# Patient Record
Sex: MALE | Race: WHITE | Employment: OTHER | URBAN - METROPOLITAN AREA
[De-identification: names, ages, dates, MRNs, and addresses within clinical notes are randomized per-mention and may not be internally consistent; named-entity substitution may affect disease eponyms.]

---

## 2018-03-26 ENCOUNTER — APPOINTMENT (OUTPATIENT)
Dept: GENERAL RADIOLOGY | Age: 78
DRG: 193 | End: 2018-03-26
Payer: COMMERCIAL

## 2018-03-26 ENCOUNTER — HOSPITAL ENCOUNTER (INPATIENT)
Age: 78
LOS: 3 days | Discharge: HOME | DRG: 193 | End: 2018-03-29
Attending: EMERGENCY MEDICINE | Admitting: INTERNAL MEDICINE
Payer: COMMERCIAL

## 2018-03-26 DIAGNOSIS — J44.1 COPD EXACERBATION (HCC): ICD-10-CM

## 2018-03-26 PROBLEM — R06.00 DYSPNEA: Status: ACTIVE | Noted: 2018-03-26

## 2018-03-26 LAB
ALBUMIN SERPL-MCNC: 3.7 G/DL (ref 3.5–5.2)
ALP BLD-CCNC: 76 U/L (ref 40–129)
ALT SERPL-CCNC: 18 U/L (ref 0–40)
ANION GAP SERPL CALCULATED.3IONS-SCNC: 13 MMOL/L (ref 7–16)
APTT: 26.6 SEC (ref 24.5–35.1)
AST SERPL-CCNC: 21 U/L (ref 0–39)
B.E.: 0.8 MMOL/L (ref -3–3)
BACTERIA: NORMAL /HPF
BASOPHILS ABSOLUTE: 0.03 E9/L (ref 0–0.2)
BASOPHILS RELATIVE PERCENT: 0.3 % (ref 0–2)
BILIRUB SERPL-MCNC: 0.8 MG/DL (ref 0–1.2)
BILIRUBIN URINE: NEGATIVE
BLOOD, URINE: ABNORMAL
BUN BLDV-MCNC: 9 MG/DL (ref 8–23)
CALCIUM SERPL-MCNC: 9.4 MG/DL (ref 8.6–10.2)
CHLORIDE BLD-SCNC: 98 MMOL/L (ref 98–107)
CLARITY: CLEAR
CO2: 26 MMOL/L (ref 22–29)
COHB: 1.6 % (ref 0–1.5)
COLOR: YELLOW
CREAT SERPL-MCNC: 1.1 MG/DL (ref 0.7–1.2)
CRITICAL: ABNORMAL
DATE ANALYZED: ABNORMAL
DATE OF COLLECTION: ABNORMAL
EOSINOPHILS ABSOLUTE: 0.16 E9/L (ref 0.05–0.5)
EOSINOPHILS RELATIVE PERCENT: 1.8 % (ref 0–6)
GFR AFRICAN AMERICAN: >60
GFR NON-AFRICAN AMERICAN: >60 ML/MIN/1.73
GLUCOSE BLD-MCNC: 112 MG/DL (ref 74–109)
GLUCOSE URINE: NEGATIVE MG/DL
HCO3: 24.4 MMOL/L (ref 22–26)
HCT VFR BLD CALC: 46.4 % (ref 37–54)
HEMOGLOBIN: 15.2 G/DL (ref 12.5–16.5)
HHB: 3.8 % (ref 0–5)
IMMATURE GRANULOCYTES #: 0.06 E9/L
IMMATURE GRANULOCYTES %: 0.7 % (ref 0–5)
INFLUENZA A BY PCR: DETECTED
INFLUENZA B BY PCR: NOT DETECTED
INR BLD: 1
KETONES, URINE: NEGATIVE MG/DL
LAB: ABNORMAL
LACTIC ACID: 1.5 MMOL/L (ref 0.5–2.2)
LEUKOCYTE ESTERASE, URINE: NEGATIVE
LIPASE: 26 U/L (ref 13–60)
LYMPHOCYTES ABSOLUTE: 0.7 E9/L (ref 1.5–4)
LYMPHOCYTES RELATIVE PERCENT: 7.9 % (ref 20–42)
Lab: 1045
MCH RBC QN AUTO: 30.5 PG (ref 26–35)
MCHC RBC AUTO-ENTMCNC: 32.8 % (ref 32–34.5)
MCV RBC AUTO: 93.2 FL (ref 80–99.9)
METHB: 0.1 % (ref 0–1.5)
MODE: ABNORMAL
MONOCYTES ABSOLUTE: 1.09 E9/L (ref 0.1–0.95)
MONOCYTES RELATIVE PERCENT: 12.3 % (ref 2–12)
NEUTROPHILS ABSOLUTE: 6.82 E9/L (ref 1.8–7.3)
NEUTROPHILS RELATIVE PERCENT: 77 % (ref 43–80)
NITRITE, URINE: NEGATIVE
O2 CONTENT: 18.8 ML/DL
O2 SATURATION: 96.1 % (ref 92–98.5)
O2HB: 94.5 % (ref 94–97)
OPERATOR ID: 3
PATIENT TEMP: 37 C
PCO2: 36 MMHG (ref 35–45)
PDW BLD-RTO: 14.8 FL (ref 11.5–15)
PH BLOOD GAS: 7.45 (ref 7.35–7.45)
PH UA: 5 (ref 5–9)
PLATELET # BLD: 204 E9/L (ref 130–450)
PMV BLD AUTO: 10.6 FL (ref 7–12)
PO2: 77.9 MMHG (ref 60–100)
POTASSIUM SERPL-SCNC: 3.6 MMOL/L (ref 3.5–5)
PRO-BNP: 163 PG/ML (ref 0–450)
PROTEIN UA: NEGATIVE MG/DL
PROTHROMBIN TIME: 11.7 SEC (ref 9.3–12.4)
RBC # BLD: 4.98 E12/L (ref 3.8–5.8)
RBC UA: NORMAL /HPF (ref 0–2)
SODIUM BLD-SCNC: 137 MMOL/L (ref 132–146)
SOURCE, BLOOD GAS: ABNORMAL
SPECIFIC GRAVITY UA: <=1.005 (ref 1–1.03)
THB: 14.1 G/DL (ref 11.5–16.5)
TIME ANALYZED: 1051
TOTAL PROTEIN: 6.5 G/DL (ref 6.4–8.3)
UROBILINOGEN, URINE: 0.2 E.U./DL
WBC # BLD: 8.9 E9/L (ref 4.5–11.5)
WBC UA: NORMAL /HPF (ref 0–5)

## 2018-03-26 PROCEDURE — 80053 COMPREHEN METABOLIC PANEL: CPT

## 2018-03-26 PROCEDURE — 87450 HC DIRECT STREP B ANTIGEN: CPT

## 2018-03-26 PROCEDURE — 82805 BLOOD GASES W/O2 SATURATION: CPT

## 2018-03-26 PROCEDURE — 85610 PROTHROMBIN TIME: CPT

## 2018-03-26 PROCEDURE — 81001 URINALYSIS AUTO W/SCOPE: CPT

## 2018-03-26 PROCEDURE — 96374 THER/PROPH/DIAG INJ IV PUSH: CPT

## 2018-03-26 PROCEDURE — 83605 ASSAY OF LACTIC ACID: CPT

## 2018-03-26 PROCEDURE — 71045 X-RAY EXAM CHEST 1 VIEW: CPT

## 2018-03-26 PROCEDURE — 2580000003 HC RX 258: Performed by: EMERGENCY MEDICINE

## 2018-03-26 PROCEDURE — 87502 INFLUENZA DNA AMP PROBE: CPT

## 2018-03-26 PROCEDURE — 85730 THROMBOPLASTIN TIME PARTIAL: CPT

## 2018-03-26 PROCEDURE — 94640 AIRWAY INHALATION TREATMENT: CPT

## 2018-03-26 PROCEDURE — 36415 COLL VENOUS BLD VENIPUNCTURE: CPT

## 2018-03-26 PROCEDURE — 99285 EMERGENCY DEPT VISIT HI MDM: CPT

## 2018-03-26 PROCEDURE — 87040 BLOOD CULTURE FOR BACTERIA: CPT

## 2018-03-26 PROCEDURE — 83690 ASSAY OF LIPASE: CPT

## 2018-03-26 PROCEDURE — 6370000000 HC RX 637 (ALT 250 FOR IP): Performed by: INTERNAL MEDICINE

## 2018-03-26 PROCEDURE — 1200000000 HC SEMI PRIVATE

## 2018-03-26 PROCEDURE — 6370000000 HC RX 637 (ALT 250 FOR IP): Performed by: EMERGENCY MEDICINE

## 2018-03-26 PROCEDURE — 85025 COMPLETE CBC W/AUTO DIFF WBC: CPT

## 2018-03-26 PROCEDURE — 96375 TX/PRO/DX INJ NEW DRUG ADDON: CPT

## 2018-03-26 PROCEDURE — 6360000002 HC RX W HCPCS: Performed by: EMERGENCY MEDICINE

## 2018-03-26 PROCEDURE — 83880 ASSAY OF NATRIURETIC PEPTIDE: CPT

## 2018-03-26 PROCEDURE — 87088 URINE BACTERIA CULTURE: CPT

## 2018-03-26 RX ORDER — 0.9 % SODIUM CHLORIDE 0.9 %
1000 INTRAVENOUS SOLUTION INTRAVENOUS ONCE
Status: COMPLETED | OUTPATIENT
Start: 2018-03-26 | End: 2018-03-26

## 2018-03-26 RX ORDER — SIMVASTATIN 20 MG
20 TABLET ORAL DAILY
Status: DISCONTINUED | OUTPATIENT
Start: 2018-03-27 | End: 2018-03-29 | Stop reason: HOSPADM

## 2018-03-26 RX ORDER — ONDANSETRON 2 MG/ML
4 INJECTION INTRAMUSCULAR; INTRAVENOUS ONCE
Status: COMPLETED | OUTPATIENT
Start: 2018-03-26 | End: 2018-03-26

## 2018-03-26 RX ORDER — ALBUTEROL SULFATE 2.5 MG/3ML
2.5 SOLUTION RESPIRATORY (INHALATION) EVERY 6 HOURS PRN
COMMUNITY

## 2018-03-26 RX ORDER — ACETAMINOPHEN 325 MG/1
650 TABLET ORAL ONCE
Status: COMPLETED | OUTPATIENT
Start: 2018-03-26 | End: 2018-03-26

## 2018-03-26 RX ORDER — OSELTAMIVIR PHOSPHATE 75 MG/1
75 CAPSULE ORAL 2 TIMES DAILY
Status: DISCONTINUED | OUTPATIENT
Start: 2018-03-26 | End: 2018-03-26 | Stop reason: SDUPTHER

## 2018-03-26 RX ORDER — IPRATROPIUM BROMIDE AND ALBUTEROL SULFATE 2.5; .5 MG/3ML; MG/3ML
1 SOLUTION RESPIRATORY (INHALATION)
Status: DISCONTINUED | OUTPATIENT
Start: 2018-03-26 | End: 2018-03-28

## 2018-03-26 RX ORDER — AZITHROMYCIN 250 MG/1
500 TABLET, FILM COATED ORAL ONCE
Status: COMPLETED | OUTPATIENT
Start: 2018-03-26 | End: 2018-03-26

## 2018-03-26 RX ORDER — OSELTAMIVIR PHOSPHATE 75 MG/1
75 CAPSULE ORAL 2 TIMES DAILY
Status: DISCONTINUED | OUTPATIENT
Start: 2018-03-26 | End: 2018-03-29 | Stop reason: HOSPADM

## 2018-03-26 RX ORDER — PREDNISONE 1 MG/1
15 TABLET ORAL DAILY
Status: ON HOLD | COMMUNITY
End: 2019-07-25 | Stop reason: HOSPADM

## 2018-03-26 RX ORDER — ASPIRIN 81 MG/1
81 TABLET, CHEWABLE ORAL DAILY
Status: DISCONTINUED | OUTPATIENT
Start: 2018-03-26 | End: 2018-03-29 | Stop reason: HOSPADM

## 2018-03-26 RX ORDER — IPRATROPIUM BROMIDE AND ALBUTEROL SULFATE 2.5; .5 MG/3ML; MG/3ML
1 SOLUTION RESPIRATORY (INHALATION)
Status: DISCONTINUED | OUTPATIENT
Start: 2018-03-26 | End: 2018-03-27

## 2018-03-26 RX ADMIN — SODIUM CHLORIDE 1000 ML: 9 INJECTION, SOLUTION INTRAVENOUS at 11:30

## 2018-03-26 RX ADMIN — AZITHROMYCIN 500 MG: 250 TABLET, FILM COATED ORAL at 09:56

## 2018-03-26 RX ADMIN — SODIUM CHLORIDE 1000 ML: 9 INJECTION, SOLUTION INTRAVENOUS at 09:06

## 2018-03-26 RX ADMIN — ACETAMINOPHEN 650 MG: 325 TABLET, FILM COATED ORAL at 09:06

## 2018-03-26 RX ADMIN — ONDANSETRON 4 MG: 2 INJECTION INTRAMUSCULAR; INTRAVENOUS at 10:05

## 2018-03-26 RX ADMIN — IPRATROPIUM BROMIDE AND ALBUTEROL SULFATE 1 AMPULE: .5; 3 SOLUTION RESPIRATORY (INHALATION) at 11:24

## 2018-03-26 RX ADMIN — IPRATROPIUM BROMIDE AND ALBUTEROL SULFATE 1 AMPULE: .5; 3 SOLUTION RESPIRATORY (INHALATION) at 14:43

## 2018-03-26 RX ADMIN — MOMETASONE FUROATE AND FORMOTEROL FUMARATE DIHYDRATE 2 PUFF: 200; 5 AEROSOL RESPIRATORY (INHALATION) at 18:54

## 2018-03-26 RX ADMIN — OSELTAMIVIR PHOSPHATE 75 MG: 75 CAPSULE ORAL at 11:30

## 2018-03-26 RX ADMIN — IPRATROPIUM BROMIDE AND ALBUTEROL SULFATE 1 AMPULE: .5; 3 SOLUTION RESPIRATORY (INHALATION) at 18:45

## 2018-03-26 RX ADMIN — WATER 2 G: 1 INJECTION INTRAMUSCULAR; INTRAVENOUS; SUBCUTANEOUS at 09:57

## 2018-03-26 RX ADMIN — ASPIRIN 81 MG 81 MG: 81 TABLET ORAL at 14:24

## 2018-03-26 ASSESSMENT — ENCOUNTER SYMPTOMS
SHORTNESS OF BREATH: 1
SPUTUM PRODUCTION: 1
CHEST TIGHTNESS: 1
NAUSEA: 0
WHEEZING: 0
EYES NEGATIVE: 1
SINUS PAIN: 0
SWOLLEN GLANDS: 0
DIARRHEA: 0
VOMITING: 0
ABDOMINAL PAIN: 0
COUGH: 1

## 2018-03-26 ASSESSMENT — PAIN SCALES - GENERAL
PAINLEVEL_OUTOF10: 0

## 2018-03-26 NOTE — CARE COORDINATION
Discharge Planning:    Met with pt & pt's wife Ashley Campos, in the ED regarding discharge planning. Pt refused to complete assessment with SW, SW asked if assessment could be completed by pt's wife Ashley Campos pt relayed that he can complete assessment. Pt lives with his wife, in a 1 story home, with 2 steps to get into the home. DME pt owns is O2 received through [address]. Pt has never received HHC & never been to rehabilitation facility. Pt is independent at home and does drive. Pt receives assistance from his wife. Pharmacy pt prefers is [facility] on [address]. Pt relays no concerns for returning home at this time. SW will follow.     Electronically signed by MELINA Brooks on 3/26/2018 at 10:52 AM

## 2018-03-26 NOTE — ED PROVIDER NOTES
Patient had be helped from his vehicle by nursing staff. He states that he became short of breath last night and is extremely weak. He has history of COPD and states he chronically wears 2 L of oxygen at home. He states that the worsening in his breathing began last evening and was not improved with taking his inhalers. He last took his inhalers this morning. He denies any chest pain but states he has tightness. He's had a productive cough. He denies fever or chills. He admits to being generally weak. The history is provided by the patient. Shortness of Breath   Severity:  Moderate  Onset quality:  Gradual  Duration:  12 hours  Timing:  Constant  Progression:  Worsening  Chronicity:  Recurrent  Context: URI    Relieved by:  Nothing  Worsened by: Activity  Ineffective treatments:  Inhaler  Associated symptoms: cough and sputum production    Associated symptoms: no abdominal pain, no chest pain, no diaphoresis, no ear pain, no fever, no headaches, no swollen glands, no vomiting and no wheezing    Risk factors: tobacco use (prior history)    Risk factors: no hx of PE/DVT        Review of Systems   Constitutional: Positive for activity change, appetite change and fatigue. Negative for diaphoresis and fever. HENT: Negative for congestion, ear pain and sinus pain. Eyes: Negative. Respiratory: Positive for cough, sputum production, chest tightness and shortness of breath. Negative for wheezing. Cardiovascular: Negative for chest pain, palpitations and leg swelling. Gastrointestinal: Negative for abdominal pain, diarrhea, nausea and vomiting. Genitourinary: Negative for decreased urine volume and flank pain. Musculoskeletal: Negative. Skin: Negative. Neurological: Positive for weakness (generalized). Negative for light-headedness and headaches. Physical Exam   Constitutional: He is oriented to person, place, and time. He appears well-developed and well-nourished. No distress. HENT:   Head: Normocephalic and atraumatic. Eyes: Conjunctivae and EOM are normal. Pupils are equal, round, and reactive to light. Neck: Normal range of motion. Neck supple. Cardiovascular: Regular rhythm, normal heart sounds and intact distal pulses. No murmur heard. Mild tachycardia   Pulmonary/Chest: Effort normal and breath sounds normal. No respiratory distress. He has no wheezes. He has no rales. He exhibits no tenderness. Abdominal: Soft. Bowel sounds are normal. There is no tenderness. There is no rebound and no guarding. Musculoskeletal: He exhibits no edema. Very weak, requiring two people to pull him to the sitting position. Lymphadenopathy:     He has no cervical adenopathy. Neurological: He is alert and oriented to person, place, and time. No cranial nerve deficit. Coordination normal.   Skin: Skin is warm and dry. No rash noted. He is not diaphoretic. No erythema. No pallor. Psychiatric: He has a normal mood and affect. His behavior is normal. Judgment and thought content normal.   Nursing note and vitals reviewed. Procedures    MDM    ED Course      EKG Interpretation    Interpreted by emergency department physician    ED Course        --------------------------------------------- PAST HISTORY ---------------------------------------------  Past Medical History:  has a past medical history of COPD (chronic obstructive pulmonary disease) ([facility] [address] and Hyperlipidemia. Past Surgical History:  has a past surgical history that includes Hemorrhoid surgery; eye surgery (Right); and Colonoscopy. Social History:  reports that he quit smoking about 8 years ago. He has quit using smokeless tobacco. He reports that he drinks about 0.6 oz of alcohol per week . He reports that he does not use drugs. Family History: family history is not on file. The patients home medications have been reviewed.     Allergies: Patient has no known allergies. -------------------------------------------------- RESULTS -------------------------------------------------    Lab  Results for orders placed or performed during the hospital encounter of 03/26/18   Culture blood #1   Result Value Ref Range    Blood Culture, Routine 5 Days- no growth    Culture blood #2   Result Value Ref Range    Culture, Blood 2 5 Days- no growth    Urine culture   Result Value Ref Range    Urine Culture, Routine <10,000 CFU/mL  Mixed gram positive organisms      Rapid influenza A/B antigens   Result Value Ref Range    Influenza A by PCR DETECTED (A) Not Detected    Influenza B by PCR Not Detected Not Detected   LEGIONELLA ANTIGEN, URINE   Result Value Ref Range    L. pneumophila Serogp 1 Ur Ag       Presumptive NEGATIVE suggesting no recent or current infections  with Legionella pneumophilia serogroup 1. Infection to  Legionella cannot be ruled out since other serogroups and  species may cause infection, antigen may not be present in  early infection, or level of antigen may be below the  detection limit. STREP PNEUMONIAE ANTIGEN   Result Value Ref Range    STREP PNEUMONIAE ANTIGEN, URINE       Presumptive NEGATIVE for Pneumococcal pneumonia, suggesting  no current or recent pneumococcal infection.  Infection due  to S. pneumoniae cannot be ruled out since the antigen present  in the sample may be below the detection limit of the test.     CBC auto differential   Result Value Ref Range    WBC 8.9 4.5 - 11.5 E9/L    RBC 4.98 3.80 - 5.80 E12/L    Hemoglobin 15.2 12.5 - 16.5 g/dL    Hematocrit 46.4 37.0 - 54.0 %    MCV 93.2 80.0 - 99.9 fL    MCH 30.5 26.0 - 35.0 pg    MCHC 32.8 32.0 - 34.5 %    RDW 14.8 11.5 - 15.0 fL    Platelets 274 856 - 031 E9/L    MPV 10.6 7.0 - 12.0 fL    Neutrophils % 77.0 43.0 - 80.0 %    Immature Granulocytes % 0.7 0.0 - 5.0 %    Lymphocytes % 7.9 (L) 20.0 - 42.0 %    Monocytes % 12.3 (H) 2.0 - 12.0 %    Eosinophils % 1.8 0.0 - 6.0 %    Basophils % 0.3 0.0 0.0 - 1.5 %    MetHb 0.1 0.0 - 1.5 %    O2 Content 18.8 mL/dL    HHb 3.8 0.0 - 5.0 %    tHb (est) 14.1 11.5 - 16.5 g/dL    Mode NC- 2 L     Date Of Collection [id number]     Time Collected 1045     Pt Temp 37.0 C     ID [id number]     Lab [id number]     Critical(s) Notified .  No Critical Values    Brain Natriuretic Peptide   Result Value Ref Range    Pro- 0 - 450 pg/mL   Microscopic Urinalysis   Result Value Ref Range    WBC, UA NONE 0 - 5 /HPF    RBC, UA NONE 0 - 2 /HPF    Bacteria, UA NONE /HPF   CBC WITH AUTO DIFFERENTIAL   Result Value Ref Range    WBC 7.3 4.5 - 11.5 E9/L    RBC 4.32 3.80 - 5.80 E12/L    Hemoglobin 13.2 12.5 - 16.5 g/dL    Hematocrit 41.2 37.0 - 54.0 %    MCV 95.4 80.0 - 99.9 fL    MCH 30.6 26.0 - 35.0 pg    MCHC 32.0 32.0 - 34.5 %    RDW 15.0 11.5 - 15.0 fL    Platelets 931 332 - 762 E9/L    MPV 10.5 7.0 - 12.0 fL    Neutrophils % 68.2 43.0 - 80.0 %    Immature Granulocytes % 0.6 0.0 - 5.0 %    Lymphocytes % 16.4 (L) 20.0 - 42.0 %    Monocytes % 11.7 2.0 - 12.0 %    Eosinophils % 2.8 0.0 - 6.0 %    Basophils % 0.3 0.0 - 2.0 %    Neutrophils # 4.95 1.80 - 7.30 E9/L    Immature Granulocytes # 0.04 E9/L    Lymphocytes # 1.19 (L) 1.50 - 4.00 E9/L    Monocytes # 0.85 0.10 - 0.95 E9/L    Eosinophils # 0.20 0.05 - 0.50 E9/L    Basophils # 0.02 0.00 - 0.20 E9/L   Comprehensive metabolic panel   Result Value Ref Range    Sodium 136 132 - 146 mmol/L    Potassium 3.9 3.5 - 5.0 mmol/L    Chloride 99 98 - 107 mmol/L    CO2 29 22 - 29 mmol/L    Anion Gap 8 7 - 16 mmol/L    Glucose 81 74 - 109 mg/dL    BUN 11 8 - 23 mg/dL    CREATININE 1.0 0.7 - 1.2 mg/dL    GFR Non-African American >60 >=60 mL/min/1.73    GFR African American >60     Calcium 8.8 8.6 - 10.2 mg/dL    Total Protein 5.8 (L) 6.4 - 8.3 g/dL    Alb 3.3 (L) 3.5 - 5.2 g/dL    Total Bilirubin 0.5 0.0 - 1.2 mg/dL    Alkaline Phosphatase 59 40 - 129 U/L    ALT 15 0 - 40 U/L    AST 20 0 - 39 U/L   CBC Auto Differential   Result Value Ref Range    WBC # 0.03 E9/L    Lymphocytes # 1.65 1.50 - 4.00 E9/L    Monocytes # 0.78 0.10 - 0.95 E9/L    Eosinophils # 0.30 0.05 - 0.50 E9/L    Basophils # 0.03 0.00 - 0.20 E9/L   Comprehensive Metabolic Panel   Result Value Ref Range    Sodium 142 132 - 146 mmol/L    Potassium 3.7 3.5 - 5.0 mmol/L    Chloride 99 98 - 107 mmol/L    CO2 32 (H) 22 - 29 mmol/L    Anion Gap 11 7 - 16 mmol/L    Glucose 93 74 - 109 mg/dL    BUN 9 8 - 23 mg/dL    CREATININE 0.9 0.7 - 1.2 mg/dL    GFR Non-African American >60 >=60 mL/min/1.73    GFR African American >60     Calcium 9.0 8.6 - 10.2 mg/dL    Total Protein 6.2 (L) 6.4 - 8.3 g/dL    Alb 3.6 3.5 - 5.2 g/dL    Total Bilirubin 0.5 0.0 - 1.2 mg/dL    Alkaline Phosphatase 58 40 - 129 U/L    ALT 17 0 - 40 U/L    AST 24 0 - 39 U/L   EKG 12 Lead   Result Value Ref Range    Ventricular Rate 94 BPM    Atrial Rate 94 BPM    P-R Interval 164 ms    QRS Duration 110 ms    Q-T Interval 354 ms    QTc Calculation (Bazett) 442 ms    P Axis 56 degrees    R Axis -15 degrees    T Axis 63 degrees       Radiology  XR CHEST PORTABLE   Final Result   Small right basilar focal area of airspace disease, atelectasis or   developing pneumonia. EKG: This EKG is signed and interpreted by me.        ------------------------- NURSING NOTES AND VITALS REVIEWED ---------------------------  Date / Time Roomed:  3/26/2018  8:30 AM  ED Bed Assignment:  0420/0420-01    The nursing notes within the ED encounter and vital signs as below have been reviewed. No data found. Oxygen Saturation Interpretation: Abnormal and Improved after treatment      ------------------------------------------ PROGRESS NOTES ------------------------------------------  Re-evaluation(s):  Patient is improving with breathing treatments however he continues to be very weak.     I have spoken with the patient and wife and discussed todays results, in addition to providing specific details for the plan of care and counseling regarding the

## 2018-03-27 LAB
ALBUMIN SERPL-MCNC: 3.3 G/DL (ref 3.5–5.2)
ALP BLD-CCNC: 59 U/L (ref 40–129)
ALT SERPL-CCNC: 15 U/L (ref 0–40)
ANION GAP SERPL CALCULATED.3IONS-SCNC: 8 MMOL/L (ref 7–16)
AST SERPL-CCNC: 20 U/L (ref 0–39)
BASOPHILS ABSOLUTE: 0.02 E9/L (ref 0–0.2)
BASOPHILS RELATIVE PERCENT: 0.3 % (ref 0–2)
BILIRUB SERPL-MCNC: 0.5 MG/DL (ref 0–1.2)
BUN BLDV-MCNC: 11 MG/DL (ref 8–23)
CALCIUM SERPL-MCNC: 8.8 MG/DL (ref 8.6–10.2)
CHLORIDE BLD-SCNC: 99 MMOL/L (ref 98–107)
CO2: 29 MMOL/L (ref 22–29)
CREAT SERPL-MCNC: 1 MG/DL (ref 0.7–1.2)
EOSINOPHILS ABSOLUTE: 0.2 E9/L (ref 0.05–0.5)
EOSINOPHILS RELATIVE PERCENT: 2.8 % (ref 0–6)
GFR AFRICAN AMERICAN: >60
GFR NON-AFRICAN AMERICAN: >60 ML/MIN/1.73
GLUCOSE BLD-MCNC: 81 MG/DL (ref 74–109)
HCT VFR BLD CALC: 41.2 % (ref 37–54)
HEMOGLOBIN: 13.2 G/DL (ref 12.5–16.5)
IMMATURE GRANULOCYTES #: 0.04 E9/L
IMMATURE GRANULOCYTES %: 0.6 % (ref 0–5)
L. PNEUMOPHILA SEROGP 1 UR AG: NORMAL
LV EF: 69 %
LVEF MODALITY: NORMAL
LYMPHOCYTES ABSOLUTE: 1.19 E9/L (ref 1.5–4)
LYMPHOCYTES RELATIVE PERCENT: 16.4 % (ref 20–42)
MCH RBC QN AUTO: 30.6 PG (ref 26–35)
MCHC RBC AUTO-ENTMCNC: 32 % (ref 32–34.5)
MCV RBC AUTO: 95.4 FL (ref 80–99.9)
MONOCYTES ABSOLUTE: 0.85 E9/L (ref 0.1–0.95)
MONOCYTES RELATIVE PERCENT: 11.7 % (ref 2–12)
NEUTROPHILS ABSOLUTE: 4.95 E9/L (ref 1.8–7.3)
NEUTROPHILS RELATIVE PERCENT: 68.2 % (ref 43–80)
PDW BLD-RTO: 15 FL (ref 11.5–15)
PLATELET # BLD: 184 E9/L (ref 130–450)
PMV BLD AUTO: 10.5 FL (ref 7–12)
POTASSIUM SERPL-SCNC: 3.9 MMOL/L (ref 3.5–5)
RBC # BLD: 4.32 E12/L (ref 3.8–5.8)
SODIUM BLD-SCNC: 136 MMOL/L (ref 132–146)
STREP PNEUMONIAE ANTIGEN, URINE: NORMAL
TOTAL PROTEIN: 5.8 G/DL (ref 6.4–8.3)
WBC # BLD: 7.3 E9/L (ref 4.5–11.5)

## 2018-03-27 PROCEDURE — 2580000003 HC RX 258: Performed by: INTERNAL MEDICINE

## 2018-03-27 PROCEDURE — 36415 COLL VENOUS BLD VENIPUNCTURE: CPT

## 2018-03-27 PROCEDURE — 85025 COMPLETE CBC W/AUTO DIFF WBC: CPT

## 2018-03-27 PROCEDURE — 93306 TTE W/DOPPLER COMPLETE: CPT

## 2018-03-27 PROCEDURE — 94640 AIRWAY INHALATION TREATMENT: CPT

## 2018-03-27 PROCEDURE — 93005 ELECTROCARDIOGRAM TRACING: CPT | Performed by: SPECIALIST

## 2018-03-27 PROCEDURE — 6370000000 HC RX 637 (ALT 250 FOR IP): Performed by: INTERNAL MEDICINE

## 2018-03-27 PROCEDURE — 2700000000 HC OXYGEN THERAPY PER DAY

## 2018-03-27 PROCEDURE — 6370000000 HC RX 637 (ALT 250 FOR IP): Performed by: EMERGENCY MEDICINE

## 2018-03-27 PROCEDURE — 6360000002 HC RX W HCPCS: Performed by: INTERNAL MEDICINE

## 2018-03-27 PROCEDURE — 80053 COMPREHEN METABOLIC PANEL: CPT

## 2018-03-27 PROCEDURE — 1200000000 HC SEMI PRIVATE

## 2018-03-27 RX ADMIN — IPRATROPIUM BROMIDE AND ALBUTEROL SULFATE 1 AMPULE: .5; 3 SOLUTION RESPIRATORY (INHALATION) at 09:40

## 2018-03-27 RX ADMIN — OSELTAMIVIR PHOSPHATE 75 MG: 75 CAPSULE ORAL at 07:58

## 2018-03-27 RX ADMIN — PREDNISONE 15 MG: 5 TABLET ORAL at 07:58

## 2018-03-27 RX ADMIN — AZITHROMYCIN MONOHYDRATE 500 MG: 500 INJECTION, POWDER, LYOPHILIZED, FOR SOLUTION INTRAVENOUS at 10:18

## 2018-03-27 RX ADMIN — MOMETASONE FUROATE AND FORMOTEROL FUMARATE DIHYDRATE 2 PUFF: 200; 5 AEROSOL RESPIRATORY (INHALATION) at 06:29

## 2018-03-27 RX ADMIN — IPRATROPIUM BROMIDE AND ALBUTEROL SULFATE 1 AMPULE: .5; 3 SOLUTION RESPIRATORY (INHALATION) at 18:29

## 2018-03-27 RX ADMIN — ASPIRIN 81 MG 81 MG: 81 TABLET ORAL at 07:58

## 2018-03-27 RX ADMIN — MOMETASONE FUROATE AND FORMOTEROL FUMARATE DIHYDRATE 2 PUFF: 200; 5 AEROSOL RESPIRATORY (INHALATION) at 18:37

## 2018-03-27 RX ADMIN — IPRATROPIUM BROMIDE AND ALBUTEROL SULFATE 1 AMPULE: .5; 3 SOLUTION RESPIRATORY (INHALATION) at 06:29

## 2018-03-27 RX ADMIN — OSELTAMIVIR PHOSPHATE 75 MG: 75 CAPSULE ORAL at 00:07

## 2018-03-27 RX ADMIN — SIMVASTATIN 20 MG: 20 TABLET, FILM COATED ORAL at 07:58

## 2018-03-27 RX ADMIN — IPRATROPIUM BROMIDE AND ALBUTEROL SULFATE 1 AMPULE: .5; 3 SOLUTION RESPIRATORY (INHALATION) at 14:29

## 2018-03-27 RX ADMIN — CEFTRIAXONE 1 G: 1 INJECTION, POWDER, FOR SOLUTION INTRAMUSCULAR; INTRAVENOUS at 09:47

## 2018-03-27 RX ADMIN — OSELTAMIVIR PHOSPHATE 75 MG: 75 CAPSULE ORAL at 20:51

## 2018-03-27 ASSESSMENT — PAIN SCALES - GENERAL
PAINLEVEL_OUTOF10: 0
PAINLEVEL_OUTOF10: 0

## 2018-03-28 LAB
ALBUMIN SERPL-MCNC: 3.3 G/DL (ref 3.5–5.2)
ALP BLD-CCNC: 57 U/L (ref 40–129)
ALT SERPL-CCNC: 15 U/L (ref 0–40)
ANION GAP SERPL CALCULATED.3IONS-SCNC: 11 MMOL/L (ref 7–16)
AST SERPL-CCNC: 23 U/L (ref 0–39)
BASOPHILS ABSOLUTE: 0.02 E9/L (ref 0–0.2)
BASOPHILS RELATIVE PERCENT: 0.3 % (ref 0–2)
BILIRUB SERPL-MCNC: 0.6 MG/DL (ref 0–1.2)
BUN BLDV-MCNC: 12 MG/DL (ref 8–23)
CALCIUM SERPL-MCNC: 8.7 MG/DL (ref 8.6–10.2)
CHLORIDE BLD-SCNC: 99 MMOL/L (ref 98–107)
CO2: 31 MMOL/L (ref 22–29)
CREAT SERPL-MCNC: 1 MG/DL (ref 0.7–1.2)
EKG ATRIAL RATE: 94 BPM
EKG P AXIS: 56 DEGREES
EKG P-R INTERVAL: 164 MS
EKG Q-T INTERVAL: 354 MS
EKG QRS DURATION: 110 MS
EKG QTC CALCULATION (BAZETT): 442 MS
EKG R AXIS: -15 DEGREES
EKG T AXIS: 63 DEGREES
EKG VENTRICULAR RATE: 94 BPM
EOSINOPHILS ABSOLUTE: 0.26 E9/L (ref 0.05–0.5)
EOSINOPHILS RELATIVE PERCENT: 3.9 % (ref 0–6)
GFR AFRICAN AMERICAN: >60
GFR NON-AFRICAN AMERICAN: >60 ML/MIN/1.73
GLUCOSE BLD-MCNC: 87 MG/DL (ref 74–109)
HCT VFR BLD CALC: 41.1 % (ref 37–54)
HEMOGLOBIN: 13.2 G/DL (ref 12.5–16.5)
IMMATURE GRANULOCYTES #: 0.05 E9/L
IMMATURE GRANULOCYTES %: 0.8 % (ref 0–5)
LYMPHOCYTES ABSOLUTE: 1.28 E9/L (ref 1.5–4)
LYMPHOCYTES RELATIVE PERCENT: 19.3 % (ref 20–42)
MCH RBC QN AUTO: 30.3 PG (ref 26–35)
MCHC RBC AUTO-ENTMCNC: 32.1 % (ref 32–34.5)
MCV RBC AUTO: 94.5 FL (ref 80–99.9)
MONOCYTES ABSOLUTE: 0.71 E9/L (ref 0.1–0.95)
MONOCYTES RELATIVE PERCENT: 10.7 % (ref 2–12)
NEUTROPHILS ABSOLUTE: 4.32 E9/L (ref 1.8–7.3)
NEUTROPHILS RELATIVE PERCENT: 65 % (ref 43–80)
PDW BLD-RTO: 14.9 FL (ref 11.5–15)
PLATELET # BLD: 188 E9/L (ref 130–450)
PMV BLD AUTO: 10.5 FL (ref 7–12)
POTASSIUM SERPL-SCNC: 3.8 MMOL/L (ref 3.5–5)
RBC # BLD: 4.35 E12/L (ref 3.8–5.8)
SODIUM BLD-SCNC: 141 MMOL/L (ref 132–146)
TOTAL PROTEIN: 6 G/DL (ref 6.4–8.3)
URINE CULTURE, ROUTINE: NORMAL
WBC # BLD: 6.6 E9/L (ref 4.5–11.5)

## 2018-03-28 PROCEDURE — 6370000000 HC RX 637 (ALT 250 FOR IP): Performed by: INTERNAL MEDICINE

## 2018-03-28 PROCEDURE — 36415 COLL VENOUS BLD VENIPUNCTURE: CPT

## 2018-03-28 PROCEDURE — 2700000000 HC OXYGEN THERAPY PER DAY

## 2018-03-28 PROCEDURE — 80053 COMPREHEN METABOLIC PANEL: CPT

## 2018-03-28 PROCEDURE — 94640 AIRWAY INHALATION TREATMENT: CPT

## 2018-03-28 PROCEDURE — 6360000002 HC RX W HCPCS: Performed by: INTERNAL MEDICINE

## 2018-03-28 PROCEDURE — 85025 COMPLETE CBC W/AUTO DIFF WBC: CPT

## 2018-03-28 PROCEDURE — 6370000000 HC RX 637 (ALT 250 FOR IP): Performed by: EMERGENCY MEDICINE

## 2018-03-28 PROCEDURE — 2580000003 HC RX 258

## 2018-03-28 PROCEDURE — 1200000000 HC SEMI PRIVATE

## 2018-03-28 PROCEDURE — 2580000003 HC RX 258: Performed by: INTERNAL MEDICINE

## 2018-03-28 RX ORDER — FAMOTIDINE 20 MG/1
20 TABLET, FILM COATED ORAL 2 TIMES DAILY
Status: DISCONTINUED | OUTPATIENT
Start: 2018-03-28 | End: 2018-03-29 | Stop reason: HOSPADM

## 2018-03-28 RX ORDER — SODIUM CHLORIDE 0.9 % (FLUSH) 0.9 %
SYRINGE (ML) INJECTION
Status: COMPLETED
Start: 2018-03-28 | End: 2018-03-28

## 2018-03-28 RX ORDER — ALBUTEROL SULFATE 2.5 MG/3ML
2.5 SOLUTION RESPIRATORY (INHALATION)
Status: DISCONTINUED | OUTPATIENT
Start: 2018-03-28 | End: 2018-03-29 | Stop reason: HOSPADM

## 2018-03-28 RX ADMIN — CEFTRIAXONE 1 G: 1 INJECTION, POWDER, FOR SOLUTION INTRAMUSCULAR; INTRAVENOUS at 10:34

## 2018-03-28 RX ADMIN — AZITHROMYCIN MONOHYDRATE 500 MG: 500 INJECTION, POWDER, LYOPHILIZED, FOR SOLUTION INTRAVENOUS at 11:10

## 2018-03-28 RX ADMIN — ASPIRIN 81 MG 81 MG: 81 TABLET ORAL at 08:14

## 2018-03-28 RX ADMIN — OSELTAMIVIR PHOSPHATE 75 MG: 75 CAPSULE ORAL at 08:14

## 2018-03-28 RX ADMIN — SIMVASTATIN 20 MG: 20 TABLET, FILM COATED ORAL at 08:13

## 2018-03-28 RX ADMIN — ALBUTEROL SULFATE 2.5 MG: 2.5 SOLUTION RESPIRATORY (INHALATION) at 14:11

## 2018-03-28 RX ADMIN — IPRATROPIUM BROMIDE AND ALBUTEROL SULFATE 1 AMPULE: .5; 3 SOLUTION RESPIRATORY (INHALATION) at 09:34

## 2018-03-28 RX ADMIN — Medication 10 ML: at 10:33

## 2018-03-28 RX ADMIN — IPRATROPIUM BROMIDE AND ALBUTEROL SULFATE 1 AMPULE: .5; 3 SOLUTION RESPIRATORY (INHALATION) at 06:17

## 2018-03-28 RX ADMIN — OSELTAMIVIR PHOSPHATE 75 MG: 75 CAPSULE ORAL at 21:04

## 2018-03-28 RX ADMIN — TIOTROPIUM BROMIDE 18 MCG: 18 CAPSULE ORAL; RESPIRATORY (INHALATION) at 14:23

## 2018-03-28 RX ADMIN — PREDNISONE 15 MG: 5 TABLET ORAL at 08:13

## 2018-03-28 RX ADMIN — MOMETASONE FUROATE AND FORMOTEROL FUMARATE DIHYDRATE 2 PUFF: 200; 5 AEROSOL RESPIRATORY (INHALATION) at 18:22

## 2018-03-28 RX ADMIN — MOMETASONE FUROATE AND FORMOTEROL FUMARATE DIHYDRATE 2 PUFF: 200; 5 AEROSOL RESPIRATORY (INHALATION) at 06:17

## 2018-03-28 RX ADMIN — ALBUTEROL SULFATE 2.5 MG: 2.5 SOLUTION RESPIRATORY (INHALATION) at 18:22

## 2018-03-28 ASSESSMENT — PAIN SCALES - GENERAL
PAINLEVEL_OUTOF10: 0

## 2018-03-28 NOTE — PROGRESS NOTES
Department of Internal Medicine  General Internal Medicine  Attending Progress Note      SUBJECTIVE:    Feels better today. Shortness of breath is better. Occasional cough with yellowish to greenish sputum. No chest pains. Eating and drinking well. Medications    Current Facility-Administered Medications: albuterol (PROVENTIL) nebulizer solution 2.5 mg, 2.5 mg, Nebulization, Q4H WA  tiotropium (SPIRIVA) inhalation capsule 18 mcg, 18 mcg, Inhalation, Daily  famotidine (PEPCID) tablet 20 mg, 20 mg, Oral, BID  oseltamivir (TAMIFLU) capsule 75 mg, 75 mg, Oral, BID  aspirin chewable tablet 81 mg, 81 mg, Oral, Daily  mometasone-formoterol (DULERA) 200-5 MCG/ACT inhaler 2 puff, 2 puff, Inhalation, BID  predniSONE (DELTASONE) tablet 15 mg, 15 mg, Oral, Daily  simvastatin (ZOCOR) tablet 20 mg, 20 mg, Oral, Daily  azithromycin (ZITHROMAX) 500 mg in D5W 250ml Vial Mate, 500 mg, Intravenous, Q24H  enoxaparin (LOVENOX) injection 40 mg, 40 mg, Subcutaneous, Daily  cefTRIAXone (ROCEPHIN) 1 g in dextrose 5 % 50 mL IVPB, 1 g, Intravenous, Q24H    Physical    VITALS:  BP (!) 124/59   Pulse 83   Temp 98.4 °F (36.9 °C) (Oral)   Resp 16   Ht 5' 11\" (1.803 m)   Wt 224 lb 8 oz (101.8 kg)   SpO2 95%   BMI 31.31 kg/m²   TEMPERATURE:  Current - Temp: 98.4 °F (36.9 °C);  Max - Temp  Av.7 °F (37.1 °C)  Min: 98.4 °F (36.9 °C)  Max: 99 °F (37.2 °C)  RESPIRATIONS RANGE: Resp  Av  Min: 16  Max: 16  PULSE RANGE: Pulse  Av.5  Min: 83  Max: 90  BLOOD PRESSURE RANGE:  Systolic (49QXO), ZXR:557 , Min:100 , DPY:064   ; Diastolic (19ULP), GZA:36, Min:59, Max:60    PULSE OXIMETRY RANGE: SpO2  Av.7 %  Min: 93 %  Max: 95 %  24HR INTAKE/OUTPUT:      Intake/Output Summary (Last 24 hours) at 18 1858  Last data filed at 18 1829   Gross per 24 hour   Intake             1240 ml   Output                0 ml   Net             1240 ml       CONSTITUTIONAL:  awake, alert, cooperative, no apparent distress, and appears

## 2018-03-28 NOTE — H&P
[address]                               HISTORY AND PHYSICAL    PATIENT NAME: Cecilia Rodriguez                       :        1940  MED REC NO:   [id number]                            ROOM:       6356  ACCOUNT NO:   [de-identified]                           ADMIT DATE: 2018  PROVIDER:     Blossom Hamman, MD    CHIEF COMPLAINT:  Shortness of breath. HISTORY OF PRESENT ILLNESS:  The patient is a 52-year-old gentleman. He  presented to the emergency room with a week history of having coughing. Yesterday, he started having soreness of his throat and shortness of  breath. He denied having any fevers. He had some chills. No nausea,  vomiting, diarrhea or constipation. No urinary complaints. Denied any  chest pain. He presented to the ER because of persistence of his symptoms. The patient was seen in the emergency room and he had a chest x-ray done,  which revealed evidence of a small right basal airspace disease. His  influenza A was positive. He was given Rocephin and azithromycin  intravenously and Tamiflu 75 mg p.o. x1. He was admitted for further  evaluation. PAST MEDICAL HISTORY:  1. Longstanding COPD. 2.  History of lung nodules. 3.  Hyperlipidemia. 4.  History of abdominal aortic aneurysm 3 cm, a few years ago. Repeat  ultrasound done in  showed no significant aneurysmal dilatation of the  aorta. PAST SURGICAL HISTORY:  1. Right cataract surgery. 2.  Hemorrhoid surgery many years ago. FAMILY HISTORY:  The mother  from coronary artery disease in her  76s. Father  of dementia. Two brother  of cancer of the  lung. Three sisters alive and healthy. He has one son who has diabetes  mellitus type 2. One daughter who is healthy. ALLERGIES:  No known drug allergies.     MEDICATIONS:  Aspirin 81 mg daily, Symbicort 2 puffs twice daily,  prednisone 50 mg daily, simvastatin 20 mg nightly. SOCIAL HISTORY:  The patient has a 50 pack-year history of smoking. He  drinks about 12-pack per week. He is . He is retired and used to  be an . REVIEW OF SYSTEMS:  Unremarkable other than stated in the history of  present illness and past medical history. PHYSICAL EXAMINATION:  GENERAL APPEARANCE:  A 67-year-old gentleman in no acute distress. His  vital signs in the ER, temperature 100.4, pulse 108 per minute, respiratory  rate 20 per minute, blood pressure 149/67, saturation of oxygen 96% on  nasal cannula. HEENT:  Normocephalic, atraumatic. His pupils are equal and reactive  bilaterally. Extraocular movements are intact. No conjunctival pallor or  icterus appreciated. NECK:  Supple. No JVD. No lymphadenopathy, no bruits. CHEST:  Extremely diminished over the bases bilaterally. He has vesicular  breath sounds. ABDOMEN:  Soft, nontender. Bowel sounds are positive. No organomegaly  appreciated. EXTREMITIES:  There is no edema, clubbing or cyanosis. NEUROLOGIC:  Awake, alert, oriented x3 with no focal deficits noted. LABORATORY DATA:  Sodium 137, potassium 3.6, chloride 98, bicarb 26, BUN of  9, creatinine of 1.1. Liver function tests are normal.  White cell count  8.9, hemoglobin 15.2, hematocrit 46.4, platelet count 625,184. ASSESSMENT/PLAN:  1. Influenza A. The patient placed on Tamiflu 75 mg p.o. b.i.d. We will  continue same for now. 2.  COPD with exacerbation. We will keep him on Rocephin and azithromycin  intravenously and aerosol treatments and consult his pulmonologist, Dr. Dontrell Ortez. We will monitor his respiratory status closely. 3.  Right lower lobe airspace disease, rule out pneumonia. We will check  her blood cultures. We will send urine for Legionella and strep pneumoniae  antigens. Keep him on Rocephin and azithromycin intravenously for now. Consult Dr. Dontrell Ortez as stated above.   4.  Chronic respiratory failure, on home oxygen. 5.  Dyspnea secondary to above. Rule out any cardiac etiology. We will  consult Dr. Cody Lantigua. 6.  Hyperlipidemia. 7.  History of lung nodules in the past.  8.  History of tobacco abuse.         Dell Acosta MD    D: 03/27/2018 20:12:45       T: 03/27/2018 20:15:43     [email or web address]  Job#: [id number]     Doc#: [id number]    CC:

## 2018-03-28 NOTE — PROGRESS NOTES
P Quality Flow/Interdisciplinary Rounds Progress Note        Quality Flow Rounds held on March 28, 2018    Disciplines Attending:  Bedside Nurse, ,  and Nursing Unit Leadership    Julian Garcia was admitted on 3/26/2018  8:30 AM    Anticipated Discharge Date:  Expected Discharge Date: 03/29/18    Disposition:    Philipp Score:  Philipp Scale Score: 20    Readmission Risk              Readmission Risk:        14.75       Age 72 or Greater:  1    Admitted from SNF or Requires Paid or Family Care:  0    Currently has CHF,COPD,ARF,CRI,or is on dialysis:  4    Takes more than 5 Prescription Medications:  4    Takes Digoxin,Insulin,Anticoagulants,Narcotics or ASA/Plavix:  [address] in Past 12 Months:  0    On Disability:  0    Patient Considers own Health:  3.75          Discussed patient goal for the day, patient clinical progression, and barriers to discharge.   The following Goal(s) of the Day/Commitment(s) have been identified:  Activity progression, Iso for Flu, has Home O2, IV ATX,        Gabrielle Chappell  March 28, 2018

## 2018-03-28 NOTE — CONSULTS
[address] [place] [address]                                   CONSULTATION    PATIENT NAME: Dada Farris                       :        1940  MED REC NO:   [id number]                            ROOM:       2894  ACCOUNT NO:   [de-identified]                           ADMIT DATE: 2018  PROVIDER:     Sakina Castañeda MD    CONSULT DATE:  2018    HISTORY OF PRESENT ILLNESS:  The patient is a 58-year-old white male whom I  am seeing in consultation at the request of Dr. Jillian Fragoso. The patient had  presented to the hospital because of increasing shortness of breath. Overall, doing poorly. A chest x-ray was done, which has suggested the  possibility of right lower lobe pneumonia. He has been coughing, chest  tightness, chest congestion, and just overall very weak. PAST MEDICAL HISTORY:  He is known to me from previous encounter. He has  had a bronchoscopy on 2018, because of intractable cough. At that  time, I found severe tracheobronchitis, most likely secondary to GERD and  in spite of recommendation, the patient had refused to see a GI specialist  for an upper endoscopy. He has been having problem with GERD that he has  denied intermittently, but I also has spoken with his wife, which she had  told me that he was having problems with that. His other history; known to  have severe COPD, chronic respiratory failure, has been corticosteroid  dependent. Has had deconditioning. SMOKING HABIT:  He did smoke for about 45 years about 1-1/2 pack of  cigarettes per day, quit in . WORK HISTORY:   39 years, retired about 15 years ago. He also  had worked at [employer] _____ at one point for about 3 years. FAMILY HISTORY:  Mother, brother, sister had asthma; emphysema in the  mother and brother; 2 brother had lung cancer. He is not sure, mother had  CHF. Both parents  in their [de-identified].   The

## 2018-03-29 VITALS
WEIGHT: 224.5 LBS | RESPIRATION RATE: 20 BRPM | HEART RATE: 78 BPM | TEMPERATURE: 98.2 F | OXYGEN SATURATION: 94 % | SYSTOLIC BLOOD PRESSURE: 118 MMHG | DIASTOLIC BLOOD PRESSURE: 48 MMHG | HEIGHT: 71 IN | BODY MASS INDEX: 31.43 KG/M2

## 2018-03-29 LAB
ALBUMIN SERPL-MCNC: 3.6 G/DL (ref 3.5–5.2)
ALP BLD-CCNC: 58 U/L (ref 40–129)
ALT SERPL-CCNC: 17 U/L (ref 0–40)
ANION GAP SERPL CALCULATED.3IONS-SCNC: 11 MMOL/L (ref 7–16)
AST SERPL-CCNC: 24 U/L (ref 0–39)
BASOPHILS ABSOLUTE: 0.03 E9/L (ref 0–0.2)
BASOPHILS RELATIVE PERCENT: 0.4 % (ref 0–2)
BILIRUB SERPL-MCNC: 0.5 MG/DL (ref 0–1.2)
BUN BLDV-MCNC: 9 MG/DL (ref 8–23)
CALCIUM SERPL-MCNC: 9 MG/DL (ref 8.6–10.2)
CHLORIDE BLD-SCNC: 99 MMOL/L (ref 98–107)
CO2: 32 MMOL/L (ref 22–29)
CREAT SERPL-MCNC: 0.9 MG/DL (ref 0.7–1.2)
EOSINOPHILS ABSOLUTE: 0.3 E9/L (ref 0.05–0.5)
EOSINOPHILS RELATIVE PERCENT: 4 % (ref 0–6)
GFR AFRICAN AMERICAN: >60
GFR NON-AFRICAN AMERICAN: >60 ML/MIN/1.73
GLUCOSE BLD-MCNC: 93 MG/DL (ref 74–109)
HCT VFR BLD CALC: 41 % (ref 37–54)
HEMOGLOBIN: 13.3 G/DL (ref 12.5–16.5)
IMMATURE GRANULOCYTES #: 0.03 E9/L
IMMATURE GRANULOCYTES %: 0.4 % (ref 0–5)
LYMPHOCYTES ABSOLUTE: 1.65 E9/L (ref 1.5–4)
LYMPHOCYTES RELATIVE PERCENT: 22 % (ref 20–42)
MCH RBC QN AUTO: 30.6 PG (ref 26–35)
MCHC RBC AUTO-ENTMCNC: 32.4 % (ref 32–34.5)
MCV RBC AUTO: 94.3 FL (ref 80–99.9)
MONOCYTES ABSOLUTE: 0.78 E9/L (ref 0.1–0.95)
MONOCYTES RELATIVE PERCENT: 10.4 % (ref 2–12)
NEUTROPHILS ABSOLUTE: 4.72 E9/L (ref 1.8–7.3)
NEUTROPHILS RELATIVE PERCENT: 62.8 % (ref 43–80)
PDW BLD-RTO: 14.6 FL (ref 11.5–15)
PLATELET # BLD: 212 E9/L (ref 130–450)
PMV BLD AUTO: 10 FL (ref 7–12)
POTASSIUM SERPL-SCNC: 3.7 MMOL/L (ref 3.5–5)
RBC # BLD: 4.35 E12/L (ref 3.8–5.8)
SODIUM BLD-SCNC: 142 MMOL/L (ref 132–146)
TOTAL PROTEIN: 6.2 G/DL (ref 6.4–8.3)
WBC # BLD: 7.5 E9/L (ref 4.5–11.5)

## 2018-03-29 PROCEDURE — 94640 AIRWAY INHALATION TREATMENT: CPT

## 2018-03-29 PROCEDURE — 2580000003 HC RX 258: Performed by: INTERNAL MEDICINE

## 2018-03-29 PROCEDURE — 36415 COLL VENOUS BLD VENIPUNCTURE: CPT

## 2018-03-29 PROCEDURE — 2700000000 HC OXYGEN THERAPY PER DAY

## 2018-03-29 PROCEDURE — 80053 COMPREHEN METABOLIC PANEL: CPT

## 2018-03-29 PROCEDURE — 85025 COMPLETE CBC W/AUTO DIFF WBC: CPT

## 2018-03-29 PROCEDURE — 6370000000 HC RX 637 (ALT 250 FOR IP): Performed by: INTERNAL MEDICINE

## 2018-03-29 PROCEDURE — 6360000002 HC RX W HCPCS: Performed by: INTERNAL MEDICINE

## 2018-03-29 PROCEDURE — 2580000003 HC RX 258

## 2018-03-29 PROCEDURE — 6370000000 HC RX 637 (ALT 250 FOR IP): Performed by: EMERGENCY MEDICINE

## 2018-03-29 RX ORDER — FAMOTIDINE 20 MG/1
20 TABLET, FILM COATED ORAL 2 TIMES DAILY
Qty: 60 TABLET | Refills: 3 | Status: SHIPPED | OUTPATIENT
Start: 2018-03-29

## 2018-03-29 RX ORDER — OSELTAMIVIR PHOSPHATE 75 MG/1
75 CAPSULE ORAL 2 TIMES DAILY
Qty: 5 CAPSULE | Refills: 0 | Status: SHIPPED | OUTPATIENT
Start: 2018-03-29 | End: 2018-03-31

## 2018-03-29 RX ORDER — OSELTAMIVIR PHOSPHATE 75 MG/1
75 CAPSULE ORAL 2 TIMES DAILY
Qty: 20 CAPSULE | Refills: 0 | Status: SHIPPED | OUTPATIENT
Start: 2018-03-29 | End: 2018-03-29

## 2018-03-29 RX ORDER — AMOXICILLIN AND CLAVULANATE POTASSIUM 875; 125 MG/1; MG/1
1 TABLET, FILM COATED ORAL 2 TIMES DAILY
Qty: 14 TABLET | Refills: 0 | Status: SHIPPED | OUTPATIENT
Start: 2018-03-29 | End: 2018-04-05

## 2018-03-29 RX ORDER — SODIUM CHLORIDE 0.9 % (FLUSH) 0.9 %
SYRINGE (ML) INJECTION
Status: COMPLETED
Start: 2018-03-29 | End: 2018-03-29

## 2018-03-29 RX ADMIN — AZITHROMYCIN MONOHYDRATE 500 MG: 500 INJECTION, POWDER, LYOPHILIZED, FOR SOLUTION INTRAVENOUS at 09:56

## 2018-03-29 RX ADMIN — ALBUTEROL SULFATE 2.5 MG: 2.5 SOLUTION RESPIRATORY (INHALATION) at 09:39

## 2018-03-29 RX ADMIN — SIMVASTATIN 20 MG: 20 TABLET, FILM COATED ORAL at 08:55

## 2018-03-29 RX ADMIN — PREDNISONE 15 MG: 5 TABLET ORAL at 08:55

## 2018-03-29 RX ADMIN — ALBUTEROL SULFATE 2.5 MG: 2.5 SOLUTION RESPIRATORY (INHALATION) at 06:05

## 2018-03-29 RX ADMIN — OSELTAMIVIR PHOSPHATE 75 MG: 75 CAPSULE ORAL at 08:55

## 2018-03-29 RX ADMIN — CEFTRIAXONE 1 G: 1 INJECTION, POWDER, FOR SOLUTION INTRAMUSCULAR; INTRAVENOUS at 08:53

## 2018-03-29 RX ADMIN — MOMETASONE FUROATE AND FORMOTEROL FUMARATE DIHYDRATE 2 PUFF: 200; 5 AEROSOL RESPIRATORY (INHALATION) at 06:05

## 2018-03-29 RX ADMIN — TIOTROPIUM BROMIDE 18 MCG: 18 CAPSULE ORAL; RESPIRATORY (INHALATION) at 06:05

## 2018-03-29 RX ADMIN — ASPIRIN 81 MG 81 MG: 81 TABLET ORAL at 08:55

## 2018-03-29 RX ADMIN — Medication 10 ML: at 09:07

## 2018-03-29 ASSESSMENT — PAIN SCALES - GENERAL: PAINLEVEL_OUTOF10: 0

## 2018-03-29 NOTE — PROGRESS NOTES
Cardiology  Progress Note      SUBJECTIVE: patient was seen this morning. No chest pain. Less dyspnea. Current Inpatient Medications  No current facility-administered medications for this encounter. Review Of System   A comprehensive review of systems was negative except for: Respiratory: positive for cough    Physical  VITALS:  BP (!) 118/48   Pulse 78   Temp 98.2 °F (36.8 °C) (Oral)   Resp 20   Ht 5' 11\" (1.803 m)   Wt 224 lb 8 oz (101.8 kg)   SpO2 94%   BMI 31.31 kg/m²   CURRENT TEMPERATURE:  Temp: 98.2 °F (36.8 °C)  NECK:  no jugular venous distension  BACK:  symmetric  LUNGS:  diminished breath sounds right base and left base  CARDIOVASCULAR:  normal S1 and S2, no S3 and no S4  ABDOMEN:  non-tender  EXTREMITIES: no pedal edema noted    DATA:      ECG:  I have reviewed EKG with the following interpretation:  normal sinus rhythm    Cardiology Labs:    BMP:    Lab Results   Component Value Date     03/29/2018    K 3.7 03/29/2018    CL 99 03/29/2018    CO2 32 03/29/2018    BUN 9 03/29/2018     CBC:    Lab Results   Component Value Date    WBC 7.5 03/29/2018    RBC 4.35 03/29/2018    HGB 13.3 03/29/2018    HCT 41.0 03/29/2018    MCV 94.3 03/29/2018    RDW 14.6 03/29/2018     03/29/2018       ASSESSMENT    Dyspnea  Mostly pulmonary related. Please refer to my consult  Echo : Normal EF  Clinically much better. PLAN  As per orders.   Sign off  Thank you

## 2018-03-29 NOTE — PROGRESS NOTES
P Quality Flow/Interdisciplinary Rounds Progress Note        Quality Flow Rounds held on March 29, 2018    Disciplines Attending:  Bedside Nurse, ,  and Nursing Unit Leadership    Julian Garcia was admitted on 3/26/2018  8:30 AM    Anticipated Discharge Date:  Expected Discharge Date: 03/29/18    Disposition:    Philipp Score:  Philipp Scale Score: 22    Readmission Risk              Readmission Risk:        14.75       Age 72 or Greater:  1    Admitted from SNF or Requires Paid or Family Care:  0    Currently has CHF,COPD,ARF,CRI,or is on dialysis:  4    Takes more than 5 Prescription Medications:  4    Takes Digoxin,Insulin,Anticoagulants,Narcotics or ASA/Plavix:  [address] in Past 12 Months:  0    On Disability:  0    Patient Considers own Health:  3.75          Discussed patient goal for the day, patient clinical progression, and barriers to discharge.   The following Goal(s) of the Day/Commitment(s) have been identified:  Prompt for DC      Gabrielle Chappell  March 29, 2018

## 2018-03-30 NOTE — DISCHARGE SUMMARY
Augmentin 875 mg b.i.d. for 7  more days, Tamiflu 75 mg b.i.d. for two more days, prednisone 50 mg daily,  Proventil nebulizer 2.5 mg every 6 hours as needed, aspirin 81 mg daily,  Symbicort 2 puffs b.i.d., simvastatin 20 mg daily, Spiriva 18 mcg daily.         Urvashi Chen MD    D: 03/29/2018 21:00:07       T: 03/29/2018 21:01:21     [email or web address]  Job#: [id number]     Doc#: [id number]    CC:

## 2018-03-31 LAB
BLOOD CULTURE, ROUTINE: NORMAL
CULTURE, BLOOD 2: NORMAL

## 2018-04-04 ASSESSMENT — ENCOUNTER SYMPTOMS
DIARRHEA: 0
SINUS PAIN: 0
VOMITING: 0
CHEST TIGHTNESS: 1
NAUSEA: 0
WHEEZING: 0
SPUTUM PRODUCTION: 1
SHORTNESS OF BREATH: 1
EYES NEGATIVE: 1
ABDOMINAL PAIN: 0
SWOLLEN GLANDS: 0
COUGH: 1

## 2018-09-17 ENCOUNTER — HOSPITAL ENCOUNTER (OUTPATIENT)
Dept: ULTRASOUND IMAGING | Age: 78
Discharge: HOME | End: 2018-09-17
Payer: COMMERCIAL

## 2018-09-17 DIAGNOSIS — M79.604 PAIN IN BOTH LOWER EXTREMITIES: ICD-10-CM

## 2018-09-17 DIAGNOSIS — M79.605 PAIN IN BOTH LOWER EXTREMITIES: ICD-10-CM

## 2018-09-17 PROCEDURE — 93925 LOWER EXTREMITY STUDY: CPT

## 2019-02-07 ENCOUNTER — HOSPITAL ENCOUNTER (OUTPATIENT)
Age: 79
Setting detail: OBSERVATION
Discharge: HOME | End: 2019-02-08
Attending: EMERGENCY MEDICINE | Admitting: SURGERY
Payer: MEDICARE

## 2019-02-07 ENCOUNTER — ANESTHESIA (OUTPATIENT)
Dept: OPERATING ROOM | Age: 79
End: 2019-02-07
Payer: MEDICARE

## 2019-02-07 ENCOUNTER — APPOINTMENT (OUTPATIENT)
Dept: CT IMAGING | Age: 79
End: 2019-02-07
Payer: MEDICARE

## 2019-02-07 ENCOUNTER — ANESTHESIA EVENT (OUTPATIENT)
Dept: OPERATING ROOM | Age: 79
End: 2019-02-07
Payer: MEDICARE

## 2019-02-07 VITALS
TEMPERATURE: 98.4 F | RESPIRATION RATE: 3 BRPM | OXYGEN SATURATION: 95 % | DIASTOLIC BLOOD PRESSURE: 144 MMHG | SYSTOLIC BLOOD PRESSURE: 152 MMHG

## 2019-02-07 DIAGNOSIS — K35.20 ACUTE APPENDICITIS WITH PERFORATION AND GENERALIZED PERITONITIS, WITHOUT ABSCESS, UNSPECIFIED WHETHER GANGRENE PRESENT: Primary | ICD-10-CM

## 2019-02-07 DIAGNOSIS — K35.20 ACUTE APPENDICITIS WITH GENERALIZED PERITONITIS, WITHOUT ABSCESS, UNSPECIFIED WHETHER GANGRENE PRESENT, UNSPECIFIED WHETHER PERFORATION PRESENT: ICD-10-CM

## 2019-02-07 PROBLEM — K35.33 APPENDICITIS WITH PERITONITIS: Status: ACTIVE | Noted: 2019-02-07

## 2019-02-07 PROBLEM — K35.80 APPENDICITIS, ACUTE: Status: ACTIVE | Noted: 2019-02-07

## 2019-02-07 LAB
ALBUMIN SERPL-MCNC: 4.1 G/DL (ref 3.5–5.2)
ALP BLD-CCNC: 76 U/L (ref 40–129)
ALT SERPL-CCNC: 23 U/L (ref 0–40)
ANION GAP SERPL CALCULATED.3IONS-SCNC: 11 MMOL/L (ref 7–16)
AST SERPL-CCNC: 19 U/L (ref 0–39)
BACTERIA: NORMAL /HPF
BASOPHILS ABSOLUTE: 0.05 E9/L (ref 0–0.2)
BASOPHILS RELATIVE PERCENT: 0.4 % (ref 0–2)
BILIRUB SERPL-MCNC: 0.7 MG/DL (ref 0–1.2)
BILIRUBIN URINE: NEGATIVE
BLOOD, URINE: ABNORMAL
BUN BLDV-MCNC: 16 MG/DL (ref 8–23)
CALCIUM SERPL-MCNC: 9.4 MG/DL (ref 8.6–10.2)
CHLORIDE BLD-SCNC: 103 MMOL/L (ref 98–107)
CLARITY: CLEAR
CO2: 29 MMOL/L (ref 22–29)
COLOR: YELLOW
CREAT SERPL-MCNC: 1.1 MG/DL (ref 0.7–1.2)
EOSINOPHILS ABSOLUTE: 0.36 E9/L (ref 0.05–0.5)
EOSINOPHILS RELATIVE PERCENT: 2.6 % (ref 0–6)
GFR AFRICAN AMERICAN: >60
GFR NON-AFRICAN AMERICAN: >60 ML/MIN/1.73
GLUCOSE BLD-MCNC: 106 MG/DL (ref 74–99)
GLUCOSE URINE: NEGATIVE MG/DL
HCT VFR BLD CALC: 49.3 % (ref 37–54)
HEMOGLOBIN: 15.7 G/DL (ref 12.5–16.5)
IMMATURE GRANULOCYTES #: 0.06 E9/L
IMMATURE GRANULOCYTES %: 0.4 % (ref 0–5)
KETONES, URINE: NEGATIVE MG/DL
LACTIC ACID: 2.2 MMOL/L (ref 0.5–2.2)
LEUKOCYTE ESTERASE, URINE: NEGATIVE
LYMPHOCYTES ABSOLUTE: 2.13 E9/L (ref 1.5–4)
LYMPHOCYTES RELATIVE PERCENT: 15.4 % (ref 20–42)
MCH RBC QN AUTO: 30.3 PG (ref 26–35)
MCHC RBC AUTO-ENTMCNC: 31.8 % (ref 32–34.5)
MCV RBC AUTO: 95.2 FL (ref 80–99.9)
MONOCYTES ABSOLUTE: 1.33 E9/L (ref 0.1–0.95)
MONOCYTES RELATIVE PERCENT: 9.6 % (ref 2–12)
NEUTROPHILS ABSOLUTE: 9.91 E9/L (ref 1.8–7.3)
NEUTROPHILS RELATIVE PERCENT: 71.6 % (ref 43–80)
NITRITE, URINE: NEGATIVE
PDW BLD-RTO: 13.6 FL (ref 11.5–15)
PH UA: 5.5 (ref 5–9)
PLATELET # BLD: 189 E9/L (ref 130–450)
PMV BLD AUTO: 10.3 FL (ref 7–12)
POTASSIUM SERPL-SCNC: 3.9 MMOL/L (ref 3.5–5)
PROTEIN UA: NEGATIVE MG/DL
RBC # BLD: 5.18 E12/L (ref 3.8–5.8)
RBC UA: NORMAL /HPF (ref 0–2)
SODIUM BLD-SCNC: 143 MMOL/L (ref 132–146)
SPECIFIC GRAVITY UA: 1.02 (ref 1–1.03)
TOTAL PROTEIN: 6.9 G/DL (ref 6.4–8.3)
UROBILINOGEN, URINE: 0.2 E.U./DL
WBC # BLD: 13.8 E9/L (ref 4.5–11.5)
WBC UA: NORMAL /HPF (ref 0–5)

## 2019-02-07 PROCEDURE — G0378 HOSPITAL OBSERVATION PER HR: HCPCS

## 2019-02-07 PROCEDURE — 44970 LAPAROSCOPY APPENDECTOMY: CPT | Performed by: SURGERY

## 2019-02-07 PROCEDURE — 81001 URINALYSIS AUTO W/SCOPE: CPT

## 2019-02-07 PROCEDURE — 2500000003 HC RX 250 WO HCPCS: Performed by: SURGERY

## 2019-02-07 PROCEDURE — 3600000004 HC SURGERY LEVEL 4 BASE: Performed by: SURGERY

## 2019-02-07 PROCEDURE — 6360000002 HC RX W HCPCS: Performed by: INTERNAL MEDICINE

## 2019-02-07 PROCEDURE — 2700000000 HC OXYGEN THERAPY PER DAY

## 2019-02-07 PROCEDURE — 80053 COMPREHEN METABOLIC PANEL: CPT

## 2019-02-07 PROCEDURE — 6370000000 HC RX 637 (ALT 250 FOR IP): Performed by: INTERNAL MEDICINE

## 2019-02-07 PROCEDURE — 6360000004 HC RX CONTRAST MEDICATION: Performed by: RADIOLOGY

## 2019-02-07 PROCEDURE — 6360000002 HC RX W HCPCS: Performed by: NURSE ANESTHETIST, CERTIFIED REGISTERED

## 2019-02-07 PROCEDURE — 6360000002 HC RX W HCPCS: Performed by: EMERGENCY MEDICINE

## 2019-02-07 PROCEDURE — 96376 TX/PRO/DX INJ SAME DRUG ADON: CPT

## 2019-02-07 PROCEDURE — 49585 REPAIR UMBILICAL HERN,5+Y/O,REDUC: CPT | Performed by: SURGERY

## 2019-02-07 PROCEDURE — 2580000003 HC RX 258: Performed by: NURSE ANESTHETIST, CERTIFIED REGISTERED

## 2019-02-07 PROCEDURE — 99285 EMERGENCY DEPT VISIT HI MDM: CPT

## 2019-02-07 PROCEDURE — 2580000003 HC RX 258: Performed by: SURGERY

## 2019-02-07 PROCEDURE — 2709999900 HC NON-CHARGEABLE SUPPLY: Performed by: SURGERY

## 2019-02-07 PROCEDURE — 6370000000 HC RX 637 (ALT 250 FOR IP): Performed by: ANESTHESIOLOGY

## 2019-02-07 PROCEDURE — 2720000010 HC SURG SUPPLY STERILE: Performed by: SURGERY

## 2019-02-07 PROCEDURE — 85025 COMPLETE CBC W/AUTO DIFF WBC: CPT

## 2019-02-07 PROCEDURE — 3700000000 HC ANESTHESIA ATTENDED CARE: Performed by: SURGERY

## 2019-02-07 PROCEDURE — 96375 TX/PRO/DX INJ NEW DRUG ADDON: CPT

## 2019-02-07 PROCEDURE — 7100000000 HC PACU RECOVERY - FIRST 15 MIN: Performed by: SURGERY

## 2019-02-07 PROCEDURE — 83605 ASSAY OF LACTIC ACID: CPT

## 2019-02-07 PROCEDURE — 7100000001 HC PACU RECOVERY - ADDTL 15 MIN: Performed by: SURGERY

## 2019-02-07 PROCEDURE — 6360000002 HC RX W HCPCS: Performed by: SURGERY

## 2019-02-07 PROCEDURE — 74177 CT ABD & PELVIS W/CONTRAST: CPT

## 2019-02-07 PROCEDURE — 3600000014 HC SURGERY LEVEL 4 ADDTL 15MIN: Performed by: SURGERY

## 2019-02-07 PROCEDURE — 96374 THER/PROPH/DIAG INJ IV PUSH: CPT

## 2019-02-07 PROCEDURE — 36415 COLL VENOUS BLD VENIPUNCTURE: CPT

## 2019-02-07 PROCEDURE — 94640 AIRWAY INHALATION TREATMENT: CPT

## 2019-02-07 PROCEDURE — 3700000001 HC ADD 15 MINUTES (ANESTHESIA): Performed by: SURGERY

## 2019-02-07 PROCEDURE — 2580000003 HC RX 258: Performed by: EMERGENCY MEDICINE

## 2019-02-07 PROCEDURE — 2500000003 HC RX 250 WO HCPCS: Performed by: NURSE ANESTHETIST, CERTIFIED REGISTERED

## 2019-02-07 PROCEDURE — 88304 TISSUE EXAM BY PATHOLOGIST: CPT

## 2019-02-07 PROCEDURE — 99223 1ST HOSP IP/OBS HIGH 75: CPT | Performed by: SURGERY

## 2019-02-07 RX ORDER — ROCURONIUM BROMIDE 10 MG/ML
INJECTION, SOLUTION INTRAVENOUS PRN
Status: DISCONTINUED | OUTPATIENT
Start: 2019-02-07 | End: 2019-02-07 | Stop reason: SDUPTHER

## 2019-02-07 RX ORDER — SODIUM CHLORIDE, SODIUM LACTATE, POTASSIUM CHLORIDE, CALCIUM CHLORIDE 600; 310; 30; 20 MG/100ML; MG/100ML; MG/100ML; MG/100ML
INJECTION, SOLUTION INTRAVENOUS CONTINUOUS PRN
Status: DISCONTINUED | OUTPATIENT
Start: 2019-02-07 | End: 2019-02-07 | Stop reason: SDUPTHER

## 2019-02-07 RX ORDER — ONDANSETRON 2 MG/ML
4 INJECTION INTRAMUSCULAR; INTRAVENOUS
Status: DISCONTINUED | OUTPATIENT
Start: 2019-02-07 | End: 2019-02-07

## 2019-02-07 RX ORDER — BUPIVACAINE HYDROCHLORIDE AND EPINEPHRINE 2.5; 5 MG/ML; UG/ML
INJECTION, SOLUTION EPIDURAL; INFILTRATION; INTRACAUDAL; PERINEURAL PRN
Status: DISCONTINUED | OUTPATIENT
Start: 2019-02-07 | End: 2019-02-07 | Stop reason: ALTCHOICE

## 2019-02-07 RX ORDER — LABETALOL HYDROCHLORIDE 5 MG/ML
INJECTION, SOLUTION INTRAVENOUS PRN
Status: DISCONTINUED | OUTPATIENT
Start: 2019-02-07 | End: 2019-02-07 | Stop reason: SDUPTHER

## 2019-02-07 RX ORDER — HYDROMORPHONE HYDROCHLORIDE 1 MG/ML
0.25 INJECTION, SOLUTION INTRAMUSCULAR; INTRAVENOUS; SUBCUTANEOUS EVERY 5 MIN PRN
Status: DISCONTINUED | OUTPATIENT
Start: 2019-02-07 | End: 2019-02-07

## 2019-02-07 RX ORDER — SODIUM CHLORIDE 0.9 % (FLUSH) 0.9 %
10 SYRINGE (ML) INJECTION EVERY 12 HOURS SCHEDULED
Status: DISCONTINUED | OUTPATIENT
Start: 2019-02-07 | End: 2019-02-08 | Stop reason: HOSPADM

## 2019-02-07 RX ORDER — MORPHINE SULFATE 10 MG/ML
5 INJECTION, SOLUTION INTRAMUSCULAR; INTRAVENOUS ONCE
Status: COMPLETED | OUTPATIENT
Start: 2019-02-07 | End: 2019-02-07

## 2019-02-07 RX ORDER — SODIUM CHLORIDE 0.9 % (FLUSH) 0.9 %
10 SYRINGE (ML) INJECTION PRN
Status: DISCONTINUED | OUTPATIENT
Start: 2019-02-07 | End: 2019-02-08 | Stop reason: HOSPADM

## 2019-02-07 RX ORDER — SODIUM CHLORIDE, SODIUM LACTATE, POTASSIUM CHLORIDE, CALCIUM CHLORIDE 600; 310; 30; 20 MG/100ML; MG/100ML; MG/100ML; MG/100ML
INJECTION, SOLUTION INTRAVENOUS CONTINUOUS
Status: DISCONTINUED | OUTPATIENT
Start: 2019-02-07 | End: 2019-02-08 | Stop reason: HOSPADM

## 2019-02-07 RX ORDER — 0.9 % SODIUM CHLORIDE 0.9 %
1000 INTRAVENOUS SOLUTION INTRAVENOUS ONCE
Status: COMPLETED | OUTPATIENT
Start: 2019-02-07 | End: 2019-02-07

## 2019-02-07 RX ORDER — NEOSTIGMINE METHYLSULFATE 0.5 MG/ML
INJECTION, SOLUTION INTRAVENOUS PRN
Status: DISCONTINUED | OUTPATIENT
Start: 2019-02-07 | End: 2019-02-07 | Stop reason: SDUPTHER

## 2019-02-07 RX ORDER — FENTANYL CITRATE 50 UG/ML
50 INJECTION, SOLUTION INTRAMUSCULAR; INTRAVENOUS EVERY 5 MIN PRN
Status: DISCONTINUED | OUTPATIENT
Start: 2019-02-07 | End: 2019-02-07

## 2019-02-07 RX ORDER — ONDANSETRON 2 MG/ML
4 INJECTION INTRAMUSCULAR; INTRAVENOUS ONCE
Status: COMPLETED | OUTPATIENT
Start: 2019-02-07 | End: 2019-02-07

## 2019-02-07 RX ORDER — IPRATROPIUM BROMIDE AND ALBUTEROL SULFATE 2.5; .5 MG/3ML; MG/3ML
1 SOLUTION RESPIRATORY (INHALATION) ONCE
Status: COMPLETED | OUTPATIENT
Start: 2019-02-07 | End: 2019-02-07

## 2019-02-07 RX ORDER — HYDROCODONE BITARTRATE AND ACETAMINOPHEN 5; 325 MG/1; MG/1
2 TABLET ORAL EVERY 4 HOURS PRN
Status: DISCONTINUED | OUTPATIENT
Start: 2019-02-07 | End: 2019-02-08 | Stop reason: HOSPADM

## 2019-02-07 RX ORDER — ALBUTEROL SULFATE 90 UG/1
2 AEROSOL, METERED RESPIRATORY (INHALATION) EVERY 6 HOURS PRN
Status: DISCONTINUED | OUTPATIENT
Start: 2019-02-07 | End: 2019-02-08 | Stop reason: HOSPADM

## 2019-02-07 RX ORDER — DEXAMETHASONE SODIUM PHOSPHATE 10 MG/ML
INJECTION INTRAMUSCULAR; INTRAVENOUS PRN
Status: DISCONTINUED | OUTPATIENT
Start: 2019-02-07 | End: 2019-02-07 | Stop reason: SDUPTHER

## 2019-02-07 RX ORDER — HYDROCODONE BITARTRATE AND ACETAMINOPHEN 5; 325 MG/1; MG/1
1 TABLET ORAL EVERY 4 HOURS PRN
Status: DISCONTINUED | OUTPATIENT
Start: 2019-02-07 | End: 2019-02-08 | Stop reason: HOSPADM

## 2019-02-07 RX ORDER — MEPERIDINE HYDROCHLORIDE 25 MG/ML
12.5 INJECTION INTRAMUSCULAR; INTRAVENOUS; SUBCUTANEOUS EVERY 5 MIN PRN
Status: DISCONTINUED | OUTPATIENT
Start: 2019-02-07 | End: 2019-02-07

## 2019-02-07 RX ORDER — ACETAMINOPHEN 325 MG/1
650 TABLET ORAL EVERY 4 HOURS PRN
Status: DISCONTINUED | OUTPATIENT
Start: 2019-02-07 | End: 2019-02-08 | Stop reason: HOSPADM

## 2019-02-07 RX ORDER — MIDAZOLAM HYDROCHLORIDE 1 MG/ML
INJECTION INTRAMUSCULAR; INTRAVENOUS PRN
Status: DISCONTINUED | OUTPATIENT
Start: 2019-02-07 | End: 2019-02-07 | Stop reason: SDUPTHER

## 2019-02-07 RX ORDER — FAMOTIDINE 20 MG/1
20 TABLET, FILM COATED ORAL 2 TIMES DAILY
Status: DISCONTINUED | OUTPATIENT
Start: 2019-02-07 | End: 2019-02-08 | Stop reason: HOSPADM

## 2019-02-07 RX ORDER — GLYCOPYRROLATE 1 MG/5 ML
SYRINGE (ML) INTRAVENOUS PRN
Status: DISCONTINUED | OUTPATIENT
Start: 2019-02-07 | End: 2019-02-07 | Stop reason: SDUPTHER

## 2019-02-07 RX ORDER — PROPOFOL 10 MG/ML
INJECTION, EMULSION INTRAVENOUS PRN
Status: DISCONTINUED | OUTPATIENT
Start: 2019-02-07 | End: 2019-02-07 | Stop reason: SDUPTHER

## 2019-02-07 RX ORDER — HYDROCODONE BITARTRATE AND ACETAMINOPHEN 5; 325 MG/1; MG/1
1 TABLET ORAL EVERY 6 HOURS PRN
Qty: 10 TABLET | Refills: 0 | Status: SHIPPED | OUTPATIENT
Start: 2019-02-07 | End: 2019-02-10

## 2019-02-07 RX ORDER — KETOROLAC TROMETHAMINE 30 MG/ML
INJECTION, SOLUTION INTRAMUSCULAR; INTRAVENOUS PRN
Status: DISCONTINUED | OUTPATIENT
Start: 2019-02-07 | End: 2019-02-07 | Stop reason: SDUPTHER

## 2019-02-07 RX ORDER — FENTANYL CITRATE 50 UG/ML
INJECTION, SOLUTION INTRAMUSCULAR; INTRAVENOUS PRN
Status: DISCONTINUED | OUTPATIENT
Start: 2019-02-07 | End: 2019-02-07 | Stop reason: SDUPTHER

## 2019-02-07 RX ORDER — ASPIRIN 81 MG/1
81 TABLET, CHEWABLE ORAL DAILY
Status: DISCONTINUED | OUTPATIENT
Start: 2019-02-07 | End: 2019-02-08 | Stop reason: HOSPADM

## 2019-02-07 RX ORDER — ONDANSETRON 2 MG/ML
4 INJECTION INTRAMUSCULAR; INTRAVENOUS EVERY 6 HOURS PRN
Status: DISCONTINUED | OUTPATIENT
Start: 2019-02-07 | End: 2019-02-08 | Stop reason: HOSPADM

## 2019-02-07 RX ORDER — ALBUTEROL SULFATE 2.5 MG/3ML
2.5 SOLUTION RESPIRATORY (INHALATION) EVERY 6 HOURS PRN
Status: DISCONTINUED | OUTPATIENT
Start: 2019-02-07 | End: 2019-02-08 | Stop reason: HOSPADM

## 2019-02-07 RX ORDER — SIMVASTATIN 20 MG
20 TABLET ORAL DAILY
Status: DISCONTINUED | OUTPATIENT
Start: 2019-02-07 | End: 2019-02-08 | Stop reason: HOSPADM

## 2019-02-07 RX ORDER — ONDANSETRON 2 MG/ML
INJECTION INTRAMUSCULAR; INTRAVENOUS PRN
Status: DISCONTINUED | OUTPATIENT
Start: 2019-02-07 | End: 2019-02-07 | Stop reason: SDUPTHER

## 2019-02-07 RX ADMIN — ROCURONIUM BROMIDE 30 MG: 10 INJECTION, SOLUTION INTRAVENOUS at 10:29

## 2019-02-07 RX ADMIN — ASPIRIN 81 MG CHEWABLE TABLET 81 MG: 81 TABLET CHEWABLE at 17:22

## 2019-02-07 RX ADMIN — FAMOTIDINE 20 MG: 20 TABLET, FILM COATED ORAL at 20:28

## 2019-02-07 RX ADMIN — MORPHINE SULFATE 5 MG: 10 INJECTION INTRAVENOUS at 09:29

## 2019-02-07 RX ADMIN — SODIUM CHLORIDE, POTASSIUM CHLORIDE, SODIUM LACTATE AND CALCIUM CHLORIDE: 600; 310; 30; 20 INJECTION, SOLUTION INTRAVENOUS at 10:53

## 2019-02-07 RX ADMIN — WATER 1 G: 1 INJECTION INTRAMUSCULAR; INTRAVENOUS; SUBCUTANEOUS at 09:29

## 2019-02-07 RX ADMIN — SODIUM CHLORIDE, POTASSIUM CHLORIDE, SODIUM LACTATE AND CALCIUM CHLORIDE: 600; 310; 30; 20 INJECTION, SOLUTION INTRAVENOUS at 10:27

## 2019-02-07 RX ADMIN — METRONIDAZOLE 500 MG: 500 INJECTION, SOLUTION INTRAVENOUS at 09:30

## 2019-02-07 RX ADMIN — IOPAMIDOL 80 ML: 755 INJECTION, SOLUTION INTRAVENOUS at 08:26

## 2019-02-07 RX ADMIN — LIDOCAINE HYDROCHLORIDE 60 MG: 20 INJECTION, SOLUTION INTRAVENOUS at 10:29

## 2019-02-07 RX ADMIN — FENTANYL CITRATE 50 MCG: 50 INJECTION, SOLUTION INTRAMUSCULAR; INTRAVENOUS at 10:37

## 2019-02-07 RX ADMIN — PROPOFOL 40 MG: 10 INJECTION, EMULSION INTRAVENOUS at 10:29

## 2019-02-07 RX ADMIN — Medication 10 ML: at 20:29

## 2019-02-07 RX ADMIN — DEXAMETHASONE SODIUM PHOSPHATE 10 MG: 10 INJECTION INTRAMUSCULAR; INTRAVENOUS at 10:37

## 2019-02-07 RX ADMIN — IOHEXOL 50 ML: 240 INJECTION, SOLUTION INTRATHECAL; INTRAVASCULAR; INTRAVENOUS; ORAL at 08:26

## 2019-02-07 RX ADMIN — Medication 0.6 MG: at 10:51

## 2019-02-07 RX ADMIN — MORPHINE SULFATE 5 MG: 10 INJECTION INTRAVENOUS at 06:00

## 2019-02-07 RX ADMIN — ONDANSETRON 4 MG: 2 INJECTION INTRAMUSCULAR; INTRAVENOUS at 10:50

## 2019-02-07 RX ADMIN — KETOROLAC TROMETHAMINE 30 MG: 30 INJECTION, SOLUTION INTRAMUSCULAR; INTRAVENOUS at 10:48

## 2019-02-07 RX ADMIN — LABETALOL 20 MG/4 ML (5 MG/ML) INTRAVENOUS SYRINGE 5 MG: at 10:44

## 2019-02-07 RX ADMIN — FENTANYL CITRATE 100 MCG: 50 INJECTION, SOLUTION INTRAMUSCULAR; INTRAVENOUS at 10:29

## 2019-02-07 RX ADMIN — FENTANYL CITRATE 50 MCG: 50 INJECTION, SOLUTION INTRAMUSCULAR; INTRAVENOUS at 10:43

## 2019-02-07 RX ADMIN — PROPOFOL 50 MG: 10 INJECTION, EMULSION INTRAVENOUS at 10:56

## 2019-02-07 RX ADMIN — SIMVASTATIN 20 MG: 20 TABLET, FILM COATED ORAL at 17:22

## 2019-02-07 RX ADMIN — ONDANSETRON 4 MG: 2 INJECTION INTRAMUSCULAR; INTRAVENOUS at 09:45

## 2019-02-07 RX ADMIN — MIDAZOLAM 2 MG: 1 INJECTION INTRAMUSCULAR; INTRAVENOUS at 10:27

## 2019-02-07 RX ADMIN — METRONIDAZOLE 500 MG: 500 INJECTION, SOLUTION INTRAVENOUS at 17:22

## 2019-02-07 RX ADMIN — IPRATROPIUM BROMIDE AND ALBUTEROL SULFATE 1 AMPULE: .5; 3 SOLUTION RESPIRATORY (INHALATION) at 11:47

## 2019-02-07 RX ADMIN — PREDNISONE 15 MG: 5 TABLET ORAL at 17:22

## 2019-02-07 RX ADMIN — NEOSTIGMINE METHYLSULFATE 3 MG: 0.5 INJECTION INTRAVENOUS at 10:51

## 2019-02-07 RX ADMIN — SODIUM CHLORIDE 1000 ML: 9 INJECTION, SOLUTION INTRAVENOUS at 06:00

## 2019-02-07 RX ADMIN — ALBUTEROL SULFATE 2.5 MG: 2.5 SOLUTION RESPIRATORY (INHALATION) at 18:32

## 2019-02-07 RX ADMIN — SODIUM CHLORIDE, POTASSIUM CHLORIDE, SODIUM LACTATE AND CALCIUM CHLORIDE: 600; 310; 30; 20 INJECTION, SOLUTION INTRAVENOUS at 16:28

## 2019-02-07 RX ADMIN — FENTANYL CITRATE 50 MCG: 50 INJECTION, SOLUTION INTRAMUSCULAR; INTRAVENOUS at 10:55

## 2019-02-07 ASSESSMENT — PULMONARY FUNCTION TESTS
PIF_VALUE: 22
PIF_VALUE: 24
PIF_VALUE: 1
PIF_VALUE: 30
PIF_VALUE: 30
PIF_VALUE: 24
PIF_VALUE: 30
PIF_VALUE: 7
PIF_VALUE: 27
PIF_VALUE: 42
PIF_VALUE: 4
PIF_VALUE: 5
PIF_VALUE: 26
PIF_VALUE: 21
PIF_VALUE: 8
PIF_VALUE: 26
PIF_VALUE: 4
PIF_VALUE: 26
PIF_VALUE: 31
PIF_VALUE: 31
PIF_VALUE: 25
PIF_VALUE: 1
PIF_VALUE: 21
PIF_VALUE: 23
PIF_VALUE: 24
PIF_VALUE: 28
PIF_VALUE: 21
PIF_VALUE: 29
PIF_VALUE: 24
PIF_VALUE: 0
PIF_VALUE: 21
PIF_VALUE: 22
PIF_VALUE: 26
PIF_VALUE: 31
PIF_VALUE: 30

## 2019-02-07 ASSESSMENT — ENCOUNTER SYMPTOMS
EYE REDNESS: 0
WHEEZING: 0
SORE THROAT: 0
COUGH: 0
EYE DISCHARGE: 0
EYE PAIN: 0
BACK PAIN: 0
DIARRHEA: 0
SINUS PRESSURE: 0
NAUSEA: 0
SHORTNESS OF BREATH: 0
VOMITING: 0
SHORTNESS OF BREATH: 1
ABDOMINAL PAIN: 1

## 2019-02-07 ASSESSMENT — PAIN DESCRIPTION - PAIN TYPE
TYPE: ACUTE PAIN
TYPE: ACUTE PAIN

## 2019-02-07 ASSESSMENT — PAIN DESCRIPTION - FREQUENCY
FREQUENCY: CONTINUOUS
FREQUENCY: CONTINUOUS

## 2019-02-07 ASSESSMENT — PAIN SCALES - GENERAL
PAINLEVEL_OUTOF10: 10
PAINLEVEL_OUTOF10: 10
PAINLEVEL_OUTOF10: 0
PAINLEVEL_OUTOF10: 0
PAINLEVEL_OUTOF10: 5
PAINLEVEL_OUTOF10: 0
PAINLEVEL_OUTOF10: 4
PAINLEVEL_OUTOF10: 6
PAINLEVEL_OUTOF10: 0

## 2019-02-07 ASSESSMENT — COPD QUESTIONNAIRES: CAT_SEVERITY: SEVERE

## 2019-02-07 ASSESSMENT — PAIN DESCRIPTION - ORIENTATION
ORIENTATION: RIGHT;LOWER

## 2019-02-07 ASSESSMENT — PAIN DESCRIPTION - ONSET: ONSET: AWAKENED FROM SLEEP

## 2019-02-07 ASSESSMENT — PAIN DESCRIPTION - DESCRIPTORS
DESCRIPTORS: ACHING
DESCRIPTORS: ACHING

## 2019-02-07 ASSESSMENT — PAIN DESCRIPTION - LOCATION
LOCATION: ABDOMEN

## 2019-02-07 ASSESSMENT — PAIN DESCRIPTION - PROGRESSION: CLINICAL_PROGRESSION: GRADUALLY IMPROVING

## 2019-02-08 VITALS
TEMPERATURE: 98 F | WEIGHT: 230 LBS | HEIGHT: 71 IN | OXYGEN SATURATION: 95 % | SYSTOLIC BLOOD PRESSURE: 131 MMHG | HEART RATE: 66 BPM | RESPIRATION RATE: 16 BRPM | BODY MASS INDEX: 32.2 KG/M2 | DIASTOLIC BLOOD PRESSURE: 66 MMHG

## 2019-02-08 LAB
ALBUMIN SERPL-MCNC: 3.6 G/DL (ref 3.5–5.2)
ALP BLD-CCNC: 61 U/L (ref 40–129)
ALT SERPL-CCNC: 17 U/L (ref 0–40)
ANION GAP SERPL CALCULATED.3IONS-SCNC: 15 MMOL/L (ref 7–16)
AST SERPL-CCNC: 15 U/L (ref 0–39)
BILIRUB SERPL-MCNC: 0.6 MG/DL (ref 0–1.2)
BUN BLDV-MCNC: 17 MG/DL (ref 8–23)
CALCIUM SERPL-MCNC: 9.1 MG/DL (ref 8.6–10.2)
CHLORIDE BLD-SCNC: 100 MMOL/L (ref 98–107)
CO2: 26 MMOL/L (ref 22–29)
CREAT SERPL-MCNC: 1.1 MG/DL (ref 0.7–1.2)
GFR AFRICAN AMERICAN: >60
GFR NON-AFRICAN AMERICAN: >60 ML/MIN/1.73
GLUCOSE BLD-MCNC: 107 MG/DL (ref 74–99)
HCT VFR BLD CALC: 44.2 % (ref 37–54)
HEMOGLOBIN: 14.1 G/DL (ref 12.5–16.5)
MCH RBC QN AUTO: 30.7 PG (ref 26–35)
MCHC RBC AUTO-ENTMCNC: 31.9 % (ref 32–34.5)
MCV RBC AUTO: 96.1 FL (ref 80–99.9)
PDW BLD-RTO: 13.7 FL (ref 11.5–15)
PLATELET # BLD: 187 E9/L (ref 130–450)
PMV BLD AUTO: 10.6 FL (ref 7–12)
POTASSIUM SERPL-SCNC: 4.4 MMOL/L (ref 3.5–5)
RBC # BLD: 4.6 E12/L (ref 3.8–5.8)
SODIUM BLD-SCNC: 141 MMOL/L (ref 132–146)
TOTAL PROTEIN: 6.5 G/DL (ref 6.4–8.3)
WBC # BLD: 14.6 E9/L (ref 4.5–11.5)

## 2019-02-08 PROCEDURE — 80053 COMPREHEN METABOLIC PANEL: CPT

## 2019-02-08 PROCEDURE — 2500000003 HC RX 250 WO HCPCS: Performed by: SURGERY

## 2019-02-08 PROCEDURE — 85027 COMPLETE CBC AUTOMATED: CPT

## 2019-02-08 PROCEDURE — 36415 COLL VENOUS BLD VENIPUNCTURE: CPT

## 2019-02-08 PROCEDURE — G0378 HOSPITAL OBSERVATION PER HR: HCPCS

## 2019-02-08 PROCEDURE — 99024 POSTOP FOLLOW-UP VISIT: CPT | Performed by: SURGERY

## 2019-02-08 PROCEDURE — 6370000000 HC RX 637 (ALT 250 FOR IP): Performed by: INTERNAL MEDICINE

## 2019-02-08 PROCEDURE — 2580000003 HC RX 258: Performed by: SURGERY

## 2019-02-08 PROCEDURE — 6360000002 HC RX W HCPCS: Performed by: SURGERY

## 2019-02-08 PROCEDURE — 94150 VITAL CAPACITY TEST: CPT

## 2019-02-08 PROCEDURE — 2700000000 HC OXYGEN THERAPY PER DAY

## 2019-02-08 PROCEDURE — 94640 AIRWAY INHALATION TREATMENT: CPT

## 2019-02-08 RX ADMIN — MOMETASONE FUROATE AND FORMOTEROL FUMARATE DIHYDRATE 2 PUFF: 200; 5 AEROSOL RESPIRATORY (INHALATION) at 06:55

## 2019-02-08 RX ADMIN — PREDNISONE 15 MG: 5 TABLET ORAL at 08:41

## 2019-02-08 RX ADMIN — ASPIRIN 81 MG CHEWABLE TABLET 81 MG: 81 TABLET CHEWABLE at 08:42

## 2019-02-08 RX ADMIN — WATER 1 G: 1 INJECTION INTRAMUSCULAR; INTRAVENOUS; SUBCUTANEOUS at 08:41

## 2019-02-08 RX ADMIN — FAMOTIDINE 20 MG: 20 TABLET, FILM COATED ORAL at 08:41

## 2019-02-08 RX ADMIN — METRONIDAZOLE 500 MG: 500 INJECTION, SOLUTION INTRAVENOUS at 08:55

## 2019-02-08 RX ADMIN — METRONIDAZOLE 500 MG: 500 INJECTION, SOLUTION INTRAVENOUS at 01:33

## 2019-02-08 RX ADMIN — TIOTROPIUM BROMIDE 18 MCG: 18 CAPSULE ORAL; RESPIRATORY (INHALATION) at 06:54

## 2019-02-08 RX ADMIN — SODIUM CHLORIDE, POTASSIUM CHLORIDE, SODIUM LACTATE AND CALCIUM CHLORIDE: 600; 310; 30; 20 INJECTION, SOLUTION INTRAVENOUS at 08:39

## 2019-02-08 RX ADMIN — SIMVASTATIN 20 MG: 20 TABLET, FILM COATED ORAL at 08:42

## 2019-02-08 ASSESSMENT — PAIN - FUNCTIONAL ASSESSMENT: PAIN_FUNCTIONAL_ASSESSMENT: ACTIVITIES ARE NOT PREVENTED

## 2019-02-08 ASSESSMENT — PAIN SCALES - GENERAL
PAINLEVEL_OUTOF10: 0
PAINLEVEL_OUTOF10: 0

## 2019-02-28 ENCOUNTER — OFFICE VISIT (OUTPATIENT)
Dept: SURGERY | Age: 79
End: 2019-02-28

## 2019-02-28 VITALS
SYSTOLIC BLOOD PRESSURE: 134 MMHG | WEIGHT: 223 LBS | HEIGHT: 71 IN | DIASTOLIC BLOOD PRESSURE: 78 MMHG | TEMPERATURE: 98.4 F | RESPIRATION RATE: 18 BRPM | HEART RATE: 82 BPM | OXYGEN SATURATION: 96 % | BODY MASS INDEX: 31.22 KG/M2

## 2019-02-28 DIAGNOSIS — K36 OTHER APPENDICITIS: Primary | ICD-10-CM

## 2019-02-28 PROCEDURE — 99024 POSTOP FOLLOW-UP VISIT: CPT | Performed by: SURGERY

## 2019-02-28 RX ORDER — TERBINAFINE HYDROCHLORIDE 250 MG/1
TABLET ORAL
COMMUNITY
Start: 2019-02-13 | End: 2020-12-18 | Stop reason: ALTCHOICE

## 2019-07-21 ENCOUNTER — APPOINTMENT (OUTPATIENT)
Dept: GENERAL RADIOLOGY | Age: 79
DRG: 191 | End: 2019-07-21
Payer: MEDICARE

## 2019-07-21 ENCOUNTER — HOSPITAL ENCOUNTER (EMERGENCY)
Age: 79
Discharge: HOME | DRG: 191 | End: 2019-07-21
Attending: EMERGENCY MEDICINE
Payer: MEDICARE

## 2019-07-21 VITALS
HEIGHT: 71 IN | OXYGEN SATURATION: 95 % | BODY MASS INDEX: 29.82 KG/M2 | RESPIRATION RATE: 32 BRPM | DIASTOLIC BLOOD PRESSURE: 63 MMHG | HEART RATE: 112 BPM | TEMPERATURE: 98.8 F | SYSTOLIC BLOOD PRESSURE: 130 MMHG | WEIGHT: 213 LBS

## 2019-07-21 DIAGNOSIS — J44.1 COPD EXACERBATION (HCC): Primary | ICD-10-CM

## 2019-07-21 LAB
ANION GAP SERPL CALCULATED.3IONS-SCNC: 14 MMOL/L (ref 7–16)
B.E.: -1.7 MMOL/L (ref -3–3)
BASOPHILS ABSOLUTE: 0.04 E9/L (ref 0–0.2)
BASOPHILS RELATIVE PERCENT: 0.4 % (ref 0–2)
BUN BLDV-MCNC: 11 MG/DL (ref 8–23)
CALCIUM SERPL-MCNC: 9.8 MG/DL (ref 8.6–10.2)
CHLORIDE BLD-SCNC: 98 MMOL/L (ref 98–107)
CO2: 27 MMOL/L (ref 22–29)
CREAT SERPL-MCNC: 1 MG/DL (ref 0.7–1.2)
DELIVERY SYSTEMS: ABNORMAL
DEVICE: ABNORMAL
EOSINOPHILS ABSOLUTE: 0.14 E9/L (ref 0.05–0.5)
EOSINOPHILS RELATIVE PERCENT: 1.4 % (ref 0–6)
FIO2 ARTERIAL: 2
GFR AFRICAN AMERICAN: >60
GFR NON-AFRICAN AMERICAN: >60 ML/MIN/1.73
GLUCOSE BLD-MCNC: 114 MG/DL (ref 74–99)
HCO3 ARTERIAL: 22.1 MMOL/L (ref 22–26)
HCT VFR BLD CALC: 46.5 % (ref 37–54)
HEMOGLOBIN: 14.6 G/DL (ref 12.5–16.5)
IMMATURE GRANULOCYTES #: 0.06 E9/L
IMMATURE GRANULOCYTES %: 0.6 % (ref 0–5)
LACTIC ACID: 5.5 MMOL/L (ref 0.5–2.2)
LYMPHOCYTES ABSOLUTE: 1.19 E9/L (ref 1.5–4)
LYMPHOCYTES RELATIVE PERCENT: 11.7 % (ref 20–42)
MAGNESIUM: 1.8 MG/DL (ref 1.6–2.6)
MCH RBC QN AUTO: 31.1 PG (ref 26–35)
MCHC RBC AUTO-ENTMCNC: 31.4 % (ref 32–34.5)
MCV RBC AUTO: 98.9 FL (ref 80–99.9)
MONOCYTES ABSOLUTE: 0.94 E9/L (ref 0.1–0.95)
MONOCYTES RELATIVE PERCENT: 9.3 % (ref 2–12)
NEUTROPHILS ABSOLUTE: 7.77 E9/L (ref 1.8–7.3)
NEUTROPHILS RELATIVE PERCENT: 76.6 % (ref 43–80)
O2 SATURATION: 94.2 % (ref 92–98.5)
OPERATOR ID: 9
PCO2 ARTERIAL: 33.8 MMHG (ref 35–45)
PDW BLD-RTO: 13.9 FL (ref 11.5–15)
PH BLOOD GAS: 7.42 (ref 7.35–7.45)
PLATELET # BLD: 196 E9/L (ref 130–450)
PMV BLD AUTO: 10.5 FL (ref 7–12)
PO2 ARTERIAL: 69.4 MMHG (ref 60–80)
POTASSIUM REFLEX MAGNESIUM: 3.5 MMOL/L (ref 3.5–5)
PRO-BNP: 102 PG/ML (ref 0–450)
RBC # BLD: 4.7 E12/L (ref 3.8–5.8)
SODIUM BLD-SCNC: 139 MMOL/L (ref 132–146)
SOURCE, BLOOD GAS: ABNORMAL
TROPONIN: 0.02 NG/ML (ref 0–0.03)
WBC # BLD: 10.1 E9/L (ref 4.5–11.5)

## 2019-07-21 PROCEDURE — 83880 ASSAY OF NATRIURETIC PEPTIDE: CPT

## 2019-07-21 PROCEDURE — 6370000000 HC RX 637 (ALT 250 FOR IP): Performed by: EMERGENCY MEDICINE

## 2019-07-21 PROCEDURE — 94640 AIRWAY INHALATION TREATMENT: CPT

## 2019-07-21 PROCEDURE — 80048 BASIC METABOLIC PNL TOTAL CA: CPT

## 2019-07-21 PROCEDURE — 87040 BLOOD CULTURE FOR BACTERIA: CPT

## 2019-07-21 PROCEDURE — 85025 COMPLETE CBC W/AUTO DIFF WBC: CPT

## 2019-07-21 PROCEDURE — 83605 ASSAY OF LACTIC ACID: CPT

## 2019-07-21 PROCEDURE — 84484 ASSAY OF TROPONIN QUANT: CPT

## 2019-07-21 PROCEDURE — 96374 THER/PROPH/DIAG INJ IV PUSH: CPT

## 2019-07-21 PROCEDURE — 99285 EMERGENCY DEPT VISIT HI MDM: CPT

## 2019-07-21 PROCEDURE — 6360000002 HC RX W HCPCS: Performed by: EMERGENCY MEDICINE

## 2019-07-21 PROCEDURE — 2580000003 HC RX 258: Performed by: EMERGENCY MEDICINE

## 2019-07-21 PROCEDURE — 83735 ASSAY OF MAGNESIUM: CPT

## 2019-07-21 PROCEDURE — 82803 BLOOD GASES ANY COMBINATION: CPT

## 2019-07-21 PROCEDURE — 71045 X-RAY EXAM CHEST 1 VIEW: CPT

## 2019-07-21 PROCEDURE — 36415 COLL VENOUS BLD VENIPUNCTURE: CPT

## 2019-07-21 PROCEDURE — 93005 ELECTROCARDIOGRAM TRACING: CPT

## 2019-07-21 PROCEDURE — 94664 DEMO&/EVAL PT USE INHALER: CPT

## 2019-07-21 RX ORDER — AZITHROMYCIN 250 MG/1
TABLET, FILM COATED ORAL
Qty: 4 TABLET | Refills: 0 | Status: ON HOLD | OUTPATIENT
Start: 2019-07-21 | End: 2019-07-25 | Stop reason: HOSPADM

## 2019-07-21 RX ORDER — 0.9 % SODIUM CHLORIDE 0.9 %
1000 INTRAVENOUS SOLUTION INTRAVENOUS ONCE
Status: COMPLETED | OUTPATIENT
Start: 2019-07-21 | End: 2019-07-21

## 2019-07-21 RX ORDER — METHYLPREDNISOLONE SODIUM SUCCINATE 125 MG/2ML
60 INJECTION, POWDER, LYOPHILIZED, FOR SOLUTION INTRAMUSCULAR; INTRAVENOUS ONCE
Status: COMPLETED | OUTPATIENT
Start: 2019-07-21 | End: 2019-07-21

## 2019-07-21 RX ORDER — IPRATROPIUM BROMIDE AND ALBUTEROL SULFATE 2.5; .5 MG/3ML; MG/3ML
3 SOLUTION RESPIRATORY (INHALATION) ONCE
Status: COMPLETED | OUTPATIENT
Start: 2019-07-21 | End: 2019-07-21

## 2019-07-21 RX ORDER — AZITHROMYCIN 250 MG/1
500 TABLET, FILM COATED ORAL ONCE
Status: COMPLETED | OUTPATIENT
Start: 2019-07-21 | End: 2019-07-21

## 2019-07-21 RX ADMIN — SODIUM CHLORIDE 1000 ML: 9 INJECTION, SOLUTION INTRAVENOUS at 06:55

## 2019-07-21 RX ADMIN — METHYLPREDNISOLONE SODIUM SUCCINATE 60 MG: 125 INJECTION, POWDER, FOR SOLUTION INTRAMUSCULAR; INTRAVENOUS at 05:55

## 2019-07-21 RX ADMIN — IPRATROPIUM BROMIDE AND ALBUTEROL SULFATE 3 AMPULE: .5; 3 SOLUTION RESPIRATORY (INHALATION) at 04:57

## 2019-07-21 RX ADMIN — AZITHROMYCIN MONOHYDRATE 500 MG: 250 TABLET ORAL at 08:02

## 2019-07-21 ASSESSMENT — ENCOUNTER SYMPTOMS
DIARRHEA: 0
CHEST TIGHTNESS: 1
SHORTNESS OF BREATH: 1
NAUSEA: 0
ABDOMINAL PAIN: 0
COUGH: 1
VOMITING: 0
BACK PAIN: 0

## 2019-07-21 NOTE — ED NOTES
Blood Cultures obtained from the right wrist, per policy. 1st set drawn at this time and sent to lab. Blood Cultures obtained from the right anticubital. 2nd set drawn and sent to lab.      Janes Campbell RN  07/21/19 [id number]

## 2019-07-21 NOTE — ED PROVIDER NOTES
Patient presents to the ED with a complaint of shortness of breath which got worse this morning. Describes as tightness in his chest.  Denies actual chest pain. Has had increase productivity of his cough with yellow sputum. No fever chills. Was doing breathing treatments at home which provided only minimal relief. Was also given a DuoNeb treatment by EMS which provided only mild relief. No associated fever or chills. No swelling in his legs. States this is similar to previous episodes of COPD exacerbation. Takes 15 mg of prednisone daily. Has not been on any recent antibiotics. Of breath is worse with any type of exertion. He is on continuous home oxygen therapy, 2 L by nasal cannula. He did have to wear more oxygen this morning due to his difficulty breathing. Review of Systems   Constitutional: Negative for chills, diaphoresis, fatigue and fever. HENT: Negative for congestion and postnasal drip. Eyes: Negative for visual disturbance. Respiratory: Positive for cough, chest tightness and shortness of breath. Cardiovascular: Negative for chest pain, palpitations and leg swelling. Gastrointestinal: Negative for abdominal pain, diarrhea, nausea and vomiting. Musculoskeletal: Negative for back pain. Skin: Negative for pallor and rash. Neurological: Negative for dizziness, syncope and light-headedness. Hematological: Does not bruise/bleed easily. Psychiatric/Behavioral: Negative for confusion. All other systems reviewed and are negative. Physical Exam   Constitutional: He is oriented to person, place, and time. He appears well-developed and well-nourished. He appears distressed. HENT:   Head: Normocephalic and atraumatic. Eyes: Conjunctivae are normal.   Neck: Normal range of motion. Neck supple. Cardiovascular: Regular rhythm and normal heart sounds. No murmur heard.   Patient is tachycardic   Pulmonary/Chest: He is in respiratory distress ( Accessory muscle use with mild conversational dyspnea). He has wheezes ( Faint expiratory wheezes noted). He has no rales. Diminished breath sounds throughout   Abdominal: Soft. Bowel sounds are normal. There is no tenderness. There is no rebound and no guarding. Musculoskeletal: He exhibits no edema, tenderness or deformity. There is no pretibial edema nor calf tenderness bilaterally      Neurological: He is alert and oriented to person, place, and time. Skin: Skin is warm. He is diaphoretic. Nursing note and vitals reviewed. Procedures    MDM    EKG Interpretation    Interpreted by emergency department physician    Rhythm: sinus tachycardia  Rate: 108  Axis: normal  Ectopy: none  Conduction: normal  ST Segments: normal  T Waves: normal  Q Waves: none    Clinical Impression: sinus tachycardia    Keith Sleeper          --------------------------------------------- PAST HISTORY ---------------------------------------------  Past Medical History:  has a past medical history of COPD (chronic obstructive pulmonary disease) (HCC) and Hyperlipidemia. Past Surgical History:  has a past surgical history that includes Hemorrhoid surgery; eye surgery (Right); Colonoscopy; and laparoscopic appendectomy (N/A, 2/7/2019). Social History:  reports that he quit smoking about 9 years ago. He has quit using smokeless tobacco. He reports that he drinks about 1.0 standard drinks of alcohol per week. He reports that he does not use drugs. Family History: family history is not on file. The patients home medications have been reviewed. Allergies: Patient has no known allergies.     -------------------------------------------------- RESULTS -------------------------------------------------  Labs:  Results for orders placed or performed during the hospital encounter of 07/21/19   CBC Auto Differential   Result Value Ref Range    WBC 10.1 4.5 - 11.5 E9/L    RBC 4.70 3.80 - 5.80 E12/L    Hemoglobin 14.6 12.5 - 16.5 g/dL

## 2019-07-23 ENCOUNTER — APPOINTMENT (OUTPATIENT)
Dept: GENERAL RADIOLOGY | Age: 79
DRG: 191 | End: 2019-07-23
Payer: MEDICARE

## 2019-07-23 ENCOUNTER — HOSPITAL ENCOUNTER (INPATIENT)
Age: 79
LOS: 2 days | Discharge: ANOTHER HOSPITAL | DRG: 191 | End: 2019-07-25
Attending: EMERGENCY MEDICINE | Admitting: INTERNAL MEDICINE
Payer: MEDICARE

## 2019-07-23 DIAGNOSIS — J44.1 COPD EXACERBATION (HCC): Primary | ICD-10-CM

## 2019-07-23 PROBLEM — J44.9 COPD (CHRONIC OBSTRUCTIVE PULMONARY DISEASE) (HCC): Status: ACTIVE | Noted: 2019-07-23

## 2019-07-23 LAB
ANION GAP SERPL CALCULATED.3IONS-SCNC: 12 MMOL/L (ref 7–16)
ANISOCYTOSIS: ABNORMAL
B.E.: 3.6 MMOL/L
BASOPHILS ABSOLUTE: 0 E9/L (ref 0–0.2)
BASOPHILS RELATIVE PERCENT: 0.3 % (ref 0–2)
BUN BLDV-MCNC: 16 MG/DL (ref 8–23)
CALCIUM SERPL-MCNC: 9.6 MG/DL (ref 8.6–10.2)
CHLORIDE BLD-SCNC: 98 MMOL/L (ref 98–107)
CO2: 29 MMOL/L (ref 22–29)
COHB: 1.5 % (ref 0–1.5)
COMMENT: ABNORMAL
CREAT SERPL-MCNC: 1 MG/DL (ref 0.7–1.2)
CRITICAL: ABNORMAL
DATE ANALYZED: ABNORMAL
DATE OF COLLECTION: ABNORMAL
EKG ATRIAL RATE: 91 BPM
EKG P AXIS: 74 DEGREES
EKG P-R INTERVAL: 170 MS
EKG Q-T INTERVAL: 354 MS
EKG QRS DURATION: 98 MS
EKG QTC CALCULATION (BAZETT): 435 MS
EKG R AXIS: 12 DEGREES
EKG T AXIS: 78 DEGREES
EKG VENTRICULAR RATE: 91 BPM
EOSINOPHILS ABSOLUTE: 0.06 E9/L (ref 0.05–0.5)
EOSINOPHILS RELATIVE PERCENT: 0.9 % (ref 0–6)
GFR AFRICAN AMERICAN: >60
GFR NON-AFRICAN AMERICAN: >60 ML/MIN/1.73
GLUCOSE BLD-MCNC: 102 MG/DL (ref 74–99)
HCO3: 29.7 MMOL/L
HCT VFR BLD CALC: 45.4 % (ref 37–54)
HEMOGLOBIN: 14.4 G/DL (ref 12.5–16.5)
HHB: 62.1 %
LAB: ABNORMAL
LYMPHOCYTES ABSOLUTE: 1.28 E9/L (ref 1.5–4)
LYMPHOCYTES RELATIVE PERCENT: 17.5 % (ref 20–42)
Lab: ABNORMAL
MCH RBC QN AUTO: 30.7 PG (ref 26–35)
MCHC RBC AUTO-ENTMCNC: 31.7 % (ref 32–34.5)
MCV RBC AUTO: 96.8 FL (ref 80–99.9)
METHB: 0.3 % (ref 0–1.5)
MODE: ABNORMAL
MONOCYTES ABSOLUTE: 1.14 E9/L (ref 0.1–0.95)
MONOCYTES RELATIVE PERCENT: 15.8 % (ref 2–12)
NEUTROPHILS ABSOLUTE: 4.69 E9/L (ref 1.8–7.3)
NEUTROPHILS RELATIVE PERCENT: 65.8 % (ref 43–80)
O2 SATURATION: 36.8 %
O2HB: 36.1 %
OPERATOR ID: 4001
PATIENT TEMP: 37 C
PCO2: 50.5 MMHG (ref 40–52)
PDW BLD-RTO: 14.3 FL (ref 11.5–15)
PH BLOOD GAS: 7.39 (ref 7.3–7.42)
PLATELET # BLD: 203 E9/L (ref 130–450)
PMV BLD AUTO: 10.1 FL (ref 7–12)
PO2: 22.1 MMHG (ref 30–50)
POLYCHROMASIA: ABNORMAL
POTASSIUM REFLEX MAGNESIUM: 4.6 MMOL/L (ref 3.5–5)
PRO-BNP: 68 PG/ML (ref 0–450)
RBC # BLD: 4.69 E12/L (ref 3.8–5.8)
SODIUM BLD-SCNC: 139 MMOL/L (ref 132–146)
SOURCE, BLOOD GAS: ABNORMAL
THB: 15.8 G/DL (ref 11.5–16.5)
TIME ANALYZED: 1640
TROPONIN: 0.01 NG/ML (ref 0–0.03)
WBC # BLD: 7.1 E9/L (ref 4.5–11.5)

## 2019-07-23 PROCEDURE — 6370000000 HC RX 637 (ALT 250 FOR IP): Performed by: EMERGENCY MEDICINE

## 2019-07-23 PROCEDURE — 83880 ASSAY OF NATRIURETIC PEPTIDE: CPT

## 2019-07-23 PROCEDURE — 93005 ELECTROCARDIOGRAM TRACING: CPT | Performed by: EMERGENCY MEDICINE

## 2019-07-23 PROCEDURE — 94644 CONT INHLJ TX 1ST HOUR: CPT

## 2019-07-23 PROCEDURE — 6360000002 HC RX W HCPCS: Performed by: INTERNAL MEDICINE

## 2019-07-23 PROCEDURE — 99285 EMERGENCY DEPT VISIT HI MDM: CPT

## 2019-07-23 PROCEDURE — 1200000000 HC SEMI PRIVATE

## 2019-07-23 PROCEDURE — 36415 COLL VENOUS BLD VENIPUNCTURE: CPT

## 2019-07-23 PROCEDURE — 6370000000 HC RX 637 (ALT 250 FOR IP): Performed by: INTERNAL MEDICINE

## 2019-07-23 PROCEDURE — 84484 ASSAY OF TROPONIN QUANT: CPT

## 2019-07-23 PROCEDURE — 82805 BLOOD GASES W/O2 SATURATION: CPT

## 2019-07-23 PROCEDURE — 80048 BASIC METABOLIC PNL TOTAL CA: CPT

## 2019-07-23 PROCEDURE — 93010 ELECTROCARDIOGRAM REPORT: CPT | Performed by: INTERNAL MEDICINE

## 2019-07-23 PROCEDURE — 71045 X-RAY EXAM CHEST 1 VIEW: CPT

## 2019-07-23 PROCEDURE — 85025 COMPLETE CBC W/AUTO DIFF WBC: CPT

## 2019-07-23 RX ORDER — IPRATROPIUM BROMIDE AND ALBUTEROL SULFATE 2.5; .5 MG/3ML; MG/3ML
3 SOLUTION RESPIRATORY (INHALATION) ONCE
Status: COMPLETED | OUTPATIENT
Start: 2019-07-23 | End: 2019-07-23

## 2019-07-23 RX ORDER — FAMOTIDINE 20 MG/1
20 TABLET, FILM COATED ORAL 2 TIMES DAILY
Status: DISCONTINUED | OUTPATIENT
Start: 2019-07-23 | End: 2019-07-25 | Stop reason: HOSPADM

## 2019-07-23 RX ORDER — ASPIRIN 81 MG/1
81 TABLET, CHEWABLE ORAL DAILY
Status: DISCONTINUED | OUTPATIENT
Start: 2019-07-24 | End: 2019-07-25 | Stop reason: HOSPADM

## 2019-07-23 RX ORDER — DOXYCYCLINE HYCLATE 100 MG/1
100 CAPSULE ORAL 2 TIMES DAILY
Status: DISCONTINUED | OUTPATIENT
Start: 2019-07-23 | End: 2019-07-25 | Stop reason: HOSPADM

## 2019-07-23 RX ORDER — SIMVASTATIN 20 MG
20 TABLET ORAL DAILY
Status: DISCONTINUED | OUTPATIENT
Start: 2019-07-24 | End: 2019-07-25 | Stop reason: HOSPADM

## 2019-07-23 RX ORDER — PREDNISONE 20 MG/1
60 TABLET ORAL ONCE
Status: COMPLETED | OUTPATIENT
Start: 2019-07-23 | End: 2019-07-23

## 2019-07-23 RX ORDER — METHYLPREDNISOLONE SODIUM SUCCINATE 125 MG/2ML
60 INJECTION, POWDER, LYOPHILIZED, FOR SOLUTION INTRAMUSCULAR; INTRAVENOUS EVERY 8 HOURS
Status: DISCONTINUED | OUTPATIENT
Start: 2019-07-23 | End: 2019-07-24

## 2019-07-23 RX ORDER — ALBUTEROL SULFATE 90 UG/1
2 AEROSOL, METERED RESPIRATORY (INHALATION) EVERY 4 HOURS PRN
Status: DISCONTINUED | OUTPATIENT
Start: 2019-07-23 | End: 2019-07-24

## 2019-07-23 RX ORDER — TERBINAFINE HYDROCHLORIDE 250 MG/1
250 TABLET ORAL DAILY
Status: DISCONTINUED | OUTPATIENT
Start: 2019-07-24 | End: 2019-07-25 | Stop reason: HOSPADM

## 2019-07-23 RX ADMIN — PREDNISONE 60 MG: 20 TABLET ORAL at 16:19

## 2019-07-23 RX ADMIN — METHYLPREDNISOLONE SODIUM SUCCINATE 60 MG: 125 INJECTION, POWDER, FOR SOLUTION INTRAMUSCULAR; INTRAVENOUS at 21:57

## 2019-07-23 RX ADMIN — IPRATROPIUM BROMIDE AND ALBUTEROL SULFATE 3 AMPULE: .5; 3 SOLUTION RESPIRATORY (INHALATION) at 16:16

## 2019-07-23 RX ADMIN — DOXYCYCLINE HYCLATE 100 MG: 100 CAPSULE ORAL at 21:57

## 2019-07-24 ENCOUNTER — APPOINTMENT (OUTPATIENT)
Dept: CT IMAGING | Age: 79
DRG: 191 | End: 2019-07-24
Payer: MEDICARE

## 2019-07-24 LAB
ALBUMIN SERPL-MCNC: 3.8 G/DL (ref 3.5–5.2)
ALP BLD-CCNC: 59 U/L (ref 40–129)
ALT SERPL-CCNC: 29 U/L (ref 0–40)
ANION GAP SERPL CALCULATED.3IONS-SCNC: 11 MMOL/L (ref 7–16)
ANISOCYTOSIS: ABNORMAL
AST SERPL-CCNC: 33 U/L (ref 0–39)
BASOPHILS ABSOLUTE: 0 E9/L (ref 0–0.2)
BASOPHILS RELATIVE PERCENT: 0.2 % (ref 0–2)
BILIRUB SERPL-MCNC: 0.4 MG/DL (ref 0–1.2)
BUN BLDV-MCNC: 15 MG/DL (ref 8–23)
BURR CELLS: ABNORMAL
CALCIUM SERPL-MCNC: 9.1 MG/DL (ref 8.6–10.2)
CHLORIDE BLD-SCNC: 100 MMOL/L (ref 98–107)
CO2: 29 MMOL/L (ref 22–29)
CREAT SERPL-MCNC: 0.9 MG/DL (ref 0.7–1.2)
EOSINOPHILS ABSOLUTE: 0 E9/L (ref 0.05–0.5)
EOSINOPHILS RELATIVE PERCENT: 0 % (ref 0–6)
GFR AFRICAN AMERICAN: >60
GFR NON-AFRICAN AMERICAN: >60 ML/MIN/1.73
GLUCOSE BLD-MCNC: 149 MG/DL (ref 74–99)
HCT VFR BLD CALC: 43.9 % (ref 37–54)
HEMOGLOBIN: 14.2 G/DL (ref 12.5–16.5)
LYMPHOCYTES ABSOLUTE: 0.49 E9/L (ref 1.5–4)
LYMPHOCYTES RELATIVE PERCENT: 4.3 % (ref 20–42)
MCH RBC QN AUTO: 31.1 PG (ref 26–35)
MCHC RBC AUTO-ENTMCNC: 32.3 % (ref 32–34.5)
MCV RBC AUTO: 96.3 FL (ref 80–99.9)
MONOCYTES ABSOLUTE: 0.25 E9/L (ref 0.1–0.95)
MONOCYTES RELATIVE PERCENT: 1.7 % (ref 2–12)
NEUTROPHILS ABSOLUTE: 11.56 E9/L (ref 1.8–7.3)
NEUTROPHILS RELATIVE PERCENT: 93.9 % (ref 43–80)
OVALOCYTES: ABNORMAL
PDW BLD-RTO: 14 FL (ref 11.5–15)
PLATELET # BLD: 203 E9/L (ref 130–450)
PMV BLD AUTO: 10.3 FL (ref 7–12)
POIKILOCYTES: ABNORMAL
POLYCHROMASIA: ABNORMAL
POTASSIUM SERPL-SCNC: 4.6 MMOL/L (ref 3.5–5)
RBC # BLD: 4.56 E12/L (ref 3.8–5.8)
SODIUM BLD-SCNC: 140 MMOL/L (ref 132–146)
TARGET CELLS: ABNORMAL
TOTAL PROTEIN: 6.4 G/DL (ref 6.4–8.3)
WBC # BLD: 12.3 E9/L (ref 4.5–11.5)

## 2019-07-24 PROCEDURE — 1200000000 HC SEMI PRIVATE

## 2019-07-24 PROCEDURE — 71260 CT THORAX DX C+: CPT

## 2019-07-24 PROCEDURE — 6370000000 HC RX 637 (ALT 250 FOR IP): Performed by: INTERNAL MEDICINE

## 2019-07-24 PROCEDURE — 6360000002 HC RX W HCPCS: Performed by: INTERNAL MEDICINE

## 2019-07-24 PROCEDURE — 94664 DEMO&/EVAL PT USE INHALER: CPT

## 2019-07-24 PROCEDURE — 6360000004 HC RX CONTRAST MEDICATION: Performed by: RADIOLOGY

## 2019-07-24 PROCEDURE — 36415 COLL VENOUS BLD VENIPUNCTURE: CPT

## 2019-07-24 PROCEDURE — 80053 COMPREHEN METABOLIC PANEL: CPT

## 2019-07-24 PROCEDURE — 85025 COMPLETE CBC W/AUTO DIFF WBC: CPT

## 2019-07-24 PROCEDURE — 94640 AIRWAY INHALATION TREATMENT: CPT

## 2019-07-24 RX ORDER — METHYLPREDNISOLONE SODIUM SUCCINATE 125 MG/2ML
60 INJECTION, POWDER, LYOPHILIZED, FOR SOLUTION INTRAMUSCULAR; INTRAVENOUS EVERY 6 HOURS
Status: DISCONTINUED | OUTPATIENT
Start: 2019-07-24 | End: 2019-07-25 | Stop reason: HOSPADM

## 2019-07-24 RX ORDER — ALBUTEROL SULFATE 2.5 MG/3ML
2.5 SOLUTION RESPIRATORY (INHALATION)
Status: DISCONTINUED | OUTPATIENT
Start: 2019-07-24 | End: 2019-07-25 | Stop reason: HOSPADM

## 2019-07-24 RX ADMIN — TIOTROPIUM BROMIDE 18 MCG: 18 CAPSULE ORAL; RESPIRATORY (INHALATION) at 06:42

## 2019-07-24 RX ADMIN — METHYLPREDNISOLONE SODIUM SUCCINATE 60 MG: 125 INJECTION, POWDER, FOR SOLUTION INTRAMUSCULAR; INTRAVENOUS at 12:43

## 2019-07-24 RX ADMIN — ALBUTEROL SULFATE 2.5 MG: 2.5 SOLUTION RESPIRATORY (INHALATION) at 10:07

## 2019-07-24 RX ADMIN — FAMOTIDINE 20 MG: 20 TABLET ORAL at 20:59

## 2019-07-24 RX ADMIN — IOPAMIDOL 80 ML: 755 INJECTION, SOLUTION INTRAVENOUS at 12:28

## 2019-07-24 RX ADMIN — MOMETASONE FUROATE AND FORMOTEROL FUMARATE DIHYDRATE 2 PUFF: 200; 5 AEROSOL RESPIRATORY (INHALATION) at 00:09

## 2019-07-24 RX ADMIN — DOXYCYCLINE HYCLATE 100 MG: 100 CAPSULE ORAL at 20:59

## 2019-07-24 RX ADMIN — METHYLPREDNISOLONE SODIUM SUCCINATE 60 MG: 125 INJECTION, POWDER, FOR SOLUTION INTRAMUSCULAR; INTRAVENOUS at 17:16

## 2019-07-24 RX ADMIN — SIMVASTATIN 20 MG: 20 TABLET, FILM COATED ORAL at 09:14

## 2019-07-24 RX ADMIN — ASPIRIN 81 MG 81 MG: 81 TABLET ORAL at 09:15

## 2019-07-24 RX ADMIN — MOMETASONE FUROATE AND FORMOTEROL FUMARATE DIHYDRATE 2 PUFF: 200; 5 AEROSOL RESPIRATORY (INHALATION) at 06:42

## 2019-07-24 RX ADMIN — TERBINAFINE 250 MG: 250 TABLET ORAL at 09:18

## 2019-07-24 RX ADMIN — MOMETASONE FUROATE AND FORMOTEROL FUMARATE DIHYDRATE 2 PUFF: 200; 5 AEROSOL RESPIRATORY (INHALATION) at 17:52

## 2019-07-24 RX ADMIN — ALBUTEROL SULFATE 2.5 MG: 2.5 SOLUTION RESPIRATORY (INHALATION) at 13:35

## 2019-07-24 RX ADMIN — ALBUTEROL SULFATE 2 PUFF: 90 AEROSOL, METERED RESPIRATORY (INHALATION) at 00:09

## 2019-07-24 RX ADMIN — ALBUTEROL SULFATE 2.5 MG: 2.5 SOLUTION RESPIRATORY (INHALATION) at 17:50

## 2019-07-24 RX ADMIN — ALBUTEROL SULFATE 2 PUFF: 90 AEROSOL, METERED RESPIRATORY (INHALATION) at 07:18

## 2019-07-24 RX ADMIN — METHYLPREDNISOLONE SODIUM SUCCINATE 60 MG: 125 INJECTION, POWDER, FOR SOLUTION INTRAMUSCULAR; INTRAVENOUS at 05:39

## 2019-07-24 RX ADMIN — DOXYCYCLINE HYCLATE 100 MG: 100 CAPSULE ORAL at 09:15

## 2019-07-24 ASSESSMENT — PAIN DESCRIPTION - PROGRESSION: CLINICAL_PROGRESSION: OTHER (COMMENT)

## 2019-07-24 ASSESSMENT — PAIN DESCRIPTION - ONSET: ONSET: OTHER (COMMENT)

## 2019-07-24 ASSESSMENT — PAIN SCALES - GENERAL
PAINLEVEL_OUTOF10: 0
PAINLEVEL_OUTOF10: 0

## 2019-07-24 ASSESSMENT — PAIN - FUNCTIONAL ASSESSMENT: PAIN_FUNCTIONAL_ASSESSMENT: PREVENTS OR INTERFERES SOME ACTIVE ACTIVITIES AND ADLS

## 2019-07-24 NOTE — PROGRESS NOTES
Was called to the bedside due to patient being uncomfortable and short of breath. All ordered medications for this patient have been administered at this time and it is too early to administer another dose of Albuterol per Q4 PRN order. Spoke to RN and advised we speak to the physician about starting nebulizer treatments on this patient along with keeping his Albuterol inhaler. Will return to administer treatment as soon as I am able.

## 2019-07-24 NOTE — PROGRESS NOTES
[facility] Quality Flow/Interdisciplinary Rounds Progress Note        Quality Flow Rounds held on July 24, 2019    Disciplines Attending:  Bedside Nurse, ,  and Nursing Unit Leadership    Gris Jauregui was admitted on 7/23/2019  3:46 PM    Anticipated Discharge Date:  Expected Discharge Date: 07/24/19    Disposition:    Philipp Score:  Philipp Scale Score: 19    Readmission Risk              Risk of Unplanned Readmission:        12           Discussed patient goal for the day, patient clinical progression, and barriers to discharge.   The following Goal(s) of the Day/Commitment(s) have been identified:  Continue to monitor      Oseas Vásquez  July 24, 2019

## 2019-07-25 VITALS
HEIGHT: 71 IN | RESPIRATION RATE: 22 BRPM | SYSTOLIC BLOOD PRESSURE: 123 MMHG | DIASTOLIC BLOOD PRESSURE: 61 MMHG | OXYGEN SATURATION: 90 % | BODY MASS INDEX: 32.34 KG/M2 | TEMPERATURE: 97.9 F | HEART RATE: 92 BPM | WEIGHT: 231 LBS

## 2019-07-25 LAB
ALBUMIN SERPL-MCNC: 4.1 G/DL (ref 3.5–5.2)
ALP BLD-CCNC: 69 U/L (ref 40–129)
ALT SERPL-CCNC: 42 U/L (ref 0–40)
ANION GAP SERPL CALCULATED.3IONS-SCNC: 14 MMOL/L (ref 7–16)
AST SERPL-CCNC: 37 U/L (ref 0–39)
BASOPHILS ABSOLUTE: 0.04 E9/L (ref 0–0.2)
BASOPHILS RELATIVE PERCENT: 0.2 % (ref 0–2)
BILIRUB SERPL-MCNC: 0.4 MG/DL (ref 0–1.2)
BUN BLDV-MCNC: 22 MG/DL (ref 8–23)
CALCIUM SERPL-MCNC: 9.1 MG/DL (ref 8.6–10.2)
CHLORIDE BLD-SCNC: 102 MMOL/L (ref 98–107)
CO2: 26 MMOL/L (ref 22–29)
CREAT SERPL-MCNC: 1 MG/DL (ref 0.7–1.2)
EOSINOPHILS ABSOLUTE: 0 E9/L (ref 0.05–0.5)
EOSINOPHILS RELATIVE PERCENT: 0 % (ref 0–6)
GFR AFRICAN AMERICAN: >60
GFR NON-AFRICAN AMERICAN: >60 ML/MIN/1.73
GLUCOSE BLD-MCNC: 135 MG/DL (ref 74–99)
HCT VFR BLD CALC: 47.4 % (ref 37–54)
HEMOGLOBIN: 15.2 G/DL (ref 12.5–16.5)
IMMATURE GRANULOCYTES #: 0.14 E9/L
IMMATURE GRANULOCYTES %: 0.8 % (ref 0–5)
LYMPHOCYTES ABSOLUTE: 1.02 E9/L (ref 1.5–4)
LYMPHOCYTES RELATIVE PERCENT: 5.6 % (ref 20–42)
MCH RBC QN AUTO: 31 PG (ref 26–35)
MCHC RBC AUTO-ENTMCNC: 32.1 % (ref 32–34.5)
MCV RBC AUTO: 96.7 FL (ref 80–99.9)
MONOCYTES ABSOLUTE: 0.83 E9/L (ref 0.1–0.95)
MONOCYTES RELATIVE PERCENT: 4.5 % (ref 2–12)
NEUTROPHILS ABSOLUTE: 16.23 E9/L (ref 1.8–7.3)
NEUTROPHILS RELATIVE PERCENT: 88.9 % (ref 43–80)
PDW BLD-RTO: 13.8 FL (ref 11.5–15)
PLATELET # BLD: 243 E9/L (ref 130–450)
PMV BLD AUTO: 9.9 FL (ref 7–12)
POTASSIUM SERPL-SCNC: 4.2 MMOL/L (ref 3.5–5)
RBC # BLD: 4.9 E12/L (ref 3.8–5.8)
SODIUM BLD-SCNC: 142 MMOL/L (ref 132–146)
TOTAL PROTEIN: 6.8 G/DL (ref 6.4–8.3)
WBC # BLD: 18.3 E9/L (ref 4.5–11.5)

## 2019-07-25 PROCEDURE — 80053 COMPREHEN METABOLIC PANEL: CPT

## 2019-07-25 PROCEDURE — 36415 COLL VENOUS BLD VENIPUNCTURE: CPT

## 2019-07-25 PROCEDURE — 94640 AIRWAY INHALATION TREATMENT: CPT

## 2019-07-25 PROCEDURE — 6370000000 HC RX 637 (ALT 250 FOR IP): Performed by: INTERNAL MEDICINE

## 2019-07-25 PROCEDURE — 6360000002 HC RX W HCPCS: Performed by: INTERNAL MEDICINE

## 2019-07-25 PROCEDURE — 85025 COMPLETE CBC W/AUTO DIFF WBC: CPT

## 2019-07-25 RX ORDER — METHYLPREDNISOLONE SODIUM SUCCINATE 125 MG/2ML
60 INJECTION, POWDER, LYOPHILIZED, FOR SOLUTION INTRAMUSCULAR; INTRAVENOUS EVERY 6 HOURS
Qty: 1 EACH | Refills: 0
Start: 2019-07-25 | End: 2020-12-18 | Stop reason: ALTCHOICE

## 2019-07-25 RX ORDER — DOXYCYCLINE HYCLATE 100 MG/1
100 CAPSULE ORAL 2 TIMES DAILY
Qty: 20 CAPSULE | Refills: 0 | Status: SHIPPED | OUTPATIENT
Start: 2019-07-25 | End: 2019-08-04

## 2019-07-25 RX ADMIN — TIOTROPIUM BROMIDE 18 MCG: 18 CAPSULE ORAL; RESPIRATORY (INHALATION) at 06:04

## 2019-07-25 RX ADMIN — TERBINAFINE 250 MG: 250 TABLET ORAL at 08:41

## 2019-07-25 RX ADMIN — SIMVASTATIN 20 MG: 20 TABLET, FILM COATED ORAL at 08:42

## 2019-07-25 RX ADMIN — ASPIRIN 81 MG 81 MG: 81 TABLET ORAL at 08:42

## 2019-07-25 RX ADMIN — METHYLPREDNISOLONE SODIUM SUCCINATE 60 MG: 125 INJECTION, POWDER, FOR SOLUTION INTRAMUSCULAR; INTRAVENOUS at 00:05

## 2019-07-25 RX ADMIN — FAMOTIDINE 20 MG: 20 TABLET ORAL at 08:42

## 2019-07-25 RX ADMIN — METHYLPREDNISOLONE SODIUM SUCCINATE 60 MG: 125 INJECTION, POWDER, FOR SOLUTION INTRAMUSCULAR; INTRAVENOUS at 11:33

## 2019-07-25 RX ADMIN — METHYLPREDNISOLONE SODIUM SUCCINATE 60 MG: 125 INJECTION, POWDER, FOR SOLUTION INTRAMUSCULAR; INTRAVENOUS at 05:35

## 2019-07-25 RX ADMIN — ALBUTEROL SULFATE 2.5 MG: 2.5 SOLUTION RESPIRATORY (INHALATION) at 13:08

## 2019-07-25 RX ADMIN — MOMETASONE FUROATE AND FORMOTEROL FUMARATE DIHYDRATE 2 PUFF: 200; 5 AEROSOL RESPIRATORY (INHALATION) at 06:03

## 2019-07-25 RX ADMIN — ALBUTEROL SULFATE 2.5 MG: 2.5 SOLUTION RESPIRATORY (INHALATION) at 04:40

## 2019-07-25 RX ADMIN — DOXYCYCLINE HYCLATE 100 MG: 100 CAPSULE ORAL at 08:41

## 2019-07-25 RX ADMIN — ALBUTEROL SULFATE 2.5 MG: 2.5 SOLUTION RESPIRATORY (INHALATION) at 08:57

## 2019-07-25 NOTE — DISCHARGE INSTR - COC
Belongings:[id number]}    RN SIGNATURE:  {Esignature:595452700}    CASE MANAGEMENT/SOCIAL WORK SECTION    Inpatient Status Date: ***    Readmission Risk Assessment Score:  Readmission Risk              Risk of Unplanned Readmission:        12           Discharging to Facility/ Agency   · Name:   · Address:  · Phone:  · Fax:    Dialysis Facility (if applicable)   · Name:  · Address:  · Dialysis Schedule:  · Phone:  · Fax:    / signature: {Esignature:[id number]}    PHYSICIAN SECTION    Prognosis: {Prognosis:3298346791}    Condition at Discharge: [address] [place] [address] Patient Condition:604518644}    Rehab Potential (if transferring to Rehab): {Prognosis:8711463393}    Recommended Labs or Other Treatments After Discharge: ***    Physician Certification: I certify the above information and transfer of Elda Coyne  is necessary for the continuing treatment of the diagnosis listed and that he requires {Admit to Appropriate Level of Care:29654} for {GREATER/LESS:770024248} 30 days.      Update Admission H&P: {CHP DME Changes in FXQKK:273168216}    PHYSICIAN SIGNATURE:  {Esignature:586953394}

## 2019-07-25 NOTE — PLAN OF CARE
Problem: Falls - Risk of:  Goal: Will remain free from falls  7/25/2019 0339 by Guanako Jeffers RN  Outcome: Met This Shift  Goal: Absence of physical injury  7/25/2019 0339 by Guanako Jeffers RN  Outcome: Met This Shift     Problem: Airway Clearance - Ineffective:  Goal: Ability to maintain a clear airway will improve  7/25/2019 1600 by Ngozi Bradley RN  Outcome: Met This Shift  7/25/2019 0339 by Guanako Jeffers RN  Outcome: Met This Shift

## 2019-07-26 LAB
BLOOD CULTURE, ROUTINE: NORMAL
CULTURE, BLOOD 2: NORMAL

## 2020-06-16 ENCOUNTER — APPOINTMENT (OUTPATIENT)
Dept: CT IMAGING | Age: 80
End: 2020-06-16
Payer: MEDICARE

## 2020-06-16 ENCOUNTER — HOSPITAL ENCOUNTER (EMERGENCY)
Age: 80
Discharge: HOME | End: 2020-06-16
Attending: EMERGENCY MEDICINE
Payer: MEDICARE

## 2020-06-16 VITALS
TEMPERATURE: 98.1 F | SYSTOLIC BLOOD PRESSURE: 156 MMHG | OXYGEN SATURATION: 97 % | RESPIRATION RATE: 24 BRPM | WEIGHT: 258 LBS | BODY MASS INDEX: 36.12 KG/M2 | DIASTOLIC BLOOD PRESSURE: 77 MMHG | HEART RATE: 114 BPM | HEIGHT: 71 IN

## 2020-06-16 LAB
ANION GAP SERPL CALCULATED.3IONS-SCNC: 14 MMOL/L (ref 7–16)
B.E.: 1.2 MMOL/L (ref -3–3)
BASOPHILS ABSOLUTE: 0.04 E9/L (ref 0–0.2)
BASOPHILS RELATIVE PERCENT: 0.3 % (ref 0–2)
BUN BLDV-MCNC: 14 MG/DL (ref 8–23)
CALCIUM SERPL-MCNC: 9.6 MG/DL (ref 8.6–10.2)
CHLORIDE BLD-SCNC: 101 MMOL/L (ref 98–107)
CO2: 27 MMOL/L (ref 22–29)
COHB: 0 % (ref 0–1.5)
CREAT SERPL-MCNC: 1.2 MG/DL (ref 0.7–1.2)
CRITICAL: ABNORMAL
DATE ANALYZED: ABNORMAL
DATE OF COLLECTION: ABNORMAL
EOSINOPHILS ABSOLUTE: 0.01 E9/L (ref 0.05–0.5)
EOSINOPHILS RELATIVE PERCENT: 0.1 % (ref 0–6)
GFR AFRICAN AMERICAN: >60
GFR NON-AFRICAN AMERICAN: 58 ML/MIN/1.73
GLUCOSE BLD-MCNC: 124 MG/DL (ref 74–99)
HCO3: 24.1 MMOL/L (ref 22–26)
HCT VFR BLD CALC: 48.5 % (ref 37–54)
HEMOGLOBIN: 15.7 G/DL (ref 12.5–16.5)
HHB: 3.1 % (ref 0–5)
IMMATURE GRANULOCYTES #: 0.17 E9/L
IMMATURE GRANULOCYTES %: 1.3 % (ref 0–5)
LAB: ABNORMAL
LACTIC ACID: 3.2 MMOL/L (ref 0.5–2.2)
LYMPHOCYTES ABSOLUTE: 0.68 E9/L (ref 1.5–4)
LYMPHOCYTES RELATIVE PERCENT: 5.2 % (ref 20–42)
Lab: ABNORMAL
MCH RBC QN AUTO: 31.2 PG (ref 26–35)
MCHC RBC AUTO-ENTMCNC: 32.4 % (ref 32–34.5)
MCV RBC AUTO: 96.2 FL (ref 80–99.9)
METHB: 0.3 % (ref 0–1.5)
MODE: ABNORMAL
MONOCYTES ABSOLUTE: 0.64 E9/L (ref 0.1–0.95)
MONOCYTES RELATIVE PERCENT: 4.9 % (ref 2–12)
NEUTROPHILS ABSOLUTE: 11.43 E9/L (ref 1.8–7.3)
NEUTROPHILS RELATIVE PERCENT: 88.2 % (ref 43–80)
O2 CONTENT: 21.6 ML/DL
O2 SATURATION: 96.9 % (ref 92–98.5)
O2HB: 96.6 % (ref 94–97)
OPERATOR ID: 30
PATIENT TEMP: 37 C
PCO2: 33.5 MMHG (ref 35–45)
PDW BLD-RTO: 15.1 FL (ref 11.5–15)
PH BLOOD GAS: 7.47 (ref 7.35–7.45)
PLATELET # BLD: 216 E9/L (ref 130–450)
PMV BLD AUTO: 10.6 FL (ref 7–12)
PO2: 82.1 MMHG (ref 75–100)
POTASSIUM SERPL-SCNC: 4.6 MMOL/L (ref 3.5–5)
PRO-BNP: 160 PG/ML (ref 0–450)
RBC # BLD: 5.04 E12/L (ref 3.8–5.8)
SODIUM BLD-SCNC: 142 MMOL/L (ref 132–146)
SOURCE, BLOOD GAS: ABNORMAL
THB: 15.9 G/DL (ref 11.5–16.5)
TIME ANALYZED: 1635
TROPONIN: 0.03 NG/ML (ref 0–0.03)
WBC # BLD: 13 E9/L (ref 4.5–11.5)

## 2020-06-16 PROCEDURE — 94644 CONT INHLJ TX 1ST HOUR: CPT

## 2020-06-16 PROCEDURE — 6370000000 HC RX 637 (ALT 250 FOR IP): Performed by: STUDENT IN AN ORGANIZED HEALTH CARE EDUCATION/TRAINING PROGRAM

## 2020-06-16 PROCEDURE — 87040 BLOOD CULTURE FOR BACTERIA: CPT

## 2020-06-16 PROCEDURE — 85025 COMPLETE CBC W/AUTO DIFF WBC: CPT

## 2020-06-16 PROCEDURE — 83880 ASSAY OF NATRIURETIC PEPTIDE: CPT

## 2020-06-16 PROCEDURE — 36415 COLL VENOUS BLD VENIPUNCTURE: CPT

## 2020-06-16 PROCEDURE — 82805 BLOOD GASES W/O2 SATURATION: CPT

## 2020-06-16 PROCEDURE — 71275 CT ANGIOGRAPHY CHEST: CPT

## 2020-06-16 PROCEDURE — 84484 ASSAY OF TROPONIN QUANT: CPT

## 2020-06-16 PROCEDURE — 6360000004 HC RX CONTRAST MEDICATION: Performed by: RADIOLOGY

## 2020-06-16 PROCEDURE — 99285 EMERGENCY DEPT VISIT HI MDM: CPT

## 2020-06-16 PROCEDURE — 83605 ASSAY OF LACTIC ACID: CPT

## 2020-06-16 PROCEDURE — 80048 BASIC METABOLIC PNL TOTAL CA: CPT

## 2020-06-16 PROCEDURE — 93005 ELECTROCARDIOGRAM TRACING: CPT | Performed by: STUDENT IN AN ORGANIZED HEALTH CARE EDUCATION/TRAINING PROGRAM

## 2020-06-16 RX ORDER — IPRATROPIUM BROMIDE AND ALBUTEROL SULFATE 2.5; .5 MG/3ML; MG/3ML
3 SOLUTION RESPIRATORY (INHALATION) ONCE
Status: COMPLETED | OUTPATIENT
Start: 2020-06-16 | End: 2020-06-16

## 2020-06-16 RX ADMIN — IOPAMIDOL 75 ML: 755 INJECTION, SOLUTION INTRAVENOUS at 17:54

## 2020-06-16 RX ADMIN — IPRATROPIUM BROMIDE AND ALBUTEROL SULFATE 3 AMPULE: .5; 3 SOLUTION RESPIRATORY (INHALATION) at 15:38

## 2020-06-16 ASSESSMENT — ENCOUNTER SYMPTOMS
EYE DISCHARGE: 0
ABDOMINAL PAIN: 0
NAUSEA: 0
VOMITING: 0
EYE REDNESS: 0
EYE PAIN: 0
WHEEZING: 0
DIARRHEA: 0
SORE THROAT: 0
BACK PAIN: 0
COUGH: 0
SINUS PRESSURE: 0
SHORTNESS OF BREATH: 1

## 2020-06-16 ASSESSMENT — PAIN DESCRIPTION - ONSET: ONSET: ON-GOING

## 2020-06-16 ASSESSMENT — PAIN - FUNCTIONAL ASSESSMENT: PAIN_FUNCTIONAL_ASSESSMENT: PREVENTS OR INTERFERES SOME ACTIVE ACTIVITIES AND ADLS

## 2020-06-16 ASSESSMENT — PAIN DESCRIPTION - PROGRESSION: CLINICAL_PROGRESSION: GRADUALLY WORSENING

## 2020-06-16 ASSESSMENT — PAIN SCALES - GENERAL: PAINLEVEL_OUTOF10: 5

## 2020-06-16 ASSESSMENT — PAIN DESCRIPTION - LOCATION: LOCATION: FOOT

## 2020-06-16 ASSESSMENT — PAIN DESCRIPTION - FREQUENCY: FREQUENCY: CONTINUOUS

## 2020-06-16 ASSESSMENT — PAIN DESCRIPTION - DESCRIPTORS: DESCRIPTORS: SORE;BURNING;CONSTANT

## 2020-06-16 ASSESSMENT — PAIN DESCRIPTION - ORIENTATION: ORIENTATION: RIGHT;LEFT

## 2020-06-16 ASSESSMENT — PAIN DESCRIPTION - PAIN TYPE: TYPE: CHRONIC PAIN

## 2020-06-16 NOTE — ED PROVIDER NOTES
Pulmonary:      Effort: Tachypnea, accessory muscle usage and respiratory distress present. Breath sounds: Wheezing present. Abdominal:      General: There is distension. Palpations: Abdomen is soft. Tenderness: There is no abdominal tenderness. There is no guarding or rebound. Musculoskeletal:      Right lower leg: Edema present. Left lower leg: Edema present. Skin:     General: Skin is warm and dry. Findings: No erythema or rash. Neurological:      General: No focal deficit present. Mental Status: He is alert and oriented to person, place, and time. Sensory: No sensory deficit. Motor: No weakness. Coordination: Coordination normal.          Procedures     MDM  Number of Diagnoses or Management Options  Diagnosis management comments: Frontal diagnosis includes acute pulmonary embolism, COPD exacerbation, or acute decompensated congestive heart failure. Labs and imaging will be obtained. Supportive care will be provided. ABG to assess respiratory status. EKG Interpretation    Interpreted by emergency department physician    Clinical Impression: Sinus tachycardia. PAC noted. No ST segment elevations or depressions. Left axis. No T wave abnormalities inversions. No acute changes except for heart rate when compared to EKG from 7/23/2019. Danielle Bunch               --------------------------------------------- PAST HISTORY ---------------------------------------------  Past Medical History:  has a past medical history of COPD (chronic obstructive pulmonary disease) ([address] and Hyperlipidemia. Past Surgical History:  has a past surgical history that includes Hemorrhoid surgery; eye surgery (Right); Colonoscopy; laparoscopic appendectomy (N/A, 2/7/2019); and Appendectomy (2019). Social History:  reports that he quit smoking about 10 years ago. He has quit using smokeless tobacco. He reports previous alcohol use.  He reports that he does not use Source: Blood Arterial     pH, Blood Gas 7.474 (H) 7.350 - 7.450    PCO2 33.5 (L) 35.0 - 45.0 mmHg    PO2 82.1 75.0 - 100.0 mmHg    HCO3 24.1 22.0 - 26.0 mmol/L    B.E. 1.2 -3.0 - 3.0 mmol/L    O2 Sat 96.9 92.0 - 98.5 %    O2Hb 96.6 94.0 - 97.0 %    COHb 0.0 0.0 - 1.5 %    MetHb 0.3 0.0 - 1.5 %    O2 Content 21.6 mL/dL    HHb 3.1 0.0 - 5.0 %    tHb (est) 15.9 11.5 - 16.5 g/dL    Mode NC-4  L     Date Of Collection      Time Collected      Pt Temp 37.0 C     ID 30     Lab [id number]     Critical(s) Notified . No Critical Values    EKG 12 Lead   Result Value Ref Range    Ventricular Rate 110 BPM    Atrial Rate 110 BPM    P-R Interval 162 ms    QRS Duration 92 ms    Q-T Interval 320 ms    QTc Calculation (Bazett) 433 ms    P Axis 58 degrees    R Axis -23 degrees    T Axis 62 degrees       Radiology:  CTA CHEST W CONTRAST   Final Result   1. No pulmonary embolism or other acute process. 2. Moderate emphysematous changes. 3. Mildly ectatic ascending thoracic aorta measuring approximately 4.3 cm. Coronary artery atherosclerotic vascular calcifications. 4. Resolution of the previously identified small 4 mm nodule in the left   lower lobe.             ------------------------- NURSING NOTES AND VITALS REVIEWED ---------------------------  Date / Time Roomed:  6/16/2020  3:07 PM  ED Bed Assignment:  [id number]    The nursing notes within the ED encounter and vital signs as below have been reviewed. BP (!) 156/77   Pulse 114   Temp 98.1 °F (36.7 °C) (Oral)   Resp 24   Ht 5' 11\" (1.803 m)   Wt 258 lb (117 kg)   SpO2 97%   BMI 35.98 kg/m²   Oxygen Saturation Interpretation: Normal      ------------------------------------------ PROGRESS NOTES ------------------------------------------  1857 patient was reevaluated and results of today's testing were shared with him.   I explained that given his elevated heart rate and accessory muscle use I was concerned that he is in the midst of a significant COPD

## 2020-06-17 ENCOUNTER — CARE COORDINATION (OUTPATIENT)
Dept: CARE COORDINATION | Age: 80
End: 2020-06-17

## 2020-06-18 ENCOUNTER — CARE COORDINATION (OUTPATIENT)
Dept: CARE COORDINATION | Age: 80
End: 2020-06-18

## 2020-06-18 LAB
EKG ATRIAL RATE: 110 BPM
EKG P AXIS: 58 DEGREES
EKG P-R INTERVAL: 162 MS
EKG Q-T INTERVAL: 320 MS
EKG QRS DURATION: 92 MS
EKG QTC CALCULATION (BAZETT): 433 MS
EKG R AXIS: -23 DEGREES
EKG T AXIS: 62 DEGREES
EKG VENTRICULAR RATE: 110 BPM

## 2020-06-18 NOTE — CARE COORDINATION
COVID-19 ED/Flu Clinic Initial Outreach Note    This Florian Henderson made second attempt to  contact the patient by telephone to perform post discharge call. Left message. Will no longer make an outreach. Patient was seen and treated for leg pain and SOB .
Breast cancer

## 2020-06-21 LAB
BLOOD CULTURE, ROUTINE: NORMAL
CULTURE, BLOOD 2: NORMAL

## 2020-10-16 ENCOUNTER — HOSPITAL ENCOUNTER (OUTPATIENT)
Dept: ULTRASOUND IMAGING | Age: 80
End: 2020-10-16
Payer: MEDICARE

## 2020-10-16 ENCOUNTER — HOSPITAL ENCOUNTER (OUTPATIENT)
Dept: ULTRASOUND IMAGING | Age: 80
Discharge: HOME | End: 2020-10-16
Payer: MEDICARE

## 2020-10-16 PROCEDURE — 93925 LOWER EXTREMITY STUDY: CPT

## 2020-10-16 PROCEDURE — 93925 LOWER EXTREMITY STUDY: CPT | Performed by: RADIOLOGY

## 2020-12-18 ENCOUNTER — HOSPITAL ENCOUNTER (INPATIENT)
Age: 80
LOS: 6 days | Discharge: HOME HEALTH | DRG: 871 | End: 2020-12-24
Attending: EMERGENCY MEDICINE | Admitting: INTERNAL MEDICINE
Payer: MEDICARE

## 2020-12-18 ENCOUNTER — APPOINTMENT (OUTPATIENT)
Dept: GENERAL RADIOLOGY | Age: 80
DRG: 871 | End: 2020-12-18
Payer: MEDICARE

## 2020-12-18 DIAGNOSIS — D72.829 LEUKOCYTOSIS, UNSPECIFIED TYPE: ICD-10-CM

## 2020-12-18 DIAGNOSIS — R06.03 ACUTE RESPIRATORY DISTRESS: ICD-10-CM

## 2020-12-18 DIAGNOSIS — A41.9 SEPTIC SHOCK (HCC): Primary | ICD-10-CM

## 2020-12-18 DIAGNOSIS — R09.02 HYPOXIA: ICD-10-CM

## 2020-12-18 DIAGNOSIS — R65.21 SEPTIC SHOCK (HCC): Primary | ICD-10-CM

## 2020-12-18 DIAGNOSIS — E87.20 LACTIC ACIDOSIS: ICD-10-CM

## 2020-12-18 DIAGNOSIS — J18.9 PNEUMONIA DUE TO ORGANISM: ICD-10-CM

## 2020-12-18 LAB
ALBUMIN SERPL-MCNC: 3.8 G/DL (ref 3.5–5.2)
ALP BLD-CCNC: 100 U/L (ref 40–129)
ALT SERPL-CCNC: 19 U/L (ref 0–40)
ANION GAP SERPL CALCULATED.3IONS-SCNC: 17 MMOL/L (ref 7–16)
APTT: 27.4 SEC (ref 24.5–35.1)
AST SERPL-CCNC: 23 U/L (ref 0–39)
B.E.: -3.9 MMOL/L (ref -3–3)
BACTERIA: ABNORMAL /HPF
BASOPHILS ABSOLUTE: 0 E9/L (ref 0–0.2)
BASOPHILS RELATIVE PERCENT: 0.3 % (ref 0–2)
BILIRUB SERPL-MCNC: 0.7 MG/DL (ref 0–1.2)
BILIRUBIN URINE: NEGATIVE
BLOOD, URINE: ABNORMAL
BUN BLDV-MCNC: 12 MG/DL (ref 8–23)
CALCIUM SERPL-MCNC: 9.9 MG/DL (ref 8.6–10.2)
CHLORIDE BLD-SCNC: 100 MMOL/L (ref 98–107)
CLARITY: CLEAR
CO2: 25 MMOL/L (ref 22–29)
COHB: 0.4 % (ref 0–1.5)
COLOR: YELLOW
CREAT SERPL-MCNC: 1.8 MG/DL (ref 0.7–1.2)
CRITICAL: ABNORMAL
DATE ANALYZED: ABNORMAL
DATE OF COLLECTION: ABNORMAL
EKG ATRIAL RATE: 192 BPM
EKG P AXIS: -79 DEGREES
EKG P-R INTERVAL: 134 MS
EKG Q-T INTERVAL: 242 MS
EKG QRS DURATION: 76 MS
EKG QTC CALCULATION (BAZETT): 406 MS
EKG R AXIS: -15 DEGREES
EKG T AXIS: 79 DEGREES
EKG VENTRICULAR RATE: 170 BPM
EOSINOPHILS ABSOLUTE: 0.24 E9/L (ref 0.05–0.5)
EOSINOPHILS RELATIVE PERCENT: 0.9 % (ref 0–6)
EPITHELIAL CELLS, UA: ABNORMAL /HPF
GFR AFRICAN AMERICAN: 44
GFR NON-AFRICAN AMERICAN: 37 ML/MIN/1.73
GLUCOSE BLD-MCNC: 160 MG/DL (ref 74–99)
GLUCOSE URINE: NEGATIVE MG/DL
HCO3: 19.1 MMOL/L (ref 22–26)
HCT VFR BLD CALC: 51.2 % (ref 37–54)
HEMOGLOBIN: 15.9 G/DL (ref 12.5–16.5)
HHB: 6.3 % (ref 0–5)
INR BLD: 1.2
KETONES, URINE: NEGATIVE MG/DL
LAB: ABNORMAL
LACTIC ACID, SEPSIS: 6.6 MMOL/L (ref 0.5–1.9)
LACTIC ACID, SEPSIS: 8.6 MMOL/L (ref 0.5–1.9)
LACTIC ACID: 4.7 MMOL/L (ref 0.5–2.2)
LACTIC ACID: 6.3 MMOL/L (ref 0.5–2.2)
LEUKOCYTE ESTERASE, URINE: NEGATIVE
LYMPHOCYTES ABSOLUTE: 2.64 E9/L (ref 1.5–4)
LYMPHOCYTES RELATIVE PERCENT: 10.4 % (ref 20–42)
Lab: ABNORMAL
MCH RBC QN AUTO: 30 PG (ref 26–35)
MCHC RBC AUTO-ENTMCNC: 31.1 % (ref 32–34.5)
MCV RBC AUTO: 96.6 FL (ref 80–99.9)
METHB: 0.5 % (ref 0–1.5)
MODE: ABNORMAL
MONOCYTES ABSOLUTE: 1.58 E9/L (ref 0.1–0.95)
MONOCYTES RELATIVE PERCENT: 6.1 % (ref 2–12)
NEUTROPHILS ABSOLUTE: 21.91 E9/L (ref 1.8–7.3)
NEUTROPHILS RELATIVE PERCENT: 82.6 % (ref 43–80)
NITRITE, URINE: NEGATIVE
O2 CONTENT: 21.2 ML/DL
O2 SATURATION: 93.6 % (ref 92–98.5)
O2HB: 92.8 % (ref 94–97)
OPERATOR ID: 274
PATIENT TEMP: 37
PCO2: 30.1 MMHG (ref 35–45)
PDW BLD-RTO: 14.7 FL (ref 11.5–15)
PH BLOOD GAS: 7.42 (ref 7.35–7.45)
PH UA: 5 (ref 5–9)
PLATELET # BLD: 276 E9/L (ref 130–450)
PMV BLD AUTO: 11.1 FL (ref 7–12)
PO2: 66.9 MMHG (ref 75–100)
POTASSIUM REFLEX MAGNESIUM: 3.7 MMOL/L (ref 3.5–5)
PRO-BNP: 1011 PG/ML (ref 0–450)
PROTEIN UA: NEGATIVE MG/DL
PROTHROMBIN TIME: 13.2 SEC (ref 9.3–12.4)
RBC # BLD: 5.3 E12/L (ref 3.8–5.8)
RBC UA: ABNORMAL /HPF (ref 0–2)
SARS-COV-2, NAAT: NOT DETECTED
SODIUM BLD-SCNC: 142 MMOL/L (ref 132–146)
SOURCE, BLOOD GAS: ABNORMAL
SPECIFIC GRAVITY UA: 1.02 (ref 1–1.03)
THB: 16.3 G/DL (ref 11.5–16.5)
TIME ANALYZED: 1018
TOTAL PROTEIN: 6.8 G/DL (ref 6.4–8.3)
TROPONIN: 0.17 NG/ML (ref 0–0.03)
UROBILINOGEN, URINE: 0.2 E.U./DL
WBC # BLD: 26.4 E9/L (ref 4.5–11.5)
WBC UA: ABNORMAL /HPF (ref 0–5)

## 2020-12-18 PROCEDURE — 93005 ELECTROCARDIOGRAM TRACING: CPT | Performed by: EMERGENCY MEDICINE

## 2020-12-18 PROCEDURE — 83880 ASSAY OF NATRIURETIC PEPTIDE: CPT

## 2020-12-18 PROCEDURE — 99285 EMERGENCY DEPT VISIT HI MDM: CPT

## 2020-12-18 PROCEDURE — 6360000002 HC RX W HCPCS: Performed by: INTERNAL MEDICINE

## 2020-12-18 PROCEDURE — 6370000000 HC RX 637 (ALT 250 FOR IP): Performed by: EMERGENCY MEDICINE

## 2020-12-18 PROCEDURE — 2500000003 HC RX 250 WO HCPCS: Performed by: STUDENT IN AN ORGANIZED HEALTH CARE EDUCATION/TRAINING PROGRAM

## 2020-12-18 PROCEDURE — 94644 CONT INHLJ TX 1ST HOUR: CPT

## 2020-12-18 PROCEDURE — 71045 X-RAY EXAM CHEST 1 VIEW: CPT

## 2020-12-18 PROCEDURE — 2580000003 HC RX 258: Performed by: STUDENT IN AN ORGANIZED HEALTH CARE EDUCATION/TRAINING PROGRAM

## 2020-12-18 PROCEDURE — 85730 THROMBOPLASTIN TIME PARTIAL: CPT

## 2020-12-18 PROCEDURE — 81001 URINALYSIS AUTO W/SCOPE: CPT

## 2020-12-18 PROCEDURE — 82805 BLOOD GASES W/O2 SATURATION: CPT

## 2020-12-18 PROCEDURE — 96365 THER/PROPH/DIAG IV INF INIT: CPT

## 2020-12-18 PROCEDURE — 2000000000 HC ICU R&B

## 2020-12-18 PROCEDURE — 36415 COLL VENOUS BLD VENIPUNCTURE: CPT

## 2020-12-18 PROCEDURE — 93010 ELECTROCARDIOGRAM REPORT: CPT | Performed by: INTERNAL MEDICINE

## 2020-12-18 PROCEDURE — U0002 COVID-19 LAB TEST NON-CDC: HCPCS

## 2020-12-18 PROCEDURE — 85610 PROTHROMBIN TIME: CPT

## 2020-12-18 PROCEDURE — 87040 BLOOD CULTURE FOR BACTERIA: CPT

## 2020-12-18 PROCEDURE — 80053 COMPREHEN METABOLIC PANEL: CPT

## 2020-12-18 PROCEDURE — 6360000002 HC RX W HCPCS: Performed by: STUDENT IN AN ORGANIZED HEALTH CARE EDUCATION/TRAINING PROGRAM

## 2020-12-18 PROCEDURE — 85025 COMPLETE CBC W/AUTO DIFF WBC: CPT

## 2020-12-18 PROCEDURE — 2580000003 HC RX 258: Performed by: INTERNAL MEDICINE

## 2020-12-18 PROCEDURE — 84484 ASSAY OF TROPONIN QUANT: CPT

## 2020-12-18 PROCEDURE — 94660 CPAP INITIATION&MGMT: CPT

## 2020-12-18 PROCEDURE — 6370000000 HC RX 637 (ALT 250 FOR IP): Performed by: INTERNAL MEDICINE

## 2020-12-18 PROCEDURE — 96375 TX/PRO/DX INJ NEW DRUG ADDON: CPT

## 2020-12-18 PROCEDURE — 87088 URINE BACTERIA CULTURE: CPT

## 2020-12-18 PROCEDURE — 83605 ASSAY OF LACTIC ACID: CPT

## 2020-12-18 RX ORDER — LORAZEPAM 1 MG/1
1 TABLET ORAL EVERY 6 HOURS PRN
Status: DISCONTINUED | OUTPATIENT
Start: 2020-12-18 | End: 2020-12-24 | Stop reason: HOSPADM

## 2020-12-18 RX ORDER — ATORVASTATIN CALCIUM 20 MG/1
20 TABLET, FILM COATED ORAL DAILY
Status: DISCONTINUED | OUTPATIENT
Start: 2020-12-19 | End: 2020-12-24 | Stop reason: HOSPADM

## 2020-12-18 RX ORDER — PREDNISONE 1 MG/1
5 TABLET ORAL DAILY
Status: ON HOLD | COMMUNITY
End: 2020-12-24 | Stop reason: HOSPADM

## 2020-12-18 RX ORDER — ACETAMINOPHEN 325 MG/1
650 TABLET ORAL EVERY 6 HOURS PRN
Status: DISCONTINUED | OUTPATIENT
Start: 2020-12-18 | End: 2020-12-24 | Stop reason: HOSPADM

## 2020-12-18 RX ORDER — IPRATROPIUM BROMIDE AND ALBUTEROL SULFATE 2.5; .5 MG/3ML; MG/3ML
3 SOLUTION RESPIRATORY (INHALATION) ONCE
Status: COMPLETED | OUTPATIENT
Start: 2020-12-18 | End: 2020-12-18

## 2020-12-18 RX ORDER — LORAZEPAM 0.5 MG/1
0.5 TABLET ORAL ONCE
Status: COMPLETED | OUTPATIENT
Start: 2020-12-18 | End: 2020-12-18

## 2020-12-18 RX ORDER — ASPIRIN 81 MG/1
81 TABLET, CHEWABLE ORAL DAILY
Status: DISCONTINUED | OUTPATIENT
Start: 2020-12-19 | End: 2020-12-24 | Stop reason: HOSPADM

## 2020-12-18 RX ORDER — FAMOTIDINE 20 MG/1
20 TABLET, FILM COATED ORAL 2 TIMES DAILY
Status: DISCONTINUED | OUTPATIENT
Start: 2020-12-18 | End: 2020-12-24 | Stop reason: HOSPADM

## 2020-12-18 RX ORDER — 0.9 % SODIUM CHLORIDE 0.9 %
1000 INTRAVENOUS SOLUTION INTRAVENOUS ONCE
Status: COMPLETED | OUTPATIENT
Start: 2020-12-18 | End: 2020-12-18

## 2020-12-18 RX ORDER — SODIUM CHLORIDE 9 MG/ML
INJECTION, SOLUTION INTRAVENOUS CONTINUOUS
Status: DISCONTINUED | OUTPATIENT
Start: 2020-12-18 | End: 2020-12-22

## 2020-12-18 RX ORDER — 0.9 % SODIUM CHLORIDE 0.9 %
30 INTRAVENOUS SOLUTION INTRAVENOUS ONCE
Status: COMPLETED | OUTPATIENT
Start: 2020-12-18 | End: 2020-12-18

## 2020-12-18 RX ORDER — ALBUTEROL SULFATE 2.5 MG/3ML
2.5 SOLUTION RESPIRATORY (INHALATION) EVERY 4 HOURS PRN
Status: DISCONTINUED | OUTPATIENT
Start: 2020-12-18 | End: 2020-12-24 | Stop reason: HOSPADM

## 2020-12-18 RX ORDER — METHYLPREDNISOLONE SODIUM SUCCINATE 125 MG/2ML
60 INJECTION, POWDER, LYOPHILIZED, FOR SOLUTION INTRAMUSCULAR; INTRAVENOUS EVERY 6 HOURS
Status: DISCONTINUED | OUTPATIENT
Start: 2020-12-18 | End: 2020-12-20

## 2020-12-18 RX ORDER — FUROSEMIDE 20 MG/1
20 TABLET ORAL DAILY
Status: ON HOLD | COMMUNITY
End: 2020-12-24 | Stop reason: HOSPADM

## 2020-12-18 RX ORDER — ACETAMINOPHEN 650 MG/1
650 SUPPOSITORY RECTAL ONCE
Status: COMPLETED | OUTPATIENT
Start: 2020-12-18 | End: 2020-12-18

## 2020-12-18 RX ADMIN — LORAZEPAM 0.5 MG: 0.5 TABLET ORAL at 15:57

## 2020-12-18 RX ADMIN — SODIUM CHLORIDE: 9 INJECTION, SOLUTION INTRAVENOUS at 23:15

## 2020-12-18 RX ADMIN — METHYLPREDNISOLONE SODIUM SUCCINATE 60 MG: 125 INJECTION, POWDER, FOR SOLUTION INTRAMUSCULAR; INTRAVENOUS at 23:16

## 2020-12-18 RX ADMIN — WATER 2 G: 1 INJECTION INTRAMUSCULAR; INTRAVENOUS; SUBCUTANEOUS at 11:55

## 2020-12-18 RX ADMIN — SODIUM CHLORIDE 1000 ML: 9 INJECTION, SOLUTION INTRAVENOUS at 17:14

## 2020-12-18 RX ADMIN — FAMOTIDINE 20 MG: 20 TABLET ORAL at 23:16

## 2020-12-18 RX ADMIN — VANCOMYCIN HYDROCHLORIDE 1750 MG: 10 INJECTION, POWDER, LYOPHILIZED, FOR SOLUTION INTRAVENOUS at 17:51

## 2020-12-18 RX ADMIN — ACETAMINOPHEN 650 MG: 650 SUPPOSITORY RECTAL at 10:59

## 2020-12-18 RX ADMIN — IPRATROPIUM BROMIDE AND ALBUTEROL SULFATE 3 AMPULE: .5; 3 SOLUTION RESPIRATORY (INHALATION) at 10:15

## 2020-12-18 RX ADMIN — DOXYCYCLINE 100 MG: 100 INJECTION, POWDER, LYOPHILIZED, FOR SOLUTION INTRAVENOUS at 13:21

## 2020-12-18 RX ADMIN — SODIUM CHLORIDE 3510 ML: 9 INJECTION, SOLUTION INTRAVENOUS at 11:54

## 2020-12-18 RX ADMIN — PIPERACILLIN AND TAZOBACTAM 3.38 G: 3; .375 INJECTION, POWDER, FOR SOLUTION INTRAVENOUS at 17:15

## 2020-12-18 ASSESSMENT — ENCOUNTER SYMPTOMS
EYE REDNESS: 0
EYE PAIN: 0
ABDOMINAL PAIN: 0
BACK PAIN: 0
SORE THROAT: 0
CONSTIPATION: 0
EYE DISCHARGE: 0
VOMITING: 1
WHEEZING: 0
COUGH: 0
NAUSEA: 1
BLOOD IN STOOL: 0
SINUS PRESSURE: 0
SHORTNESS OF BREATH: 1
DIARRHEA: 0
ABDOMINAL DISTENTION: 0

## 2020-12-18 NOTE — ED NOTES
Spoke with RT Will, stated they did a trial on NC and patient SPO2 dropped and he was labored breathing.  Stated the patient absolutely needs the [facility] Darryl Lazcano RN  12/18/20 5827

## 2020-12-18 NOTE — Clinical Note
Patient Class: Inpatient [101]   REQUIRED: Diagnosis: Septic shock ([facility] [address] [[id number]]   Estimated Length of Stay: Estimated stay of more than 2 midnights   Future Attending Provider: Alexis Barrientos [[id number]]   Admitting Provider: Alexis Barrientos [[id number]]

## 2020-12-18 NOTE — ED PROVIDER NOTES
71-year male presents ED with complaint of shortness of breath since 1 AM.  Patient does have a history of COPD, denied having any troubles prior to 1 AM last night. States nothing makes his shortness of better or worse, has not tried anything for shortness with. Patient is on home oxygen at home states he is on 2 L at rest, 4 L when moving. Does have some associated nausea and vomiting as well. Denies any chest pain, abdominal pain, change to bowel or bladder habits, fevers chills. Denies any cough congestion runny nose. Review of Systems   Constitutional: Negative for chills and fever. HENT: Negative for ear pain, sinus pressure and sore throat. Eyes: Negative for pain, discharge and redness. Respiratory: Positive for shortness of breath. Negative for cough and wheezing. Cardiovascular: Negative for chest pain. Gastrointestinal: Positive for nausea and vomiting. Negative for abdominal distention, abdominal pain, blood in stool, constipation and diarrhea. Genitourinary: Negative for dysuria and frequency. Musculoskeletal: Negative for arthralgias and back pain. Skin: Negative for rash and wound. Neurological: Negative for weakness and headaches. Hematological: Negative for adenopathy. All other systems reviewed and are negative. Physical Exam  Vitals signs and nursing note reviewed. Constitutional:       Appearance: He is well-developed. HENT:      Head: Normocephalic and atraumatic. Eyes:      Conjunctiva/sclera: Conjunctivae normal.   Neck:      Musculoskeletal: Normal range of motion and neck supple. Cardiovascular:      Rate and Rhythm: Normal rate and regular rhythm. Heart sounds: Normal heart sounds. No murmur. Pulmonary:      Effort: Pulmonary effort is normal. Tachypnea present. No respiratory distress. Breath sounds: Examination of the right-upper field reveals wheezing. Examination of the left-upper field reveals wheezing. Examination of the right-middle field reveals wheezing. Examination of the left-middle field reveals wheezing. Examination of the right-lower field reveals wheezing. Examination of the left-lower field reveals wheezing. Wheezing present. No rales. Comments: On nasal cannula saturating 85%. Abdominal:      General: Abdomen is protuberant. Bowel sounds are normal. There is no distension. Palpations: Abdomen is soft. Tenderness: There is no abdominal tenderness. There is no guarding or rebound. Negative signs include Erazo's sign, Rovsing's sign and McBurney's sign. Musculoskeletal:         General: No tenderness or deformity. Skin:     General: Skin is warm and dry. Comments: Pitting edema in the bilateral lower extremities   Neurological:      General: No focal deficit present. Mental Status: He is alert and oriented to person, place, and time. Psychiatric:         Mood and Affect: Mood normal.         Thought Content:  Thought content normal.          Procedures     MDM  Number of Diagnoses or Management Options  Acute respiratory distress  Hypoxia  Lactic acidosis  Leukocytosis, unspecified type  Pneumonia due to organism  Septic shock ([facility] [address] I have discussed this patient in detail with the resident and provided the instruction and education regarding the evidence-based evaluation and treatment of shortness of breath  History: Patient with history of COPD, follows with Dr. Jacky Dos Santos. He states he was feeling fairly well last night on his usual home oxygen. He woke this morning severely short of breath and severe distress. No recent illness or injury. No fever. My findings: Antony Patiño is a 79 y.o. male whom is in respiratory distress. Physical exam reveals he is tachypneic and tachycardic. Abdominal breathing is noted. Use of accessory muscles noted. Conversational dyspnea noted. Lung sounds are coarse with expiratory wheezing bilaterally. No obvious rhonchi or rales. Abdomen is soft, dull to percussion, nontender to palpation. Extremities with 1+ lower edema in a symmetrical fashion. He is alert and oriented follows commands. Skin is warm and dry. My plan: Symptomatic and supportive care. Aggressive respiratory support with BiPAP. Oxygen supplementation. Labs, x-ray. Admission. Electronically signed by Jesus Moore DO on 12/18/20 at 10:08 AM EST          [TG]   1102 Creatinine elevated at 1.8 elevated from baseline, patient does also have elevated troponin 0.17 however most likely due to demand ischemia from his shortness of breath. [CB]   1121 Covid negative      [CB]   1126 Lactic acid 6.6      [CB]   1129 Pts white count showing elevation at 26.4    [CB]   36 Cxr showing right lower lobe pneumonia      [CB]   1130 Patient does state he is feeling better, tachycardic at 120, did also develop a fever of 101.3 did start the patient on Tylenol for that.    [CB]   1135 Patient does have erythema to his buttocks and scrotum no signs of Bryson's gangrene or sacral wounds.     [CB]   1220 Bp was 113/60 pulse 105    [CB] 1224 Patient was updated on his lab work and chest x-ray showing pneumonia will start antibiotics for this and again talked with him about getting him into the hospital which she was agreeable with. Was also started on 30 mL/kg of fluids due to his septic shock. [CB]   1243 Inform nurse about placing second IV    [CB]   0724 Patient still doing well, has approximately 700 mL left of his 30 MG/KG fluid bolus pressure most recently was 101/56 with a pulse of 103. Patient had no complaints at this time. [CB]   0 Talked with Dr. Heavenly Pace who was agreeable with the patient being admitted to the hospital.    [CB]   1659 Lactic acid elevated at 8.6.    [CB]   1706 Reports Dr. Ciara Alvarez would like the intensivist to have the patient sent to the intensive care unit, due to the patient's elevated lactic acid following his fluids    [CB]   1735 Talked with Dr. Varinder Ashraf of the ICU and he was agreeable with having patient come to the intensive care unit. [CB]   1929 Repeat lactic acid came down to 6.3.    [CB]      ED Course User Index  [CB] Robbin Reaves MD  [TG] Miguel Steinberg,       EKG: This EKG is signed by emergency department physician. Rate: 1700  Rhythm: Sinus tachycardia  Interpretation: Sinus tachycardia, patient does have slight ST depressions in leads V4 through V6 and slight elevation in lead aVR, most likely due to demand ischemia  Comparison: Changes compared to previous EKG.     Sepsis Re-assessment of Volume Status and Tissue Perfusion  Required for septic shock (persistent hypotension after fluids OR lactic acid  > or = 4.0)    12/18/20   2:20 PM EST          Vital Signs:   Vitals:    12/21/20 1430 12/21/20 1630 12/21/20 1730 12/21/20 1757   BP: 137/77 131/81 (!) 144/72 114/72   Pulse: 153  113 113   Resp:       Temp:       TempSrc:       SpO2:       Weight:       Height: Card/Pulm:  Rhythm: normal rate. Heart Sounds: Normal S1, S2 and no murmurs, gallops, or rubs. clear to auscultation, no wheezes or rales, unlabored breathing and pt on bipap. Capillary Refill: normal.  Radial Pulse:  present 2+. Skin:  Warm. OR    Any two of the following:  - Measure CVP  - Measure ScvO2  - Bedside cardiovascular ultrasound  - Dynamic assessment of fluid responsiveness with passive leg raise or fluid   challenge        ED Course as of Dec 21 1956   Fri Dec 18, 2020   1008 ATTENDING PROVIDER ATTESTATION:     I have personally performed and/or participated in the history, exam, medical decision making, and procedures and agree with all pertinent clinical information unless otherwise noted. I have also reviewed and agree with the past medical, family and social history unless otherwise noted. I have discussed this patient in detail with the resident and provided the instruction and education regarding the evidence-based evaluation and treatment of shortness of breath  History: Patient with history of COPD, follows with Dr. Jan Fragoso. He states he was feeling fairly well last night on his usual home oxygen. He woke this morning severely short of breath and severe distress. No recent illness or injury. No fever. My findings: Usha Calitxo is a 79 y.o. male whom is in respiratory distress. Physical exam reveals he is tachypneic and tachycardic. Abdominal breathing is noted. Use of accessory muscles noted. Conversational dyspnea noted. Lung sounds are coarse with expiratory wheezing bilaterally. No obvious rhonchi or rales. Abdomen is soft, dull to percussion, nontender to palpation. Extremities with 1+ lower edema in a symmetrical fashion. He is alert and oriented follows commands. Skin is warm and dry. My plan: Symptomatic and supportive care. Aggressive respiratory support with BiPAP. Oxygen supplementation. Labs, x-ray. Admission. Electronically signed by Mike Fonseca DO on 12/18/20 at 10:08 AM EST          [TG]   1102 Creatinine elevated at 1.8 elevated from baseline, patient does also have elevated troponin 0.17 however most likely due to demand ischemia from his shortness of breath. [CB]   1121 Covid negative      [CB]   1126 Lactic acid 6.6      [CB]   1129 Pts white count showing elevation at 26.4    [CB]   36 Cxr showing right lower lobe pneumonia      [CB]   1130 Patient does state he is feeling better, tachycardic at 120, did also develop a fever of 101.3 did start the patient on Tylenol for that.    [CB]   1135 Patient does have erythema to his buttocks and scrotum no signs of Bryson's gangrene or sacral wounds. [CB]   1220 Bp was 113/60 pulse 105    [CB]   1224 Patient was updated on his lab work and chest x-ray showing pneumonia will start antibiotics for this and again talked with him about getting him into the hospital which she was agreeable with. Was also started on 30 mL/kg of fluids due to his septic shock. [CB]   1243 Inform nurse about placing second IV    [CB]   4863 Patient still doing well, has approximately 700 mL left of his 30 MG/KG fluid bolus pressure most recently was 101/56 with a pulse of 103. Patient had no complaints at this time. [CB]   0 Talked with Dr. Katina Perry who was agreeable with the patient being admitted to the hospital.    [CB]   1659 Lactic acid elevated at 8.6.    [CB]   1706 Reports Dr. Rainey Friend would like the intensivist to have the patient sent to the intensive care unit, due to the patient's elevated lactic acid following his fluids    [CB]   1735 Talked with Dr. Sonny Sales of the ICU and he was agreeable with having patient come to the intensive care unit.     [CB]   1929 Repeat lactic acid came down to 6.3.    [CB]      ED Course User Index  [CB] Vivi Parker MD  [TG] Mike Fonseca DO --------------------------------------------- PAST HISTORY ---------------------------------------------  Past Medical History:  has a past medical history of COPD (chronic obstructive pulmonary disease) ([address] and Hyperlipidemia. Past Surgical History:  has a past surgical history that includes Hemorrhoid surgery; eye surgery (Right); Colonoscopy; laparoscopic appendectomy (N/A, 2/7/2019); and Appendectomy (2019). Social History:  reports that he quit smoking about 11 years ago. He has quit using smokeless tobacco. He reports previous alcohol use. He reports that he does not use drugs. Family History: family history is not on file. The patients home medications have been reviewed. Allergies: Patient has no known allergies. -------------------------------------------------- RESULTS -------------------------------------------------    LABS:  Results for orders placed or performed during the hospital encounter of 12/18/20   Culture, Blood 1    Specimen: Blood   Result Value Ref Range    Blood Culture, Routine 24 Hours no growth    Culture, Blood 2    Specimen: Blood   Result Value Ref Range    Culture, Blood 2 24 Hours no growth    Urine culture    Specimen: Urine voided   Result Value Ref Range    Urine Culture, Routine Growth not present    Culture, MRSA, Screening    Specimen: Nares   Result Value Ref Range    MRSA Culture Only Methicillin resistant Staph aureus not isolated    LEGIONELLA ANTIGEN, URINE    Specimen: Urine voided   Result Value Ref Range    L. pneumophila Serogp 1 Ur Ag       Presumptive Negative -suggesting no recent or current infections  with Legionella pneumophila serogroup 1. Infection to Legionella cannot be ruled out since other serogroups  and species may cause infection, antigen may not be present in  early infection, or level of antigen may be below the  detection limit.   Normal Range: Presumptive Negative     STREP PNEUMONIAE ANTIGEN    Specimen: Urine, clean catch Result Value Ref Range    Sodium 142 132 - 146 mmol/L    Potassium reflex Magnesium 3.7 3.5 - 5.0 mmol/L    Chloride 100 98 - 107 mmol/L    CO2 25 22 - 29 mmol/L    Anion Gap 17 (H) 7 - 16 mmol/L    Glucose 160 (H) 74 - 99 mg/dL    BUN 12 8 - 23 mg/dL    CREATININE 1.8 (H) 0.7 - 1.2 mg/dL    GFR Non-African American 37 >=60 mL/min/1.73    GFR African American 44     Calcium 9.9 8.6 - 10.2 mg/dL    Total Protein 6.8 6.4 - 8.3 g/dL    Alb 3.8 3.5 - 5.2 g/dL    Total Bilirubin 0.7 0.0 - 1.2 mg/dL    Alkaline Phosphatase 100 40 - 129 U/L    ALT 19 0 - 40 U/L    AST 23 0 - 39 U/L   Troponin   Result Value Ref Range    Troponin 0.17 (H) 0.00 - 0.03 ng/mL   Brain Natriuretic Peptide   Result Value Ref Range    Pro-BNP 1,011 (H) 0 - 450 pg/mL   Blood Gas, Arterial   Result Value Ref Range    Date Analyzed 20201218     Time Analyzed 1018     Source: Blood Arterial     pH, Blood Gas 7.421 7.350 - 7.450    PCO2 30.1 (L) 35.0 - 45.0 mmHg    PO2 66.9 (L) 75.0 - 100.0 mmHg    HCO3 19.1 (L) 22.0 - 26.0 mmol/L    B.E. -3.9 (L) -3.0 - 3.0 mmol/L    O2 Sat 93.6 92.0 - 98.5 %    O2Hb 92.8 (L) 94.0 - 97.0 %    COHb 0.4 0.0 - 1.5 %    MetHb 0.5 0.0 - 1.5 %    O2 Content 21.2 mL/dL    HHb 6.3 (H) 0.0 - 5.0 %    tHb (est) 16.3 11.5 - 16.5 g/dL    Mode NC- 4 L     Date Of Collection      Time Collected      Pt Temp 37           Lab [id number]     Critical(s) Notified .  No Critical Values    COVID-19   Result Value Ref Range    SARS-CoV-2, NAAT Not Detected Not Detected   Microscopic Urinalysis   Result Value Ref Range    WBC, UA 0-1 0 - 5 /HPF    RBC, UA 2-5 0 - 2 /HPF    Epithelial Cells, UA RARE /HPF    Bacteria, UA RARE (A) None Seen /HPF   LACTIC ACID, PLASMA   Result Value Ref Range    Lactic Acid 6.3 (HH) 0.5 - 2.2 mmol/L   LACTIC ACID, PLASMA   Result Value Ref Range    Lactic Acid 4.7 (HH) 0.5 - 2.2 mmol/L   LACTIC ACID, PLASMA   Result Value Ref Range    Lactic Acid 2.9 (H) 0.5 - 2.2 mmol/L   LACTIC ACID, PLASMA Result Value Ref Range    Lactic Acid 4.4 (HH) 0.5 - 2.2 mmol/L   LACTIC ACID, PLASMA   Result Value Ref Range    Lactic Acid 1.9 0.5 - 2.2 mmol/L   LACTIC ACID, PLASMA   Result Value Ref Range    Lactic Acid 3.5 (H) 0.5 - 2.2 mmol/L   CBC WITH AUTO DIFFERENTIAL   Result Value Ref Range    WBC 15.7 (H) 4.5 - 11.5 E9/L    RBC 4.35 3.80 - 5.80 E12/L    Hemoglobin 12.9 12.5 - 16.5 g/dL    Hematocrit 40.8 37.0 - 54.0 %    MCV 93.8 80.0 - 99.9 fL    MCH 29.7 26.0 - 35.0 pg    MCHC 31.6 (L) 32.0 - 34.5 %    RDW 14.8 11.5 - 15.0 fL    Platelets 835 377 - 018 E9/L    MPV 10.7 7.0 - 12.0 fL    Neutrophils % 97.4 (H) 43.0 - 80.0 %    Lymphocytes % 1.7 (L) 20.0 - 42.0 %    Monocytes % 1.6 (L) 2.0 - 12.0 %    Eosinophils % 0.0 0.0 - 6.0 %    Basophils % 0.2 0.0 - 2.0 %    Neutrophils Absolute 15.39 (H) 1.80 - 7.30 E9/L    Lymphocytes Absolute 0.31 (L) 1.50 - 4.00 E9/L    Monocytes Absolute 0.00 (L) 0.10 - 0.95 E9/L    Eosinophils Absolute 0.00 (L) 0.05 - 0.50 E9/L    Basophils Absolute 0.00 0.00 - 0.20 E9/L    Metamyelocytes Relative 0.9 0.0 - 1.0 %    Anisocytosis 1+     Polychromasia 1+     Poikilocytes 1+     [place] Cells 1+     Ovalocytes 1+    Comprehensive metabolic panel   Result Value Ref Range    Sodium 138 132 - 146 mmol/L    Potassium 4.6 3.5 - 5.0 mmol/L    Chloride 108 (H) 98 - 107 mmol/L    CO2 22 22 - 29 mmol/L    Anion Gap 8 7 - 16 mmol/L    Glucose 161 (H) 74 - 99 mg/dL    BUN 16 8 - 23 mg/dL    CREATININE 1.5 (H) 0.7 - 1.2 mg/dL    GFR Non-African American 45 >=60 mL/min/1.73    GFR African American 55     Calcium 8.5 (L) 8.6 - 10.2 mg/dL    Total Protein 5.2 (L) 6.4 - 8.3 g/dL    Alb 2.8 (L) 3.5 - 5.2 g/dL    Total Bilirubin 0.7 0.0 - 1.2 mg/dL    Alkaline Phosphatase 60 40 - 129 U/L    ALT 13 0 - 40 U/L    AST 20 0 - 39 U/L   MAGNESIUM   Result Value Ref Range    Magnesium 1.7 1.6 - 2.6 mg/dL   Phosphorus   Result Value Ref Range    Phosphorus 3.0 2.5 - 4.5 mg/dL   TSH without Reflex   Result Value Ref Range TSH 0.776 0.270 - 4.200 uIU/mL   T4   Result Value Ref Range    T4, Total 4.3 (L) 4.5 - 11.7 mcg/dL   Vitamin D 25 Hydroxy   Result Value Ref Range    Vit D, 25-Hydroxy 13 (L) 30 - 100 ng/mL   Theophylline   Result Value Ref Range    Theophylline Lvl 7.3 (L) 10.0 - 20.0 mcg/mL   LACTIC ACID, PLASMA   Result Value Ref Range    Lactic Acid 2.0 0.5 - 2.2 mmol/L   LACTIC ACID, PLASMA   Result Value Ref Range    Lactic Acid 2.5 (H) 0.5 - 2.2 mmol/L   LACTIC ACID, PLASMA   Result Value Ref Range    Lactic Acid 3.6 (H) 0.5 - 2.2 mmol/L   CBC Auto Differential   Result Value Ref Range    WBC 17.5 (H) 4.5 - 11.5 E9/L    RBC 4.36 3.80 - 5.80 E12/L    Hemoglobin 13.1 12.5 - 16.5 g/dL    Hematocrit 40.5 37.0 - 54.0 %    MCV 92.9 80.0 - 99.9 fL    MCH 30.0 26.0 - 35.0 pg    MCHC 32.3 32.0 - 34.5 %    RDW 14.6 11.5 - 15.0 fL    Platelets 114 371 - 041 E9/L    MPV 11.2 7.0 - 12.0 fL    Neutrophils % 93.9 (H) 43.0 - 80.0 %    Lymphocytes % 5.2 (L) 20.0 - 42.0 %    Monocytes % 0.9 (L) 2.0 - 12.0 %    Eosinophils % 0.0 0.0 - 6.0 %    Basophils % 0.1 0.0 - 2.0 %    Neutrophils Absolute 16.45 (H) 1.80 - 7.30 E9/L    Lymphocytes Absolute 0.88 (L) 1.50 - 4.00 E9/L    Monocytes Absolute 0.18 0.10 - 0.95 E9/L    Eosinophils Absolute 0.00 (L) 0.05 - 0.50 E9/L    Basophils Absolute 0.00 0.00 - 0.20 E9/L    Poikilocytes 2+     [place] Cells 1+    Magnesium   Result Value Ref Range    Magnesium 1.9 1.6 - 2.6 mg/dL   Phosphorus   Result Value Ref Range    Phosphorus 2.8 2.5 - 4.5 mg/dL   Comprehensive Metabolic Panel   Result Value Ref Range    Sodium 141 132 - 146 mmol/L    Potassium 3.7 3.5 - 5.0 mmol/L    Chloride 107 98 - 107 mmol/L    CO2 20 (L) 22 - 29 mmol/L    Anion Gap 14 7 - 16 mmol/L    Glucose 132 (H) 74 - 99 mg/dL    BUN 25 (H) 8 - 23 mg/dL    CREATININE 1.3 (H) 0.7 - 1.2 mg/dL    GFR Non-African American 53 >=60 mL/min/1.73    GFR African American >60     Calcium 8.8 8.6 - 10.2 mg/dL    Total Protein 6.1 (L) 6.4 - 8.3 g/dL Alb 2.8 (L) 3.5 - 5.2 g/dL    Total Bilirubin 0.4 0.0 - 1.2 mg/dL    Alkaline Phosphatase 64 40 - 129 U/L    ALT 13 0 - 40 U/L    AST 25 0 - 39 U/L   LACTIC ACID, PLASMA   Result Value Ref Range    Lactic Acid 3.1 (H) 0.5 - 2.2 mmol/L   LACTIC ACID, PLASMA   Result Value Ref Range    Lactic Acid 2.1 0.5 - 2.2 mmol/L   LACTIC ACID, PLASMA   Result Value Ref Range    Lactic Acid 2.0 0.5 - 2.2 mmol/L   LACTIC ACID, PLASMA   Result Value Ref Range    Lactic Acid 2.1 0.5 - 2.2 mmol/L   LACTIC ACID, PLASMA   Result Value Ref Range    Lactic Acid 2.1 0.5 - 2.2 mmol/L   LACTIC ACID, PLASMA   Result Value Ref Range    Lactic Acid 1.8 0.5 - 2.2 mmol/L   LACTIC ACID, PLASMA   Result Value Ref Range    Lactic Acid 3.0 (H) 0.5 - 2.2 mmol/L   LACTIC ACID, PLASMA   Result Value Ref Range    Lactic Acid 1.6 0.5 - 2.2 mmol/L   VANCOMYCIN, RANDOM   Result Value Ref Range    Vancomycin Rm 9.2 5.0 - 40.0 mcg/mL   CBC Auto Differential   Result Value Ref Range    WBC 11.6 (H) 4.5 - 11.5 E9/L    RBC 3.96 3.80 - 5.80 E12/L    Hemoglobin 11.9 (L) 12.5 - 16.5 g/dL    Hematocrit 36.1 (L) 37.0 - 54.0 %    MCV 91.2 80.0 - 99.9 fL    MCH 30.1 26.0 - 35.0 pg    MCHC 33.0 32.0 - 34.5 %    RDW 14.3 11.5 - 15.0 fL    Platelets 901 631 - 686 E9/L    MPV 11.0 7.0 - 12.0 fL    Neutrophils % 98.3 (H) 43.0 - 80.0 %    Lymphocytes % 1.7 (L) 20.0 - 42.0 %    Monocytes % 3.0 2.0 - 12.0 %    Eosinophils % 0.0 0.0 - 6.0 %    Basophils % 0.1 0.0 - 2.0 %    Neutrophils Absolute 11.37 (H) 1.80 - 7.30 E9/L    Lymphocytes Absolute 0.23 (L) 1.50 - 4.00 E9/L    Monocytes Absolute 0.00 (L) 0.10 - 0.95 E9/L    Eosinophils Absolute 0.00 (L) 0.05 - 0.50 E9/L    Basophils Absolute 0.00 0.00 - 0.20 E9/L    Poikilocytes 1+     [place] Cells 1+     Ovalocytes 1+     [place] Cells 1+    Comprehensive Metabolic Panel   Result Value Ref Range    Sodium 143 132 - 146 mmol/L    Potassium 3.3 (L) 3.5 - 5.0 mmol/L    Chloride 112 (H) 98 - 107 mmol/L    CO2 23 22 - 29 mmol/L Anion Gap 8 7 - 16 mmol/L    Glucose 137 (H) 74 - 99 mg/dL    BUN 25 (H) 8 - 23 mg/dL    CREATININE 1.1 0.7 - 1.2 mg/dL    GFR Non-African American >60 >=60 mL/min/1.73    GFR African American >60     Calcium 8.6 8.6 - 10.2 mg/dL    Total Protein 5.5 (L) 6.4 - 8.3 g/dL    Alb 2.8 (L) 3.5 - 5.2 g/dL    Total Bilirubin 0.2 0.0 - 1.2 mg/dL    Alkaline Phosphatase 63 40 - 129 U/L    ALT 14 0 - 40 U/L    AST 24 0 - 39 U/L   Theophylline   Result Value Ref Range    Theophylline Lvl 7.9 (L) 10.0 - 20.0 mcg/mL   Magnesium   Result Value Ref Range    Magnesium 2.1 1.6 - 2.6 mg/dL   Phosphorus   Result Value Ref Range    Phosphorus 2.4 (L) 2.5 - 4.5 mg/dL   LACTIC ACID, PLASMA   Result Value Ref Range    Lactic Acid 3.5 (H) 0.5 - 2.2 mmol/L   LACTIC ACID, PLASMA   Result Value Ref Range    Lactic Acid 3.4 (H) 0.5 - 2.2 mmol/L   EKG 12 lead   Result Value Ref Range    Ventricular Rate 170 BPM    Atrial Rate 192 BPM    P-R Interval 134 ms    QRS Duration 76 ms    Q-T Interval 242 ms    QTc Calculation (Bazett) 406 ms    P Axis -79 degrees    R Axis -15 degrees    T Axis 79 degrees   EKG 12 Lead   Result Value Ref Range    Ventricular Rate 146 BPM    Atrial Rate 174 BPM    QRS Duration 110 ms    Q-T Interval 330 ms    QTc Calculation (Bazett) 514 ms    R Axis 16 degrees    T Axis 133 degrees       RADIOLOGY:  CT CHEST WO CONTRAST   Final Result   Large airspace opacity left lower lobe superior segment consistent with   pneumonia. Bibasilar atelectasis. Small left pleural effusion. Ascending aorta dilated up to 4.5 cm. This previously measured 4.3 cm on   06/16/2020. XR CHEST PORTABLE   Final Result   Stable bibasilar opacities related to atelectasis, pneumonia, or effusion.       XR CHEST PORTABLE   Final Result   Patchy right lung base atelectasis or infiltrate      XR CHEST PORTABLE    (Results Pending)           ------------------------- NURSING NOTES AND VITALS REVIEWED --------------------------- Disposition:  Patient's disposition: Admit to intermediate  Patient's condition is stable.          Sol Lizarraga MD  Resident  12/18/20 1518       Sol Lizarraga MD  Resident  12/21/20 7042

## 2020-12-18 NOTE — ED NOTES
Bed: 04  Expected date:   Expected time:   Means of arrival:   Comments:  52 Cely Valdes RN  12/18/20 0242

## 2020-12-18 NOTE — ED NOTES
Patient took bipap off again, stating it too much oxygen. bipap placed back on. Respiratory paged to try to adjust settings.  Waiting for call back from RT     Mile Valencia RN  12/18/20 1912

## 2020-12-18 NOTE — ED NOTES
Sepsis center called for patient to have a repeat lactic acid drawn. Lab was drawn and sent to lab.       Annabelle Hudson RN  12/18/20 8325

## 2020-12-18 NOTE — ED NOTES
Spoke with Dr. Eric Nguyen about critical lactic acid value. Stated he wants the patient to go to the ICU and to see if the resident consulted the intensivist. Spoke with Dr. Kalpana Squires, stated he did not talk to the intensivist but will call them. New orders for antibiotics and fluids placed by Dr. Kalpana Squires.       Cally Carr RN  12/18/20 2501

## 2020-12-19 ENCOUNTER — APPOINTMENT (OUTPATIENT)
Dept: CT IMAGING | Age: 80
DRG: 871 | End: 2020-12-19
Payer: MEDICARE

## 2020-12-19 ENCOUNTER — APPOINTMENT (OUTPATIENT)
Dept: GENERAL RADIOLOGY | Age: 80
DRG: 871 | End: 2020-12-19
Payer: MEDICARE

## 2020-12-19 LAB
ALBUMIN SERPL-MCNC: 2.8 G/DL (ref 3.5–5.2)
ALP BLD-CCNC: 60 U/L (ref 40–129)
ALT SERPL-CCNC: 13 U/L (ref 0–40)
ANION GAP SERPL CALCULATED.3IONS-SCNC: 8 MMOL/L (ref 7–16)
ANISOCYTOSIS: ABNORMAL
AST SERPL-CCNC: 20 U/L (ref 0–39)
BASOPHILS ABSOLUTE: 0 E9/L (ref 0–0.2)
BASOPHILS RELATIVE PERCENT: 0.2 % (ref 0–2)
BILIRUB SERPL-MCNC: 0.7 MG/DL (ref 0–1.2)
BUN BLDV-MCNC: 16 MG/DL (ref 8–23)
BURR CELLS: ABNORMAL
CALCIUM SERPL-MCNC: 8.5 MG/DL (ref 8.6–10.2)
CHLORIDE BLD-SCNC: 108 MMOL/L (ref 98–107)
CO2: 22 MMOL/L (ref 22–29)
CREAT SERPL-MCNC: 1.5 MG/DL (ref 0.7–1.2)
EOSINOPHILS ABSOLUTE: 0 E9/L (ref 0.05–0.5)
EOSINOPHILS RELATIVE PERCENT: 0 % (ref 0–6)
GFR AFRICAN AMERICAN: 55
GFR NON-AFRICAN AMERICAN: 45 ML/MIN/1.73
GLUCOSE BLD-MCNC: 161 MG/DL (ref 74–99)
HCT VFR BLD CALC: 40.8 % (ref 37–54)
HEMOGLOBIN: 12.9 G/DL (ref 12.5–16.5)
LACTIC ACID: 1.9 MMOL/L (ref 0.5–2.2)
LACTIC ACID: 2 MMOL/L (ref 0.5–2.2)
LACTIC ACID: 2.9 MMOL/L (ref 0.5–2.2)
LACTIC ACID: 3.5 MMOL/L (ref 0.5–2.2)
LACTIC ACID: 4.4 MMOL/L (ref 0.5–2.2)
LYMPHOCYTES ABSOLUTE: 0.31 E9/L (ref 1.5–4)
LYMPHOCYTES RELATIVE PERCENT: 1.7 % (ref 20–42)
MAGNESIUM: 1.7 MG/DL (ref 1.6–2.6)
MCH RBC QN AUTO: 29.7 PG (ref 26–35)
MCHC RBC AUTO-ENTMCNC: 31.6 % (ref 32–34.5)
MCV RBC AUTO: 93.8 FL (ref 80–99.9)
METAMYELOCYTES RELATIVE PERCENT: 0.9 % (ref 0–1)
MONOCYTES ABSOLUTE: 0 E9/L (ref 0.1–0.95)
MONOCYTES RELATIVE PERCENT: 1.6 % (ref 2–12)
NEUTROPHILS ABSOLUTE: 15.39 E9/L (ref 1.8–7.3)
NEUTROPHILS RELATIVE PERCENT: 97.4 % (ref 43–80)
OVALOCYTES: ABNORMAL
PDW BLD-RTO: 14.8 FL (ref 11.5–15)
PHOSPHORUS: 3 MG/DL (ref 2.5–4.5)
PLATELET # BLD: 189 E9/L (ref 130–450)
PMV BLD AUTO: 10.7 FL (ref 7–12)
POIKILOCYTES: ABNORMAL
POLYCHROMASIA: ABNORMAL
POTASSIUM SERPL-SCNC: 4.6 MMOL/L (ref 3.5–5)
RBC # BLD: 4.35 E12/L (ref 3.8–5.8)
SODIUM BLD-SCNC: 138 MMOL/L (ref 132–146)
T4 TOTAL: 4.3 MCG/DL (ref 4.5–11.7)
THEOPHYLLINE LEVEL: 7.3 MCG/ML (ref 10–20)
TOTAL PROTEIN: 5.2 G/DL (ref 6.4–8.3)
TSH SERPL DL<=0.05 MIU/L-ACNC: 0.78 UIU/ML (ref 0.27–4.2)
VITAMIN D 25-HYDROXY: 13 NG/ML (ref 30–100)
WBC # BLD: 15.7 E9/L (ref 4.5–11.5)

## 2020-12-19 PROCEDURE — 2580000003 HC RX 258: Performed by: INTERNAL MEDICINE

## 2020-12-19 PROCEDURE — 6370000000 HC RX 637 (ALT 250 FOR IP): Performed by: INTERNAL MEDICINE

## 2020-12-19 PROCEDURE — 36415 COLL VENOUS BLD VENIPUNCTURE: CPT

## 2020-12-19 PROCEDURE — 71250 CT THORAX DX C-: CPT

## 2020-12-19 PROCEDURE — 83735 ASSAY OF MAGNESIUM: CPT

## 2020-12-19 PROCEDURE — 2500000003 HC RX 250 WO HCPCS: Performed by: INTERNAL MEDICINE

## 2020-12-19 PROCEDURE — 6360000002 HC RX W HCPCS: Performed by: INTERNAL MEDICINE

## 2020-12-19 PROCEDURE — 71045 X-RAY EXAM CHEST 1 VIEW: CPT

## 2020-12-19 PROCEDURE — 94660 CPAP INITIATION&MGMT: CPT

## 2020-12-19 PROCEDURE — 85025 COMPLETE CBC W/AUTO DIFF WBC: CPT

## 2020-12-19 PROCEDURE — 82306 VITAMIN D 25 HYDROXY: CPT

## 2020-12-19 PROCEDURE — 80198 ASSAY OF THEOPHYLLINE: CPT

## 2020-12-19 PROCEDURE — 87449 NOS EACH ORGANISM AG IA: CPT

## 2020-12-19 PROCEDURE — 80053 COMPREHEN METABOLIC PANEL: CPT

## 2020-12-19 PROCEDURE — 84443 ASSAY THYROID STIM HORMONE: CPT

## 2020-12-19 PROCEDURE — 87081 CULTURE SCREEN ONLY: CPT

## 2020-12-19 PROCEDURE — 1200000000 HC SEMI PRIVATE

## 2020-12-19 PROCEDURE — 84100 ASSAY OF PHOSPHORUS: CPT

## 2020-12-19 PROCEDURE — 84436 ASSAY OF TOTAL THYROXINE: CPT

## 2020-12-19 PROCEDURE — 83605 ASSAY OF LACTIC ACID: CPT

## 2020-12-19 PROCEDURE — 36592 COLLECT BLOOD FROM PICC: CPT

## 2020-12-19 RX ORDER — 0.9 % SODIUM CHLORIDE 0.9 %
1000 INTRAVENOUS SOLUTION INTRAVENOUS ONCE
Status: COMPLETED | OUTPATIENT
Start: 2020-12-19 | End: 2020-12-19

## 2020-12-19 RX ORDER — ARFORMOTEROL TARTRATE 15 UG/2ML
15 SOLUTION RESPIRATORY (INHALATION) 2 TIMES DAILY
Status: DISCONTINUED | OUTPATIENT
Start: 2020-12-19 | End: 2020-12-24 | Stop reason: HOSPADM

## 2020-12-19 RX ORDER — SODIUM CHLORIDE 9 MG/ML
INJECTION, SOLUTION INTRAVENOUS EVERY 8 HOURS
Status: DISCONTINUED | OUTPATIENT
Start: 2020-12-19 | End: 2020-12-24 | Stop reason: HOSPADM

## 2020-12-19 RX ORDER — BUDESONIDE 0.5 MG/2ML
0.5 INHALANT ORAL 2 TIMES DAILY
Status: DISCONTINUED | OUTPATIENT
Start: 2020-12-19 | End: 2020-12-24 | Stop reason: HOSPADM

## 2020-12-19 RX ADMIN — THEOPHYLLINE ANHYDROUS 400 MG: 200 CAPSULE, EXTENDED RELEASE ORAL at 08:59

## 2020-12-19 RX ADMIN — METHYLPREDNISOLONE SODIUM SUCCINATE 60 MG: 125 INJECTION, POWDER, FOR SOLUTION INTRAMUSCULAR; INTRAVENOUS at 16:37

## 2020-12-19 RX ADMIN — SODIUM CHLORIDE 1000 ML: 9 INJECTION, SOLUTION INTRAVENOUS at 11:24

## 2020-12-19 RX ADMIN — PIPERACILLIN AND TAZOBACTAM 3.38 G: 3; .375 INJECTION, POWDER, FOR SOLUTION INTRAVENOUS at 11:27

## 2020-12-19 RX ADMIN — DOXYCYCLINE 100 MG: 100 INJECTION, POWDER, LYOPHILIZED, FOR SOLUTION INTRAVENOUS at 12:55

## 2020-12-19 RX ADMIN — FAMOTIDINE 20 MG: 20 TABLET ORAL at 20:18

## 2020-12-19 RX ADMIN — PIPERACILLIN AND TAZOBACTAM 3.38 G: 3; .375 INJECTION, POWDER, FOR SOLUTION INTRAVENOUS at 20:22

## 2020-12-19 RX ADMIN — METHYLPREDNISOLONE SODIUM SUCCINATE 60 MG: 125 INJECTION, POWDER, FOR SOLUTION INTRAMUSCULAR; INTRAVENOUS at 11:27

## 2020-12-19 RX ADMIN — FAMOTIDINE 20 MG: 20 TABLET ORAL at 08:56

## 2020-12-19 RX ADMIN — SODIUM CHLORIDE: 9 INJECTION, SOLUTION INTRAVENOUS at 16:41

## 2020-12-19 RX ADMIN — ATORVASTATIN CALCIUM 20 MG: 20 TABLET, FILM COATED ORAL at 08:56

## 2020-12-19 RX ADMIN — METHYLPREDNISOLONE SODIUM SUCCINATE 60 MG: 125 INJECTION, POWDER, FOR SOLUTION INTRAMUSCULAR; INTRAVENOUS at 05:37

## 2020-12-19 RX ADMIN — ASPIRIN 81 MG CHEWABLE TABLET 81 MG: 81 TABLET CHEWABLE at 08:56

## 2020-12-19 RX ADMIN — PIPERACILLIN AND TAZOBACTAM 3.38 G: 3; .375 INJECTION, POWDER, FOR SOLUTION INTRAVENOUS at 05:37

## 2020-12-19 RX ADMIN — DOXYCYCLINE 100 MG: 100 INJECTION, POWDER, LYOPHILIZED, FOR SOLUTION INTRAVENOUS at 02:22

## 2020-12-19 RX ADMIN — METHYLPREDNISOLONE SODIUM SUCCINATE 60 MG: 125 INJECTION, POWDER, FOR SOLUTION INTRAMUSCULAR; INTRAVENOUS at 22:21

## 2020-12-19 ASSESSMENT — PAIN SCALES - GENERAL
PAINLEVEL_OUTOF10: 0

## 2020-12-19 ASSESSMENT — ENCOUNTER SYMPTOMS
GASTROINTESTINAL NEGATIVE: 1
COUGH: 1
SHORTNESS OF BREATH: 1
EYES NEGATIVE: 1
ALLERGIC/IMMUNOLOGIC NEGATIVE: 1

## 2020-12-19 NOTE — PROGRESS NOTES
Pharmacy Consultation Note  (Antibiotic Dosing and Monitoring)    Initial consult date: 2020  Consulting physician: Debbie Rodriguez  Drug: Vancomycin  Indication: Septic shock     Age/  Gender Height Weight IBW Dosing weight  Allergy Information   80 y.o./male 5' 11\" (180.3 cm) 258 lb (117 kg)     Ideal body weight: 75.3 kg (166 lb 0.1 oz)  Adjusted ideal body weight: 92.4 kg (203 lb 12.9 oz)  118.2  Patient has no known allergies. Temp (24hrs), Av.2 °F (37.9 °C), Min:99 °F (37.2 °C), Max:102 °F (38.9 °C)          Date  WBC BUN SCr CrCl  (mL/min) Drug/Dose Time   Given Level(s)   (Time) Comments   2020 26.4 12 1.8  44   Vancomycin 1750 mg IV  1751                                            Intake/Output Summary (Last 24 hours) at 2020 0220  Last data filed at 2020 1425  Gross per 24 hour   Intake 21369 ml   Output    Net 36886 ml       Historical Cultures:  Organism   Date Value Ref Range Status   2018 Candida albicans (A)  Final     No results for input(s): BC in the last 72 hours. Cultures:  available culture and sensitivity results were reviewed in [facility]    Assessment:  · 79 y.o. male has been initiated Vancomycin.   · Estimated CrCl = 44 mL/min  · Goal trough level = 15-20 mcg/mL    Plan:  · Monitor renal function   · Clinical pharmacy to follow      [facility], [address] [place] [address] 2020 2:20 AM

## 2020-12-19 NOTE — PROGRESS NOTES
Pharmacy Consultation Note  (Antibiotic Dosing and Monitoring)    Initial consult date: 12/19/2020  Consulting physician: Dr. Lei Ferris  Drug(s): Vancomycin  Indication: Septic shock    Ht Readings from Last 1 Encounters:   12/18/20 5' 11\" (1.803 m)     Wt Readings from Last 1 Encounters:   12/19/20 260 lb 8 oz (118.2 kg)     Age/  Gender IBW DW  Allergy Information   79 y.o.   male 75.3 kg 92.5 kg  Patient has no known allergies. Date  Tmax WBC BUN/CR UOP  (mL/kg/hr) Drug/Dose Time   Given Level(s)   (Time) Comments   12/18  (#1) 102 26.4 12/1.2 -- Vancomycin 1750 mg IV x 1 1751     12/19  (#2) 100 15.7 16/1.5 --   Random level @ <1700> =     12/20  (#3)             (#4)             (#5)             (#6)             (#7)             Estimated Creatinine Clearance: 52 mL/min (A) (based on SCr of 1.5 mg/dL (H)). UOP over the past 24 hours:       Intake/Output Summary (Last 24 hours) at 12/19/2020 0746  Last data filed at 12/19/2020 0546  Gross per 24 hour   Intake 96305 ml   Output 900 ml   Net 48932 ml     Other anti-infectives: Anti-infective Dose Date Initiated Date Stopped   Ceftriaxone 2g x 1, 1g q24hr 12/18 12/19   Doxycycline 100 mg q12hr 12/18    Pip/tazo 3.375g IV q8hr 12/18      Cultures:  available culture and sensitivity results were reviewed in EPIC  Cultures sent and are pending. Culture Date Result    Blood cx 1 12/18    Blood cx 2 12/18    Urine cx 12/18    MRSA nares     Strep/legionella urine antigens            Assessment:  · Consulted by Dr. Lei Ferris to dose/monitor vancomycin  · Goal trough level:  15-20 mcg/mL  · Pt is a 79 yoM who presented in septic shock, likely secondary to pneumonia.   · Serum creatinine today: 1.5 mg/dL; CrCl ~ 52 mL/min; baseline Scr ~ 0.9 mg/dL in 2019  · Vancomycin 1750 mg IV x 1 given on 12/18 @ 1751    Plan:  · Check random level today @ 1700 and re-dose if level < 20 mcg/mL  · Follow renal function · Pharmacist will follow and monitor/adjust dosing as necessary      Thank you for the consult,    Efrain Arguello PharmD, BCPS 12/19/2020 7:46 AM

## 2020-12-19 NOTE — CONSULTS
Pulmonary/Critical Care Consult Note    CHIEF COMPLAINT: Severe shortness of breath    HISTORY OF PRESENT ILLNESS: 28-year-old gentleman presented emergency room with severe shortness of breath since 1 AM refractory to his oxygen at 2 L at rest and 4 L with exertion. Patient has home nebulizer as well. He had no chest pain productive cough or purulent sputum or fever he did have some nausea vomiting. Patient was resuscitated aggressively in the emergency room including nebulizer treatments steroids and placed on a noninvasive vent. He came off of this in the night. ALLERGY:  Patient has no known allergies. FAMILY HISTORY:  History reviewed. No pertinent family history.     SOCIAL HISTORY:  Social History     Socioeconomic History    Marital status:      Spouse name: Not on file    Number of children: Not on file    Years of education: Not on file    Highest education level: Not on file   Occupational History    Not on file   Social Needs    Financial resource strain: Not on file    Food insecurity     Worry: Not on file     Inability: Not on file    Transportation needs     Medical: Not on file     Non-medical: Not on file   Tobacco Use    Smoking status: Former Smoker     Quit date: 2009     Years since quittin.1    Smokeless tobacco: Former User   Substance and Sexual Activity    Alcohol use: Not Currently    Drug use: No    Sexual activity: Not Currently   Lifestyle    Physical activity     Days per week: Not on file     Minutes per session: Not on file    Stress: Not on file   Relationships    Social connections     Talks on phone: Not on file     Gets together: Not on file     Attends Synagogue service: Not on file     Active member of club or organization: Not on file     Attends meetings of clubs or organizations: Not on file     Relationship status: Not on file    Intimate partner violence     Fear of current or ex partner: Not on file Emotionally abused: Not on file     Physically abused: Not on file     Forced sexual activity: Not on file   Other Topics Concern    Not on file   Social History Narrative    Not on file       MEDICAL HISTORY:  Past Medical History:   Diagnosis Date    COPD (chronic obstructive pulmonary disease) ([address]     Hyperlipidemia    Chronic hypoxic respiratory failure using 2 L at rest and at night 4 L with exertion   No history of diabetes, stroke, heart disease kidney kidney disease, liver disease or drug abuse. No history of carcinoma DVT patient was remote smoker. MEDICATIONS:   piperacillin-tazobactam  3.375 g Intravenous Q8H    vancomycin (VANCOCIN) intermittent dosing (placeholder)   Other RX Placeholder    ipratropium  0.5 mg Nebulization Once    aspirin  81 mg Oral Daily    theophylline  400 mg Oral Daily    atorvastatin  20 mg Oral Daily    methylPREDNISolone  60 mg Intravenous Q6H    famotidine  20 mg Oral BID    doxycycline (VIBRAMYCIN) IV  100 mg Intravenous Q12H    enoxaparin  40 mg Subcutaneous Daily      sodium chloride      sodium chloride 75 mL/hr at 12/18/20 2315     albuterol, LORazepam, acetaminophen    Review of Systems   Constitutional: Positive for fatigue. HENT: Positive for congestion. Eyes: Negative. Respiratory: Positive for cough and shortness of breath. Cardiovascular: Negative. Gastrointestinal: Negative. Endocrine: Negative. Genitourinary: Negative. Musculoskeletal: Positive for arthralgias and myalgias. Skin: Negative. Allergic/Immunologic: Negative. Neurological: Positive for weakness. Hematological: Negative. Psychiatric/Behavioral: Negative. PHYSICAL EXAM:  Vitals:    12/19/20 0900   BP: (!) 110/57   Pulse: 84   Resp: 19   Temp:    SpO2: 99%     FiO2 : 80 %  O2 Flow Rate (L/min): 4 L/min  O2 Device: Nasal cannula    Constitutional: Alert pleasant gentleman obese body habitus on nasal cannula oxygen at this time. Skin: No cyanosis or skin breakdown  HEENT: Normocephalic neck is supple pupils are equal and reactive light accommodation extraocular muscles intact external ears grossly lesion. Class III airway. No trachea deviation no JVD +2/4 carotids bilaterally. Neck: No thyroid enlargement  Cardiovascular: Rate and rhythm regular no gallop no murmur  Respiratory: Few late wheezes no consolidation  Gastrointestinal: Soft bowel sounds present no peritoneal signs  Genitourinary:  and rectal exams deferred  Extremities: No palpable DVT or ischemia. No significant edema  Neurological: Patient is alert and oriented cranial nerves II through XII gross sensorimotor intact. Gait is not observed.   Psychological: Appropriate    LABS:  WBC   Date Value Ref Range Status   12/19/2020 15.7 (H) 4.5 - 11.5 E9/L Final   12/18/2020 26.4 (H) 4.5 - 11.5 E9/L Final   06/16/2020 13.0 (H) 4.5 - 11.5 E9/L Final     Hemoglobin   Date Value Ref Range Status   12/19/2020 12.9 12.5 - 16.5 g/dL Final   12/18/2020 15.9 12.5 - 16.5 g/dL Final   06/16/2020 15.7 12.5 - 16.5 g/dL Final     Hematocrit   Date Value Ref Range Status   12/19/2020 40.8 37.0 - 54.0 % Final   12/18/2020 51.2 37.0 - 54.0 % Final   06/16/2020 48.5 37.0 - 54.0 % Final     MCV   Date Value Ref Range Status   12/19/2020 93.8 80.0 - 99.9 fL Final   12/18/2020 96.6 80.0 - 99.9 fL Final   06/16/2020 96.2 80.0 - 99.9 fL Final     Platelets   Date Value Ref Range Status   12/19/2020 189 130 - 450 E9/L Final   12/18/2020 276 130 - 450 E9/L Final   06/16/2020 216 130 - 450 E9/L Final     Sodium   Date Value Ref Range Status   12/19/2020 138 132 - 146 mmol/L Final   12/18/2020 142 132 - 146 mmol/L Final   06/16/2020 142 132 - 146 mmol/L Final     Potassium   Date Value Ref Range Status   12/19/2020 4.6 3.5 - 5.0 mmol/L Final   06/16/2020 4.6 3.5 - 5.0 mmol/L Final   07/25/2019 4.2 3.5 - 5.0 mmol/L Final     Potassium reflex Magnesium   Date Value Ref Range Status 12/18/2020 3.7 3.5 - 5.0 mmol/L Final   07/23/2019 4.6 3.5 - 5.0 mmol/L Final   07/21/2019 3.5 3.5 - 5.0 mmol/L Final     Chloride   Date Value Ref Range Status   12/19/2020 108 (H) 98 - 107 mmol/L Final   12/18/2020 100 98 - 107 mmol/L Final   06/16/2020 101 98 - 107 mmol/L Final     CO2   Date Value Ref Range Status   12/19/2020 22 22 - 29 mmol/L Final   12/18/2020 25 22 - 29 mmol/L Final   06/16/2020 27 22 - 29 mmol/L Final     BUN   Date Value Ref Range Status   12/19/2020 16 8 - 23 mg/dL Final   12/18/2020 12 8 - 23 mg/dL Final   06/16/2020 14 8 - 23 mg/dL Final     CREATININE   Date Value Ref Range Status   12/19/2020 1.5 (H) 0.7 - 1.2 mg/dL Final   12/18/2020 1.8 (H) 0.7 - 1.2 mg/dL Final   06/16/2020 1.2 0.7 - 1.2 mg/dL Final     Glucose   Date Value Ref Range Status   12/19/2020 161 (H) 74 - 99 mg/dL Final   12/18/2020 160 (H) 74 - 99 mg/dL Final   06/16/2020 124 (H) 74 - 99 mg/dL Final     Calcium   Date Value Ref Range Status   12/19/2020 8.5 (L) 8.6 - 10.2 mg/dL Final   12/18/2020 9.9 8.6 - 10.2 mg/dL Final   06/16/2020 9.6 8.6 - 10.2 mg/dL Final     Total Protein   Date Value Ref Range Status   12/19/2020 5.2 (L) 6.4 - 8.3 g/dL Final   12/18/2020 6.8 6.4 - 8.3 g/dL Final   07/25/2019 6.8 6.4 - 8.3 g/dL Final     Alb   Date Value Ref Range Status   12/19/2020 2.8 (L) 3.5 - 5.2 g/dL Final   12/18/2020 3.8 3.5 - 5.2 g/dL Final   07/25/2019 4.1 3.5 - 5.2 g/dL Final     Total Bilirubin   Date Value Ref Range Status   12/19/2020 0.7 0.0 - 1.2 mg/dL Final   12/18/2020 0.7 0.0 - 1.2 mg/dL Final   07/25/2019 0.4 0.0 - 1.2 mg/dL Final     Alkaline Phosphatase   Date Value Ref Range Status   12/19/2020 60 40 - 129 U/L Final   12/18/2020 100 40 - 129 U/L Final   07/25/2019 69 40 - 129 U/L Final     AST   Date Value Ref Range Status   12/19/2020 20 0 - 39 U/L Final   12/18/2020 23 0 - 39 U/L Final   07/25/2019 37 0 - 39 U/L Final     ALT   Date Value Ref Range Status   12/19/2020 13 0 - 40 U/L Final 12/18/2020 19 0 - 40 U/L Final   07/25/2019 42 (H) 0 - 40 U/L Final     GFR Non-   Date Value Ref Range Status   12/19/2020 45 >=60 mL/min/1.73 Final     Comment:     Chronic Kidney Disease: less than 60 ml/min/1.73 sq.m. Kidney Failure: less than 15 ml/min/1.73 sq.m. Results valid for patients 18 years and older. 12/18/2020 37 >=60 mL/min/1.73 Final     Comment:     Chronic Kidney Disease: less than 60 ml/min/1.73 sq.m. Kidney Failure: less than 15 ml/min/1.73 sq.m. Results valid for patients 18 years and older. 06/16/2020 58 >=60 mL/min/1.73 Final     Comment:     Chronic Kidney Disease: less than 60 ml/min/1.73 sq.m. Kidney Failure: less than 15 ml/min/1.73 sq.m. Results valid for patients 18 years and older. GFR    Date Value Ref Range Status   12/19/2020 55  Final   12/18/2020 44  Final   06/16/2020 >60  Final     Magnesium   Date Value Ref Range Status   12/19/2020 1.7 1.6 - 2.6 mg/dL Final   07/21/2019 1.8 1.6 - 2.6 mg/dL Final     Phosphorus   Date Value Ref Range Status   12/19/2020 3.0 2.5 - 4.5 mg/dL Final     Recent Labs     12/18/20  1018   PH 7.421   PO2 66.9*   PCO2 30.1*   HCO3 19.1*   BE -3.9*   O2SAT 93.6       RADIOLOGY:  XR CHEST PORTABLE   Final Result   Stable bibasilar opacities related to atelectasis, pneumonia, or effusion. XR CHEST PORTABLE   Final Result   Patchy right lung base atelectasis or infiltrate      Covid screen was negative    IMPRESSION:  1. Acute on chronic respiratory failure hypoxic and hypercapnic  2. Exacerbation of COPD early pneumonia  3. Septic shock severe on presentation with leukocytosis, fever, lactic acidosis and hypotension  4. Obesity  PLAN:  1. Patient was fluid resuscitated in the emergency room and placed on BiPAP/NIV  2. Steroids antibiotics aerosols and continuation of patient's theophylline  3. DVT prophylaxis  4. Serial labs and work-up of pneumonia. 5. Considerations for noncontrast CT imaging to compare with CT scan 7 months prior  6. Pressors if necessary to maintain map of 65    ATTESTATION:  ICU Staff Physician note of personal involvement in Care  As the attending physician, I certify that I personally reviewed the patients history and personnally examined the patient to confirm the physical findings described above,  And that I reviewed the relevant imaging studies and available reports. I also discussed the differential diagnosis and all of the proposed management plans with the patient and individuals accompanying the patient to this visit. They had the opportunity to ask questions about the proposed management plans and to have those questions answered. This patient has a high probability of sudden, clinically significant deterioration, which requires the highest level of physician preparedness to intervene urgently. I managed/supervised life or organ supporting interventions that required frequent physician assessment. I devoted my full attention to the direct care of this patient for the amount of time indicated below. Time I spent with the family or surrogate(s) is included only if the patient was incapable of providing the necessary information or participating in medical decisions  Time devoted to teaching and to any procedures I billed separately is not included.     CRITICAL CARE TIME:  Critical care time 40 minutes    Electronically signed by Fely Hanks DO on 12/19/2020 at 9:27 AM

## 2020-12-20 LAB
ALBUMIN SERPL-MCNC: 2.8 G/DL (ref 3.5–5.2)
ALP BLD-CCNC: 64 U/L (ref 40–129)
ALT SERPL-CCNC: 13 U/L (ref 0–40)
ANION GAP SERPL CALCULATED.3IONS-SCNC: 14 MMOL/L (ref 7–16)
AST SERPL-CCNC: 25 U/L (ref 0–39)
BASOPHILS ABSOLUTE: 0 E9/L (ref 0–0.2)
BASOPHILS RELATIVE PERCENT: 0.1 % (ref 0–2)
BILIRUB SERPL-MCNC: 0.4 MG/DL (ref 0–1.2)
BUN BLDV-MCNC: 25 MG/DL (ref 8–23)
BURR CELLS: ABNORMAL
CALCIUM SERPL-MCNC: 8.8 MG/DL (ref 8.6–10.2)
CHLORIDE BLD-SCNC: 107 MMOL/L (ref 98–107)
CO2: 20 MMOL/L (ref 22–29)
CREAT SERPL-MCNC: 1.3 MG/DL (ref 0.7–1.2)
EOSINOPHILS ABSOLUTE: 0 E9/L (ref 0.05–0.5)
EOSINOPHILS RELATIVE PERCENT: 0 % (ref 0–6)
GFR AFRICAN AMERICAN: >60
GFR NON-AFRICAN AMERICAN: 53 ML/MIN/1.73
GLUCOSE BLD-MCNC: 132 MG/DL (ref 74–99)
HCT VFR BLD CALC: 40.5 % (ref 37–54)
HEMOGLOBIN: 13.1 G/DL (ref 12.5–16.5)
L. PNEUMOPHILA SEROGP 1 UR AG: NORMAL
LACTIC ACID: 1.8 MMOL/L (ref 0.5–2.2)
LACTIC ACID: 2 MMOL/L (ref 0.5–2.2)
LACTIC ACID: 2.1 MMOL/L (ref 0.5–2.2)
LACTIC ACID: 2.5 MMOL/L (ref 0.5–2.2)
LACTIC ACID: 3.1 MMOL/L (ref 0.5–2.2)
LACTIC ACID: 3.6 MMOL/L (ref 0.5–2.2)
LYMPHOCYTES ABSOLUTE: 0.88 E9/L (ref 1.5–4)
LYMPHOCYTES RELATIVE PERCENT: 5.2 % (ref 20–42)
MAGNESIUM: 1.9 MG/DL (ref 1.6–2.6)
MCH RBC QN AUTO: 30 PG (ref 26–35)
MCHC RBC AUTO-ENTMCNC: 32.3 % (ref 32–34.5)
MCV RBC AUTO: 92.9 FL (ref 80–99.9)
MONOCYTES ABSOLUTE: 0.18 E9/L (ref 0.1–0.95)
MONOCYTES RELATIVE PERCENT: 0.9 % (ref 2–12)
MRSA CULTURE ONLY: NORMAL
NEUTROPHILS ABSOLUTE: 16.45 E9/L (ref 1.8–7.3)
NEUTROPHILS RELATIVE PERCENT: 93.9 % (ref 43–80)
PDW BLD-RTO: 14.6 FL (ref 11.5–15)
PHOSPHORUS: 2.8 MG/DL (ref 2.5–4.5)
PLATELET # BLD: 209 E9/L (ref 130–450)
PMV BLD AUTO: 11.2 FL (ref 7–12)
POIKILOCYTES: ABNORMAL
POTASSIUM SERPL-SCNC: 3.7 MMOL/L (ref 3.5–5)
RBC # BLD: 4.36 E12/L (ref 3.8–5.8)
SODIUM BLD-SCNC: 141 MMOL/L (ref 132–146)
STREP PNEUMONIAE ANTIGEN, URINE: NORMAL
TOTAL PROTEIN: 6.1 G/DL (ref 6.4–8.3)
URINE CULTURE, ROUTINE: NORMAL
WBC # BLD: 17.5 E9/L (ref 4.5–11.5)

## 2020-12-20 PROCEDURE — 94640 AIRWAY INHALATION TREATMENT: CPT

## 2020-12-20 PROCEDURE — 6370000000 HC RX 637 (ALT 250 FOR IP): Performed by: INTERNAL MEDICINE

## 2020-12-20 PROCEDURE — 85025 COMPLETE CBC W/AUTO DIFF WBC: CPT

## 2020-12-20 PROCEDURE — 6360000002 HC RX W HCPCS: Performed by: INTERNAL MEDICINE

## 2020-12-20 PROCEDURE — 2500000003 HC RX 250 WO HCPCS: Performed by: INTERNAL MEDICINE

## 2020-12-20 PROCEDURE — 2580000003 HC RX 258: Performed by: INTERNAL MEDICINE

## 2020-12-20 PROCEDURE — 1200000000 HC SEMI PRIVATE

## 2020-12-20 PROCEDURE — 6360000002 HC RX W HCPCS

## 2020-12-20 PROCEDURE — 2580000003 HC RX 258

## 2020-12-20 PROCEDURE — 80198 ASSAY OF THEOPHYLLINE: CPT

## 2020-12-20 PROCEDURE — 36415 COLL VENOUS BLD VENIPUNCTURE: CPT

## 2020-12-20 PROCEDURE — 80053 COMPREHEN METABOLIC PANEL: CPT

## 2020-12-20 PROCEDURE — 83735 ASSAY OF MAGNESIUM: CPT

## 2020-12-20 PROCEDURE — 84100 ASSAY OF PHOSPHORUS: CPT

## 2020-12-20 PROCEDURE — 83605 ASSAY OF LACTIC ACID: CPT

## 2020-12-20 PROCEDURE — 2700000000 HC OXYGEN THERAPY PER DAY

## 2020-12-20 RX ORDER — VITAMIN B COMPLEX
2000 TABLET ORAL DAILY
Status: DISCONTINUED | OUTPATIENT
Start: 2020-12-20 | End: 2020-12-24 | Stop reason: HOSPADM

## 2020-12-20 RX ORDER — METHYLPREDNISOLONE SODIUM SUCCINATE 125 MG/2ML
60 INJECTION, POWDER, LYOPHILIZED, FOR SOLUTION INTRAMUSCULAR; INTRAVENOUS EVERY 8 HOURS
Status: DISCONTINUED | OUTPATIENT
Start: 2020-12-21 | End: 2020-12-21

## 2020-12-20 RX ADMIN — BUDESONIDE 500 MCG: 0.5 INHALANT RESPIRATORY (INHALATION) at 04:51

## 2020-12-20 RX ADMIN — FAMOTIDINE 20 MG: 20 TABLET ORAL at 09:48

## 2020-12-20 RX ADMIN — ASPIRIN 81 MG CHEWABLE TABLET 81 MG: 81 TABLET CHEWABLE at 09:46

## 2020-12-20 RX ADMIN — BUDESONIDE 500 MCG: 0.5 INHALANT RESPIRATORY (INHALATION) at 17:42

## 2020-12-20 RX ADMIN — ARFORMOTEROL TARTRATE 15 MCG: 15 SOLUTION RESPIRATORY (INHALATION) at 17:42

## 2020-12-20 RX ADMIN — ARFORMOTEROL TARTRATE 15 MCG: 15 SOLUTION RESPIRATORY (INHALATION) at 04:51

## 2020-12-20 RX ADMIN — PIPERACILLIN AND TAZOBACTAM 3.38 G: 3; .375 INJECTION, POWDER, FOR SOLUTION INTRAVENOUS at 04:20

## 2020-12-20 RX ADMIN — FAMOTIDINE 20 MG: 20 TABLET ORAL at 20:55

## 2020-12-20 RX ADMIN — SODIUM CHLORIDE: 9 INJECTION, SOLUTION INTRAVENOUS at 00:08

## 2020-12-20 RX ADMIN — PIPERACILLIN AND TAZOBACTAM 3.38 G: 3; .375 INJECTION, POWDER, FOR SOLUTION INTRAVENOUS at 14:13

## 2020-12-20 RX ADMIN — VANCOMYCIN HYDROCHLORIDE 1500 MG: 10 INJECTION, POWDER, LYOPHILIZED, FOR SOLUTION INTRAVENOUS at 11:18

## 2020-12-20 RX ADMIN — METHYLPREDNISOLONE SODIUM SUCCINATE 60 MG: 125 INJECTION, POWDER, FOR SOLUTION INTRAMUSCULAR; INTRAVENOUS at 17:34

## 2020-12-20 RX ADMIN — METHYLPREDNISOLONE SODIUM SUCCINATE 60 MG: 125 INJECTION, POWDER, FOR SOLUTION INTRAMUSCULAR; INTRAVENOUS at 04:20

## 2020-12-20 RX ADMIN — SODIUM CHLORIDE: 9 INJECTION, SOLUTION INTRAVENOUS at 17:36

## 2020-12-20 RX ADMIN — THEOPHYLLINE ANHYDROUS 400 MG: 200 CAPSULE, EXTENDED RELEASE ORAL at 11:22

## 2020-12-20 RX ADMIN — ATORVASTATIN CALCIUM 20 MG: 20 TABLET, FILM COATED ORAL at 09:47

## 2020-12-20 RX ADMIN — ALBUTEROL SULFATE 2.5 MG: 2.5 SOLUTION RESPIRATORY (INHALATION) at 04:51

## 2020-12-20 RX ADMIN — METHYLPREDNISOLONE SODIUM SUCCINATE 60 MG: 125 INJECTION, POWDER, FOR SOLUTION INTRAMUSCULAR; INTRAVENOUS at 11:14

## 2020-12-20 RX ADMIN — Medication 2000 UNITS: at 20:55

## 2020-12-20 RX ADMIN — DOXYCYCLINE 100 MG: 100 INJECTION, POWDER, LYOPHILIZED, FOR SOLUTION INTRAVENOUS at 14:16

## 2020-12-20 RX ADMIN — PIPERACILLIN AND TAZOBACTAM 3.38 G: 3; .375 INJECTION, POWDER, FOR SOLUTION INTRAVENOUS at 20:56

## 2020-12-20 RX ADMIN — DOXYCYCLINE 100 MG: 100 INJECTION, POWDER, LYOPHILIZED, FOR SOLUTION INTRAVENOUS at 00:08

## 2020-12-20 NOTE — PROGRESS NOTES
Pulmonary/Critical Care Progress Note    We are following patient for pneumonia, COPD exacerbation, acute respiratory failure, Covid negative    SUBJECTIVE:  The patient is more comfortable today. He recognizes that he is improving. His general sense of wellbeing is better. He will have a chest x-ray tomorrow along with labs. We will begin to taper IV steroids but continue IV antibiotics including Zosyn, vancomycin, and doxycycline. MEDICATIONS:   Vitamin D  2,000 Units Oral Daily    [START ON 12/21/2020] methylPREDNISolone  60 mg Intravenous Q8H    piperacillin-tazobactam  3.375 g Intravenous Q8H    vancomycin (VANCOCIN) intermittent dosing (placeholder)   Other RX Placeholder    Arformoterol Tartrate  15 mcg Nebulization BID    budesonide  0.5 mg Nebulization BID    ipratropium  0.5 mg Nebulization Once    aspirin  81 mg Oral Daily    theophylline  400 mg Oral Daily    atorvastatin  20 mg Oral Daily    famotidine  20 mg Oral BID    doxycycline (VIBRAMYCIN) IV  100 mg Intravenous Q12H    enoxaparin  40 mg Subcutaneous Daily      sodium chloride 12.5 mL/hr at 12/20/20 1736    sodium chloride 75 mL/hr at 12/20/20 1421     albuterol, LORazepam, acetaminophen      REVIEW OF SYSTEMS:  Constitutional: Denies fever, weight loss, night sweats, and fatigue  Skin: Denies pigmentation, dark lesions, and rashes   HEENT: Denies hearing loss, tinnitus, ear drainage, epistaxis, sore throat, and hoarseness. Cardiovascular: Denies palpitations, chest pain, and chest pressure. Respiratory: Denies cough, dyspnea at rest, hemoptysis, apnea, and choking.   Gastrointestinal: Denies nausea, vomiting, poor appetite, diarrhea, heartburn or reflux  Genitourinary: Denies dysuria, frequency, urgency or hematuria  Musculoskeletal: Denies myalgias, muscle weakness, and bone pain  Neurological: Denies dizziness, vertigo, headache, and focal weakness  Psychological: Denies anxiety and depression 12/20/2020 3.7 3.5 - 5.0 mmol/L Final   12/19/2020 4.6 3.5 - 5.0 mmol/L Final   06/16/2020 4.6 3.5 - 5.0 mmol/L Final     Potassium reflex Magnesium   Date Value Ref Range Status   12/18/2020 3.7 3.5 - 5.0 mmol/L Final   07/23/2019 4.6 3.5 - 5.0 mmol/L Final   07/21/2019 3.5 3.5 - 5.0 mmol/L Final     Chloride   Date Value Ref Range Status   12/20/2020 107 98 - 107 mmol/L Final   12/19/2020 108 (H) 98 - 107 mmol/L Final   12/18/2020 100 98 - 107 mmol/L Final     CO2   Date Value Ref Range Status   12/20/2020 20 (L) 22 - 29 mmol/L Final   12/19/2020 22 22 - 29 mmol/L Final   12/18/2020 25 22 - 29 mmol/L Final     BUN   Date Value Ref Range Status   12/20/2020 25 (H) 8 - 23 mg/dL Final   12/19/2020 16 8 - 23 mg/dL Final   12/18/2020 12 8 - 23 mg/dL Final     CREATININE   Date Value Ref Range Status   12/20/2020 1.3 (H) 0.7 - 1.2 mg/dL Final   12/19/2020 1.5 (H) 0.7 - 1.2 mg/dL Final   12/18/2020 1.8 (H) 0.7 - 1.2 mg/dL Final     Glucose   Date Value Ref Range Status   12/20/2020 132 (H) 74 - 99 mg/dL Final   12/19/2020 161 (H) 74 - 99 mg/dL Final   12/18/2020 160 (H) 74 - 99 mg/dL Final     Calcium   Date Value Ref Range Status   12/20/2020 8.8 8.6 - 10.2 mg/dL Final   12/19/2020 8.5 (L) 8.6 - 10.2 mg/dL Final   12/18/2020 9.9 8.6 - 10.2 mg/dL Final     Total Protein   Date Value Ref Range Status   12/20/2020 6.1 (L) 6.4 - 8.3 g/dL Final   12/19/2020 5.2 (L) 6.4 - 8.3 g/dL Final   12/18/2020 6.8 6.4 - 8.3 g/dL Final     Alb   Date Value Ref Range Status   12/20/2020 2.8 (L) 3.5 - 5.2 g/dL Final   12/19/2020 2.8 (L) 3.5 - 5.2 g/dL Final   12/18/2020 3.8 3.5 - 5.2 g/dL Final     Total Bilirubin   Date Value Ref Range Status   12/20/2020 0.4 0.0 - 1.2 mg/dL Final   12/19/2020 0.7 0.0 - 1.2 mg/dL Final   12/18/2020 0.7 0.0 - 1.2 mg/dL Final     Alkaline Phosphatase   Date Value Ref Range Status   12/20/2020 64 40 - 129 U/L Final   12/19/2020 60 40 - 129 U/L Final   12/18/2020 100 40 - 129 U/L Final     AST Date Value Ref Range Status   12/20/2020 25 0 - 39 U/L Final   12/19/2020 20 0 - 39 U/L Final   12/18/2020 23 0 - 39 U/L Final     ALT   Date Value Ref Range Status   12/20/2020 13 0 - 40 U/L Final   12/19/2020 13 0 - 40 U/L Final   12/18/2020 19 0 - 40 U/L Final     GFR Non-   Date Value Ref Range Status   12/20/2020 53 >=60 mL/min/1.73 Final     Comment:     Chronic Kidney Disease: less than 60 ml/min/1.73 sq.m. Kidney Failure: less than 15 ml/min/1.73 sq.m. Results valid for patients 18 years and older. 12/19/2020 45 >=60 mL/min/1.73 Final     Comment:     Chronic Kidney Disease: less than 60 ml/min/1.73 sq.m. Kidney Failure: less than 15 ml/min/1.73 sq.m. Results valid for patients 18 years and older. 12/18/2020 37 >=60 mL/min/1.73 Final     Comment:     Chronic Kidney Disease: less than 60 ml/min/1.73 sq.m. Kidney Failure: less than 15 ml/min/1.73 sq.m. Results valid for patients 18 years and older. GFR    Date Value Ref Range Status   12/20/2020 >60  Final   12/19/2020 55  Final   12/18/2020 44  Final     Magnesium   Date Value Ref Range Status   12/20/2020 1.9 1.6 - 2.6 mg/dL Final   12/19/2020 1.7 1.6 - 2.6 mg/dL Final   07/21/2019 1.8 1.6 - 2.6 mg/dL Final     Phosphorus   Date Value Ref Range Status   12/20/2020 2.8 2.5 - 4.5 mg/dL Final   12/19/2020 3.0 2.5 - 4.5 mg/dL Final     Recent Labs     12/18/20  1018   PH 7.421   PO2 66.9*   PCO2 30.1*   HCO3 19.1*   BE -3.9*   O2SAT 93.6       RADIOLOGY:  CT CHEST WO CONTRAST   Final Result   Large airspace opacity left lower lobe superior segment consistent with   pneumonia. Bibasilar atelectasis. Small left pleural effusion. Ascending aorta dilated up to 4.5 cm. This previously measured 4.3 cm on   06/16/2020. XR CHEST PORTABLE   Final Result   Stable bibasilar opacities related to atelectasis, pneumonia, or effusion.       XR CHEST PORTABLE   Final Result

## 2020-12-20 NOTE — PROGRESS NOTES
Department of Internal Medicine  General Internal Medicine  Attending Progress Note      SUBJECTIVE:    Feels better today  No chest pains  Breathing is better. No fevers no chills  Tolerating diet  No nausea or vomiting  No diarrhea. Medications    Current Facility-Administered Medications: piperacillin-tazobactam (ZOSYN) 3.375 g in dextrose 5 % 50 mL IVPB extended infusion (mini-bag), 3.375 g, Intravenous, Q8H  0.9 % sodium chloride infusion, , Intravenous, Q8H  vancomycin (VANCOCIN) intermittent dosing (placeholder), , Other, RX Placeholder  Arformoterol Tartrate (BROVANA) nebulizer solution 15 mcg, 15 mcg, Nebulization, BID  budesonide (PULMICORT) nebulizer suspension 500 mcg, 0.5 mg, Nebulization, BID  albuterol (PROVENTIL) nebulizer solution 2.5 mg, 2.5 mg, Nebulization, Q4H PRN  ipratropium (ATROVENT) 0.02 % nebulizer solution 0.5 mg, 0.5 mg, Nebulization, Once  aspirin chewable tablet 81 mg, 81 mg, Oral, Daily  theophylline (LUIS-24) extended release capsule 400 mg, 400 mg, Oral, Daily  atorvastatin (LIPITOR) tablet 20 mg, 20 mg, Oral, Daily  methylPREDNISolone sodium (SOLU-MEDROL) injection 60 mg, 60 mg, Intravenous, Q6H  famotidine (PEPCID) tablet 20 mg, 20 mg, Oral, BID  doxycycline (VIBRAMYCIN) 100 mg in dextrose 5 % 100 mL IVPB, 100 mg, Intravenous, Q12H  LORazepam (ATIVAN) tablet 1 mg, 1 mg, Oral, Q6H PRN  acetaminophen (TYLENOL) tablet 650 mg, 650 mg, Oral, Q6H PRN  0.9 % sodium chloride infusion, , Intravenous, Continuous  enoxaparin (LOVENOX) injection 40 mg, 40 mg, Subcutaneous, Daily    Physical    VITALS:  BP (!) 143/67   Pulse 99   Temp 98.1 °F (36.7 °C) (Oral)   Resp 12   Ht 5' 11\" (1.803 m)   Wt 260 lb 8 oz (118.2 kg)   SpO2 94%   BMI 36.33 kg/m²   TEMPERATURE:  Current - Temp: 98.1 °F (36.7 °C);  Max - Temp  Av °F (36.7 °C)  Min: 97.5 °F (36.4 °C)  Max: 98.3 °F (36.8 °C)  RESPIRATIONS RANGE: Resp  Av  Min: 12  Max: 20  PULSE RANGE: Pulse  Av  Min: 91  Max: 99 BLOOD PRESSURE RANGE:  Systolic (32FBW), OHB:830 , Min:130 , MRR:694   ; Diastolic (31ANF), UQJ:25, Min:62, Max:67    PULSE OXIMETRY RANGE: SpO2  Av.5 %  Min: 94 %  Max: 95 %  24HR INTAKE/OUTPUT:      Intake/Output Summary (Last 24 hours) at 2020 1611  Last data filed at 2020 1421  Gross per 24 hour   Intake 1355 ml   Output 2950 ml   Net -1595 ml       CONSTITUTIONAL: Awake, no distress, appears as stated age. HEENT: Normocephalic atraumatic. Pupils are equal and reactive bilaterally. Extraocular movements are intact. No pallor or icterus appreciated. NECK: Supple, no JVD , no lymphadenopathy, no bruits, no thyromegaly  LUNGS: significantly diminished air movements b/l in all Lung fields. CARDIOVASCULAR: Regular rate and rhythm, no murmur rub or gallop. ABDOMEN: Soft, nontender, bowel sounds are positive, no organomegaly appreciated. NEUROLOGIC: Awake, alert, oriented x 3 , no focal deficits noted. EXT: trace  edema, no clubbing, or cyanosis.     CBC with Differential:    Lab Results   Component Value Date    WBC 17.5 2020    RBC 4.36 2020    HGB 13.1 2020    HCT 40.5 2020     2020    MCV 92.9 2020    MCH 30.0 2020    MCHC 32.3 2020    RDW 14.6 2020    METASPCT 0.9 2020    LYMPHOPCT 5.2 2020    MONOPCT 0.9 2020    BASOPCT 0.1 2020    MONOSABS 0.18 2020    LYMPHSABS 0.88 2020    EOSABS 0.00 2020    BASOSABS 0.00 2020     CMP:    Lab Results   Component Value Date     2020    K 3.7 2020    K 3.7 2020     2020    CO2 20 2020    BUN 25 2020    CREATININE 1.3 2020    GFRAA >60 2020    LABGLOM 53 2020    GLUCOSE 132 2020    PROT 6.1 2020    LABALBU 2.8 2020    CALCIUM 8.8 2020    BILITOT 0.4 2020    ALKPHOS 64 2020    AST 25 2020    ALT 13 2020         ASSESSMENT AND PLAN 1.Lt lower lobe community acquired pneumonia   Continue iv Zosyn, Doxycycline and Vancomycin  Awaiting sputum and blood cultures  Urine Legionella and strep pneumo antigens negative  Pulmonology following  2. Acute on chronic respiratory failure secondary to above  Now on nasal cannula at 4 L/min  3. Sepsis secondary to above  Continue iv antibiotics ,  Iv fluids. 4.Acute renal failure  Creatinine much improved with iv hydration, continue same today. 5.COPD, with exacerbation  Severe long standing disease  Iv Solumedrol 60 mg iv Q 6 hours for now. Refused second opinion at [facility]  or pulmonary rehab as outpatient. 6.Hyperlipidemia  Lipitor 20 mg daily  7. Anxiety  Lorazepam as needed.   8.GERD  Continue pepcid 20 mg po bid    Timur Gonzalez MD.  4:11 PM  12/20/2020

## 2020-12-20 NOTE — H&P
[address]                              HISTORY AND PHYSICAL    PATIENT NAME: Luke Wallis                     :        1940  MED REC NO:   [id number]                            ROOM:       4367  ACCOUNT NO:   [de-identified]                           ADMIT DATE: 2020  PROVIDER:     Hallie Walton MD    CHIEF COMPLAINT:  Shortness of breath. HISTORY OF PRESENT ILLNESS:  The patient is a 78-year-old gentleman who  presented to the emergency room last night with complaints of increasing  shortness of breath. He stated that his shortness of breath got worse  over the last couple of days. He has severe COPD and on home oxygen  between 2-4 liters per minute. He noticed that over the last couple of  days, his shortness of breath has been getting worse and he has been  unable to do much. He had some nausea and vomiting. He came to the ER  because of worsening of his symptoms. In the ER, his pulse ox was in  the 70s and 80s percentage and he was placed on a nonrebreather and  eventually on a BiPAP. He was febrile with temperature of 101.3 degrees  with pulse rate of 133 and respiratory rate of 25. His blood pressure  was 94/58. The patient was started on IV fluids. He was also given  Rocephin and doxycycline intravenously. He was kept on BiPAP. The  patient's creatinine was also elevated at 1.8 with baseline of 1.2 with  his white cell count up to 26.4. The patient was then admitted to the  ICU. He had chest x-ray done in the emergency room, which revealed  evidence of patchy right lung infiltrate. His lactic acid was up to  6.6. PAST MEDICAL HISTORY:  1. Longstanding severe COPD. 2.  Chronic respiratory failure secondary to above. 3.  Hyperlipidemia. 4.  History of lung nodules. PAST SURGICAL HISTORY:  1. Right cataract surgery. 2.  Hemorrhoid surgery. 3.  Appendectomy in 2019. FAMILY HISTORY:  Mother  with coronary artery disease in her  76s. Father  of dementia. Brother  with cancer of the  lung. Three sisters are alive and healthy. He has one son who has  diabetes mellitus type 2. One daughter healthy. SOCIAL HISTORY:  The patient has a 50-pack-year history of smoking. He  drinks about 12 packs per week. He is . He is retired, being an  . HOME MEDICATIONS:  Raymondo Pel two puffs b.i.d., Lasix 20 mg p.o. daily,  theophylline 200 mg, he takes two tablets daily, prednisone 5 mg daily,  Spiriva HandiHaler one puff daily, Pepcid 20 mg b.i.d., albuterol  aerosol treatments every six hours as needed, aspirin 81 mg daily,  simvastatin 20 mg daily. ALLERGIES:  No known drug allergies. REVIEW OF SYSTEMS:  The patient denied having any fevers or chills. No  severe headaches. No blurred vision or double vision. He denied having  any chest pain. No nausea. No urinary complaints. No abdominal pain. No significant changes in weight recently. PHYSICAL EXAMINATION:  GENERAL APPEARANCE:  A 28-year-old gentleman who is on nasal oxygen at  this time, in no acute distress. VITAL SIGNS:  Temperature 99 degrees, pulse 78 per minute, respiratory  rate 13 per minute, blood pressure 101/59, currently on 7 liters of  oxygen by nasal cannula and saturations are 100%. HEENT:  Normocephalic and atraumatic. His pupils are equal and reactive  bilaterally. Extraocular movements are intact. No conjunctival pallor  or icterus appreciated. There is puffiness under his lower eyelids  bilaterally. NECK:  Supple. No thyromegaly. No lymphadenopathy. CHEST:  Extremely diminished throughout both lung fields with prolonged  expiratory phase of breathing with expiratory wheezing. HEART:  Regular rate and rhythm. No murmur, rub or gallop noted. Distant heart sounds. ABDOMEN:  Soft and nontender. Bowel sounds are positive.   No organomegaly appreciated. EXTREMITIES:  Trace edema. No clubbing. No cyanosis. NEUROLOGIC:  He is awake, alert, and oriented x3 with no focal deficits. LABORATORY DATA:  Sodium 142, potassium 3.7, chloride 100, bicarb 25,  BUN 12, creatinine of 1.8, lactic acid 6.6. ProBNP 1011. White cell  count 26.4, hemoglobin 15.9, hematocrit 51.2, and platelet count 476. His COVID test was negative. Urinalysis unremarkable. Repeat chest x-ray reveals bilateral lower lung opacities. ASSESSMENT AND PLAN:  1. Pneumonia on chest x-ray. This is community-acquired pneumonia. The patient however is septic and was admitted to the ICU. He was  started on Rocephin and doxycycline IV. We will consult ICU team.   Blood cultures have been sent. We will send for urine Legionella and  Strep pneumoniae antigens. His white cell count is elevated with  tachypnea and tachycardia on admission. The patient is currently more  stable. 2.  Acute on chronic respiratory failure secondary to above. The  patient is currently on nasal cannula at 7 liters per minutes. Saturations 100%. We will monitor closely. 3.  Sepsis secondary to number 1. His lactic acid is 6.6. We will  hydrate the patient with IV fluids. Monitor his lactic acid level. Check blood cultures as stated above. 4.  COPD exacerbation. We will start him on Solu-Medrol 60 mg IV q.6  hours. 5.  Acute renal failure with creatinine up to 1.8. We will hydrate the  patient and monitor his creatinine closely. 6.  Longstanding history of severe COPD. 7.  Chronic respiratory failure, on home oxygen. 8.  Hyperlipidemia. 9.  History of lung nodules. 10.  Stable ascending aortic dilatation at 4.5 cm. This was previously  4.3 cm in 06/2020. This is on CT scan of the chest done this admission. 11. The patient has been following with Pulmonology as outpatient and  declined second opinion in [facility]. He also declined  pulmonary rehab. Jose F Gamboa MD    D: 12/19/2020 19:37:47       T: 12/19/2020 21:57:01     [email or web address]  Job#: [id number]     Doc#: [id number]    CC:

## 2020-12-20 NOTE — PROGRESS NOTES
Pharmacy Consultation Note  (Antibiotic Dosing and Monitoring)    Initial consult date: 12/19/2020  Consulting physician: Dr. Eric Lynch  Drug(s): Vancomycin  Indication: Septic shock    Ht Readings from Last 1 Encounters:   12/18/20 5' 11\" (1.803 m)     Wt Readings from Last 1 Encounters:   12/19/20 260 lb 8 oz (118.2 kg)     Age/  Gender IBW DW  Allergy Information   79 y.o.   male 75.3 kg 92.5 kg  Patient has no known allergies. Date  Tmax WBC BUN/CR UOP  (mL/kg/hr) Drug/Dose Time   Given Level(s)   (Time) Comments   12/18  (#1) 102 26.4 12/1.2 -- Vancomycin 1750 mg IV x 1 1751     12/19  (#2) 100 15.7 16/1.5 -- -- -- Random level @ <1700> = NOT DRAWN    12/20  (#3) afeb 17.5 25/1.3 1.1 Vancomycin 1500 mg IV x1 dose <0930>       (#4)       Vanco random in AM      (#5)             (#6)             (#7)             Estimated Creatinine Clearance: 60 mL/min (A) (based on SCr of 1.3 mg/dL (H)). Intake/Output Summary (Last 24 hours) at 12/20/2020 0858  Last data filed at 12/20/2020 0558  Gross per 24 hour   Intake 775 ml   Output 3100 ml   Net -2325 ml     Other anti-infectives: Anti-infective Dose Date Initiated Date Stopped   Ceftriaxone 2g x 1, 1g q24hr 12/18 12/19   Doxycycline 100 mg q12hr 12/18    Pip/tazo 3.375g IV q8hr 12/18      Cultures:  available culture and sensitivity results were reviewed in EPIC  Cultures sent and are pending. Culture Date Result    Blood cx 1 12/18 Prelim no growth   Blood cx 2 12/18 Prelim no growth   Urine cx 12/18 No growth final   MRSA nares 12/19 Pending   Strep/legionella urine antigens 12/19 Pending   COVID19 12/18 Not detected     Assessment:  · Consulted by Dr. Eric Lynch to dose/monitor vancomycin  · Goal trough level:  15-20 mcg/mL  · Pt is a 79 yoM who presented in septic shock, likely secondary to pneumonia.   · Serum creatinine today: 1.3 mg/dL; CrCl ~ 60 mL/min; baseline Scr ~ 0.9 mg/dL in 2019  · Vancomycin 1750 mg IV x 1 given on 12/18 @ 1751 · Random level yesterday never drawn    Plan:  · Will order Vancomycin 1500 mg IV x1 dose today  · Will order Vanco random level with AM labs tomorrow and will re-dose if level <15-20 mcg/mL  · Follow renal function  · Pharmacist will follow and monitor/adjust dosing as necessary    Leroy Bales, PharmD, [facility], [address] [facility]  12/20/2020  9:08 AM  Pager: [phone]

## 2020-12-21 LAB
ALBUMIN SERPL-MCNC: 2.8 G/DL (ref 3.5–5.2)
ALP BLD-CCNC: 63 U/L (ref 40–129)
ALT SERPL-CCNC: 14 U/L (ref 0–40)
ANION GAP SERPL CALCULATED.3IONS-SCNC: 8 MMOL/L (ref 7–16)
AST SERPL-CCNC: 24 U/L (ref 0–39)
BASOPHILS ABSOLUTE: 0 E9/L (ref 0–0.2)
BASOPHILS RELATIVE PERCENT: 0.1 % (ref 0–2)
BILIRUB SERPL-MCNC: 0.2 MG/DL (ref 0–1.2)
BUN BLDV-MCNC: 25 MG/DL (ref 8–23)
BURR CELLS: ABNORMAL
CALCIUM SERPL-MCNC: 8.6 MG/DL (ref 8.6–10.2)
CHLORIDE BLD-SCNC: 112 MMOL/L (ref 98–107)
CO2: 23 MMOL/L (ref 22–29)
CREAT SERPL-MCNC: 1.1 MG/DL (ref 0.7–1.2)
EKG ATRIAL RATE: 174 BPM
EKG Q-T INTERVAL: 330 MS
EKG QRS DURATION: 110 MS
EKG QTC CALCULATION (BAZETT): 514 MS
EKG R AXIS: 16 DEGREES
EKG T AXIS: 133 DEGREES
EKG VENTRICULAR RATE: 146 BPM
EOSINOPHILS ABSOLUTE: 0 E9/L (ref 0.05–0.5)
EOSINOPHILS RELATIVE PERCENT: 0 % (ref 0–6)
GFR AFRICAN AMERICAN: >60
GFR NON-AFRICAN AMERICAN: >60 ML/MIN/1.73
GLUCOSE BLD-MCNC: 137 MG/DL (ref 74–99)
HCT VFR BLD CALC: 36.1 % (ref 37–54)
HEMOGLOBIN: 11.9 G/DL (ref 12.5–16.5)
LACTIC ACID: 1.6 MMOL/L (ref 0.5–2.2)
LACTIC ACID: 3 MMOL/L (ref 0.5–2.2)
LACTIC ACID: 3.4 MMOL/L (ref 0.5–2.2)
LACTIC ACID: 3.5 MMOL/L (ref 0.5–2.2)
LACTIC ACID: 7.2 MMOL/L (ref 0.5–2.2)
LYMPHOCYTES ABSOLUTE: 0.23 E9/L (ref 1.5–4)
LYMPHOCYTES RELATIVE PERCENT: 1.7 % (ref 20–42)
MAGNESIUM: 2.1 MG/DL (ref 1.6–2.6)
MCH RBC QN AUTO: 30.1 PG (ref 26–35)
MCHC RBC AUTO-ENTMCNC: 33 % (ref 32–34.5)
MCV RBC AUTO: 91.2 FL (ref 80–99.9)
MONOCYTES ABSOLUTE: 0 E9/L (ref 0.1–0.95)
MONOCYTES RELATIVE PERCENT: 3 % (ref 2–12)
NEUTROPHILS ABSOLUTE: 11.37 E9/L (ref 1.8–7.3)
NEUTROPHILS RELATIVE PERCENT: 98.3 % (ref 43–80)
OVALOCYTES: ABNORMAL
PDW BLD-RTO: 14.3 FL (ref 11.5–15)
PHOSPHORUS: 2.4 MG/DL (ref 2.5–4.5)
PLATELET # BLD: 185 E9/L (ref 130–450)
PMV BLD AUTO: 11 FL (ref 7–12)
POIKILOCYTES: ABNORMAL
POTASSIUM SERPL-SCNC: 3.3 MMOL/L (ref 3.5–5)
RBC # BLD: 3.96 E12/L (ref 3.8–5.8)
SODIUM BLD-SCNC: 143 MMOL/L (ref 132–146)
T3 TOTAL: 52.2 NG/DL (ref 80–200)
T4 FREE: 0.56 NG/DL (ref 0.93–1.7)
TEAR DROP CELLS: ABNORMAL
THEOPHYLLINE LEVEL: 7.9 MCG/ML (ref 10–20)
TOTAL PROTEIN: 5.5 G/DL (ref 6.4–8.3)
TSH SERPL DL<=0.05 MIU/L-ACNC: 0.97 UIU/ML (ref 0.27–4.2)
VANCOMYCIN RANDOM: 9.2 MCG/ML (ref 5–40)
WBC # BLD: 11.6 E9/L (ref 4.5–11.5)

## 2020-12-21 PROCEDURE — 97165 OT EVAL LOW COMPLEX 30 MIN: CPT

## 2020-12-21 PROCEDURE — 2580000003 HC RX 258: Performed by: INTERNAL MEDICINE

## 2020-12-21 PROCEDURE — 6360000002 HC RX W HCPCS: Performed by: INTERNAL MEDICINE

## 2020-12-21 PROCEDURE — 2500000003 HC RX 250 WO HCPCS: Performed by: SPECIALIST

## 2020-12-21 PROCEDURE — 84100 ASSAY OF PHOSPHORUS: CPT

## 2020-12-21 PROCEDURE — 80202 ASSAY OF VANCOMYCIN: CPT

## 2020-12-21 PROCEDURE — 6370000000 HC RX 637 (ALT 250 FOR IP): Performed by: INTERNAL MEDICINE

## 2020-12-21 PROCEDURE — 93005 ELECTROCARDIOGRAM TRACING: CPT | Performed by: INTERNAL MEDICINE

## 2020-12-21 PROCEDURE — 80053 COMPREHEN METABOLIC PANEL: CPT

## 2020-12-21 PROCEDURE — 94640 AIRWAY INHALATION TREATMENT: CPT

## 2020-12-21 PROCEDURE — 6370000000 HC RX 637 (ALT 250 FOR IP): Performed by: SPECIALIST

## 2020-12-21 PROCEDURE — 84480 ASSAY TRIIODOTHYRONINE (T3): CPT

## 2020-12-21 PROCEDURE — 83605 ASSAY OF LACTIC ACID: CPT

## 2020-12-21 PROCEDURE — 36415 COLL VENOUS BLD VENIPUNCTURE: CPT

## 2020-12-21 PROCEDURE — 84443 ASSAY THYROID STIM HORMONE: CPT

## 2020-12-21 PROCEDURE — 85025 COMPLETE CBC W/AUTO DIFF WBC: CPT

## 2020-12-21 PROCEDURE — 97530 THERAPEUTIC ACTIVITIES: CPT

## 2020-12-21 PROCEDURE — 2500000003 HC RX 250 WO HCPCS: Performed by: INTERNAL MEDICINE

## 2020-12-21 PROCEDURE — 2060000000 HC ICU INTERMEDIATE R&B

## 2020-12-21 PROCEDURE — 6360000002 HC RX W HCPCS: Performed by: SPECIALIST

## 2020-12-21 PROCEDURE — 2580000003 HC RX 258: Performed by: SPECIALIST

## 2020-12-21 PROCEDURE — 84439 ASSAY OF FREE THYROXINE: CPT

## 2020-12-21 PROCEDURE — 83735 ASSAY OF MAGNESIUM: CPT

## 2020-12-21 PROCEDURE — 97535 SELF CARE MNGMENT TRAINING: CPT

## 2020-12-21 RX ORDER — DIPHENHYDRAMINE HYDROCHLORIDE 50 MG/ML
25 INJECTION INTRAMUSCULAR; INTRAVENOUS EVERY 6 HOURS PRN
Status: DISCONTINUED | OUTPATIENT
Start: 2020-12-21 | End: 2020-12-24 | Stop reason: HOSPADM

## 2020-12-21 RX ORDER — METOPROLOL TARTRATE 50 MG/1
100 TABLET, FILM COATED ORAL 2 TIMES DAILY
Status: DISCONTINUED | OUTPATIENT
Start: 2020-12-21 | End: 2020-12-24

## 2020-12-21 RX ORDER — DILTIAZEM HYDROCHLORIDE 5 MG/ML
10 INJECTION INTRAVENOUS ONCE
Status: COMPLETED | OUTPATIENT
Start: 2020-12-21 | End: 2020-12-21

## 2020-12-21 RX ORDER — POTASSIUM BICARBONATE 25 MEQ/1
25 TABLET, EFFERVESCENT ORAL ONCE
Status: DISCONTINUED | OUTPATIENT
Start: 2020-12-21 | End: 2020-12-21 | Stop reason: CLARIF

## 2020-12-21 RX ORDER — METOPROLOL TARTRATE 50 MG/1
100 TABLET, FILM COATED ORAL 2 TIMES DAILY
Status: DISCONTINUED | OUTPATIENT
Start: 2020-12-21 | End: 2020-12-21

## 2020-12-21 RX ORDER — DIGOXIN 0.25 MG/ML
250 INJECTION INTRAMUSCULAR; INTRAVENOUS ONCE
Status: COMPLETED | OUTPATIENT
Start: 2020-12-21 | End: 2020-12-21

## 2020-12-21 RX ORDER — HALOPERIDOL 5 MG/ML
2 INJECTION INTRAMUSCULAR EVERY 6 HOURS PRN
Status: DISCONTINUED | OUTPATIENT
Start: 2020-12-21 | End: 2020-12-24 | Stop reason: HOSPADM

## 2020-12-21 RX ORDER — METHYLPREDNISOLONE SODIUM SUCCINATE 40 MG/ML
40 INJECTION, POWDER, LYOPHILIZED, FOR SOLUTION INTRAMUSCULAR; INTRAVENOUS EVERY 8 HOURS
Status: DISCONTINUED | OUTPATIENT
Start: 2020-12-21 | End: 2020-12-22

## 2020-12-21 RX ORDER — METOPROLOL TARTRATE 50 MG/1
100 TABLET, FILM COATED ORAL ONCE
Status: COMPLETED | OUTPATIENT
Start: 2020-12-21 | End: 2020-12-21

## 2020-12-21 RX ORDER — DIGOXIN 0.25 MG/ML
250 INJECTION INTRAMUSCULAR; INTRAVENOUS EVERY 6 HOURS SCHEDULED
Status: DISPENSED | OUTPATIENT
Start: 2020-12-21 | End: 2020-12-22

## 2020-12-21 RX ORDER — LORAZEPAM 2 MG/ML
0.5 INJECTION INTRAMUSCULAR EVERY 6 HOURS PRN
Status: DISCONTINUED | OUTPATIENT
Start: 2020-12-21 | End: 2020-12-24 | Stop reason: HOSPADM

## 2020-12-21 RX ORDER — METOPROLOL SUCCINATE 50 MG/1
50 TABLET, EXTENDED RELEASE ORAL 2 TIMES DAILY
Status: DISCONTINUED | OUTPATIENT
Start: 2020-12-21 | End: 2020-12-21

## 2020-12-21 RX ADMIN — HALOPERIDOL LACTATE 2 MG: 5 INJECTION, SOLUTION INTRAMUSCULAR at 22:22

## 2020-12-21 RX ADMIN — METOPROLOL SUCCINATE 50 MG: 50 TABLET, EXTENDED RELEASE ORAL at 14:49

## 2020-12-21 RX ADMIN — IPRATROPIUM BROMIDE 0.5 MG: 0.5 SOLUTION RESPIRATORY (INHALATION) at 06:27

## 2020-12-21 RX ADMIN — DIGOXIN 250 MCG: 250 INJECTION, SOLUTION INTRAMUSCULAR; INTRAVENOUS; PARENTERAL at 14:52

## 2020-12-21 RX ADMIN — METHYLPREDNISOLONE SODIUM SUCCINATE 60 MG: 125 INJECTION, POWDER, FOR SOLUTION INTRAMUSCULAR; INTRAVENOUS at 08:19

## 2020-12-21 RX ADMIN — FAMOTIDINE 20 MG: 20 TABLET ORAL at 20:54

## 2020-12-21 RX ADMIN — DILTIAZEM HYDROCHLORIDE 15 MG/HR: 5 INJECTION, SOLUTION INTRAVENOUS at 13:22

## 2020-12-21 RX ADMIN — PIPERACILLIN AND TAZOBACTAM 3.38 G: 3; .375 INJECTION, POWDER, FOR SOLUTION INTRAVENOUS at 14:47

## 2020-12-21 RX ADMIN — SODIUM CHLORIDE: 9 INJECTION, SOLUTION INTRAVENOUS at 08:17

## 2020-12-21 RX ADMIN — DILTIAZEM HYDROCHLORIDE 10 MG/HR: 5 INJECTION, SOLUTION INTRAVENOUS at 12:50

## 2020-12-21 RX ADMIN — ARFORMOTEROL TARTRATE 15 MCG: 15 SOLUTION RESPIRATORY (INHALATION) at 06:27

## 2020-12-21 RX ADMIN — POTASSIUM BICARBONATE 20 MEQ: 782 TABLET, EFFERVESCENT ORAL at 17:57

## 2020-12-21 RX ADMIN — BUDESONIDE 500 MCG: 0.5 INHALANT RESPIRATORY (INHALATION) at 06:27

## 2020-12-21 RX ADMIN — VANCOMYCIN HYDROCHLORIDE 1750 MG: 10 INJECTION, POWDER, LYOPHILIZED, FOR SOLUTION INTRAVENOUS at 10:31

## 2020-12-21 RX ADMIN — DIPHENHYDRAMINE HYDROCHLORIDE 25 MG: 50 INJECTION INTRAMUSCULAR; INTRAVENOUS at 22:21

## 2020-12-21 RX ADMIN — ATORVASTATIN CALCIUM 20 MG: 20 TABLET, FILM COATED ORAL at 08:19

## 2020-12-21 RX ADMIN — ARFORMOTEROL TARTRATE 15 MCG: 15 SOLUTION RESPIRATORY (INHALATION) at 17:53

## 2020-12-21 RX ADMIN — LORAZEPAM 0.5 MG: 2 INJECTION INTRAMUSCULAR; INTRAVENOUS at 23:27

## 2020-12-21 RX ADMIN — METHYLPREDNISOLONE SODIUM SUCCINATE 60 MG: 125 INJECTION, POWDER, FOR SOLUTION INTRAMUSCULAR; INTRAVENOUS at 02:03

## 2020-12-21 RX ADMIN — DOXYCYCLINE 100 MG: 100 INJECTION, POWDER, LYOPHILIZED, FOR SOLUTION INTRAVENOUS at 13:25

## 2020-12-21 RX ADMIN — SODIUM CHLORIDE: 9 INJECTION, SOLUTION INTRAVENOUS at 02:10

## 2020-12-21 RX ADMIN — METHYLPREDNISOLONE SODIUM SUCCINATE 40 MG: 40 INJECTION, POWDER, FOR SOLUTION INTRAMUSCULAR; INTRAVENOUS at 17:57

## 2020-12-21 RX ADMIN — DILTIAZEM HYDROCHLORIDE 10 MG: 5 INJECTION INTRAVENOUS at 12:47

## 2020-12-21 RX ADMIN — DIGOXIN 250 MCG: 250 INJECTION, SOLUTION INTRAMUSCULAR; INTRAVENOUS; PARENTERAL at 17:57

## 2020-12-21 RX ADMIN — METOPROLOL TARTRATE 100 MG: 50 TABLET, FILM COATED ORAL at 17:57

## 2020-12-21 RX ADMIN — ASPIRIN 81 MG CHEWABLE TABLET 81 MG: 81 TABLET CHEWABLE at 08:19

## 2020-12-21 RX ADMIN — THEOPHYLLINE ANHYDROUS 400 MG: 200 CAPSULE, EXTENDED RELEASE ORAL at 08:19

## 2020-12-21 RX ADMIN — BUDESONIDE 500 MCG: 0.5 INHALANT RESPIRATORY (INHALATION) at 17:53

## 2020-12-21 RX ADMIN — DIGOXIN 250 MCG: 250 INJECTION, SOLUTION INTRAMUSCULAR; INTRAVENOUS; PARENTERAL at 13:40

## 2020-12-21 RX ADMIN — Medication 2000 UNITS: at 08:18

## 2020-12-21 RX ADMIN — ALBUTEROL SULFATE 2.5 MG: 2.5 SOLUTION RESPIRATORY (INHALATION) at 17:54

## 2020-12-21 RX ADMIN — APIXABAN 5 MG: 5 TABLET, FILM COATED ORAL at 17:57

## 2020-12-21 RX ADMIN — FAMOTIDINE 20 MG: 20 TABLET ORAL at 08:18

## 2020-12-21 RX ADMIN — DOXYCYCLINE 100 MG: 100 INJECTION, POWDER, LYOPHILIZED, FOR SOLUTION INTRAVENOUS at 02:03

## 2020-12-21 RX ADMIN — PIPERACILLIN AND TAZOBACTAM 3.38 G: 3; .375 INJECTION, POWDER, FOR SOLUTION INTRAVENOUS at 04:47

## 2020-12-21 NOTE — PROGRESS NOTES
Physical Therapy    Physical Therapy Initial Evaluation    Room #:  0515/0515-01  Patient Name: Fanny Douglass  YOB: 1940  MRN: [id number]    Referring Provider:   Jeronimo Sanon MD     Date of Service: 12/22/2020    Evaluating Physical Therapist: Fabian Fritz, PT  #[id number]       Diagnosis:   Septic shock ([facility] [address] [A41.9, R65.21]   Admitted with   shortness of breath , sepsis, pneumonia    Patient Active Problem List   Diagnosis    Influenza, pneumonia    Dyspnea    Appendicitis, acute    COPD (chronic obstructive pulmonary disease) ([address]    Septic shock ([address]    Agitation        Tentative placement recommendation: Subacute vs Home Health Physical Therapy if patient meets goals    Equipment recommendation: Patient has needed equipment       Prior Level of Function: Patient ambulated independently and with wheeled walker 4 Liters of o2 via nasal cannula with activity  Rehab Potential: good  for baseline    Past medical history:   Past Medical History:   Diagnosis Date    COPD (chronic obstructive pulmonary disease) ([address]     Hyperlipidemia      Past Surgical History:   Procedure Laterality Date    APPENDECTOMY  2019    COLONOSCOPY      EYE SURGERY Right     cataract removal with lens implants    HEMORRHOID SURGERY      LAPAROSCOPIC APPENDECTOMY N/A 2/7/2019    APPENDECTOMY LAPAROSCOPIC performed by Sanjay Martinez MD at [facility] OR       Precautions: , falls, alarm and O2 , 4 Liters of o2 via nasal cannula     SUBJECTIVE:    Social history: Patient lives with spouse   in a ranch home  with 2 steps  to enter bilateral Rail  Tub shower     Equipment owned: [email or web address],  o2 7 Liters of o2 via nasal cannula ?     AM-PAC Basic Mobility        AM-PAC Mobility Inpatient   How much difficulty turning over in bed?: A Little  How much difficulty sitting down on / standing up from a chair with arms?: A Little  How much difficulty moving from lying on back to sitting on side of bed?: A Little How much help from another person moving to and from a bed to a chair?: A Little  How much help from another person needed to walk in hospital room?: A Little  How much help from another person for climbing 3-5 steps with a railing?: A Lot  AM-PAC Inpatient Mobility Raw Score : 17  AM-PAC Inpatient T-Scale Score : 42.13  Mobility Inpatient CMS 0-100% Score: 50.57  Mobility Inpatient CMS G-Code Modifier : CK    Nursing cleared patient for PT evaluation. The admitting diagnosis and active problem list as listed above have been reviewed prior to the initiation of this evaluation. OBJECTIVE;   Initial Evaluation  Date: 12/21/20 Treatment Date:     Short Term/ Long Term   Goals   Was pt agreeable to Eval/treatment? Yes    To be met in 4 days   Pain level   0/10        Bed Mobility    Rolling: Minimal assist of 1    Supine to sit: Minimal assist of 1    Sit to supine: Minimal assist of 1    Scooting: Minimal assist of 1    Rolling: Independent    Supine to sit:  Independent    Sit to supine: Independent    Scooting: Independent     Transfers Sit to stand: Minimal assist of 1    Sit to stand: Independent     Ambulation    2x25 feet using  wheeled walker with Minimal assist of 1       50 feet using  wheeled walker with Supervision     Stair negotiation: ascended and descended   Not assessed       2 steps bilateral rails supervision    ROM Within functional limits       Strength BUE:  refer to OT eval  RLE:  3+/5  LLE:  3+/5   Increase strength in affected mm groups by 1/3 grade   Balance Sitting EOB:  good    Dynamic Standing:  fair with wheeled walker   Sitting EOB:  good    Dynamic Standing: good       Patient is Alert & Oriented x person, place, time and situation and follows directions    Sensation:  Patient  denies numbness and tingling     Edema:  yes bilateral lower extremities  Endurance: fair       Vitals: 3 Liters of o2 via nasal cannula   SPO2 at rest 94%  SPO2 during session 92%     Patient education Patient educated on role of Physical Therapy, risks of immobility, safety and plan of care, energy conservation and  importance of mobility while in hospital       Patient response to education:   Pt verbalized understanding Pt demonstrated skill Pt requires further education in this area   Yes Partial Yes      Treatment:  Patient practiced and was instructed/facilitated in the following treatment: Patient   Sat edge of bed 20 minutes with Independent to increase dynamic sitting balance and activity tolerance. seated and standing challenges for balance and endurance     Therapeutic Exercises:  ankle pumps  x 10  reps. At end of session, patient in chair with  call light and phone within reach,   all lines and tubes intact, nursing notified. Patient would benefit from continued skilled Physical Therapy to improve functional independence and quality of life. Patient's/ family goals   home        ASSESSMENT: Patient exhibits decreased strength, balance, coordination impairing functional mobility. Decreased endurance and balance  impacts safe mobility and while patient requires min  assist with mobility and continues to require skilled intervention to progress activity and monitor vital signs and response to activity.         Plan of Care:     -Bed Mobility: Lower extremity exercises  and Upper extremity exercises   -Standing Balance: Perform strengthening exercises in standing to promote motor control with or without upper extremity support   -Transfers: Provide instruction on proper hand and foot position for adequate transfer of weight onto lower extremities and use of gait device, Cues for hand placement, technique and safety and Provide stabilization to prevent fall   -Gait: Gait training and Standing activities to improve: base of support, weight shift, weight bearing -Endurance: Utilize Supervised activities to increase level of endurance to allow for safe functional mobility including transfers and gait   -Stairs: Stair training with instruction on proper technique and hand placement on rail    Patient and or family understand(s) diagnosis, prognosis, and plan of care. Frequency of treatments: Patient will be seen    daily. Time in  200  Time out  233    Total Treatment Time  10 minutes    Evaluation time includes thorough review of current medical information, gathering information on past medical history/social history and prior level of function, completion of standardized testing/informal observation of tasks, assessment of data, and development of Plan of care and goals.      CPT codes:  Low Complexity PT evaluation (96546)  Gait Training (08337) 10 minutes 1 unit(s)    Anni Helms, PT

## 2020-12-21 NOTE — PROGRESS NOTES
Pharmacy Consultation Note  (Antibiotic Dosing and Monitoring)    Initial consult date: 12/19/2020  Consulting physician: Dr. Mary Molina  Drug(s): Vancomycin  Indication: Septic shock    Ht Readings from Last 1 Encounters:   12/18/20 5' 11\" (1.803 m)     Wt Readings from Last 1 Encounters:   12/19/20 260 lb 8 oz (118.2 kg)     Age/  Gender IBW DW  Allergy Information   79 y.o.   male 75.3 kg 92.5 kg  Patient has no known allergies. Date  Tmax WBC BUN/CR UOP  (mL/kg/hr) Drug/Dose Time   Given Level(s)   (Time) Comments   12/18  (#1) 102 26.4 12/1.2 -- Vancomycin 1750 mg IV x 1 1751     12/19  (#2) 100 15.7 16/1.5 -- -- -- Random level @ <1700> = NOT DRAWN    12/20  (#3) afebrile 17.5 25/1.3 1.1 Vancomycin 1500 mg IV x 1  1118     12/21  (#4) afebrile 11.6 25/1.1 -- Vancomycin 1750 mg IV q24hr <0930> Random level @ 0512 = 9.2 mcg/mL      (#5)             (#6)             (#7)             Estimated Creatinine Clearance: 71 mL/min (based on SCr of 1.1 mg/dL). Intake/Output Summary (Last 24 hours) at 12/21/2020 0831  Last data filed at 12/20/2020 2212  Gross per 24 hour   Intake 760 ml   Output 2300 ml   Net -1540 ml     Other anti-infectives: Anti-infective Dose Date Initiated Date Stopped   Ceftriaxone 2g x 1, 1g q24hr 12/18 12/19   Doxycycline 100 mg q12hr 12/18    Pip/tazo 3.375g IV q8hr 12/18      Cultures:  available culture and sensitivity results were reviewed in EPIC  Cultures sent and are pending. Culture Date Result    Blood cx 1 12/18 Prelim no growth   Blood cx 2 12/18 Prelim no growth   Urine cx 12/18 No growth final   MRSA nares 12/19 Not isolated   Strep/legionella urine antigens 12/19 Negative   COVID19 12/18 Not detected     Assessment:  · Consulted by Dr. Mary Molina to dose/monitor vancomycin  · Goal trough level:  15-20 mcg/mL  · Pt is a 79 yoM who presented in septic shock, likely secondary to pneumonia. · Serum creatinine today: 1.1 mg/dL; CrCl ~ 60 mL/min; baseline Scr ~ 0.9 mg/dL in 2019  · Vancomycin 1750 mg IV x 1 given on 12/18 @ 1751  · Random level today @ 0511 = 9.2 mcg/mL    Plan:  · Start vancomycin 1750 mg IV q24hr  · Trough prior to the 4th dose  · MRSA nares negative, consider de-escalation  · Follow renal function  · Pharmacist will follow and monitor/adjust dosing as necessary    Lia HendersonD, BCPS 12/21/2020 8:32 AM   Ext: [phone]

## 2020-12-21 NOTE — PROGRESS NOTES
Physical Therapy    Physical Therapy on hold d/t trf to trf to picu for elevated heart rate and to be on drip; please reorder PT/OT EVAL AND TREAT when patient able to participate   Electronically signed by Mirian Falcon PT on 12/21/2020 at 12:40 PM

## 2020-12-21 NOTE — PROGRESS NOTES
Called and notified Dr. Martell Tucker office to updated Dr. Tash Lobo of orders from Dr. Oscar Sprague to start cardizem drip and transfer to 5th floor.   Electronically signed by Karl Grigsby RN on 12/21/2020 at 11:32 AM

## 2020-12-21 NOTE — PROGRESS NOTES
Pulmonary/Critical Care Progress Note    We are following patient for new, COPD exacerbation, acute respiratory failure, negative for Covid testing    SUBJECTIVE:  Patient is feeling considerably better, even better than yesterday. His color is improved and is breathing is much easier. We will taper his steroids further, continue IV antibiotics, supplement potassium and discontinue theophylline. He has developed atrial fibrillation with rapid ventricular response and both low potassium and theophylline are arrhythmogenic. Will require treatment for this. MEDICATIONS:   vancomycin  1,750 mg Intravenous Q24H    digoxin  250 mcg Intravenous 4 times per day    metoprolol succinate  50 mg Oral BID    methylPREDNISolone  40 mg Intravenous Q8H    potassium bicarbonate  25 mEq Oral Once    Vitamin D  2,000 Units Oral Daily    piperacillin-tazobactam  3.375 g Intravenous Q8H    Arformoterol Tartrate  15 mcg Nebulization BID    budesonide  0.5 mg Nebulization BID    aspirin  81 mg Oral Daily    atorvastatin  20 mg Oral Daily    famotidine  20 mg Oral BID    doxycycline (VIBRAMYCIN) IV  100 mg Intravenous Q12H    enoxaparin  40 mg Subcutaneous Daily      dilTIAZem (CARDIZEM) 125 mg in dextrose 5% 125 mL infusion 15 mg/hr (12/21/20 1322)    sodium chloride Stopped (12/21/20 1013)    sodium chloride 50 mL/hr at 12/21/20 0210     albuterol, LORazepam, acetaminophen      REVIEW OF SYSTEMS:  Constitutional: Denies fever, weight loss, night sweats, and fatigue  Skin: Denies pigmentation, dark lesions, and rashes   HEENT: Denies hearing loss, tinnitus, ear drainage, epistaxis, sore throat, and hoarseness. Cardiovascular: Denies palpitations, chest pain, and chest pressure. Respiratory: Denies cough, dyspnea at rest, hemoptysis, apnea, and choking.   Gastrointestinal: Denies nausea, vomiting, poor appetite, diarrhea, heartburn or reflux  Genitourinary: Denies dysuria, frequency, urgency or hematuria Musculoskeletal: Denies myalgias, muscle weakness, and bone pain  Neurological: Denies dizziness, vertigo, headache, and focal weakness  Psychological: Denies anxiety and depression  Endocrine: Denies heat intolerance and cold intolerance  Hematopoietic/Lymphatic: Denies bleeding problems and blood transfusions    OBJECTIVE:  Vitals:    12/21/20 1430   BP: 137/77   Pulse: 153   Resp:    Temp:    SpO2:      FiO2 : 80 %  O2 Flow Rate (L/min): 4 L/min  O2 Device: High flow nasal cannula    PHYSICAL EXAM:  Constitutional: No fever, chills, diaphoresis  Skin: No skin rash, no skin breakdown  HEENT: Unremarkable  Neck: No JVD, lymphadenopathy, thyromegaly  Cardiovascular: [place] S2 irregular and rapid consistent with atrial fibrillation with rapid ventricular response  Respiratory: Fewer wheezes than yesterday. Few inspiratory crackles at lung bases  Gastrointestinal: Soft, obese, nontender  Genitourinary: No CVA tenderness  Extremities: Clubbing, cyanosis, or edema  Neurological: Awake, alert, oriented x3.   No evidence of focal motor or sensory deficits  Psychological: Reasonably good spirits    LABS:  WBC   Date Value Ref Range Status   12/21/2020 11.6 (H) 4.5 - 11.5 E9/L Final   12/20/2020 17.5 (H) 4.5 - 11.5 E9/L Final   12/19/2020 15.7 (H) 4.5 - 11.5 E9/L Final     Hemoglobin   Date Value Ref Range Status   12/21/2020 11.9 (L) 12.5 - 16.5 g/dL Final   12/20/2020 13.1 12.5 - 16.5 g/dL Final   12/19/2020 12.9 12.5 - 16.5 g/dL Final     Hematocrit   Date Value Ref Range Status   12/21/2020 36.1 (L) 37.0 - 54.0 % Final   12/20/2020 40.5 37.0 - 54.0 % Final   12/19/2020 40.8 37.0 - 54.0 % Final     MCV   Date Value Ref Range Status   12/21/2020 91.2 80.0 - 99.9 fL Final   12/20/2020 92.9 80.0 - 99.9 fL Final   12/19/2020 93.8 80.0 - 99.9 fL Final     Platelets   Date Value Ref Range Status   12/21/2020 185 130 - 450 E9/L Final   12/20/2020 209 130 - 450 E9/L Final   12/19/2020 189 130 - 450 E9/L Final     Sodium Date Value Ref Range Status   12/21/2020 143 132 - 146 mmol/L Final   12/20/2020 141 132 - 146 mmol/L Final   12/19/2020 138 132 - 146 mmol/L Final     Potassium   Date Value Ref Range Status   12/21/2020 3.3 (L) 3.5 - 5.0 mmol/L Final   12/20/2020 3.7 3.5 - 5.0 mmol/L Final   12/19/2020 4.6 3.5 - 5.0 mmol/L Final     Potassium reflex Magnesium   Date Value Ref Range Status   12/18/2020 3.7 3.5 - 5.0 mmol/L Final   07/23/2019 4.6 3.5 - 5.0 mmol/L Final   07/21/2019 3.5 3.5 - 5.0 mmol/L Final     Chloride   Date Value Ref Range Status   12/21/2020 112 (H) 98 - 107 mmol/L Final   12/20/2020 107 98 - 107 mmol/L Final   12/19/2020 108 (H) 98 - 107 mmol/L Final     CO2   Date Value Ref Range Status   12/21/2020 23 22 - 29 mmol/L Final   12/20/2020 20 (L) 22 - 29 mmol/L Final   12/19/2020 22 22 - 29 mmol/L Final     BUN   Date Value Ref Range Status   12/21/2020 25 (H) 8 - 23 mg/dL Final   12/20/2020 25 (H) 8 - 23 mg/dL Final   12/19/2020 16 8 - 23 mg/dL Final     CREATININE   Date Value Ref Range Status   12/21/2020 1.1 0.7 - 1.2 mg/dL Final   12/20/2020 1.3 (H) 0.7 - 1.2 mg/dL Final   12/19/2020 1.5 (H) 0.7 - 1.2 mg/dL Final     Glucose   Date Value Ref Range Status   12/21/2020 137 (H) 74 - 99 mg/dL Final   12/20/2020 132 (H) 74 - 99 mg/dL Final   12/19/2020 161 (H) 74 - 99 mg/dL Final     Calcium   Date Value Ref Range Status   12/21/2020 8.6 8.6 - 10.2 mg/dL Final   12/20/2020 8.8 8.6 - 10.2 mg/dL Final   12/19/2020 8.5 (L) 8.6 - 10.2 mg/dL Final     Total Protein   Date Value Ref Range Status   12/21/2020 5.5 (L) 6.4 - 8.3 g/dL Final   12/20/2020 6.1 (L) 6.4 - 8.3 g/dL Final   12/19/2020 5.2 (L) 6.4 - 8.3 g/dL Final     Alb   Date Value Ref Range Status   12/21/2020 2.8 (L) 3.5 - 5.2 g/dL Final   12/20/2020 2.8 (L) 3.5 - 5.2 g/dL Final   12/19/2020 2.8 (L) 3.5 - 5.2 g/dL Final     Total Bilirubin   Date Value Ref Range Status   12/21/2020 0.2 0.0 - 1.2 mg/dL Final   12/20/2020 0.4 0.0 - 1.2 mg/dL Final 12/19/2020 0.7 0.0 - 1.2 mg/dL Final     Alkaline Phosphatase   Date Value Ref Range Status   12/21/2020 63 40 - 129 U/L Final   12/20/2020 64 40 - 129 U/L Final   12/19/2020 60 40 - 129 U/L Final     AST   Date Value Ref Range Status   12/21/2020 24 0 - 39 U/L Final   12/20/2020 25 0 - 39 U/L Final   12/19/2020 20 0 - 39 U/L Final     ALT   Date Value Ref Range Status   12/21/2020 14 0 - 40 U/L Final   12/20/2020 13 0 - 40 U/L Final   12/19/2020 13 0 - 40 U/L Final     GFR Non-   Date Value Ref Range Status   12/21/2020 >60 >=60 mL/min/1.73 Final     Comment:     Chronic Kidney Disease: less than 60 ml/min/1.73 sq.m. Kidney Failure: less than 15 ml/min/1.73 sq.m. Results valid for patients 18 years and older. 12/20/2020 53 >=60 mL/min/1.73 Final     Comment:     Chronic Kidney Disease: less than 60 ml/min/1.73 sq.m. Kidney Failure: less than 15 ml/min/1.73 sq.m. Results valid for patients 18 years and older. 12/19/2020 45 >=60 mL/min/1.73 Final     Comment:     Chronic Kidney Disease: less than 60 ml/min/1.73 sq.m. Kidney Failure: less than 15 ml/min/1.73 sq.m. Results valid for patients 18 years and older. GFR    Date Value Ref Range Status   12/21/2020 >60  Final   12/20/2020 >60  Final   12/19/2020 55  Final     Magnesium   Date Value Ref Range Status   12/21/2020 2.1 1.6 - 2.6 mg/dL Final   12/20/2020 1.9 1.6 - 2.6 mg/dL Final   12/19/2020 1.7 1.6 - 2.6 mg/dL Final     Phosphorus   Date Value Ref Range Status   12/21/2020 2.4 (L) 2.5 - 4.5 mg/dL Final   12/20/2020 2.8 2.5 - 4.5 mg/dL Final   12/19/2020 3.0 2.5 - 4.5 mg/dL Final     No results for input(s): PH, PO2, PCO2, HCO3, BE, O2SAT in the last 72 hours. RADIOLOGY:  CT CHEST WO CONTRAST   Final Result   Large airspace opacity left lower lobe superior segment consistent with   pneumonia. Bibasilar atelectasis. Small left pleural effusion.

## 2020-12-21 NOTE — PROGRESS NOTES
Occupational Therapy  OCCUPATIONAL THERAPY INITIAL EVALUATION      Date:2020  Patient Name: Aidee Jones  MRN: [id number]  : 1940  Room: [address] [place] [address]    Referring Provider: Kelly Painter MD    Evaluating OT: Billy Mendiola OTR/L #[id number]    AM-PAC Daily Activity Raw Score:     Recommended Placement: Home with HHOT  Recommended Adaptive Equipment: shower chair     Diagnosis:   1. Septic shock ([address]    2. Acute respiratory distress    3. Lactic acidosis    4. Leukocytosis, unspecified type    5. Pneumonia due to organism    6. Hypoxia        Surgery: N/A      Pertinent Medical History:    Past Medical History:   Diagnosis Date    COPD (chronic obstructive pulmonary disease) (HCC)     Hyperlipidemia       Precautions:  Falls, HFNC, watch HR     Home Living: Pt lives with spouse in a 1 story home with 2 MELANIE with 0 rail(s). Bathroom setup: walk in shower, tub/shower   Equipment owned: [employer], O2    Prior Level of Function: Independent with ADLs , INdependent with IADLs; ambulated without AD  Driving: Yes  Occupation: Not reported    Pain Level: -/10  Cognition: A&O: 4/4; Follows 2 step directions   Memory:  good   Sequencing:  good   Problem solving:  Fair+   Judgement/safety:  Fair+     Functional Assessment:   Initial Eval Status  Date: 20 Treatment Status  Date: STGs = LTGs  Time frame: 5-7 days   Feeding Independent        Grooming Stand by Assist     Independent    UB Dressing Stand by Assist   Donning gown while sitting EOB. Independent    LB Dressing Moderate Assist   Don socks, underwear, pants while sitting EOB  Assist with threading feet, steadying assist for pulling waistband up. Rest breaks as needed due to SOB. Cuing on modified techniques to address endurance. Independent    Bathing Moderate Assist    Modified Independent   Toileting Minimal Assist     Independent    Bed Mobility  Supine to sit: Minimal Assist   Sit to supine: Minimal Assist     Supine to sit:  Independent Sit to supine: Independent    Functional Transfers Minimal Assist   Sit<>stand  Bed, chair  Cuing on hand placement, body mechanics and pace. Rest breaks as needed. 1 LOB upon standing from chair. With therapist assist to correct. Independent    Functional Mobility Minimal Assist   Several feet at bedside due to length of HFNC. Mild unsteadiness, cuing on body mechanics and pace. Independent    Balance Sitting:     Static:  Fair+    Dynamic:fair+  Standing: fair  Sitting:     Static:  good    Dynamic:good  Standing: Good   Activity Tolerance Fair-  Pt very SOB, SpO2 93-95%  Elevated HR during session, returned patient to bed at end of session per nursing request  Fair+   Visual/  Perceptual Glasses: Yes   WFL       Hand Dominance Right     Strength ROM Additional Info:    RUE  4/5  WFL good  and wfl FMC/dexterity noted during ADL tasks       LUE 4/5  WFL good  and wfl FMC/dexterity noted during ADL tasks       Hearing: WFL   Sensation:  No c/o numbness or tingling   Tone: WFL   Edema: None noted    Comments:   Nursing approved therapy session. Upon arrival, patient supine in bed and agreeable to OT session. Therapist educated pt on role of OT. At end of session, patient supine in bed with call light and phone within reach, all lines and tubes intact; nursing aware. Pt required rest breaks throughout session. Pt demonstrated fair+ understanding of education/techniques and decreased independence and safety during completion of ADL/functional transfer/mobility tasks. Pt would benefit from continued skilled OT to increase safety and independence with completion of ADL/IADL tasks for functional independence and quality of life. Treatment:   Skilled occupational therapy services provided include instruction/training on safety and adapted techniques for completion of therapeutic activities. Skilled monitoring of O2 sats, HR, and pt response throughout treatment. ? Therapist facilitated therapeutic activities: bed mobility, functional transfers, graded functional activities (static/dynamic sitting, static/dynamic standing, functional reaching), and functional ambulation - providing min cuing (verbal, visual, tactile) on hand placement, body mechanics, posture, breathing techniques, energy conservation, compensatory strategies, and safety.      Eval Complexity: Low    Assessment of current deficits   Functional mobility [x]  ADLs [x] Strength [x]  Cognition []  Functional transfers  [x] IADLs [x] Safety Awareness [x]  Endurance [x]  Fine Motor Coordination [] Balance [x] Vision/perception [] Sensation []   Gross Motor Coordination [] ROM [] Delirium []                  Motor Control []    Plan of Care: 1-3 days/week for 1-2 weeks PRN   Instruction/training on adapted ADL techniques and AE recommendations to increase functional independence within precautions  Training on energy conservation strategies/techniques to improve independence/tolerance for self-care routine  Functional transfer/mobility training/DME recommendations for increased independence, safety, and fall prevention  Patient/Family education to increase follow through with safety techniques and functional independence  Recommendation of environmental modifications for increased safety with functional transfers/mobility and ADLs  Therapeutic exercise to improve motor endurance, ROM, and functional strength for ADLs/functional transfers  Therapeutic activities to facilitate/challenge dynamic balance, stand tolerance, fine motor dexterity/in-hand manipulation for increased independence with ADLs  Neuro-muscular re-education: facilitation of righting/equilibrium reactions, midline orientation, scapular stability/mobility, normalization of muscle tone, and facilitation of volitional active controled movement    Rehab Potential: Good for established goals     Patient / Family Goal: Return home Patient and/or family were instructed on functional diagnosis, prognosis/goals and OT plan of care. Demonstrated fair+ understanding. Eval Complexity: Low      Time In: 0938  Time Out: 1007  Total Treatment Time: 23    Min Units   OT Eval Low 97165  X  1   OT Eval Medium 45376      OT Eval High [id number]       OT Re-Eval [id number]       Therapeutic Ex 84385       Therapeutic Activities 42633  15 1    ADL/Self Care 78145  8 1    Orthotic Management 34632       Neuro Re-Ed 64134       Non-Billable Time          Evaluation Time includes thorough review of current medical information, gathering information on past medical history/social history and prior level of function, completion of standardized testing/informal observation of tasks, assessment of data and education on plan of care and goals.     Edmund Saint, OTR/L #[id number]

## 2020-12-21 NOTE — PLAN OF CARE
Problem: Skin Integrity:  Goal: Will show no infection signs and symptoms  Description: Will show no infection signs and symptoms  12/21/2020 1514 by Karla Rojas RN  Outcome: Met This Shift  12/21/2020 0950 by Amy Lew RN  Outcome: Met This Shift  Goal: Absence of new skin breakdown  Description: Absence of new skin breakdown  12/21/2020 1514 by Karla Rojas RN  Outcome: Met This Shift  12/21/2020 0950 by Amy Lew RN  Outcome: Met This Shift     Problem: Falls - Risk of:  Goal: Will remain free from falls  Description: Will remain free from falls  Outcome: Met This Shift  Goal: Absence of physical injury  Description: Absence of physical injury  Outcome: Met This Shift     Problem: Breathing Pattern - Ineffective:  Goal: Ability to achieve and maintain a regular respiratory rate will improve  Description: Ability to achieve and maintain a regular respiratory rate will improve  Outcome: Met This Shift

## 2020-12-22 ENCOUNTER — APPOINTMENT (OUTPATIENT)
Dept: ULTRASOUND IMAGING | Age: 80
DRG: 871 | End: 2020-12-22
Payer: MEDICARE

## 2020-12-22 ENCOUNTER — APPOINTMENT (OUTPATIENT)
Dept: GENERAL RADIOLOGY | Age: 80
DRG: 871 | End: 2020-12-22
Payer: MEDICARE

## 2020-12-22 LAB
ALBUMIN SERPL-MCNC: 3.3 G/DL (ref 3.5–5.2)
ALP BLD-CCNC: 68 U/L (ref 40–129)
ALT SERPL-CCNC: 25 U/L (ref 0–40)
ANION GAP SERPL CALCULATED.3IONS-SCNC: 11 MMOL/L (ref 7–16)
AST SERPL-CCNC: 44 U/L (ref 0–39)
BASOPHILS ABSOLUTE: 0 E9/L (ref 0–0.2)
BASOPHILS RELATIVE PERCENT: 0.1 % (ref 0–2)
BILIRUB SERPL-MCNC: 0.3 MG/DL (ref 0–1.2)
BILIRUBIN DIRECT: <0.2 MG/DL (ref 0–0.3)
BILIRUBIN, INDIRECT: NORMAL MG/DL (ref 0–1)
BUN BLDV-MCNC: 29 MG/DL (ref 8–23)
BURR CELLS: ABNORMAL
CALCIUM SERPL-MCNC: 8.9 MG/DL (ref 8.6–10.2)
CHLORIDE BLD-SCNC: 109 MMOL/L (ref 98–107)
CO2: 25 MMOL/L (ref 22–29)
CREAT SERPL-MCNC: 1.2 MG/DL (ref 0.7–1.2)
EOSINOPHILS ABSOLUTE: 0 E9/L (ref 0.05–0.5)
EOSINOPHILS RELATIVE PERCENT: 0 % (ref 0–6)
GFR AFRICAN AMERICAN: >60
GFR NON-AFRICAN AMERICAN: 58 ML/MIN/1.73
GLUCOSE BLD-MCNC: 121 MG/DL (ref 74–99)
HCT VFR BLD CALC: 43.1 % (ref 37–54)
HEMOGLOBIN: 14.1 G/DL (ref 12.5–16.5)
LACTIC ACID: 2.9 MMOL/L (ref 0.5–2.2)
LV EF: 55 %
LVEF MODALITY: NORMAL
LYMPHOCYTES ABSOLUTE: 0.64 E9/L (ref 1.5–4)
LYMPHOCYTES RELATIVE PERCENT: 5.2 % (ref 20–42)
MAGNESIUM: 2.4 MG/DL (ref 1.6–2.6)
MCH RBC QN AUTO: 29.9 PG (ref 26–35)
MCHC RBC AUTO-ENTMCNC: 32.7 % (ref 32–34.5)
MCV RBC AUTO: 91.5 FL (ref 80–99.9)
MONOCYTES ABSOLUTE: 0.51 E9/L (ref 0.1–0.95)
MONOCYTES RELATIVE PERCENT: 3.5 % (ref 2–12)
NEUTROPHILS ABSOLUTE: 11.56 E9/L (ref 1.8–7.3)
NEUTROPHILS RELATIVE PERCENT: 91.3 % (ref 43–80)
OVALOCYTES: ABNORMAL
PDW BLD-RTO: 14.3 FL (ref 11.5–15)
PLATELET # BLD: 228 E9/L (ref 130–450)
PMV BLD AUTO: 10.6 FL (ref 7–12)
POIKILOCYTES: ABNORMAL
POTASSIUM SERPL-SCNC: 3.7 MMOL/L (ref 3.5–5)
RBC # BLD: 4.71 E12/L (ref 3.8–5.8)
SODIUM BLD-SCNC: 145 MMOL/L (ref 132–146)
TOTAL PROTEIN: 6.3 G/DL (ref 6.4–8.3)
WBC # BLD: 12.7 E9/L (ref 4.5–11.5)

## 2020-12-22 PROCEDURE — 85025 COMPLETE CBC W/AUTO DIFF WBC: CPT

## 2020-12-22 PROCEDURE — 83735 ASSAY OF MAGNESIUM: CPT

## 2020-12-22 PROCEDURE — 2580000003 HC RX 258: Performed by: INTERNAL MEDICINE

## 2020-12-22 PROCEDURE — 80053 COMPREHEN METABOLIC PANEL: CPT

## 2020-12-22 PROCEDURE — 2700000000 HC OXYGEN THERAPY PER DAY

## 2020-12-22 PROCEDURE — 6370000000 HC RX 637 (ALT 250 FOR IP): Performed by: INTERNAL MEDICINE

## 2020-12-22 PROCEDURE — 99291 CRITICAL CARE FIRST HOUR: CPT | Performed by: INTERNAL MEDICINE

## 2020-12-22 PROCEDURE — 97161 PT EVAL LOW COMPLEX 20 MIN: CPT | Performed by: PHYSICAL THERAPIST

## 2020-12-22 PROCEDURE — 2060000000 HC ICU INTERMEDIATE R&B

## 2020-12-22 PROCEDURE — 83605 ASSAY OF LACTIC ACID: CPT

## 2020-12-22 PROCEDURE — 97116 GAIT TRAINING THERAPY: CPT | Performed by: PHYSICAL THERAPIST

## 2020-12-22 PROCEDURE — 36415 COLL VENOUS BLD VENIPUNCTURE: CPT

## 2020-12-22 PROCEDURE — 76705 ECHO EXAM OF ABDOMEN: CPT

## 2020-12-22 PROCEDURE — 93306 TTE W/DOPPLER COMPLETE: CPT

## 2020-12-22 PROCEDURE — 6360000002 HC RX W HCPCS: Performed by: INTERNAL MEDICINE

## 2020-12-22 PROCEDURE — 82248 BILIRUBIN DIRECT: CPT

## 2020-12-22 PROCEDURE — 6370000000 HC RX 637 (ALT 250 FOR IP): Performed by: SPECIALIST

## 2020-12-22 PROCEDURE — 71045 X-RAY EXAM CHEST 1 VIEW: CPT

## 2020-12-22 PROCEDURE — 2500000003 HC RX 250 WO HCPCS: Performed by: INTERNAL MEDICINE

## 2020-12-22 RX ORDER — DOXYCYCLINE HYCLATE 100 MG/1
100 CAPSULE ORAL EVERY 12 HOURS SCHEDULED
Status: DISCONTINUED | OUTPATIENT
Start: 2020-12-22 | End: 2020-12-24 | Stop reason: HOSPADM

## 2020-12-22 RX ORDER — METHYLPREDNISOLONE 4 MG/1
4 TABLET ORAL NIGHTLY
Status: DISCONTINUED | OUTPATIENT
Start: 2020-12-24 | End: 2020-12-24 | Stop reason: HOSPADM

## 2020-12-22 RX ORDER — AMOXICILLIN AND CLAVULANATE POTASSIUM 500; 125 MG/1; MG/1
1 TABLET, FILM COATED ORAL EVERY 8 HOURS SCHEDULED
Status: DISCONTINUED | OUTPATIENT
Start: 2020-12-22 | End: 2020-12-24 | Stop reason: HOSPADM

## 2020-12-22 RX ORDER — METHYLPREDNISOLONE 4 MG/1
4 TABLET ORAL
Status: DISCONTINUED | OUTPATIENT
Start: 2020-12-23 | End: 2020-12-24 | Stop reason: HOSPADM

## 2020-12-22 RX ORDER — METHYLPREDNISOLONE 4 MG/1
24 TABLET ORAL ONCE
Status: DISCONTINUED | OUTPATIENT
Start: 2020-12-22 | End: 2020-12-24 | Stop reason: HOSPADM

## 2020-12-22 RX ORDER — METHYLPREDNISOLONE 4 MG/1
4 TABLET ORAL
Status: COMPLETED | OUTPATIENT
Start: 2020-12-23 | End: 2020-12-23

## 2020-12-22 RX ORDER — METHYLPREDNISOLONE 4 MG/1
8 TABLET ORAL NIGHTLY
Status: COMPLETED | OUTPATIENT
Start: 2020-12-23 | End: 2020-12-23

## 2020-12-22 RX ADMIN — METOPROLOL TARTRATE 100 MG: 50 TABLET, FILM COATED ORAL at 11:55

## 2020-12-22 RX ADMIN — VANCOMYCIN HYDROCHLORIDE 1750 MG: 10 INJECTION, POWDER, LYOPHILIZED, FOR SOLUTION INTRAVENOUS at 12:51

## 2020-12-22 RX ADMIN — SODIUM CHLORIDE: 9 INJECTION, SOLUTION INTRAVENOUS at 16:56

## 2020-12-22 RX ADMIN — ATORVASTATIN CALCIUM 20 MG: 20 TABLET, FILM COATED ORAL at 11:55

## 2020-12-22 RX ADMIN — FAMOTIDINE 20 MG: 20 TABLET ORAL at 11:55

## 2020-12-22 RX ADMIN — PIPERACILLIN AND TAZOBACTAM 3.38 G: 3; .375 INJECTION, POWDER, FOR SOLUTION INTRAVENOUS at 21:00

## 2020-12-22 RX ADMIN — DIPHENHYDRAMINE HYDROCHLORIDE 25 MG: 50 INJECTION INTRAMUSCULAR; INTRAVENOUS at 05:05

## 2020-12-22 RX ADMIN — PIPERACILLIN AND TAZOBACTAM 3.38 G: 3; .375 INJECTION, POWDER, FOR SOLUTION INTRAVENOUS at 12:52

## 2020-12-22 RX ADMIN — HALOPERIDOL LACTATE 2 MG: 5 INJECTION, SOLUTION INTRAMUSCULAR at 05:05

## 2020-12-22 RX ADMIN — DOXYCYCLINE 100 MG: 100 INJECTION, POWDER, LYOPHILIZED, FOR SOLUTION INTRAVENOUS at 14:48

## 2020-12-22 RX ADMIN — ASPIRIN 81 MG CHEWABLE TABLET 81 MG: 81 TABLET CHEWABLE at 11:55

## 2020-12-22 RX ADMIN — Medication 2000 UNITS: at 11:54

## 2020-12-22 RX ADMIN — APIXABAN 5 MG: 5 TABLET, FILM COATED ORAL at 11:55

## 2020-12-22 RX ADMIN — METHYLPREDNISOLONE SODIUM SUCCINATE 40 MG: 40 INJECTION, POWDER, FOR SOLUTION INTRAMUSCULAR; INTRAVENOUS at 11:55

## 2020-12-22 ASSESSMENT — PAIN SCALES - GENERAL: PAINLEVEL_OUTOF10: 0

## 2020-12-22 NOTE — PROGRESS NOTES
Patient awake and alert at this time- unable to recall previous aggressive behavior.  Patient calm and cooperative at this time- (B) restraints removed

## 2020-12-22 NOTE — PROGRESS NOTES
Pulmonary/Critical Care Progress Note    We are following patient for bilateral lung infiltrates consistent with pneumonia, COPD, acute respiratory failure    SUBJECTIVE:  Continues to improve on antibiotics which include vancomycin, doxycycline, and Zosyn. I have looked at his chest x-ray done today and there has been complete clearing of the infiltrates on chest x-ray. Therefore, we will change his methylprednisolone to a Medrol Dosepak. He can have his antibiotics changed to oral and he may well be able to be discharged in a short period of time    MEDICATIONS:   vancomycin  1,750 mg Intravenous Q24H    methylPREDNISolone  40 mg Intravenous Q8H    apixaban  5 mg Oral BID    metoprolol tartrate  100 mg Oral BID    Vitamin D  2,000 Units Oral Daily    piperacillin-tazobactam  3.375 g Intravenous Q8H    Arformoterol Tartrate  15 mcg Nebulization BID    budesonide  0.5 mg Nebulization BID    aspirin  81 mg Oral Daily    atorvastatin  20 mg Oral Daily    famotidine  20 mg Oral BID    doxycycline (VIBRAMYCIN) IV  100 mg Intravenous Q12H      dilTIAZem (CARDIZEM) 125 mg in dextrose 5% 125 mL infusion 15 mg/hr (12/21/20 1322)    sodium chloride 12.5 mL/hr at 12/22/20 1656     haloperidol lactate, LORazepam, diphenhydrAMINE, albuterol, LORazepam, acetaminophen      REVIEW OF SYSTEMS:  Constitutional: Denies fever, weight loss, night sweats, and fatigue  Skin: Denies pigmentation, dark lesions, and rashes   HEENT: Denies hearing loss, tinnitus, ear drainage, epistaxis, sore throat, and hoarseness. Cardiovascular: Denies palpitations, chest pain, and chest pressure. Respiratory: Denies cough, dyspnea at rest, hemoptysis, apnea, and choking.   Gastrointestinal: Denies nausea, vomiting, poor appetite, diarrhea, heartburn or reflux  Genitourinary: Denies dysuria, frequency, urgency or hematuria  Musculoskeletal: Denies myalgias, muscle weakness, and bone pain Neurological: Denies dizziness, vertigo, headache, and focal weakness  Psychological: Denies anxiety and depression  Endocrine: Denies heat intolerance and cold intolerance  Hematopoietic/Lymphatic: Denies bleeding problems and blood transfusions    OBJECTIVE:  Vitals:    12/22/20 1445   BP: (!) 149/80   Pulse: 74   Resp: 20   Temp: 97.6 °F (36.4 °C)   SpO2: 95%     FiO2 : 80 %  O2 Flow Rate (L/min): 3 L/min  O2 Device: Nasal cannula    PHYSICAL EXAM:  Constitutional: Fevers, chills, diaphoresis  Skin: No skin rash, no skin breakdown  HEENT: Unremarkable  Neck: JVD, lymphadenopathy, thyromegaly  Cardiovascular: S1, S2 normal.  No S3 murmurs rubs present  Respiratory: Clear to auscultation bilaterally  Gastrointestinal: Soft, obese, nontender  Genitourinary: No CVA tenderness      Extremities: Clubbing, cyanosis, or edema  Neurological:, Alert, calm not agitated and moves all extremities  Psychological: Appropriate affect    LABS:  WBC   Date Value Ref Range Status   12/22/2020 12.7 (H) 4.5 - 11.5 E9/L Final   12/21/2020 11.6 (H) 4.5 - 11.5 E9/L Final   12/20/2020 17.5 (H) 4.5 - 11.5 E9/L Final     Hemoglobin   Date Value Ref Range Status   12/22/2020 14.1 12.5 - 16.5 g/dL Final   12/21/2020 11.9 (L) 12.5 - 16.5 g/dL Final   12/20/2020 13.1 12.5 - 16.5 g/dL Final     Hematocrit   Date Value Ref Range Status   12/22/2020 43.1 37.0 - 54.0 % Final   12/21/2020 36.1 (L) 37.0 - 54.0 % Final   12/20/2020 40.5 37.0 - 54.0 % Final     MCV   Date Value Ref Range Status   12/22/2020 91.5 80.0 - 99.9 fL Final   12/21/2020 91.2 80.0 - 99.9 fL Final   12/20/2020 92.9 80.0 - 99.9 fL Final     Platelets   Date Value Ref Range Status   12/22/2020 228 130 - 450 E9/L Final   12/21/2020 185 130 - 450 E9/L Final   12/20/2020 209 130 - 450 E9/L Final     Sodium   Date Value Ref Range Status   12/22/2020 145 132 - 146 mmol/L Final   12/21/2020 143 132 - 146 mmol/L Final   12/20/2020 141 132 - 146 mmol/L Final     Potassium Date Value Ref Range Status   12/22/2020 3.7 3.5 - 5.0 mmol/L Final   12/21/2020 3.3 (L) 3.5 - 5.0 mmol/L Final   12/20/2020 3.7 3.5 - 5.0 mmol/L Final     Potassium reflex Magnesium   Date Value Ref Range Status   12/18/2020 3.7 3.5 - 5.0 mmol/L Final   07/23/2019 4.6 3.5 - 5.0 mmol/L Final   07/21/2019 3.5 3.5 - 5.0 mmol/L Final     Chloride   Date Value Ref Range Status   12/22/2020 109 (H) 98 - 107 mmol/L Final   12/21/2020 112 (H) 98 - 107 mmol/L Final   12/20/2020 107 98 - 107 mmol/L Final     CO2   Date Value Ref Range Status   12/22/2020 25 22 - 29 mmol/L Final   12/21/2020 23 22 - 29 mmol/L Final   12/20/2020 20 (L) 22 - 29 mmol/L Final     BUN   Date Value Ref Range Status   12/22/2020 29 (H) 8 - 23 mg/dL Final   12/21/2020 25 (H) 8 - 23 mg/dL Final   12/20/2020 25 (H) 8 - 23 mg/dL Final     CREATININE   Date Value Ref Range Status   12/22/2020 1.2 0.7 - 1.2 mg/dL Final   12/21/2020 1.1 0.7 - 1.2 mg/dL Final   12/20/2020 1.3 (H) 0.7 - 1.2 mg/dL Final     Glucose   Date Value Ref Range Status   12/22/2020 121 (H) 74 - 99 mg/dL Final   12/21/2020 137 (H) 74 - 99 mg/dL Final   12/20/2020 132 (H) 74 - 99 mg/dL Final     Calcium   Date Value Ref Range Status   12/22/2020 8.9 8.6 - 10.2 mg/dL Final   12/21/2020 8.6 8.6 - 10.2 mg/dL Final   12/20/2020 8.8 8.6 - 10.2 mg/dL Final     Total Protein   Date Value Ref Range Status   12/22/2020 6.3 (L) 6.4 - 8.3 g/dL Final   12/21/2020 5.5 (L) 6.4 - 8.3 g/dL Final   12/20/2020 6.1 (L) 6.4 - 8.3 g/dL Final     Alb   Date Value Ref Range Status   12/22/2020 3.3 (L) 3.5 - 5.2 g/dL Final   12/21/2020 2.8 (L) 3.5 - 5.2 g/dL Final   12/20/2020 2.8 (L) 3.5 - 5.2 g/dL Final     Total Bilirubin   Date Value Ref Range Status   12/22/2020 0.3 0.0 - 1.2 mg/dL Final   12/21/2020 0.2 0.0 - 1.2 mg/dL Final   12/20/2020 0.4 0.0 - 1.2 mg/dL Final     Alkaline Phosphatase   Date Value Ref Range Status   12/22/2020 68 40 - 129 U/L Final   12/21/2020 63 40 - 129 U/L Final 12/20/2020 64 40 - 129 U/L Final     AST   Date Value Ref Range Status   12/22/2020 44 (H) 0 - 39 U/L Final   12/21/2020 24 0 - 39 U/L Final   12/20/2020 25 0 - 39 U/L Final     ALT   Date Value Ref Range Status   12/22/2020 25 0 - 40 U/L Final   12/21/2020 14 0 - 40 U/L Final   12/20/2020 13 0 - 40 U/L Final     GFR Non-   Date Value Ref Range Status   12/22/2020 58 >=60 mL/min/1.73 Final     Comment:     Chronic Kidney Disease: less than 60 ml/min/1.73 sq.m. Kidney Failure: less than 15 ml/min/1.73 sq.m. Results valid for patients 18 years and older. 12/21/2020 >60 >=60 mL/min/1.73 Final     Comment:     Chronic Kidney Disease: less than 60 ml/min/1.73 sq.m. Kidney Failure: less than 15 ml/min/1.73 sq.m. Results valid for patients 18 years and older. 12/20/2020 53 >=60 mL/min/1.73 Final     Comment:     Chronic Kidney Disease: less than 60 ml/min/1.73 sq.m. Kidney Failure: less than 15 ml/min/1.73 sq.m. Results valid for patients 18 years and older. GFR    Date Value Ref Range Status   12/22/2020 >60  Final   12/21/2020 >60  Final   12/20/2020 >60  Final     Magnesium   Date Value Ref Range Status   12/22/2020 2.4 1.6 - 2.6 mg/dL Final   12/21/2020 2.1 1.6 - 2.6 mg/dL Final   12/20/2020 1.9 1.6 - 2.6 mg/dL Final     Phosphorus   Date Value Ref Range Status   12/21/2020 2.4 (L) 2.5 - 4.5 mg/dL Final   12/20/2020 2.8 2.5 - 4.5 mg/dL Final   12/19/2020 3.0 2.5 - 4.5 mg/dL Final     No results for input(s): PH, PO2, PCO2, HCO3, BE, O2SAT in the last 72 hours. RADIOLOGY:  US ABDOMEN LIMITED   Final Result   1. Ultrasound exam was unfortunately not completed and the gallbladder was   not imaged. The patient was combative. 2.  Limited images show no ascites or free fluid. Normal right kidney. RECOMMENDATION:   Consider follow-up right upper quadrant ultrasound exam when appropriate.       XR CHEST PORTABLE   Final Result No acute process      CT CHEST WO CONTRAST   Final Result   Large airspace opacity left lower lobe superior segment consistent with   pneumonia. Bibasilar atelectasis. Small left pleural effusion. Ascending aorta dilated up to 4.5 cm. This previously measured 4.3 cm on   06/16/2020. XR CHEST PORTABLE   Final Result   Stable bibasilar opacities related to atelectasis, pneumonia, or effusion. XR CHEST PORTABLE   Final Result   Patchy right lung base atelectasis or infiltrate              PROBLEM LIST:  Active Problems:    Septic shock (HCC)    Agitation  Resolved Problems:    * No resolved hospital problems. *      IMPRESSION:  1. Check shock, resolved  2. Bilateral pneumonia, resolved  3. COPD  4. Obesity,  5. Probable obstructive sleep apnea    PLAN:  1. Change IV steroids to oral Medrol Dosepak  2.  Oral antibiotics tomorrow        Electronically signed by Silvia Greene MD on 12/22/2020 at 6:42 PM

## 2020-12-22 NOTE — CONSULTS
[address]                                  CONSULTATION    PATIENT NAME: Leila Turner                     :        1940  MED REC NO:   [id number]                            ROOM:       0515  ACCOUNT NO:   [de-identified]                           ADMIT DATE: 2020  PROVIDER:     Mojgan Alvarez MD    CONSULT DATE:  2020    HISTORY OF PRESENT ILLNESS:  The patient is a 67-year-old gentleman with  history of COPD. He is on home oxygen. He presented to the hospital about four days ago with worsening  shortness of breath and cough. He was felt to have community-acquired  pneumonia and sepsis. Lactic acid level was elevated. He was admitted initially to ICU. He was treated with antibiotics. He  got IV fluid. He was put on steroids. He was moved after that to fourth floor. He was in atrial fibrillation with rapid ventricular response in the  floor. He was started on Cardizem drip. His heart rate was still fast.    Cardiac consult was requested. The patient was given digoxin IV for heart rate control. He was  transferred to fifth floor for titration of Cardizem drip. I looked at the EKG on admission on 2020 and the patient was in  atrial fibrillation at that time with heart rate around 140 a minute. The patient was started on Eliquis for his persistent atrial  fibrillation. I came to see him this morning and he was confused and agitated. I  talked to the nursing care of him last night. The patient was  apparently doing okay mentally last night up till early hours this  morning when he started to get agitated and violent. They had to call  violent code for him. He was given Haldol and Ativan. The patient was confused when I came to see him. He was restless. His  hands were restrained. PAST MEDICAL HISTORY:  As above. COPD. Hyperlipidemia.

## 2020-12-22 NOTE — PROGRESS NOTES
· Vancomycin 1750 mg IV x 1 given on 12/18 @ 1751  · Random level today @ 0511 = 9.2 mcg/mL    Plan:  · Continue vancomycin 1750 mg IV q24hr  · Trough prior to the 4th dose  · MRSA nares negative, consider de-escalation  · Follow renal function  · Pharmacist will follow and monitor/adjust dosing as necessary    Himanshu Momin PharmD, BCPS 12/22/2020 1:33 PM   Ext: [phone]

## 2020-12-22 NOTE — PROGRESS NOTES
Spoke with Dr. Anup Onofre concerning  Patient being non-compliant with patient care. Patient will not keep oxygen on. Breathing has become labored, also pulled IV's out and is on a Cardizem drip. Patient will not keep heart monitor on.

## 2020-12-22 NOTE — PROGRESS NOTES
Department of Internal Medicine  General Internal Medicine  Attending Progress Note      SUBJECTIVE:    Feels better today  No chest pains  Breathing is better. No fevers no chills  Tolerating diet  No nausea or vomiting  No diarrhea.     Medications    Current Facility-Administered Medications: vancomycin (VANCOCIN) 1,750 mg in dextrose 5 % 500 mL IVPB, 1,750 mg, Intravenous, Q24H  dilTIAZem 125 mg in dextrose 5 % 125 mL infusion, 10 mg/hr, Intravenous, Continuous  digoxin (LANOXIN) injection 250 mcg, 250 mcg, Intravenous, 4 times per day  metoprolol succinate (TOPROL XL) extended release tablet 50 mg, 50 mg, Oral, BID  methylPREDNISolone sodium (SOLU-MEDROL) injection 40 mg, 40 mg, Intravenous, Q8H  apixaban (ELIQUIS) tablet 5 mg, 5 mg, Oral, BID  metoprolol tartrate (LOPRESSOR) tablet 100 mg, 100 mg, Oral, BID  Vitamin D (CHOLECALCIFEROL) tablet 2,000 Units, 2,000 Units, Oral, Daily  piperacillin-tazobactam (ZOSYN) 3.375 g in dextrose 5 % 50 mL IVPB extended infusion (mini-bag), 3.375 g, Intravenous, Q8H  0.9 % sodium chloride infusion, , Intravenous, Q8H  Arformoterol Tartrate (BROVANA) nebulizer solution 15 mcg, 15 mcg, Nebulization, BID  budesonide (PULMICORT) nebulizer suspension 500 mcg, 0.5 mg, Nebulization, BID  albuterol (PROVENTIL) nebulizer solution 2.5 mg, 2.5 mg, Nebulization, Q4H PRN  aspirin chewable tablet 81 mg, 81 mg, Oral, Daily  atorvastatin (LIPITOR) tablet 20 mg, 20 mg, Oral, Daily  famotidine (PEPCID) tablet 20 mg, 20 mg, Oral, BID  doxycycline (VIBRAMYCIN) 100 mg in dextrose 5 % 100 mL IVPB, 100 mg, Intravenous, Q12H  LORazepam (ATIVAN) tablet 1 mg, 1 mg, Oral, Q6H PRN  acetaminophen (TYLENOL) tablet 650 mg, 650 mg, Oral, Q6H PRN  0.9 % sodium chloride infusion, , Intravenous, Continuous    Physical    VITALS:  /72   Pulse 113   Temp 97.7 °F (36.5 °C) (Tympanic)   Resp 18   Ht 5' 11\" (1.803 m)   Wt 260 lb 8 oz (118.2 kg)   SpO2 93%   BMI 36.33 kg/m² TEMPERATURE:  Current - Temp: 97.7 °F (36.5 °C); Max - Temp  Av.8 °F (36.6 °C)  Min: 97.5 °F (36.4 °C)  Max: 98.2 °F (36.8 °C)  RESPIRATIONS RANGE: Resp  Av.4  Min: 16  Max: 20  PULSE RANGE: Pulse  Av.8  Min: 78  Max: 167  BLOOD PRESSURE RANGE:  Systolic (83TSQ), MNP:144 , Min:114 , FRX:150   ; Diastolic (03VYQ), ASK:42, Min:66, Max:95    PULSE OXIMETRY RANGE: SpO2  Av.3 %  Min: 93 %  Max: 96 %  24HR INTAKE/OUTPUT:      Intake/Output Summary (Last 24 hours) at 2020  Last data filed at 2020 3622  Gross per 24 hour   Intake 180 ml   Output 850 ml   Net -670 ml       CONSTITUTIONAL: Awake, no distress, appears as stated age. HEENT: Normocephalic atraumatic. Pupils are equal and reactive bilaterally. Extraocular movements are intact. No pallor or icterus appreciated. NECK: Supple, no JVD , no lymphadenopathy, no bruits, no thyromegaly  LUNGS:  diminished air movements b/l in all Lung fields, with some improvement  CARDIOVASCULAR: Regular rate and rhythm, no murmur rub or gallop. ABDOMEN: Soft, nontender, bowel sounds are positive, no organomegaly appreciated. NEUROLOGIC: Awake, alert, oriented x 3 , no focal deficits noted. EXT: trace  edema, no clubbing, or cyanosis.     CBC with Differential:    Lab Results   Component Value Date    WBC 11.6 2020    RBC 3.96 2020    HGB 11.9 2020    HCT 36.1 2020     2020    MCV 91.2 2020    MCH 30.1 2020    MCHC 33.0 2020    RDW 14.3 2020    METASPCT 0.9 2020    LYMPHOPCT 1.7 2020    MONOPCT 3.0 2020    BASOPCT 0.1 2020    MONOSABS 0.00 2020    LYMPHSABS 0.23 2020    EOSABS 0.00 2020    BASOSABS 0.00 2020     CMP:    Lab Results   Component Value Date     2020    K 3.3 2020    K 3.7 2020     2020    CO2 23 2020    BUN 25 2020    CREATININE 1.1 2020    GFRAA >60 2020 LABGLOM >60 12/21/2020    GLUCOSE 137 12/21/2020    PROT 5.5 12/21/2020    LABALBU 2.8 12/21/2020    CALCIUM 8.6 12/21/2020    BILITOT 0.2 12/21/2020    ALKPHOS 63 12/21/2020    AST 24 12/21/2020    ALT 14 12/21/2020         ASSESSMENT AND PLAN      1. Lt lower lobe community acquired pneumonia   Continue iv Zosyn, Doxycycline and Vancomycin  Awaiting sputum and blood cultures  Urine Legionella and strep pneumo antigens negative  Pulmonology following  2. Acute on chronic respiratory failure secondary to above  Now on nasal cannula at 4 L/min  3. Sepsis secondary to above  Continue iv antibiotics ,  Iv fluids. 4.Acute renal failure  Creatinine much improved with iv hydration, continue same today. Creatinine down go baseline  5. COPD, with exacerbation  Severe long standing disease  Iv Solumedrol 60 mg iv Q 8 hours for now. Refused second opinion at [facility]  or pulmonary rehab as outpatient. 6.Hyperlipidemia  Lipitor 20 mg daily  7. Anxiety  Lorazepam as needed.   8.GERD  Continue pepcid 20 mg po bid    Earlene Mott MD.  7:43 PM  12/21/2020

## 2020-12-22 NOTE — PROGRESS NOTES
IV team and this RN at bedside- Patient refusing IV access and all hand on care including VS- patient combative and attempted to kick this nurse.  (B) restraints remain in place-

## 2020-12-22 NOTE — PROGRESS NOTES
Spoke with Dr. Mirna Sanchez about patient being non-compliant with all care Dr. Christina James that it was ok to apply \"soft restraints\" to right and left wrist.

## 2020-12-22 NOTE — SIGNIFICANT EVENT
Violent / Agitated / Rande Creed / Danger to Self/Other Assessment    Patient's immediate situation: Agitated  Patient's reaction to verbal reorientation: Uncoperative  Patient's medical condition: Vitals are stable, except tachycardia  Likely causes are sepsis, encephalopathy, hypoxia. Already on appropriate management for underlying condition by the primary service. A code violet was called by RN  because the patient became agitated and violent. Threatening to harm physically to self / and nursing staff. Patient was seen and evaluated at bedside and a Face to Face evaluation done. No verbal reorientation was possible because of gross delirium/psychosis. Alert, awake but agitated. No apparent acute medical distress and hemodynamically remained stable. Patient was medicated IM Ativan, Haldol and Benadryl to help the patient calm down. The nurse was instructed to continue to monitor vitals and neuro status for any hemodynamic instability.     SIGNATURE: Cornelius Wilkerson PATIENT NAME: Usha Calixto   CONTACT # : Hospitalist on call MRN: [id number]

## 2020-12-22 NOTE — PROGRESS NOTES
Kyaar Segura aware of patients critical lactic acid of 7.17 stated to get another lactic acid in the am. Dr. Eric Nguyen also aware that patient has pulled both IV's out and is refusing to let this nurse Beth Palacios RN do patient care. Patient yelling telling nurse to get out of his house or else he was going to call the \"\". Would not take evening pills and also would not let this nurse put heart monitor on him.

## 2020-12-23 PROBLEM — I48.91 ATRIAL FIBRILLATION WITH RAPID VENTRICULAR RESPONSE (HCC): Status: ACTIVE | Noted: 2020-12-23

## 2020-12-23 PROBLEM — F41.9 ANXIETY: Status: ACTIVE | Noted: 2020-12-23

## 2020-12-23 PROBLEM — K21.9 GERD (GASTROESOPHAGEAL REFLUX DISEASE): Status: ACTIVE | Noted: 2020-12-23

## 2020-12-23 PROBLEM — N17.9 AKI (ACUTE KIDNEY INJURY) (HCC): Status: ACTIVE | Noted: 2020-12-23

## 2020-12-23 PROBLEM — E78.5 HLD (HYPERLIPIDEMIA): Status: ACTIVE | Noted: 2020-12-23

## 2020-12-23 PROBLEM — J44.1 ACUTE EXACERBATION OF CHRONIC OBSTRUCTIVE PULMONARY DISEASE (COPD) (HCC): Status: ACTIVE | Noted: 2019-07-23

## 2020-12-23 PROBLEM — J96.21 ACUTE ON CHRONIC RESPIRATORY FAILURE WITH HYPOXIA (HCC): Status: ACTIVE | Noted: 2020-12-23

## 2020-12-23 PROBLEM — J18.9 COMMUNITY ACQUIRED PNEUMONIA OF LEFT LOWER LOBE OF LUNG: Status: ACTIVE | Noted: 2020-12-23

## 2020-12-23 LAB
BLOOD CULTURE, ROUTINE: NORMAL
CULTURE, BLOOD 2: NORMAL
PHOSPHORUS: 3 MG/DL (ref 2.5–4.5)

## 2020-12-23 PROCEDURE — 2060000000 HC ICU INTERMEDIATE R&B

## 2020-12-23 PROCEDURE — 6360000002 HC RX W HCPCS: Performed by: INTERNAL MEDICINE

## 2020-12-23 PROCEDURE — 2580000003 HC RX 258: Performed by: INTERNAL MEDICINE

## 2020-12-23 PROCEDURE — 97110 THERAPEUTIC EXERCISES: CPT

## 2020-12-23 PROCEDURE — 2700000000 HC OXYGEN THERAPY PER DAY

## 2020-12-23 PROCEDURE — 6370000000 HC RX 637 (ALT 250 FOR IP): Performed by: INTERNAL MEDICINE

## 2020-12-23 PROCEDURE — 6370000000 HC RX 637 (ALT 250 FOR IP): Performed by: SPECIALIST

## 2020-12-23 PROCEDURE — 97530 THERAPEUTIC ACTIVITIES: CPT

## 2020-12-23 PROCEDURE — 99233 SBSQ HOSP IP/OBS HIGH 50: CPT | Performed by: INTERNAL MEDICINE

## 2020-12-23 PROCEDURE — 94660 CPAP INITIATION&MGMT: CPT

## 2020-12-23 PROCEDURE — 84100 ASSAY OF PHOSPHORUS: CPT

## 2020-12-23 PROCEDURE — 94640 AIRWAY INHALATION TREATMENT: CPT

## 2020-12-23 PROCEDURE — 36415 COLL VENOUS BLD VENIPUNCTURE: CPT

## 2020-12-23 RX ORDER — DILTIAZEM HYDROCHLORIDE 240 MG/1
240 CAPSULE, COATED, EXTENDED RELEASE ORAL DAILY
Status: DISCONTINUED | OUTPATIENT
Start: 2020-12-23 | End: 2020-12-24 | Stop reason: HOSPADM

## 2020-12-23 RX ADMIN — FAMOTIDINE 20 MG: 20 TABLET ORAL at 23:10

## 2020-12-23 RX ADMIN — APIXABAN 5 MG: 5 TABLET, FILM COATED ORAL at 23:10

## 2020-12-23 RX ADMIN — METHYLPREDNISOLONE 8 MG: 4 TABLET ORAL at 23:13

## 2020-12-23 RX ADMIN — AMOXICILLIN AND CLAVULANATE POTASSIUM 1 TABLET: 500; 125 TABLET, FILM COATED ORAL at 06:22

## 2020-12-23 RX ADMIN — VANCOMYCIN HYDROCHLORIDE 1750 MG: 10 INJECTION, POWDER, LYOPHILIZED, FOR SOLUTION INTRAVENOUS at 13:29

## 2020-12-23 RX ADMIN — APIXABAN 5 MG: 5 TABLET, FILM COATED ORAL at 11:26

## 2020-12-23 RX ADMIN — METHYLPREDNISOLONE 4 MG: 4 TABLET ORAL at 06:22

## 2020-12-23 RX ADMIN — SODIUM CHLORIDE: 9 INJECTION, SOLUTION INTRAVENOUS at 01:00

## 2020-12-23 RX ADMIN — AMOXICILLIN AND CLAVULANATE POTASSIUM 1 TABLET: 500; 125 TABLET, FILM COATED ORAL at 23:10

## 2020-12-23 RX ADMIN — METHYLPREDNISOLONE 4 MG: 4 TABLET ORAL at 23:12

## 2020-12-23 RX ADMIN — METHYLPREDNISOLONE 4 MG: 4 TABLET ORAL at 13:21

## 2020-12-23 RX ADMIN — DOXYCYCLINE HYCLATE 100 MG: 100 CAPSULE ORAL at 11:28

## 2020-12-23 RX ADMIN — BUDESONIDE 500 MCG: 0.5 INHALANT RESPIRATORY (INHALATION) at 20:12

## 2020-12-23 RX ADMIN — METHYLPREDNISOLONE 4 MG: 4 TABLET ORAL at 23:10

## 2020-12-23 RX ADMIN — METOPROLOL TARTRATE 100 MG: 50 TABLET, FILM COATED ORAL at 23:10

## 2020-12-23 RX ADMIN — ARFORMOTEROL TARTRATE 15 MCG: 15 SOLUTION RESPIRATORY (INHALATION) at 20:12

## 2020-12-23 RX ADMIN — ATORVASTATIN CALCIUM 20 MG: 20 TABLET, FILM COATED ORAL at 11:27

## 2020-12-23 RX ADMIN — ARFORMOTEROL TARTRATE 15 MCG: 15 SOLUTION RESPIRATORY (INHALATION) at 05:59

## 2020-12-23 RX ADMIN — ASPIRIN 81 MG CHEWABLE TABLET 81 MG: 81 TABLET CHEWABLE at 11:27

## 2020-12-23 RX ADMIN — PIPERACILLIN AND TAZOBACTAM 3.38 G: 3; .375 INJECTION, POWDER, FOR SOLUTION INTRAVENOUS at 13:20

## 2020-12-23 RX ADMIN — AMOXICILLIN AND CLAVULANATE POTASSIUM 1 TABLET: 500; 125 TABLET, FILM COATED ORAL at 13:21

## 2020-12-23 RX ADMIN — FAMOTIDINE 20 MG: 20 TABLET ORAL at 11:26

## 2020-12-23 RX ADMIN — BUDESONIDE 500 MCG: 0.5 INHALANT RESPIRATORY (INHALATION) at 05:59

## 2020-12-23 RX ADMIN — DOXYCYCLINE HYCLATE 100 MG: 100 CAPSULE ORAL at 23:11

## 2020-12-23 RX ADMIN — METOPROLOL TARTRATE 100 MG: 50 TABLET, FILM COATED ORAL at 11:26

## 2020-12-23 RX ADMIN — PIPERACILLIN AND TAZOBACTAM 3.38 G: 3; .375 INJECTION, POWDER, FOR SOLUTION INTRAVENOUS at 05:00

## 2020-12-23 RX ADMIN — Medication 2000 UNITS: at 11:27

## 2020-12-23 NOTE — PROGRESS NOTES
Comprehensive Nutrition Assessment    Type and Reason for Visit:  Initial, RD Nutrition Re-Screen/LOS    Nutrition Recommendations/Plan: Continue diet start ONS BID Ensure HP    Nutrition Assessment:  Pt was +MST for poor oral intakes on admit currently intakes varied w/meals. Pt admit sw/ COPD, Pneumonia w/PMH COPD. Will start ONS and monitor    Malnutrition Assessment:  Malnutrition Status: At risk for malnutrition (Comment)    Context:  Acute Illness     Findings of the 6 clinical characteristics of malnutrition:  Energy Intake:  Mild decrease in energy intake (Comment)  Weight Loss:  No significant weight loss     Body Fat Loss:  No significant body fat loss     Muscle Mass Loss:  No significant muscle mass loss    Fluid Accumulation:  No significant fluid accumulation     Strength:  Not Performed    Estimated Daily Nutrient Needs:  Energy (kcal):  2050-2150kcal/d; Weight Used for Energy Requirements:  Current     Protein (g):  100-115gm pro/d(x1.3-1.5gm/kg);  Weight Used for Protein Requirements:  Ideal        Fluid (ml/day):  2050-2150ml/d; Method Used for Fluid Requirements:  1 ml/kcal      Nutrition Related Findings:  A/ox4, upper/lower dentures, +BS, Abd WDL, BLE/BUE edema,+I/O +13L      Wounds:  None       Current Nutrition Therapies:    DIET CARDIAC; Low Sodium (2 GM)  Dietary Nutrition Supplements: Low Calorie High Protein Supplement    Anthropometric Measures:  · Height: 5' 11\" (180.3 cm)  · Current Body Weight: 260 lb (117.9 kg)(12/19)   · Admission Body Weight:      · Usual Body Weight:       · Ideal Body Weight: 172 lbs; % Ideal Body Weight 151.2 %   · BMI: 36.3  · Adjusted Body Weight:  ; No Adjustment   · BMI Categories: Obese Class 2 (BMI 35.0 -39.9)       Nutrition Diagnosis:   · Inadequate oral intake related to impaired respiratory function as evidenced by intake 26-50%      Nutrition Interventions: Food and/or Nutrient Delivery:  Continue Current Diet, Start Oral Nutrition Supplement  Nutrition Education/Counseling:  No recommendation at this time   Coordination of Nutrition Care:  Continue to monitor while inpatient    Goals:  Consume . 50-75% meals /ONS       Nutrition Monitoring and Evaluation:   Behavioral-Environmental Outcomes:  None Identified   Food/Nutrient Intake Outcomes:  Food and Nutrient Intake, Supplement Intake  Physical Signs/Symptoms Outcomes:  Biochemical Data, Fluid Status or Edema, Hemodynamic Status, Nutrition Focused Physical Findings, Skin, Weight     Discharge Planning:     Too soon to determine     Electronically signed by Amaris Basurto RD, LD on 12/23/20 at 9:08 AM EST    Contact: [phone]

## 2020-12-23 NOTE — PROGRESS NOTES
Pharmacy Consultation Note  (Antibiotic Dosing and Monitoring)    Vancomycin has been discontinued; pharmacy will sign-off. Please reconsult if needed.     Thank you,  Delfina Jose, PharmD, BCPS 12/23/2020 1:52 PM

## 2020-12-23 NOTE — PROGRESS NOTES
Physical Therapy    Physical Therapy Treatment Note    Room #:  0515/0515-01  Patient Name: Louie Castillo  YOB: 1940  MRN: [id number]    Referring Provider:   Elizabet Shore MD     Date of Service: 12/23/2020    Evaluating Physical Therapist: Macky Goldmann, PT  #[id number]       Diagnosis:   Septic shock ([facility] [address] [A41.9, R65.21]   Admitted with   shortness of breath , sepsis, pneumonia    Patient Active Problem List   Diagnosis    Influenza, pneumonia    Dyspnea    Appendicitis, acute    COPD (chronic obstructive pulmonary disease) ([address]    Septic shock ([address]    Agitation        Tentative placement recommendation: Subacute vs Home Health Physical Therapy if patient meets goals    Equipment recommendation: Patient has needed equipment       Prior Level of Function: Patient ambulated independently and with wheeled walker 4 Liters of o2 via nasal cannula with activity  Rehab Potential: good  for baseline    Past medical history:   Past Medical History:   Diagnosis Date    COPD (chronic obstructive pulmonary disease) ([facility] [address]     Hyperlipidemia      Past Surgical History:   Procedure Laterality Date    APPENDECTOMY  2019    COLONOSCOPY      EYE SURGERY Right     cataract removal with lens implants    HEMORRHOID SURGERY      LAPAROSCOPIC APPENDECTOMY N/A 2/7/2019    APPENDECTOMY LAPAROSCOPIC performed by Hai Mcneill MD at [address] OR       Precautions: , falls, alarm and O2 , 4 Liters of o2 via nasal cannula     SUBJECTIVE:    Social history: Patient lives with spouse   in a [place] home  with 2 steps  to enter bilateral Rail  Tub shower     Equipment owned: [employer],  o2 7 Liters of o2 via nasal cannula ?     AM-PAC Basic Mobility        AM-PAC Mobility Inpatient   How much difficulty turning over in bed?: A Lot  How much difficulty sitting down on / standing up from a chair with arms?: A Little  How much difficulty moving from lying on back to sitting on side of bed?: A Lot How much help from another person moving to and from a bed to a chair?: A Little  How much help from another person needed to walk in hospital room?: A Little  How much help from another person for climbing 3-5 steps with a railing?: A Lot  AM-PAC Inpatient Mobility Raw Score : 15  AM-PAC Inpatient T-Scale Score : 39.45  Mobility Inpatient CMS 0-100% Score: 57.7  Mobility Inpatient CMS G-Code Modifier : CK    Nursing cleared patient for PT treatment. OBJECTIVE;   Initial Evaluation  Date: 12/21/20 Treatment Date:    12/23/2020   Short Term/ Long Term   Goals   Was pt agreeable to Eval/treatment? Yes   yes To be met in 4 days   Pain level   0/10    na    Bed Mobility    Rolling: Minimal assist of 1    Supine to sit: Minimal assist of 1    Sit to supine: Minimal assist of 1    Scooting: Minimal assist of 1   Rolling: Moderate assist of 1   Supine to sit: Moderate assist of 1   Sit to supine: Not assessed    Scooting: Minimal assist of 1    Rolling: Independent    Supine to sit:  Independent    Sit to supine: Independent    Scooting: Independent     Transfers Sit to stand: Minimal assist of 1   Sit to stand: Minimal assist of 1 with bed elevated     Sit to stand: Independent     Ambulation    2x25 feet using  wheeled walker with Minimal assist of 1     2 x 20 and 1 x 10 feet using  wheeled walker with Minimal assist of 1 cues for walker technique, using long O2.    50 feet using  wheeled walker with Supervision     Stair negotiation: ascended and descended   Not assessed       2 steps bilateral rails supervision    ROM Within functional limits       Strength BUE:  refer to OT eval  RLE:  3+/5  LLE:  3+/5   Increase strength in affected mm groups by 1/3 grade   Balance Sitting EOB:  good    Dynamic Standing:  fair with wheeled walker  Sitting EOB: good   Dynamic Standing: fair using wheeled walker   Sitting EOB:  good    Dynamic Standing: good Patient is Alert & Oriented x person, place, time and situation and follows directions    Sensation:  Patient  denies numbness and tingling     Edema:  yes bilateral lower extremities  Endurance: fair       Vitals: 3 Liters of o2 via nasal cannula   SPO2 at rest 93%  SPO2 during session 88%     Patient education  Patient educated on role of Physical Therapy, risks of immobility, safety and plan of care, energy conservation and  importance of mobility while in hospital       Patient response to education:   Pt verbalized understanding Pt demonstrated skill Pt requires further education in this area   Yes Partial Yes      Treatment:  Patient practiced and was instructed/facilitated in the following treatment: Patient   Sat edge of bed 15 minutes with Supervision  to increase dynamic sitting balance and activity tolerance. Pt. Performed seated exercises with rest breaks. Pt. Ambulated to bedside chair. RN present. Therapeutic Exercises:  ankle pumps, heel raises, long arc quad and seated marching  x 10  reps. At end of session, patient in chair with  call light and phone within reach,   all lines and tubes intact, nursing notified. Patient would benefit from continued skilled Physical Therapy to improve functional independence and quality of life. Patient's/ family goals   home        ASSESSMENT: Patient exhibits decreased strength, balance, coordination impairing functional mobility. Decreased endurance and balance  impacts safe mobility and while patient requires min  assist with mobility and SpO2 drops with activity.       Plan of Care:     -Bed Mobility: Lower extremity exercises  and Upper extremity exercises   -Standing Balance: Perform strengthening exercises in standing to promote motor control with or without upper extremity support -Transfers: Provide instruction on proper hand and foot position for adequate transfer of weight onto lower extremities and use of gait device, Cues for hand placement, technique and safety and Provide stabilization to prevent fall   -Gait: Gait training and Standing activities to improve: base of support, weight shift, weight bearing    -Endurance: Utilize Supervised activities to increase level of endurance to allow for safe functional mobility including transfers and gait   -Stairs: Stair training with instruction on proper technique and hand placement on rail    Patient and or family understand(s) diagnosis, prognosis, and plan of care. Frequency of treatments: Patient will be seen  daily.        Time in  1059  Time out  1122    Total Treatment Time  23 minutes  CPT codes:    Therapeutic activities (69158)   15 minutes  1 unit(s)  Therapeutic exercises (08865)   8 minutes  1 unit(s)    Davidson Petty PTA   SYA#[id number]

## 2020-12-23 NOTE — PROGRESS NOTES
Department of Internal Medicine  General Internal Medicine  Attending Progress Note      SUBJECTIVE:    Had episode of A fib with RVR yesterday and placed on Cardizem drip. Agitated this morning and pulling on oxygen and iv site and had to be restrained  No respiratory distress on exam today.     Medications    Current Facility-Administered Medications: doxycycline hyclate (VIBRAMYCIN) capsule 100 mg, 100 mg, Oral, 2 times per day  amoxicillin-clavulanate (AUGMENTIN) 500-125 MG per tablet 1 tablet, 1 tablet, Oral, 3 times per day  methylPREDNISolone (MEDROL) tablet 24 mg, 24 mg, Oral, Once  [START ON 12/23/2020] methylPREDNISolone (MEDROL) tablet 4 mg, 4 mg, Oral, QAM AC  [START ON 12/23/2020] methylPREDNISolone (MEDROL) tablet 4 mg, 4 mg, Oral, Lunch  [START ON 12/23/2020] methylPREDNISolone (MEDROL) tablet 4 mg, 4 mg, Oral, Dinner  [START ON 12/23/2020] methylPREDNISolone (MEDROL) tablet 8 mg, 8 mg, Oral, Nightly  [START ON 12/24/2020] methylPREDNISolone (MEDROL) tablet 4 mg, 4 mg, Oral, Nightly  vancomycin (VANCOCIN) 1,750 mg in dextrose 5 % 500 mL IVPB, 1,750 mg, Intravenous, Q24H  dilTIAZem 125 mg in dextrose 5 % 125 mL infusion, 10 mg/hr, Intravenous, Continuous  apixaban (ELIQUIS) tablet 5 mg, 5 mg, Oral, BID  haloperidol lactate (HALDOL) injection 2 mg, 2 mg, Intramuscular, Q6H PRN  LORazepam (ATIVAN) injection 0.5 mg, 0.5 mg, Intramuscular, Q6H PRN  diphenhydrAMINE (BENADRYL) injection 25 mg, 25 mg, Intramuscular, Q6H PRN  metoprolol tartrate (LOPRESSOR) tablet 100 mg, 100 mg, Oral, BID  Vitamin D (CHOLECALCIFEROL) tablet 2,000 Units, 2,000 Units, Oral, Daily  piperacillin-tazobactam (ZOSYN) 3.375 g in dextrose 5 % 50 mL IVPB extended infusion (mini-bag), 3.375 g, Intravenous, Q8H  0.9 % sodium chloride infusion, , Intravenous, Q8H  Arformoterol Tartrate (BROVANA) nebulizer solution 15 mcg, 15 mcg, Nebulization, BID  budesonide (PULMICORT) nebulizer suspension 500 mcg, 0.5 mg, Nebulization, BID albuterol (PROVENTIL) nebulizer solution 2.5 mg, 2.5 mg, Nebulization, Q4H PRN  aspirin chewable tablet 81 mg, 81 mg, Oral, Daily  atorvastatin (LIPITOR) tablet 20 mg, 20 mg, Oral, Daily  famotidine (PEPCID) tablet 20 mg, 20 mg, Oral, BID  LORazepam (ATIVAN) tablet 1 mg, 1 mg, Oral, Q6H PRN  acetaminophen (TYLENOL) tablet 650 mg, 650 mg, Oral, Q6H PRN    Physical    VITALS:  BP (!) 149/80   Pulse 74   Temp 97.6 °F (36.4 °C) (Oral)   Resp 20   Ht 5' 11\" (1.803 m)   Wt 260 lb 8 oz (118.2 kg)   SpO2 95%   BMI 36.33 kg/m²   TEMPERATURE:  Current - Temp: 97.6 °F (36.4 °C); Max - Temp  Av °F (36.7 °C)  Min: 97.6 °F (36.4 °C)  Max: 98.3 °F (36.8 °C)  RESPIRATIONS RANGE: Resp  Av.7  Min: 18  Max: 24  PULSE RANGE: Pulse  Av.5  Min: 74  Max: 87  BLOOD PRESSURE RANGE:  Systolic (27VMU), WXO:901 , Min:134 , XUP:244   ; Diastolic (60CJB), QWA:45, Min:80, Max:95    PULSE OXIMETRY RANGE: SpO2  Av %  Min: 95 %  Max: 97 %  24HR INTAKE/OUTPUT:    No intake or output data in the 24 hours ending 20    CONSTITUTIONAL: Awake, no distress, appears as stated age. HEENT: Normocephalic atraumatic. Pupils are equal and reactive bilaterally. Extraocular movements are intact. No pallor or icterus appreciated. NECK: Supple, no JVD , no lymphadenopathy, no bruits, no thyromegaly  LUNGS:  diminished air movements b/l in all Lung fields, with some improvement, exp wheezes. CARDIOVASCULAR: Regular rate and rhythm, no murmur rub or gallop. ABDOMEN: Soft, nontender, bowel sounds are positive, no organomegaly appreciated. NEUROLOGIC: Awake, alert, oriented x 2 , no focal deficits noted. Agitated. EXT: trace  edema, no clubbing, or cyanosis.     CBC with Differential:    Lab Results   Component Value Date    WBC 12.7 2020    RBC 4.71 2020    HGB 14.1 2020    HCT 43.1 2020     2020    MCV 91.5 2020    MCH 29.9 2020    MCHC 32.7 2020    RDW 14.3 2020 METASPCT 0.9 12/19/2020    LYMPHOPCT 5.2 12/22/2020    MONOPCT 3.5 12/22/2020    BASOPCT 0.1 12/22/2020    MONOSABS 0.51 12/22/2020    LYMPHSABS 0.64 12/22/2020    EOSABS 0.00 12/22/2020    BASOSABS 0.00 12/22/2020     CMP:    Lab Results   Component Value Date     12/22/2020    K 3.7 12/22/2020    K 3.7 12/18/2020     12/22/2020    CO2 25 12/22/2020    BUN 29 12/22/2020    CREATININE 1.2 12/22/2020    GFRAA >60 12/22/2020    LABGLOM 58 12/22/2020    GLUCOSE 121 12/22/2020    PROT 6.3 12/22/2020    LABALBU 3.3 12/22/2020    CALCIUM 8.9 12/22/2020    BILITOT 0.3 12/22/2020    ALKPHOS 68 12/22/2020    AST 44 12/22/2020    ALT 25 12/22/2020         ASSESSMENT AND PLAN      1. Lt lower lobe community acquired pneumonia   Continue iv Zosyn, Doxycycline and Vancomycin  Awaiting sputum and blood cultures  Urine Legionella and strep pneumo antigens negative  Pulmonology following. Change to po antibiotics from am.    2.Acute on chronic respiratory failure secondary to above  Now on nasal cannula at 4 L/min    3. Sepsis secondary to above  Continue iv antibiotics ,  Iv fluids. Resolved    4. Acute renal failure  Creatinine much improved with iv hydration, continue same today. Creatinine down go baseline    5. COPD, with exacerbation  Severe long standing disease  Iv Solumedrol changed to po steroids today    6. A Fib with RVR on Cardizem drip  Metoprolol 100 mg po bid, rate controlled now. Eliquis 5 mg po bid  TSH normal.  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 Ativan as needed,   Observe for now. 8.Hyperlipidemia  Lipitor 20 mg daily    9. Anxiety  Lorazepam as needed. 10.GERD  Continue pepcid 20 mg po bid   Discussed with Wife in detail over the phone today.     Sonia Castillo MD.  8:41 PM  12/22/2020

## 2020-12-23 NOTE — PROGRESS NOTES
Cardiology  Progress Note      SUBJECTIVE:  No chest pain. Mild dyspnea at rest.  Patient was alert and awake this morning. No more confusion.     Current Inpatient Medications  Current Facility-Administered Medications: doxycycline hyclate (VIBRAMYCIN) capsule 100 mg, 100 mg, Oral, 2 times per day  amoxicillin-clavulanate (AUGMENTIN) 500-125 MG per tablet 1 tablet, 1 tablet, Oral, 3 times per day  methylPREDNISolone (MEDROL) tablet 24 mg, 24 mg, Oral, Once  methylPREDNISolone (MEDROL) tablet 4 mg, 4 mg, Oral, QAM AC  methylPREDNISolone (MEDROL) tablet 4 mg, 4 mg, Oral, Lunch  methylPREDNISolone (MEDROL) tablet 4 mg, 4 mg, Oral, Dinner  methylPREDNISolone (MEDROL) tablet 8 mg, 8 mg, Oral, Nightly  [START ON 12/24/2020] methylPREDNISolone (MEDROL) tablet 4 mg, 4 mg, Oral, Nightly  vancomycin (VANCOCIN) 1,750 mg in dextrose 5 % 500 mL IVPB, 1,750 mg, Intravenous, Q24H  dilTIAZem 125 mg in dextrose 5 % 125 mL infusion, 10 mg/hr, Intravenous, Continuous  apixaban (ELIQUIS) tablet 5 mg, 5 mg, Oral, BID  haloperidol lactate (HALDOL) injection 2 mg, 2 mg, Intramuscular, Q6H PRN  LORazepam (ATIVAN) injection 0.5 mg, 0.5 mg, Intramuscular, Q6H PRN  diphenhydrAMINE (BENADRYL) injection 25 mg, 25 mg, Intramuscular, Q6H PRN  metoprolol tartrate (LOPRESSOR) tablet 100 mg, 100 mg, Oral, BID  Vitamin D (CHOLECALCIFEROL) tablet 2,000 Units, 2,000 Units, Oral, Daily  piperacillin-tazobactam (ZOSYN) 3.375 g in dextrose 5 % 50 mL IVPB extended infusion (mini-bag), 3.375 g, Intravenous, Q8H  0.9 % sodium chloride infusion, , Intravenous, Q8H  Arformoterol Tartrate (BROVANA) nebulizer solution 15 mcg, 15 mcg, Nebulization, BID  budesonide (PULMICORT) nebulizer suspension 500 mcg, 0.5 mg, Nebulization, BID  albuterol (PROVENTIL) nebulizer solution 2.5 mg, 2.5 mg, Nebulization, Q4H PRN  aspirin chewable tablet 81 mg, 81 mg, Oral, Daily  atorvastatin (LIPITOR) tablet 20 mg, 20 mg, Oral, Daily famotidine (PEPCID) tablet 20 mg, 20 mg, Oral, BID  LORazepam (ATIVAN) tablet 1 mg, 1 mg, Oral, Q6H PRN  acetaminophen (TYLENOL) tablet 650 mg, 650 mg, Oral, Q6H PRN      Physical  VITALS:  BP (!) 158/78   Pulse 73   Temp 98.2 °F (36.8 °C) (Oral)   Resp 18   Ht 5' 11\" (1.803 m)   Wt 260 lb 8 oz (118.2 kg)   SpO2 96%   BMI 36.33 kg/m²   CURRENT TEMPERATURE:  Temp: 98.2 °F (36.8 °C)  CONSTITUTIONAL: No acute distress. EYES: Vision is intact. ENT: No sore throat. No ear drainage. NECK: No JVD. BACK: Symmetric. LUNGS:  diminished breath sounds right base and left base  CARDIOVASCULAR:  normal S1 and S2, no S3 and murmurs include systolic murmur I/VI located at left sternal border without radiation  ABDOMEN:  distended  NEUROLOGIC: No focal deficits. EXTREMITIES: Trace edema in legs. DATA:      Cardiology Labs:  BMP:    Lab Results   Component Value Date     12/22/2020    K 3.7 12/22/2020    K 3.7 12/18/2020     12/22/2020    CO2 25 12/22/2020    BUN 29 12/22/2020     CBC:    Lab Results   Component Value Date    WBC 12.7 12/22/2020    RBC 4.71 12/22/2020    HGB 14.1 12/22/2020    HCT 43.1 12/22/2020    MCV 91.5 12/22/2020    RDW 14.3 12/22/2020     12/22/2020     PT/INR:  No results found for: PTINR  TROPONIN:  No components found for: TROP    ASSESSMENT    1persistent atrial fibrillation with rapid ventricular response. Please refer to consult. Patient converted back to sinus rhythm yesterday. This atrial fibrillation could have been triggered by his acute respiratory failure. He was started on Eliquis. TSH is unremarkable. Echocardiogram is showing preserved left ventricular systolic function. Cardizem drip was stopped. Patient was started on diltiazem LA. We will try to avoid beta-blocker for now in view of severe COPD. 2severe COPD. 3confusion  Mental status is much better today. Possible alcohol withdrawal versus steroid induced confusion.       PLAN As per orders.

## 2020-12-23 NOTE — PROGRESS NOTES
[address] [place], [place] [address]ist   Progress Note    Admitting Date and Time: 12/18/2020  9:57 AM  Admit Dx: Septic shock ([facility] [address] [A41.9, R65.21]    Subjective/interval history:    Covering for Dr. John Treivno     Pt feels better today. States he has some shortness of breath, but no worse than his baseline. IV antibiotics were discontinued, oral antibiotic started today. On methylprednisolone taper. ROS: denies fever, chills, cp, n/v, HA unless stated above.      dilTIAZem  240 mg Oral Daily    doxycycline hyclate  100 mg Oral 2 times per day    amoxicillin-clavulanate  1 tablet Oral 3 times per day    methylPREDNISolone  24 mg Oral Once    methylPREDNISolone  4 mg Oral QAM AC    methylPREDNISolone  4 mg Oral Lunch    methylPREDNISolone  4 mg Oral Dinner    methylPREDNISolone  8 mg Oral Nightly    [START ON 12/24/2020] methylPREDNISolone  4 mg Oral Nightly    apixaban  5 mg Oral BID    metoprolol tartrate  100 mg Oral BID    Vitamin D  2,000 Units Oral Daily    Arformoterol Tartrate  15 mcg Nebulization BID    budesonide  0.5 mg Nebulization BID    aspirin  81 mg Oral Daily    atorvastatin  20 mg Oral Daily    famotidine  20 mg Oral BID         haloperidol lactate, 2 mg, Q6H PRN      LORazepam, 0.5 mg, Q6H PRN      diphenhydrAMINE, 25 mg, Q6H PRN      albuterol, 2.5 mg, Q4H PRN      LORazepam, 1 mg, Q6H PRN      acetaminophen, 650 mg, Q6H PRN         Objective:    BP (!) 158/78   Pulse 73   Temp 98.2 °F (36.8 °C) (Oral)   Resp 18   Ht 5' 11\" (1.803 m)   Wt 260 lb 8 oz (118.2 kg)   SpO2 96%   BMI 36.33 kg/m²   General Appearance: alert and oriented to person, place and time and in no acute distress  Skin: warm and dry  Head: normocephalic and atraumatic  Eyes: pupils equal, round, and reactive to light, extraocular eye movements intact, conjunctivae normal  Neck: neck supple and non tender without mass Pulmonary/Chest: Nonlabored. Slightly diminished but otherwise clear to auscultation bilaterally. Cardiovascular: Distant, normal rate, normal S1 and S2 and no carotid bruits  Abdomen: Obese, soft, non-tender, non-distended, normal bowel sounds, no masses or organomegaly  Extremities: no cyanosis, no clubbing and no edema  Neurologic: no cranial nerve deficit and speech normal      Recent Labs     12/21/20  0511 12/22/20  1447    145   K 3.3* 3.7   * 109*   CO2 23 25   BUN 25* 29*   CREATININE 1.1 1.2   GLUCOSE 137* 121*   CALCIUM 8.6 8.9       Recent Labs     12/21/20  0511 12/22/20  1447   ALKPHOS 63 68   PROT 5.5* 6.3*   LABALBU 2.8* 3.3*   BILITOT 0.2 0.3   AST 24 44*   ALT 14 25       Recent Labs     12/21/20  0511 12/22/20  1447   WBC 11.6* 12.7*   RBC 3.96 4.71   HGB 11.9* 14.1   HCT 36.1* 43.1   MCV 91.2 91.5   MCH 30.1 29.9   MCHC 33.0 32.7   RDW 14.3 14.3    228   MPV 11.0 10.6       CBC:   Lab Results   Component Value Date    WBC 12.7 12/22/2020    RBC 4.71 12/22/2020    HGB 14.1 12/22/2020    HCT 43.1 12/22/2020    MCV 91.5 12/22/2020    MCH 29.9 12/22/2020    MCHC 32.7 12/22/2020    RDW 14.3 12/22/2020     12/22/2020    MPV 10.6 12/22/2020     BMP:    Lab Results   Component Value Date     12/22/2020    K 3.7 12/22/2020    K 3.7 12/18/2020     12/22/2020    CO2 25 12/22/2020    BUN 29 12/22/2020    LABALBU 3.3 12/22/2020    CREATININE 1.2 12/22/2020    CALCIUM 8.9 12/22/2020    GFRAA >60 12/22/2020    LABGLOM 58 12/22/2020    GLUCOSE 121 12/22/2020        Radiology:   US ABDOMEN LIMITED   Final Result   1. Ultrasound exam was unfortunately not completed and the gallbladder was   not imaged. The patient was combative. 2.  Limited images show no ascites or free fluid. Normal right kidney. RECOMMENDATION:   Consider follow-up right upper quadrant ultrasound exam when appropriate.       XR CHEST PORTABLE   Final Result   No acute process CT CHEST WO CONTRAST   Final Result   Large airspace opacity left lower lobe superior segment consistent with   pneumonia. Bibasilar atelectasis. Small left pleural effusion. Ascending aorta dilated up to 4.5 cm. This previously measured 4.3 cm on   06/16/2020. XR CHEST PORTABLE   Final Result   Stable bibasilar opacities related to atelectasis, pneumonia, or effusion. XR CHEST PORTABLE   Final Result   Patchy right lung base atelectasis or infiltrate          Assessment/Plan:  Principal Problem:    Septic shock (HCC)  Active Problems:    Acute exacerbation of chronic obstructive pulmonary disease (COPD) ([facility] [address]    Community acquired pneumonia of left lower lobe of lung    Acute on chronic respiratory failure with hypoxia (HCC)    HLD (hyperlipidemia)    GERD (gastroesophageal reflux disease)    Atrial fibrillation with rapid ventricular response ([facility])    LORAINE (acute kidney injury) ([facility] [address]  Resolved Problems:    * No resolved hospital problems. *      1. Septic shock due to community-acquired pneumonia  -Shock has now resolved  -now on oral Augmentin and doxycycline    2. Acute on chronic respiratory failure with hypoxia  -Now on baseline nasal cannula oxygen    3. Acute exacerbation of COPD  -due to CAP  -Antibiotics as noted above  -on ICS/LABA  -Oral methylprednisolone taper per pulmonology    4. Atrial fibrillation with rapid ventricular rate  -On oral Cardizem and Eliquis per cardiology    5. LORAINE  -resolved with IV fluids and treatment of sepsis    6. Hyperlipidemia  -continue atorvastatin      7. GERD  -on oral Pepcid     DVT prophylaxis: Eliquis  CODE STATUS: Full code    NOTE: This report was transcribed using voice recognition software. Every effort was made to ensure accuracy; however, inadvertent computerized transcription errors may be present.      Electronically signed by Kike Sharp DO on 12/23/2020 at 2:17 PM

## 2020-12-24 VITALS
TEMPERATURE: 97.5 F | WEIGHT: 260.5 LBS | DIASTOLIC BLOOD PRESSURE: 63 MMHG | RESPIRATION RATE: 20 BRPM | BODY MASS INDEX: 36.47 KG/M2 | HEIGHT: 71 IN | OXYGEN SATURATION: 92 % | HEART RATE: 76 BPM | SYSTOLIC BLOOD PRESSURE: 153 MMHG

## 2020-12-24 LAB
ANION GAP SERPL CALCULATED.3IONS-SCNC: 8 MMOL/L (ref 7–16)
BUN BLDV-MCNC: 23 MG/DL (ref 8–23)
CALCIUM SERPL-MCNC: 8.4 MG/DL (ref 8.6–10.2)
CHLORIDE BLD-SCNC: 103 MMOL/L (ref 98–107)
CO2: 29 MMOL/L (ref 22–29)
CREAT SERPL-MCNC: 1 MG/DL (ref 0.7–1.2)
GFR AFRICAN AMERICAN: >60
GFR NON-AFRICAN AMERICAN: >60 ML/MIN/1.73
GLUCOSE BLD-MCNC: 98 MG/DL (ref 74–99)
METER GLUCOSE: 91 MG/DL (ref 74–99)
POTASSIUM SERPL-SCNC: 3.4 MMOL/L (ref 3.5–5)
SODIUM BLD-SCNC: 140 MMOL/L (ref 132–146)

## 2020-12-24 PROCEDURE — 94660 CPAP INITIATION&MGMT: CPT

## 2020-12-24 PROCEDURE — 6370000000 HC RX 637 (ALT 250 FOR IP): Performed by: INTERNAL MEDICINE

## 2020-12-24 PROCEDURE — 94640 AIRWAY INHALATION TREATMENT: CPT

## 2020-12-24 PROCEDURE — 2700000000 HC OXYGEN THERAPY PER DAY

## 2020-12-24 PROCEDURE — 97530 THERAPEUTIC ACTIVITIES: CPT

## 2020-12-24 PROCEDURE — 97110 THERAPEUTIC EXERCISES: CPT

## 2020-12-24 PROCEDURE — 80048 BASIC METABOLIC PNL TOTAL CA: CPT

## 2020-12-24 PROCEDURE — 99239 HOSP IP/OBS DSCHRG MGMT >30: CPT | Performed by: INTERNAL MEDICINE

## 2020-12-24 PROCEDURE — 82962 GLUCOSE BLOOD TEST: CPT

## 2020-12-24 PROCEDURE — 36415 COLL VENOUS BLD VENIPUNCTURE: CPT

## 2020-12-24 PROCEDURE — 6360000002 HC RX W HCPCS: Performed by: INTERNAL MEDICINE

## 2020-12-24 PROCEDURE — 6370000000 HC RX 637 (ALT 250 FOR IP): Performed by: SPECIALIST

## 2020-12-24 RX ORDER — METHYLPREDNISOLONE 4 MG/1
4 TABLET ORAL NIGHTLY
Qty: 2 TABLET | Refills: 0 | Status: SHIPPED | OUTPATIENT
Start: 2020-12-25 | End: 2020-12-27

## 2020-12-24 RX ORDER — DILTIAZEM HYDROCHLORIDE 240 MG/1
240 CAPSULE, COATED, EXTENDED RELEASE ORAL DAILY
Qty: 30 CAPSULE | Refills: 1 | Status: SHIPPED | OUTPATIENT
Start: 2020-12-25

## 2020-12-24 RX ORDER — METOPROLOL TARTRATE 100 MG/1
100 TABLET ORAL 2 TIMES DAILY
Qty: 60 TABLET | Refills: 0 | Status: CANCELLED | OUTPATIENT
Start: 2020-12-24

## 2020-12-24 RX ORDER — DOXYCYCLINE HYCLATE 100 MG/1
100 CAPSULE ORAL EVERY 12 HOURS SCHEDULED
Qty: 14 CAPSULE | Refills: 0 | Status: SHIPPED | OUTPATIENT
Start: 2020-12-24 | End: 2020-12-31

## 2020-12-24 RX ORDER — CHOLECALCIFEROL (VITAMIN D3) 50 MCG
2000 TABLET ORAL DAILY
Qty: 60 TABLET | Refills: 0 | Status: SHIPPED | OUTPATIENT
Start: 2020-12-25

## 2020-12-24 RX ORDER — AMOXICILLIN AND CLAVULANATE POTASSIUM 500; 125 MG/1; MG/1
1 TABLET, FILM COATED ORAL EVERY 8 HOURS SCHEDULED
Qty: 21 TABLET | Refills: 0 | Status: SHIPPED | OUTPATIENT
Start: 2020-12-24 | End: 2020-12-31

## 2020-12-24 RX ORDER — POTASSIUM CHLORIDE 20 MEQ/1
40 TABLET, EXTENDED RELEASE ORAL DAILY
Status: DISCONTINUED | OUTPATIENT
Start: 2020-12-24 | End: 2020-12-24 | Stop reason: HOSPADM

## 2020-12-24 RX ADMIN — DOXYCYCLINE HYCLATE 100 MG: 100 CAPSULE ORAL at 08:56

## 2020-12-24 RX ADMIN — ARFORMOTEROL TARTRATE 15 MCG: 15 SOLUTION RESPIRATORY (INHALATION) at 06:03

## 2020-12-24 RX ADMIN — METOPROLOL TARTRATE 100 MG: 50 TABLET, FILM COATED ORAL at 08:56

## 2020-12-24 RX ADMIN — DILTIAZEM HYDROCHLORIDE 240 MG: 240 CAPSULE, COATED, EXTENDED RELEASE ORAL at 08:56

## 2020-12-24 RX ADMIN — APIXABAN 5 MG: 5 TABLET, FILM COATED ORAL at 08:56

## 2020-12-24 RX ADMIN — METHYLPREDNISOLONE 4 MG: 4 TABLET ORAL at 13:31

## 2020-12-24 RX ADMIN — FAMOTIDINE 20 MG: 20 TABLET ORAL at 08:56

## 2020-12-24 RX ADMIN — POTASSIUM CHLORIDE 40 MEQ: 1500 TABLET, EXTENDED RELEASE ORAL at 08:56

## 2020-12-24 RX ADMIN — ASPIRIN 81 MG CHEWABLE TABLET 81 MG: 81 TABLET CHEWABLE at 08:56

## 2020-12-24 RX ADMIN — AMOXICILLIN AND CLAVULANATE POTASSIUM 1 TABLET: 500; 125 TABLET, FILM COATED ORAL at 06:25

## 2020-12-24 RX ADMIN — ATORVASTATIN CALCIUM 20 MG: 20 TABLET, FILM COATED ORAL at 08:57

## 2020-12-24 RX ADMIN — AMOXICILLIN AND CLAVULANATE POTASSIUM 1 TABLET: 500; 125 TABLET, FILM COATED ORAL at 13:31

## 2020-12-24 RX ADMIN — METHYLPREDNISOLONE 4 MG: 4 TABLET ORAL at 06:25

## 2020-12-24 RX ADMIN — Medication 2000 UNITS: at 08:56

## 2020-12-24 RX ADMIN — BUDESONIDE 500 MCG: 0.5 INHALANT RESPIRATORY (INHALATION) at 06:03

## 2020-12-24 ASSESSMENT — PAIN SCALES - GENERAL
PAINLEVEL_OUTOF10: 0
PAINLEVEL_OUTOF10: 0

## 2020-12-24 NOTE — PROGRESS NOTES
[address] [place], [place] [address]ist   Progress Note    Admitting Date and Time: 12/18/2020  9:57 AM  Admit Dx: Septic shock ([facility] [address] [A41.9, R65.21]    Subjective/interval history:    Covering for Dr. Tash Lobo     Pt feels today, denies any new complaints. Feels that his breathing is at baseline. Tolerating oral antibiotics. Started on oral diltiazem today per cardiology. Metoprolol discontinued per cardiology recommendation. ROS: denies fever, chills, cp, n/v, HA unless stated above.      potassium chloride  40 mEq Oral Daily    dilTIAZem  240 mg Oral Daily    doxycycline hyclate  100 mg Oral 2 times per day    amoxicillin-clavulanate  1 tablet Oral 3 times per day    methylPREDNISolone  24 mg Oral Once    methylPREDNISolone  4 mg Oral QAM AC    methylPREDNISolone  4 mg Oral Lunch    methylPREDNISolone  4 mg Oral Nightly    apixaban  5 mg Oral BID    Vitamin D  2,000 Units Oral Daily    Arformoterol Tartrate  15 mcg Nebulization BID    budesonide  0.5 mg Nebulization BID    aspirin  81 mg Oral Daily    atorvastatin  20 mg Oral Daily    famotidine  20 mg Oral BID         haloperidol lactate, 2 mg, Q6H PRN      LORazepam, 0.5 mg, Q6H PRN      diphenhydrAMINE, 25 mg, Q6H PRN      albuterol, 2.5 mg, Q4H PRN      LORazepam, 1 mg, Q6H PRN      acetaminophen, 650 mg, Q6H PRN         Objective:    BP (!) 164/74   Pulse 67   Temp 96.1 °F (35.6 °C) (Infrared)   Resp 14   Ht 5' 11\" (1.803 m)   Wt 260 lb 8 oz (118.2 kg)   SpO2 95%   BMI 36.33 kg/m²   General Appearance: alert and oriented to person, place and time and in no acute distress  Skin: warm and dry  Head: normocephalic and atraumatic  Eyes: pupils equal, round, and reactive to light, extraocular eye movements intact, conjunctivae normal  Neck: neck supple and non tender without mass Pulmonary/Chest: Nonlabored. Slightly diminished but otherwise clear to auscultation bilaterally. Cardiovascular: Distant, normal rate, regular rhythm, normal S1 and S2 and no carotid bruits  Abdomen: Obese, soft, non-tender, non-distended, normal bowel sounds, no masses or organomegaly  Extremities: no cyanosis, no clubbing and no edema  Neurologic: no cranial nerve deficit and speech normal      Recent Labs     12/22/20  1447 12/24/20  0502    140   K 3.7 3.4*   * 103   CO2 25 29   BUN 29* 23   CREATININE 1.2 1.0   GLUCOSE 121* 98   CALCIUM 8.9 8.4*       Recent Labs     12/22/20  1447   ALKPHOS 68   PROT 6.3*   LABALBU 3.3*   BILITOT 0.3   AST 44*   ALT 25       Recent Labs     12/22/20  1447   WBC 12.7*   RBC 4.71   HGB 14.1   HCT 43.1   MCV 91.5   MCH 29.9   MCHC 32.7   RDW 14.3      MPV 10.6       CBC:   Lab Results   Component Value Date    WBC 12.7 12/22/2020    RBC 4.71 12/22/2020    HGB 14.1 12/22/2020    HCT 43.1 12/22/2020    MCV 91.5 12/22/2020    MCH 29.9 12/22/2020    MCHC 32.7 12/22/2020    RDW 14.3 12/22/2020     12/22/2020    MPV 10.6 12/22/2020     BMP:    Lab Results   Component Value Date     12/24/2020    K 3.4 12/24/2020    K 3.7 12/18/2020     12/24/2020    CO2 29 12/24/2020    BUN 23 12/24/2020    LABALBU 3.3 12/22/2020    CREATININE 1.0 12/24/2020    CALCIUM 8.4 12/24/2020    GFRAA >60 12/24/2020    LABGLOM >60 12/24/2020    GLUCOSE 98 12/24/2020        Radiology:   US ABDOMEN LIMITED   Final Result   1. Ultrasound exam was unfortunately not completed and the gallbladder was   not imaged. The patient was combative. 2.  Limited images show no ascites or free fluid. Normal right kidney. RECOMMENDATION:   Consider follow-up right upper quadrant ultrasound exam when appropriate.       XR CHEST PORTABLE   Final Result   No acute process      CT CHEST WO CONTRAST   Final Result Large airspace opacity left lower lobe superior segment consistent with   pneumonia. Bibasilar atelectasis. Small left pleural effusion. Ascending aorta dilated up to 4.5 cm. This previously measured 4.3 cm on   06/16/2020. XR CHEST PORTABLE   Final Result   Stable bibasilar opacities related to atelectasis, pneumonia, or effusion. XR CHEST PORTABLE   Final Result   Patchy right lung base atelectasis or infiltrate          Assessment/Plan:  Principal Problem:    Septic shock (HCC)  Active Problems:    Acute exacerbation of chronic obstructive pulmonary disease (COPD) ([facility] [address]    Community acquired pneumonia of left lower lobe of lung    Acute on chronic respiratory failure with hypoxia (HCC)    HLD (hyperlipidemia)    GERD (gastroesophageal reflux disease)    Atrial fibrillation with rapid ventricular response (HCC)    LORAINE (acute kidney injury) ([facility] [address]  Resolved Problems:    * No resolved hospital problems. *      1. Septic shock due to community-acquired pneumonia  -Shock has now resolved  -now on oral Augmentin and doxycycline    2. Acute on chronic respiratory failure with hypoxia  -Now on baseline nasal cannula oxygen    3. Acute exacerbation of COPD  -due to CAP  -Antibiotics as noted above  -on ICS/LABA  -Oral methylprednisolone taper per pulmonology    4. Atrial fibrillation with rapid ventricular rate  -On oral Cardizem and Eliquis per cardiology    5. LORAINE  -resolved with IV fluids and treatment of sepsis    6. Hyperlipidemia  -continue atorvastatin      7. GERD  -on oral Pepcid     DVT prophylaxis: Eliquis  CODE STATUS: Full code    Disposition: Probable discharge later today    NOTE: This report was transcribed using voice recognition software. Every effort was made to ensure accuracy; however, inadvertent computerized transcription errors may be present.      Electronically signed by Kike Sharp DO on 12/24/2020 at 11:42 AM

## 2020-12-24 NOTE — PROGRESS NOTES
Physical Therapy    Physical Therapy Treatment Note    Room #:  0515/0515-02  Patient Name: Monet Blanca  YOB: 1940  MRN: [id number]    Referring Provider:   Kathya Denise MD     Date of Service: 12/24/2020    Evaluating Physical Therapist: Zoltan Portillo, PT  #[id number]       Diagnosis:   Septic shock ([address] [A41.9, R65.21]   Admitted with   shortness of breath , sepsis, pneumonia    Patient Active Problem List   Diagnosis    Influenza, pneumonia    Dyspnea    Appendicitis, acute    Acute exacerbation of chronic obstructive pulmonary disease (COPD) ([address]    Septic shock ([address]    Agitation    Community acquired pneumonia of left lower lobe of lung    Acute on chronic respiratory failure with hypoxia ([address]    HLD (hyperlipidemia)    Anxiety    GERD (gastroesophageal reflux disease)    Atrial fibrillation with rapid ventricular response ([address]    LORAINE (acute kidney injury) ([address]        Tentative placement recommendation: Subacute vs Home Health Physical Therapy if patient meets goals    Equipment recommendation: Patient has needed equipment       Prior Level of Function: Patient ambulated independently and with wheeled walker 4 Liters of o2 via nasal cannula with activity  Rehab Potential: good  for baseline    Past medical history:   Past Medical History:   Diagnosis Date    COPD (chronic obstructive pulmonary disease) ([address]     Hyperlipidemia      Past Surgical History:   Procedure Laterality Date    APPENDECTOMY  2019    COLONOSCOPY      EYE SURGERY Right     cataract removal with lens implants    HEMORRHOID SURGERY      LAPAROSCOPIC APPENDECTOMY N/A 2/7/2019    APPENDECTOMY LAPAROSCOPIC performed by Bubba Morris MD at [facility] OR       Precautions: , falls, alarm and O2 , 4 Liters of o2 via nasal cannula     SUBJECTIVE:    Social history: Patient lives with spouse   in a ranch home  with 2 steps  to enter bilateral Rail  Tub shower Equipment owned: [address],  o2 7 Liters of o2 via nasal cannula ? AM-PAC Basic Mobility        AM-PAC Mobility Inpatient   How much difficulty turning over in bed?: A Little  How much difficulty sitting down on / standing up from a chair with arms?: A Little  How much difficulty moving from lying on back to sitting on side of bed?: A Little  How much help from another person moving to and from a bed to a chair?: A Little  How much help from another person needed to walk in hospital room?: A Lot  How much help from another person for climbing 3-5 steps with a railing?: A Lot  AM-PAC Inpatient Mobility Raw Score : 16  AM-PAC Inpatient T-Scale Score : 40.78  Mobility Inpatient CMS 0-100% Score: 54.16  Mobility Inpatient CMS G-Code Modifier : CK    Nursing cleared patient for PT treatment. OBJECTIVE;   Initial Evaluation  Date: 12/21/20 Treatment Date:    12/24/2020   Short Term/ Long Term   Goals   Was pt agreeable to Eval/treatment? Yes   yes To be met in 4 days   Pain level   0/10    na    Bed Mobility    Rolling: Minimal assist of 1    Supine to sit: Minimal assist of 1    Sit to supine: Minimal assist of 1    Scooting: Minimal assist of 1   Rolling: Moderate assist of 1   Supine to sit: Moderate assist of 1   Sit to supine: Not assessed    Scooting: Minimal assist of 1    Rolling: Independent    Supine to sit:  Independent    Sit to supine: Independent    Scooting: Independent     Transfers Sit to stand: Minimal assist of 1   Sit to stand: Minimal assist of 1 with bed elevated     Sit to stand: Independent     Ambulation    2x25 feet using  wheeled walker with Minimal assist of 1     2 x 15 feet using  wheeled walker with Minimal assist of 1 cues for walker technique, using long O2.    50 feet using  wheeled walker with Supervision     Stair negotiation: ascended and descended   Not assessed       2 steps bilateral rails supervision    ROM Within functional limits       Strength BUE:  refer to OT eval RLE:  3+/5  LLE:  3+/5   Increase strength in affected mm groups by 1/3 grade   Balance Sitting EOB:  good    Dynamic Standing:  fair with wheeled walker  Sitting EOB: good   Dynamic Standing: fair using wheeled walker   Sitting EOB:  good    Dynamic Standing: good       Patient is Alert & Oriented x person, place, time and situation and follows directions    Sensation:  Patient  denies numbness and tingling     Edema:  yes bilateral lower extremities  Endurance: fair       Vitals: 3 Liters of o2 via nasal cannula   SPO2 at rest 94%  SPO2 during session 88%     Patient education  Patient educated on role of Physical Therapy, risks of immobility, safety and plan of care, energy conservation and  importance of mobility while in hospital       Patient response to education:   Pt verbalized understanding Pt demonstrated skill Pt requires further education in this area   Yes Partial Yes      Treatment:  Patient practiced and was instructed/facilitated in the following treatment: Patient   Sat edge of bed 15 minutes with Supervision  to increase dynamic sitting balance and activity tolerance. Pt. Performed seated exercises with rest breaks. Pt. Ambulated into bathroom and back to eob. Therapeutic Exercises:  ankle pumps, heel raises, long arc quad and seated marching  x 10  reps. At end of session, patient in chair with  call light and phone within reach,   all lines and tubes intact, nursing notified. Patient would benefit from continued skilled Physical Therapy to improve functional independence and quality of life. Patient's/ family goals   home        ASSESSMENT: Patient exhibits decreased strength, balance, coordination impairing functional mobility. Decreased endurance and balance  impacts safe mobility and while patient requires min  assist with mobility and SpO2 drops with activity.       Plan of Care:     -Bed Mobility: Lower extremity exercises  and Upper extremity exercises -Standing Balance: Perform strengthening exercises in standing to promote motor control with or without upper extremity support   -Transfers: Provide instruction on proper hand and foot position for adequate transfer of weight onto lower extremities and use of gait device, Cues for hand placement, technique and safety and Provide stabilization to prevent fall   -Gait: Gait training and Standing activities to improve: base of support, weight shift, weight bearing    -Endurance: Utilize Supervised activities to increase level of endurance to allow for safe functional mobility including transfers and gait   -Stairs: Stair training with instruction on proper technique and hand placement on rail    Patient and or family understand(s) diagnosis, prognosis, and plan of care. Frequency of treatments: Patient will be seen  daily.        Time in  1130  Time out  1153    Total Treatment Time  23 minutes  CPT codes:    Therapeutic activities (87395)   15 minutes  1 unit(s)  Therapeutic exercises (75861)   8 minutes  1 unit(s)    Maybelle Meckel, PTA   [place]#[id number]

## 2020-12-24 NOTE — DISCHARGE SUMMARY
[facility] Physician Discharge Summary       Tatiana Rinaldi MD  [address] [address]  [phone]    In 1 week      Emily Dhillon MD  [address] [place] [address] [phone]    In 1 week        Activity level: Resume normal activity    Diet: DIET CARDIAC; Low Sodium (2 GM)  Dietary Nutrition Supplements: Low Calorie High Protein Supplement    Labs: Routine labs per primary care physician    Condition at discharge: Stable    Dispo: Home    Continue supplemental oxygen via nasal canula @ 4 LPM round-the-clock. Continue CPAP / BiPAP during sleep as prior to admission. Patient ID:  Davey Griffin  [id number]  79 y.o.  1940    Admit date: 12/18/2020    Discharge date and time:  12/24/2020  2:34 PM    Admission Diagnoses: Principal Problem:    Septic shock ([address]  Active Problems:    Acute exacerbation of chronic obstructive pulmonary disease (COPD) ([address]    Community acquired pneumonia of left lower lobe of lung    Acute on chronic respiratory failure with hypoxia (HCC)    HLD (hyperlipidemia)    GERD (gastroesophageal reflux disease)    Atrial fibrillation with rapid ventricular response (HCC)    LORAINE (acute kidney injury) ([address]  Resolved Problems:    * No resolved hospital problems. *      Discharge Diagnoses: Principal Problem:    Septic shock ([address]  Active Problems:    Acute exacerbation of chronic obstructive pulmonary disease (COPD) ([address]    Community acquired pneumonia of left lower lobe of lung    Acute on chronic respiratory failure with hypoxia (HCC)    HLD (hyperlipidemia)    GERD (gastroesophageal reflux disease)    Atrial fibrillation with rapid ventricular response (HCC)    LORAINE (acute kidney injury) ([address]  Resolved Problems:    * No resolved hospital problems.  *      Consults:  IP CONSULT TO PULMONOLOGY  IP CONSULT TO CRITICAL CARE  PHARMACY TO DOSE VANCOMYCIN  IP CONSULT TO CARDIOLOGY    Procedures: None Hospital Course: This is a 80-year-old male with a history significant for COPD with chronic hypoxic respiratory failure on home oxygen was admitted to the hospital for acute on chronic respiratory failure and septic shock due to pneumonia. He was initially admitted to ICU, treated with fluids, antibiotics, and blood pressure improved. He was transferred to the floor 12/22. Also had atrial fibrillation with rapid ventricular rate. He was previously taking metoprolol, however this was changed to oral diltiazem per cardiology due to his COPD. Started on oral anticoagulation with Eliquis. Follow-up with Dr. Teresa Leigh in the office. Discharge Exam:  Vitals:    12/24/20 0030 12/24/20 0035 12/24/20 0845 12/24/20 1404   BP: (!) 157/75  (!) 164/74 (!) 153/63   Pulse: 95 95 67 76   Resp: 20 14 20   Temp:   96.1 °F (35.6 °C) 97.5 °F (36.4 °C)   TempSrc: Infrared  Infrared Oral   SpO2: 96%  95% 92%   Weight:       Height:           General Appearance: alert and oriented to person, place and time and in no acute distress  Skin: warm and dry  Head: normocephalic and atraumatic  Eyes: pupils equal, round, and reactive to light, extraocular eye movements intact, conjunctivae normal  Neck: neck supple and non tender without mass   Pulmonary/Chest: Nonlabored. Slightly diminished but otherwise clear to auscultation bilaterally. Cardiovascular: Distant, normal rate, regular rhythm, normal S1 and S2 and no carotid bruits  Abdomen: Obese, soft, non-tender, non-distended, normal bowel sounds, no masses or organomegaly  Extremities: no cyanosis, no clubbing and no edema  Neurologic: no cranial nerve deficit and speech normal  I/O last 3 completed shifts:   In: 530 [P.O.:530]  Out: 550 [Urine:550]  I/O this shift:  In: 180 [P.O.:180]  Out: 300 [Urine:300]      LABS:  Recent Labs     12/22/20  1447 12/24/20  0502    140   K 3.7 3.4*   * 103   CO2 25 29   BUN 29* 23   CREATININE 1.2 1.0   GLUCOSE 121* 98 CALCIUM 8.9 8.4*       Recent Labs     12/22/20  1447   WBC 12.7*   RBC 4.71   HGB 14.1   HCT 43.1   MCV 91.5   MCH 29.9   MCHC 32.7   RDW 14.3      MPV 10.6       No results for input(s): POCGLU in the last 72 hours.     CBC:   Lab Results   Component Value Date    WBC 12.7 12/22/2020    RBC 4.71 12/22/2020    HGB 14.1 12/22/2020    HCT 43.1 12/22/2020    MCV 91.5 12/22/2020    MCH 29.9 12/22/2020    MCHC 32.7 12/22/2020    RDW 14.3 12/22/2020     12/22/2020    MPV 10.6 12/22/2020     BMP:    Lab Results   Component Value Date     12/24/2020    K 3.4 12/24/2020    K 3.7 12/18/2020     12/24/2020    CO2 29 12/24/2020    BUN 23 12/24/2020    LABALBU 3.3 12/22/2020    CREATININE 1.0 12/24/2020    CALCIUM 8.4 12/24/2020    GFRAA >60 12/24/2020    LABGLOM >60 12/24/2020    GLUCOSE 98 12/24/2020       Imaging:  Ct Chest Wo Contrast    Result Date: 12/19/2020 EXAMINATION: CT OF THE CHEST WITHOUT CONTRAST 12/19/2020 5:23 pm TECHNIQUE: CT of the chest was performed without the administration of intravenous contrast. Multiplanar reformatted images are provided for review. Dose modulation, iterative reconstruction, and/or weight based adjustment of the mA/kV was utilized to reduce the radiation dose to as low as reasonably achievable. COMPARISON: 06/16/2020 HISTORY: ORDERING SYSTEM PROVIDED HISTORY: Bibasilar pneumonia compared with study 7 months prior TECHNOLOGIST PROVIDED HISTORY: Reason for exam:->Bibasilar pneumonia compared with study 7 months prior FINDINGS: Mediastinum: Dilatation of the ascending aorta measuring 4.5 cm. This previously measured 4.3 cm on 06/16/2020. The heart and pericardium are grossly normal.  Coronary artery calcified plaque noted 1.5 cm right-sided precarinal lymph node. Lungs/pleura: Left lower lobe superior segment airspace opacity consistent with pneumonia. Bibasilar atelectasis also present. Small left pleural effusion. No pneumothorax. Upper Abdomen: Unremarkable. Soft Tissues/Bones: Multilevel degenerative changes thoracic spine. Large airspace opacity left lower lobe superior segment consistent with pneumonia. Bibasilar atelectasis. Small left pleural effusion. Ascending aorta dilated up to 4.5 cm. This previously measured 4.3 cm on 06/16/2020. Xr Chest Portable    Result Date: 12/19/2020  EXAMINATION: ONE XRAY VIEW OF THE CHEST 12/19/2020 6:53 am COMPARISON: December 18, 2020 HISTORY: ORDERING SYSTEM PROVIDED HISTORY: COPD Exacerbation TECHNOLOGIST PROVIDED HISTORY: Reason for exam:->COPD Exacerbation FINDINGS: Stable bibasilar opacities notable on the left silhouetting left heart border and left hemidiaphragm. There is interstitial prominence in perihilar and infrahilar locations. The heart is normal size. No pneumothorax. Stable bibasilar opacities related to atelectasis, pneumonia, or effusion.     Xr Chest Portable Result Date: 12/18/2020  EXAMINATION: ONE XRAY VIEW OF THE CHEST 12/18/2020 9:11 am COMPARISON: CT 06/16/2020 HISTORY: ORDERING SYSTEM PROVIDED HISTORY: SOB TECHNOLOGIST PROVIDED HISTORY: Reason for exam:->SOB FINDINGS: There is prominent cardiac silhouette, likely in part related to previously seen prominent epicardial fat. There is patchy right lung base atelectasis or infiltrate. No large effusion or pneumothorax is seen. No acute osseous abnormality. Patchy right lung base atelectasis or infiltrate        Patient Instructions:   Current Discharge Medication List      START taking these medications    Details   apixaban (ELIQUIS) 5 MG TABS tablet Take 1 tablet by mouth 2 times daily  Qty: 60 tablet, Refills: 0      dilTIAZem (CARDIZEM CD) 240 MG extended release capsule Take 1 capsule by mouth daily  Qty: 30 capsule, Refills: 1      amoxicillin-clavulanate (AUGMENTIN) 500-125 MG per tablet Take 1 tablet by mouth every 8 hours for 7 days  Qty: 21 tablet, Refills: 0      doxycycline hyclate (VIBRAMYCIN) 100 MG capsule Take 1 capsule by mouth every 12 hours for 7 days  Qty: 14 capsule, Refills: 0      Vitamin D (CHOLECALCIFEROL) 50 MCG (2000 UT) TABS tablet Take 1 tablet by mouth daily  Qty: 60 tablet, Refills: 0    Comments: Labeling may look different. 25 mcg=1000 Units. Please double check dosages.       methylPREDNISolone (MEDROL) 4 MG tablet Take 1 tablet by mouth nightly for 2 days  Qty: 2 tablet, Refills: 0         CONTINUE these medications which have NOT CHANGED    Details   mometasone-formoterol (DULERA) 200-5 MCG/ACT inhaler Inhale 2 puffs into the lungs every 12 hours      !! OXYGEN Inhale 5 L into the lungs daily      theophylline (LUIS-24) 200 MG extended release capsule Take 400 mg by mouth daily      tiotropium (SPIRIVA RESPIMAT) 2.5 MCG/ACT AERS inhaler Inhale 2 puffs into the lungs daily      famotidine (PEPCID) 20 MG tablet Take 1 tablet by mouth 2 times daily  Qty: 60 tablet, Refills: 3 albuterol (PROVENTIL) (2.5 MG/3ML) 0.083% nebulizer solution Take 2.5 mg by nebulization every 6 hours as needed for Wheezing      aspirin 81 MG tablet Take 81 mg by mouth daily      !! OXYGEN Inhale 2 L into the lungs nightly       albuterol (PROVENTIL HFA) 108 (90 BASE) MCG/ACT inhaler Inhale 2 puffs into the lungs every 6 hours as needed for Wheezing. Qty: 1 Inhaler, Refills: 3      simvastatin (ZOCOR) 20 MG tablet Take 20 mg by mouth daily        ! ! - Potential duplicate medications found. Please discuss with provider. STOP taking these medications       furosemide (LASIX) 20 MG tablet Comments:   Reason for Stopping:         predniSONE (DELTASONE) 5 MG tablet Comments:   Reason for Stopping:         influenza virus trivalent vaccine (FLUZONE) injection Comments:   Reason for Stopping:                 Note that greater than 30 minutes was spent in preparing discharge papers, discussing discharge with patient, medication review, etc.    NOTE: This report was transcribed using voice recognition software. Every effort was made to ensure accuracy; however, inadvertent computerized transcription errors may be present.      Signed:  Electronically signed by Joanna Nino DO on 12/24/2020 at 2:34 PM'

## 2020-12-24 NOTE — PROGRESS NOTES
Cardiology  Progress Note      SUBJECTIVE:  No chest pain. Feels better. Less shortness of breath. He wants to go home.       Current Inpatient Medications  Current Facility-Administered Medications: potassium chloride (KLOR-CON M) extended release tablet 40 mEq, 40 mEq, Oral, Daily  dilTIAZem (CARDIZEM CD) extended release capsule 240 mg, 240 mg, Oral, Daily  doxycycline hyclate (VIBRAMYCIN) capsule 100 mg, 100 mg, Oral, 2 times per day  amoxicillin-clavulanate (AUGMENTIN) 500-125 MG per tablet 1 tablet, 1 tablet, Oral, 3 times per day  methylPREDNISolone (MEDROL) tablet 24 mg, 24 mg, Oral, Once  methylPREDNISolone (MEDROL) tablet 4 mg, 4 mg, Oral, QAM AC  methylPREDNISolone (MEDROL) tablet 4 mg, 4 mg, Oral, Lunch  methylPREDNISolone (MEDROL) tablet 4 mg, 4 mg, Oral, Nightly  apixaban (ELIQUIS) tablet 5 mg, 5 mg, Oral, BID  haloperidol lactate (HALDOL) injection 2 mg, 2 mg, Intramuscular, Q6H PRN  LORazepam (ATIVAN) injection 0.5 mg, 0.5 mg, Intramuscular, Q6H PRN  diphenhydrAMINE (BENADRYL) injection 25 mg, 25 mg, Intramuscular, Q6H PRN  Vitamin D (CHOLECALCIFEROL) tablet 2,000 Units, 2,000 Units, Oral, Daily  0.9 % sodium chloride infusion, , Intravenous, Q8H  Arformoterol Tartrate (BROVANA) nebulizer solution 15 mcg, 15 mcg, Nebulization, BID  budesonide (PULMICORT) nebulizer suspension 500 mcg, 0.5 mg, Nebulization, BID  albuterol (PROVENTIL) nebulizer solution 2.5 mg, 2.5 mg, Nebulization, Q4H PRN  aspirin chewable tablet 81 mg, 81 mg, Oral, Daily  atorvastatin (LIPITOR) tablet 20 mg, 20 mg, Oral, Daily  famotidine (PEPCID) tablet 20 mg, 20 mg, Oral, BID  LORazepam (ATIVAN) tablet 1 mg, 1 mg, Oral, Q6H PRN  acetaminophen (TYLENOL) tablet 650 mg, 650 mg, Oral, Q6H PRN      Physical  VITALS:  BP (!) 164/74   Pulse 67   Temp 96.1 °F (35.6 °C) (Infrared)   Resp 14   Ht 5' 11\" (1.803 m)   Wt 260 lb 8 oz (118.2 kg)   SpO2 95%   BMI 36.33 kg/m²   CURRENT TEMPERATURE:  Temp: 96.1 °F (35.6 °C) CONSTITUTIONAL: No acute distress. EYES: Vision is intact. ENT: No sore throat. No ear drainage. NECK: No JVD. BACK: Symmetric. LUNGS:  diminished breath sounds right base and left base  CARDIOVASCULAR:  normal S1 and S2, no S3 and no S4  ABDOMEN:  non-tender  NEUROLOGIC: No focal deficits. EXTREMITIES: No edema cyanosis or clubbing. DATA:      ECG:  I have reviewed EKG with the following interpretation:  normal sinus rhythm    Cardiology Labs:  BMP:    Lab Results   Component Value Date     12/24/2020    K 3.4 12/24/2020    K 3.7 12/18/2020     12/24/2020    CO2 29 12/24/2020    BUN 23 12/24/2020     CBC:    Lab Results   Component Value Date    WBC 12.7 12/22/2020    RBC 4.71 12/22/2020    HGB 14.1 12/22/2020    HCT 43.1 12/22/2020    MCV 91.5 12/22/2020    RDW 14.3 12/22/2020     12/22/2020     PT/INR:  No results found for: PTINR  TROPONIN:  No components found for: TROP    ASSESSMENT    1persistent atrial fibrillation with rapid ventricular response. Please refer to consult. Patient converted back to sinus rhythm December 22. .  This atrial fibrillation could have been triggered by his acute respiratory failure. He was started on Eliquis. TSH is unremarkable. Echocardiogram is showing preserved left ventricular systolic function. Cardizem drip was stopped. Patient was started on diltiazem [place]. We will try to avoid beta-blocker for now in view of severe COPD. 2severe COPD. 3confusion  Mental status is much better today. Possible alcohol withdrawal versus steroid induced confusion. PLAN  As per orders.

## 2020-12-31 NOTE — PROGRESS NOTES
Physician Progress Note      PATIENT:               Geetha Cintron  CSN #:                  [id number]  :                       1940  ADMIT DATE:       2020 9:57 AM  DISCH DATE:        2020 3:50 PM  RESPONDING  PROVIDER #:        Tollie Apley DO          QUERY TEXT:    Pt admitted with Sepsis and has encephalopathy documented in RRT note . If possible, please document in progress notes and discharge summary further   specificity regarding the type of encephalopathy:    The medical record reflects the following:  Risk Factors: 78 yo with septic shock, pneumonia, acute respiratory failure,   delirium  Clinical Indicators: Per  code violet note  Agitated , violent,   threatening harm to self and staff, likely d/t sepsis, encephalopathy,   hypoxia. LA 6.6, Wbc 26.4, creat 1.8,  Treatment: IV Haldol, Ativan, Benadryl,    Thank you, Andry Noyola RN -[phone]  Options provided:  -- Metabolic encephalopathy  -- Septic encephalopathy  -- Other - I will add my own diagnosis  -- Disagree - Not applicable / Not valid  -- Disagree - Clinically unable to determine / Unknown  -- Refer to Clinical Documentation Reviewer    PROVIDER RESPONSE TEXT:    This patient has metabolic encephalopathy.     Query created by: Vanetta Sicard on 2020 5:48 PM      Electronically signed by:  Tollie Apley DO 2020 3:59 PM

## 2022-01-01 ENCOUNTER — APPOINTMENT (OUTPATIENT)
Dept: GENERAL RADIOLOGY | Age: 82
DRG: 177 | End: 2022-01-01
Payer: MEDICARE

## 2022-01-01 ENCOUNTER — HOSPITAL ENCOUNTER (INPATIENT)
Age: 82
LOS: 16 days | DRG: 177 | End: 2022-07-10
Attending: STUDENT IN AN ORGANIZED HEALTH CARE EDUCATION/TRAINING PROGRAM | Admitting: INTERNAL MEDICINE
Payer: MEDICARE

## 2022-01-01 ENCOUNTER — APPOINTMENT (OUTPATIENT)
Dept: CT IMAGING | Age: 82
DRG: 177 | End: 2022-01-01
Payer: MEDICARE

## 2022-01-01 VITALS
TEMPERATURE: 97.5 F | DIASTOLIC BLOOD PRESSURE: 76 MMHG | OXYGEN SATURATION: 64 % | RESPIRATION RATE: 12 BRPM | BODY MASS INDEX: 36.27 KG/M2 | SYSTOLIC BLOOD PRESSURE: 151 MMHG | HEART RATE: 98 BPM | HEIGHT: 71 IN | WEIGHT: 259.04 LBS

## 2022-01-01 DIAGNOSIS — J18.9 PNEUMONIA DUE TO INFECTIOUS ORGANISM, UNSPECIFIED LATERALITY, UNSPECIFIED PART OF LUNG: Primary | ICD-10-CM

## 2022-01-01 DIAGNOSIS — J96.21 ACUTE ON CHRONIC RESPIRATORY FAILURE WITH HYPOXIA (HCC): ICD-10-CM

## 2022-01-01 DIAGNOSIS — A41.9 SEPTICEMIA (HCC): ICD-10-CM

## 2022-01-01 DIAGNOSIS — E87.20 LACTIC ACIDOSIS: ICD-10-CM

## 2022-01-01 LAB
(1,3)-BETA-D-GLUCAN (FUNGITELL) INTERPRETATION: NEGATIVE
(1,3)-BETA-D-GLUCAN (FUNGITELL): <31 PG/ML
ADENOVIRUS BY PCR: NOT DETECTED
ALBUMIN SERPL-MCNC: 2.8 G/DL (ref 3.5–5.2)
ALBUMIN SERPL-MCNC: 3 G/DL (ref 3.5–5.2)
ALBUMIN SERPL-MCNC: 3.1 G/DL (ref 3.5–5.2)
ALBUMIN SERPL-MCNC: 3.1 G/DL (ref 3.5–5.2)
ALBUMIN SERPL-MCNC: 3.2 G/DL (ref 3.5–5.2)
ALBUMIN SERPL-MCNC: 3.2 G/DL (ref 3.5–5.2)
ALBUMIN SERPL-MCNC: 3.3 G/DL (ref 3.5–5.2)
ALBUMIN SERPL-MCNC: 3.4 G/DL (ref 3.5–5.2)
ALBUMIN SERPL-MCNC: 3.4 G/DL (ref 3.5–5.2)
ALBUMIN SERPL-MCNC: 3.5 G/DL (ref 3.5–5.2)
ALBUMIN SERPL-MCNC: 3.5 G/DL (ref 3.5–5.2)
ALBUMIN SERPL-MCNC: 3.8 G/DL (ref 3.5–5.2)
ALP BLD-CCNC: 63 U/L (ref 40–129)
ALP BLD-CCNC: 67 U/L (ref 40–129)
ALP BLD-CCNC: 71 U/L (ref 40–129)
ALP BLD-CCNC: 72 U/L (ref 40–129)
ALP BLD-CCNC: 75 U/L (ref 40–129)
ALP BLD-CCNC: 77 U/L (ref 40–129)
ALP BLD-CCNC: 77 U/L (ref 40–129)
ALP BLD-CCNC: 79 U/L (ref 40–129)
ALP BLD-CCNC: 80 U/L (ref 40–129)
ALP BLD-CCNC: 81 U/L (ref 40–129)
ALP BLD-CCNC: 83 U/L (ref 40–129)
ALP BLD-CCNC: 99 U/L (ref 40–129)
ALT SERPL-CCNC: 27 U/L (ref 0–40)
ALT SERPL-CCNC: 29 U/L (ref 0–40)
ALT SERPL-CCNC: 30 U/L (ref 0–40)
ALT SERPL-CCNC: 36 U/L (ref 0–40)
ALT SERPL-CCNC: 38 U/L (ref 0–40)
ALT SERPL-CCNC: 38 U/L (ref 0–40)
ALT SERPL-CCNC: 45 U/L (ref 0–40)
ALT SERPL-CCNC: 47 U/L (ref 0–40)
ALT SERPL-CCNC: 58 U/L (ref 0–40)
ALT SERPL-CCNC: 63 U/L (ref 0–40)
ALT SERPL-CCNC: 64 U/L (ref 0–40)
ALT SERPL-CCNC: 70 U/L (ref 0–40)
ANION GAP SERPL CALCULATED.3IONS-SCNC: 10 MMOL/L (ref 7–16)
ANION GAP SERPL CALCULATED.3IONS-SCNC: 11 MMOL/L (ref 7–16)
ANION GAP SERPL CALCULATED.3IONS-SCNC: 11 MMOL/L (ref 7–16)
ANION GAP SERPL CALCULATED.3IONS-SCNC: 12 MMOL/L (ref 7–16)
ANION GAP SERPL CALCULATED.3IONS-SCNC: 13 MMOL/L (ref 7–16)
ANION GAP SERPL CALCULATED.3IONS-SCNC: 15 MMOL/L (ref 7–16)
ANION GAP SERPL CALCULATED.3IONS-SCNC: 7 MMOL/L (ref 7–16)
ANION GAP SERPL CALCULATED.3IONS-SCNC: 8 MMOL/L (ref 7–16)
ANION GAP SERPL CALCULATED.3IONS-SCNC: 9 MMOL/L (ref 7–16)
ANISOCYTOSIS: ABNORMAL
AST SERPL-CCNC: 33 U/L (ref 0–39)
AST SERPL-CCNC: 34 U/L (ref 0–39)
AST SERPL-CCNC: 37 U/L (ref 0–39)
AST SERPL-CCNC: 38 U/L (ref 0–39)
AST SERPL-CCNC: 38 U/L (ref 0–39)
AST SERPL-CCNC: 40 U/L (ref 0–39)
AST SERPL-CCNC: 41 U/L (ref 0–39)
AST SERPL-CCNC: 43 U/L (ref 0–39)
AST SERPL-CCNC: 44 U/L (ref 0–39)
AST SERPL-CCNC: 45 U/L (ref 0–39)
AST SERPL-CCNC: 45 U/L (ref 0–39)
AST SERPL-CCNC: 47 U/L (ref 0–39)
AST SERPL-CCNC: 52 U/L (ref 0–39)
AST SERPL-CCNC: 54 U/L (ref 0–39)
B.E.: 0.2 MMOL/L (ref -3–3)
B.E.: 1.2 MMOL/L (ref -3–3)
B.E.: 1.4 MMOL/L (ref -3–3)
B.E.: 2.6 MMOL/L (ref -3–3)
BACTERIA: ABNORMAL /HPF
BASOPHILS ABSOLUTE: 0 E9/L (ref 0–0.2)
BASOPHILS ABSOLUTE: 0.01 E9/L (ref 0–0.2)
BASOPHILS ABSOLUTE: 0.02 E9/L (ref 0–0.2)
BASOPHILS ABSOLUTE: 0.03 E9/L (ref 0–0.2)
BASOPHILS ABSOLUTE: 0.03 E9/L (ref 0–0.2)
BASOPHILS ABSOLUTE: 0.04 E9/L (ref 0–0.2)
BASOPHILS ABSOLUTE: 0.05 E9/L (ref 0–0.2)
BASOPHILS ABSOLUTE: 0.05 E9/L (ref 0–0.2)
BASOPHILS RELATIVE PERCENT: 0.1 % (ref 0–2)
BASOPHILS RELATIVE PERCENT: 0.2 % (ref 0–2)
BASOPHILS RELATIVE PERCENT: 0.3 % (ref 0–2)
BASOPHILS RELATIVE PERCENT: 0.3 % (ref 0–2)
BILIRUB SERPL-MCNC: 0.3 MG/DL (ref 0–1.2)
BILIRUB SERPL-MCNC: 0.4 MG/DL (ref 0–1.2)
BILIRUB SERPL-MCNC: 0.5 MG/DL (ref 0–1.2)
BILIRUB SERPL-MCNC: 0.6 MG/DL (ref 0–1.2)
BILIRUB SERPL-MCNC: 0.8 MG/DL (ref 0–1.2)
BILIRUB SERPL-MCNC: 0.8 MG/DL (ref 0–1.2)
BILIRUB SERPL-MCNC: 0.9 MG/DL (ref 0–1.2)
BILIRUB SERPL-MCNC: 0.9 MG/DL (ref 0–1.2)
BILIRUB SERPL-MCNC: 1 MG/DL (ref 0–1.2)
BILIRUB SERPL-MCNC: 1.2 MG/DL (ref 0–1.2)
BILIRUBIN URINE: NEGATIVE
BLOOD CULTURE, ROUTINE: NORMAL
BLOOD, URINE: ABNORMAL
BORDETELLA PARAPERTUSSIS BY PCR: NOT DETECTED
BORDETELLA PERTUSSIS BY PCR: NOT DETECTED
BUN BLDV-MCNC: 10 MG/DL (ref 6–23)
BUN BLDV-MCNC: 14 MG/DL (ref 6–23)
BUN BLDV-MCNC: 17 MG/DL (ref 6–23)
BUN BLDV-MCNC: 19 MG/DL (ref 6–23)
BUN BLDV-MCNC: 22 MG/DL (ref 6–23)
BUN BLDV-MCNC: 22 MG/DL (ref 6–23)
BUN BLDV-MCNC: 25 MG/DL (ref 6–23)
BUN BLDV-MCNC: 26 MG/DL (ref 6–23)
BUN BLDV-MCNC: 29 MG/DL (ref 6–23)
BUN BLDV-MCNC: 29 MG/DL (ref 6–23)
BUN BLDV-MCNC: 31 MG/DL (ref 6–23)
BUN BLDV-MCNC: 31 MG/DL (ref 6–23)
BUN BLDV-MCNC: 32 MG/DL (ref 6–23)
BUN BLDV-MCNC: 33 MG/DL (ref 6–23)
BURR CELLS: ABNORMAL
BURR CELLS: ABNORMAL
C DIFF TOXIN/ANTIGEN: NORMAL
CALCIUM SERPL-MCNC: 8.5 MG/DL (ref 8.6–10.2)
CALCIUM SERPL-MCNC: 8.6 MG/DL (ref 8.6–10.2)
CALCIUM SERPL-MCNC: 8.6 MG/DL (ref 8.6–10.2)
CALCIUM SERPL-MCNC: 8.7 MG/DL (ref 8.6–10.2)
CALCIUM SERPL-MCNC: 8.8 MG/DL (ref 8.6–10.2)
CALCIUM SERPL-MCNC: 8.8 MG/DL (ref 8.6–10.2)
CALCIUM SERPL-MCNC: 9 MG/DL (ref 8.6–10.2)
CALCIUM SERPL-MCNC: 9 MG/DL (ref 8.6–10.2)
CALCIUM SERPL-MCNC: 9.1 MG/DL (ref 8.6–10.2)
CHLAMYDOPHILIA PNEUMONIAE BY PCR: NOT DETECTED
CHLORIDE BLD-SCNC: 101 MMOL/L (ref 98–107)
CHLORIDE BLD-SCNC: 103 MMOL/L (ref 98–107)
CHLORIDE BLD-SCNC: 104 MMOL/L (ref 98–107)
CHLORIDE BLD-SCNC: 105 MMOL/L (ref 98–107)
CHLORIDE BLD-SCNC: 107 MMOL/L (ref 98–107)
CHLORIDE BLD-SCNC: 107 MMOL/L (ref 98–107)
CHLORIDE BLD-SCNC: 108 MMOL/L (ref 98–107)
CHLORIDE BLD-SCNC: 109 MMOL/L (ref 98–107)
CHLORIDE BLD-SCNC: 109 MMOL/L (ref 98–107)
CHLORIDE BLD-SCNC: 110 MMOL/L (ref 98–107)
CHLORIDE BLD-SCNC: 110 MMOL/L (ref 98–107)
CHLORIDE BLD-SCNC: 97 MMOL/L (ref 98–107)
CLARITY: ABNORMAL
CO2: 25 MMOL/L (ref 22–29)
CO2: 26 MMOL/L (ref 22–29)
CO2: 27 MMOL/L (ref 22–29)
CO2: 28 MMOL/L (ref 22–29)
CO2: 28 MMOL/L (ref 22–29)
COHB: 0 % (ref 0–1.5)
COHB: 0.3 % (ref 0–1.5)
COHB: 0.3 % (ref 0–1.5)
COLOR: ABNORMAL
CORONAVIRUS 229E BY PCR: NOT DETECTED
CORONAVIRUS HKU1 BY PCR: NOT DETECTED
CORONAVIRUS NL63 BY PCR: NOT DETECTED
CORONAVIRUS OC43 BY PCR: NOT DETECTED
CREAT SERPL-MCNC: 0.8 MG/DL (ref 0.7–1.2)
CREAT SERPL-MCNC: 0.9 MG/DL (ref 0.7–1.2)
CREAT SERPL-MCNC: 1 MG/DL (ref 0.7–1.2)
CREAT SERPL-MCNC: 1.1 MG/DL (ref 0.7–1.2)
CREAT SERPL-MCNC: 1.1 MG/DL (ref 0.7–1.2)
CREAT SERPL-MCNC: 1.2 MG/DL (ref 0.7–1.2)
CREAT SERPL-MCNC: 1.2 MG/DL (ref 0.7–1.2)
CREAT SERPL-MCNC: 1.5 MG/DL (ref 0.7–1.2)
CRITICAL: ABNORMAL
CULTURE, BLOOD 2: NORMAL
CULTURE, RESPIRATORY: ABNORMAL
CULTURE, RESPIRATORY: ABNORMAL
DATE ANALYZED: ABNORMAL
DATE OF COLLECTION: ABNORMAL
DELIVERY SYSTEMS: ABNORMAL
DEVICE: ABNORMAL
EKG ATRIAL RATE: 102 BPM
EKG ATRIAL RATE: 125 BPM
EKG ATRIAL RATE: 139 BPM
EKG P AXIS: 70 DEGREES
EKG P AXIS: 73 DEGREES
EKG P-R INTERVAL: 154 MS
EKG P-R INTERVAL: 176 MS
EKG Q-T INTERVAL: 280 MS
EKG Q-T INTERVAL: 360 MS
EKG Q-T INTERVAL: 380 MS
EKG QRS DURATION: 92 MS
EKG QRS DURATION: 96 MS
EKG QRS DURATION: 96 MS
EKG QTC CALCULATION (BAZETT): 426 MS
EKG QTC CALCULATION (BAZETT): 469 MS
EKG QTC CALCULATION (BAZETT): 485 MS
EKG R AXIS: -2 DEGREES
EKG R AXIS: 3 DEGREES
EKG T AXIS: 69 DEGREES
EKG T AXIS: 77 DEGREES
EKG T AXIS: 77 DEGREES
EKG VENTRICULAR RATE: 102 BPM
EKG VENTRICULAR RATE: 139 BPM
EKG VENTRICULAR RATE: 98 BPM
EOSINOPHILS ABSOLUTE: 0 E9/L (ref 0.05–0.5)
EOSINOPHILS ABSOLUTE: 0.01 E9/L (ref 0.05–0.5)
EOSINOPHILS ABSOLUTE: 0.11 E9/L (ref 0.05–0.5)
EOSINOPHILS ABSOLUTE: 0.18 E9/L (ref 0.05–0.5)
EOSINOPHILS ABSOLUTE: 0.18 E9/L (ref 0.05–0.5)
EOSINOPHILS ABSOLUTE: 0.2 E9/L (ref 0.05–0.5)
EOSINOPHILS ABSOLUTE: 0.21 E9/L (ref 0.05–0.5)
EOSINOPHILS ABSOLUTE: 0.23 E9/L (ref 0.05–0.5)
EOSINOPHILS ABSOLUTE: 0.29 E9/L (ref 0.05–0.5)
EOSINOPHILS RELATIVE PERCENT: 0 % (ref 0–6)
EOSINOPHILS RELATIVE PERCENT: 0.1 % (ref 0–6)
EOSINOPHILS RELATIVE PERCENT: 0.9 % (ref 0–6)
EOSINOPHILS RELATIVE PERCENT: 1.1 % (ref 0–6)
EOSINOPHILS RELATIVE PERCENT: 1.7 % (ref 0–6)
EPITHELIAL CELLS, UA: ABNORMAL /HPF
FIO2: 100
GFR AFRICAN AMERICAN: 54
GFR AFRICAN AMERICAN: >60
GFR NON-AFRICAN AMERICAN: 45 ML/MIN/1.73
GFR NON-AFRICAN AMERICAN: 58 ML/MIN/1.73
GFR NON-AFRICAN AMERICAN: 58 ML/MIN/1.73
GFR NON-AFRICAN AMERICAN: >60 ML/MIN/1.73
GLUCOSE BLD-MCNC: 101 MG/DL (ref 74–99)
GLUCOSE BLD-MCNC: 108 MG/DL (ref 74–99)
GLUCOSE BLD-MCNC: 113 MG/DL (ref 74–99)
GLUCOSE BLD-MCNC: 118 MG/DL (ref 74–99)
GLUCOSE BLD-MCNC: 127 MG/DL (ref 74–99)
GLUCOSE BLD-MCNC: 138 MG/DL (ref 74–99)
GLUCOSE BLD-MCNC: 141 MG/DL (ref 74–99)
GLUCOSE BLD-MCNC: 146 MG/DL (ref 74–99)
GLUCOSE BLD-MCNC: 158 MG/DL (ref 74–99)
GLUCOSE BLD-MCNC: 163 MG/DL (ref 74–99)
GLUCOSE BLD-MCNC: 169 MG/DL (ref 74–99)
GLUCOSE BLD-MCNC: 88 MG/DL (ref 74–99)
GLUCOSE BLD-MCNC: 89 MG/DL (ref 74–99)
GLUCOSE BLD-MCNC: 91 MG/DL (ref 74–99)
GLUCOSE URINE: NEGATIVE MG/DL
GRAM STAIN ORDERABLE: NORMAL
HCO3: 22.9 MMOL/L (ref 22–26)
HCO3: 24 MMOL/L (ref 22–26)
HCO3: 24.9 MMOL/L (ref 22–26)
HCO3: 25.3 MMOL/L (ref 22–26)
HCT VFR BLD CALC: 37.8 % (ref 37–54)
HCT VFR BLD CALC: 38.1 % (ref 37–54)
HCT VFR BLD CALC: 39.7 % (ref 37–54)
HCT VFR BLD CALC: 40.3 % (ref 37–54)
HCT VFR BLD CALC: 40.5 % (ref 37–54)
HCT VFR BLD CALC: 41.3 % (ref 37–54)
HCT VFR BLD CALC: 42.4 % (ref 37–54)
HCT VFR BLD CALC: 42.6 % (ref 37–54)
HCT VFR BLD CALC: 44 % (ref 37–54)
HCT VFR BLD CALC: 44.5 % (ref 37–54)
HCT VFR BLD CALC: 45.6 % (ref 37–54)
HCT VFR BLD CALC: 46 % (ref 37–54)
HCT VFR BLD CALC: 46.4 % (ref 37–54)
HEMOGLOBIN: 12.1 G/DL (ref 12.5–16.5)
HEMOGLOBIN: 12.3 G/DL (ref 12.5–16.5)
HEMOGLOBIN: 12.6 G/DL (ref 12.5–16.5)
HEMOGLOBIN: 12.9 G/DL (ref 12.5–16.5)
HEMOGLOBIN: 13 G/DL (ref 12.5–16.5)
HEMOGLOBIN: 13 G/DL (ref 12.5–16.5)
HEMOGLOBIN: 13.6 G/DL (ref 12.5–16.5)
HEMOGLOBIN: 13.8 G/DL (ref 12.5–16.5)
HEMOGLOBIN: 13.9 G/DL (ref 12.5–16.5)
HEMOGLOBIN: 14.2 G/DL (ref 12.5–16.5)
HEMOGLOBIN: 14.6 G/DL (ref 12.5–16.5)
HEMOGLOBIN: 14.7 G/DL (ref 12.5–16.5)
HEMOGLOBIN: 14.9 G/DL (ref 12.5–16.5)
HHB: 2.8 % (ref 0–5)
HHB: 6.5 % (ref 0–5)
HHB: 7.1 % (ref 0–5)
HUMAN METAPNEUMOVIRUS BY PCR: NOT DETECTED
HUMAN RHINOVIRUS/ENTEROVIRUS BY PCR: NOT DETECTED
IMMATURE GRANULOCYTES #: 0.1 E9/L
IMMATURE GRANULOCYTES #: 0.11 E9/L
IMMATURE GRANULOCYTES #: 0.2 E9/L
IMMATURE GRANULOCYTES #: 0.26 E9/L
IMMATURE GRANULOCYTES #: 0.26 E9/L
IMMATURE GRANULOCYTES #: 0.33 E9/L
IMMATURE GRANULOCYTES #: 0.42 E9/L
IMMATURE GRANULOCYTES %: 0.5 % (ref 0–5)
IMMATURE GRANULOCYTES %: 0.9 % (ref 0–5)
IMMATURE GRANULOCYTES %: 1.1 % (ref 0–5)
IMMATURE GRANULOCYTES %: 1.4 % (ref 0–5)
IMMATURE GRANULOCYTES %: 1.6 % (ref 0–5)
IMMATURE GRANULOCYTES %: 1.9 % (ref 0–5)
IMMATURE GRANULOCYTES %: 2.2 % (ref 0–5)
INFLUENZA A BY PCR: NOT DETECTED
INFLUENZA A BY PCR: NOT DETECTED
INFLUENZA B BY PCR: NOT DETECTED
INFLUENZA B BY PCR: NOT DETECTED
KETONES, URINE: ABNORMAL MG/DL
L. PNEUMOPHILA SEROGP 1 UR AG: NORMAL
LAB: ABNORMAL
LACTIC ACID, SEPSIS: 2.1 MMOL/L (ref 0.5–1.9)
LACTIC ACID, SEPSIS: 4.3 MMOL/L (ref 0.5–1.9)
LEUKOCYTE ESTERASE, URINE: ABNORMAL
LYMPHOCYTES ABSOLUTE: 0.34 E9/L (ref 1.5–4)
LYMPHOCYTES ABSOLUTE: 0.47 E9/L (ref 1.5–4)
LYMPHOCYTES ABSOLUTE: 0.49 E9/L (ref 1.5–4)
LYMPHOCYTES ABSOLUTE: 0.68 E9/L (ref 1.5–4)
LYMPHOCYTES ABSOLUTE: 0.79 E9/L (ref 1.5–4)
LYMPHOCYTES ABSOLUTE: 0.91 E9/L (ref 1.5–4)
LYMPHOCYTES ABSOLUTE: 0.98 E9/L (ref 1.5–4)
LYMPHOCYTES ABSOLUTE: 1.14 E9/L (ref 1.5–4)
LYMPHOCYTES ABSOLUTE: 1.74 E9/L (ref 1.5–4)
LYMPHOCYTES ABSOLUTE: 1.76 E9/L (ref 1.5–4)
LYMPHOCYTES ABSOLUTE: 1.77 E9/L (ref 1.5–4)
LYMPHOCYTES ABSOLUTE: 2.39 E9/L (ref 1.5–4)
LYMPHOCYTES ABSOLUTE: 2.67 E9/L (ref 1.5–4)
LYMPHOCYTES RELATIVE PERCENT: 1.8 % (ref 20–42)
LYMPHOCYTES RELATIVE PERCENT: 10.7 % (ref 20–42)
LYMPHOCYTES RELATIVE PERCENT: 13 % (ref 20–42)
LYMPHOCYTES RELATIVE PERCENT: 13.9 % (ref 20–42)
LYMPHOCYTES RELATIVE PERCENT: 3.5 % (ref 20–42)
LYMPHOCYTES RELATIVE PERCENT: 4.3 % (ref 20–42)
LYMPHOCYTES RELATIVE PERCENT: 4.3 % (ref 20–42)
LYMPHOCYTES RELATIVE PERCENT: 4.9 % (ref 20–42)
LYMPHOCYTES RELATIVE PERCENT: 5.3 % (ref 20–42)
LYMPHOCYTES RELATIVE PERCENT: 5.7 % (ref 20–42)
LYMPHOCYTES RELATIVE PERCENT: 5.9 % (ref 20–42)
LYMPHOCYTES RELATIVE PERCENT: 7 % (ref 20–42)
LYMPHOCYTES RELATIVE PERCENT: 9.1 % (ref 20–42)
Lab: ABNORMAL
MAGNESIUM: 1.6 MG/DL (ref 1.6–2.6)
MAGNESIUM: 2.1 MG/DL (ref 1.6–2.6)
MAGNESIUM: 2.3 MG/DL (ref 1.6–2.6)
MAGNESIUM: 2.5 MG/DL (ref 1.6–2.6)
MAGNESIUM: 2.6 MG/DL (ref 1.6–2.6)
MCH RBC QN AUTO: 30.1 PG (ref 26–35)
MCH RBC QN AUTO: 30.2 PG (ref 26–35)
MCH RBC QN AUTO: 30.3 PG (ref 26–35)
MCH RBC QN AUTO: 30.4 PG (ref 26–35)
MCH RBC QN AUTO: 30.4 PG (ref 26–35)
MCH RBC QN AUTO: 30.6 PG (ref 26–35)
MCH RBC QN AUTO: 30.6 PG (ref 26–35)
MCH RBC QN AUTO: 30.7 PG (ref 26–35)
MCH RBC QN AUTO: 30.8 PG (ref 26–35)
MCHC RBC AUTO-ENTMCNC: 31.5 % (ref 32–34.5)
MCHC RBC AUTO-ENTMCNC: 31.6 % (ref 32–34.5)
MCHC RBC AUTO-ENTMCNC: 31.7 % (ref 32–34.5)
MCHC RBC AUTO-ENTMCNC: 31.9 % (ref 32–34.5)
MCHC RBC AUTO-ENTMCNC: 32 % (ref 32–34.5)
MCHC RBC AUTO-ENTMCNC: 32.1 % (ref 32–34.5)
MCHC RBC AUTO-ENTMCNC: 32.3 % (ref 32–34.5)
MCHC RBC AUTO-ENTMCNC: 32.4 % (ref 32–34.5)
MCV RBC AUTO: 93.9 FL (ref 80–99.9)
MCV RBC AUTO: 93.9 FL (ref 80–99.9)
MCV RBC AUTO: 94.3 FL (ref 80–99.9)
MCV RBC AUTO: 94.3 FL (ref 80–99.9)
MCV RBC AUTO: 94.6 FL (ref 80–99.9)
MCV RBC AUTO: 94.8 FL (ref 80–99.9)
MCV RBC AUTO: 94.8 FL (ref 80–99.9)
MCV RBC AUTO: 95.1 FL (ref 80–99.9)
MCV RBC AUTO: 95.4 FL (ref 80–99.9)
MCV RBC AUTO: 95.4 FL (ref 80–99.9)
MCV RBC AUTO: 95.5 FL (ref 80–99.9)
MCV RBC AUTO: 95.7 FL (ref 80–99.9)
MCV RBC AUTO: 96 FL (ref 80–99.9)
METAMYELOCYTES RELATIVE PERCENT: 0.9 % (ref 0–1)
METHB: 0.4 % (ref 0–1.5)
METHB: 0.4 % (ref 0–1.5)
METHB: 0.5 % (ref 0–1.5)
MODE: ABNORMAL
MONOCYTES ABSOLUTE: 0.34 E9/L (ref 0.1–0.95)
MONOCYTES ABSOLUTE: 0.35 E9/L (ref 0.1–0.95)
MONOCYTES ABSOLUTE: 0.49 E9/L (ref 0.1–0.95)
MONOCYTES ABSOLUTE: 0.5 E9/L (ref 0.1–0.95)
MONOCYTES ABSOLUTE: 0.61 E9/L (ref 0.1–0.95)
MONOCYTES ABSOLUTE: 0.68 E9/L (ref 0.1–0.95)
MONOCYTES ABSOLUTE: 1.26 E9/L (ref 0.1–0.95)
MONOCYTES ABSOLUTE: 1.28 E9/L (ref 0.1–0.95)
MONOCYTES ABSOLUTE: 1.33 E9/L (ref 0.1–0.95)
MONOCYTES ABSOLUTE: 1.36 E9/L (ref 0.1–0.95)
MONOCYTES ABSOLUTE: 1.43 E9/L (ref 0.1–0.95)
MONOCYTES ABSOLUTE: 1.64 E9/L (ref 0.1–0.95)
MONOCYTES ABSOLUTE: 1.71 E9/L (ref 0.1–0.95)
MONOCYTES RELATIVE PERCENT: 1.8 % (ref 2–12)
MONOCYTES RELATIVE PERCENT: 1.8 % (ref 2–12)
MONOCYTES RELATIVE PERCENT: 10.4 % (ref 2–12)
MONOCYTES RELATIVE PERCENT: 2.6 % (ref 2–12)
MONOCYTES RELATIVE PERCENT: 3.4 % (ref 2–12)
MONOCYTES RELATIVE PERCENT: 3.5 % (ref 2–12)
MONOCYTES RELATIVE PERCENT: 5.3 % (ref 2–12)
MONOCYTES RELATIVE PERCENT: 6.6 % (ref 2–12)
MONOCYTES RELATIVE PERCENT: 7.1 % (ref 2–12)
MONOCYTES RELATIVE PERCENT: 7.6 % (ref 2–12)
MONOCYTES RELATIVE PERCENT: 7.7 % (ref 2–12)
MONOCYTES RELATIVE PERCENT: 7.7 % (ref 2–12)
MONOCYTES RELATIVE PERCENT: 7.8 % (ref 2–12)
MRSA CULTURE ONLY: NORMAL
MYCOPLASMA PNEUMONIAE BY PCR: NOT DETECTED
NEUTROPHILS ABSOLUTE: 10.97 E9/L (ref 1.8–7.3)
NEUTROPHILS ABSOLUTE: 11.22 E9/L (ref 1.8–7.3)
NEUTROPHILS ABSOLUTE: 11.28 E9/L (ref 1.8–7.3)
NEUTROPHILS ABSOLUTE: 12.65 E9/L (ref 1.8–7.3)
NEUTROPHILS ABSOLUTE: 13.02 E9/L (ref 1.8–7.3)
NEUTROPHILS ABSOLUTE: 14.56 E9/L (ref 1.8–7.3)
NEUTROPHILS ABSOLUTE: 15.54 E9/L (ref 1.8–7.3)
NEUTROPHILS ABSOLUTE: 15.82 E9/L (ref 1.8–7.3)
NEUTROPHILS ABSOLUTE: 15.99 E9/L (ref 1.8–7.3)
NEUTROPHILS ABSOLUTE: 16.3 E9/L (ref 1.8–7.3)
NEUTROPHILS ABSOLUTE: 16.38 E9/L (ref 1.8–7.3)
NEUTROPHILS ABSOLUTE: 16.69 E9/L (ref 1.8–7.3)
NEUTROPHILS ABSOLUTE: 22.68 E9/L (ref 1.8–7.3)
NEUTROPHILS RELATIVE PERCENT: 73.9 % (ref 43–80)
NEUTROPHILS RELATIVE PERCENT: 77.4 % (ref 43–80)
NEUTROPHILS RELATIVE PERCENT: 78.7 % (ref 43–80)
NEUTROPHILS RELATIVE PERCENT: 81.1 % (ref 43–80)
NEUTROPHILS RELATIVE PERCENT: 84.5 % (ref 43–80)
NEUTROPHILS RELATIVE PERCENT: 85 % (ref 43–80)
NEUTROPHILS RELATIVE PERCENT: 85.1 % (ref 43–80)
NEUTROPHILS RELATIVE PERCENT: 88.2 % (ref 43–80)
NEUTROPHILS RELATIVE PERCENT: 90.4 % (ref 43–80)
NEUTROPHILS RELATIVE PERCENT: 91.7 % (ref 43–80)
NEUTROPHILS RELATIVE PERCENT: 92.2 % (ref 43–80)
NEUTROPHILS RELATIVE PERCENT: 93 % (ref 43–80)
NEUTROPHILS RELATIVE PERCENT: 96.5 % (ref 43–80)
NITRITE, URINE: POSITIVE
O2 CONTENT: 17.5 ML/DL
O2 CONTENT: 19.7 ML/DL
O2 CONTENT: 19.9 ML/DL
O2 SATURATION: 100 % (ref 92–98.5)
O2 SATURATION: 92.8 % (ref 92–98.5)
O2 SATURATION: 93.5 % (ref 92–98.5)
O2 SATURATION: 97.2 % (ref 92–98.5)
O2HB: 92.2 % (ref 94–97)
O2HB: 93.1 % (ref 94–97)
O2HB: 96.4 % (ref 94–97)
OPERATOR ID: 2321
OPERATOR ID: 2323
OPERATOR ID: 30
OPERATOR ID: ABNORMAL
ORGANISM: ABNORMAL
OVALOCYTES: ABNORMAL
PARAINFLUENZA VIRUS 1 BY PCR: NOT DETECTED
PARAINFLUENZA VIRUS 2 BY PCR: NOT DETECTED
PARAINFLUENZA VIRUS 3 BY PCR: NOT DETECTED
PARAINFLUENZA VIRUS 4 BY PCR: NOT DETECTED
PATIENT TEMP: 37 C
PCO2 37: 35.3 MMHG (ref 35–45)
PCO2: 31.9 MMHG (ref 35–45)
PCO2: 32.6 MMHG (ref 35–45)
PCO2: 33.6 MMHG (ref 35–45)
PDW BLD-RTO: 13.7 FL (ref 11.5–15)
PDW BLD-RTO: 13.8 FL (ref 11.5–15)
PDW BLD-RTO: 13.8 FL (ref 11.5–15)
PDW BLD-RTO: 13.9 FL (ref 11.5–15)
PDW BLD-RTO: 13.9 FL (ref 11.5–15)
PDW BLD-RTO: 14 FL (ref 11.5–15)
PDW BLD-RTO: 14.1 FL (ref 11.5–15)
PDW BLD-RTO: 14.1 FL (ref 11.5–15)
PDW BLD-RTO: 14.2 FL (ref 11.5–15)
PDW BLD-RTO: 14.3 FL (ref 11.5–15)
PEEP/CPAP: 8 CMH2O
PH 37: 7.46 (ref 7.35–7.45)
PH BLOOD GAS: 7.47 (ref 7.35–7.45)
PH BLOOD GAS: 7.48 (ref 7.35–7.45)
PH BLOOD GAS: 7.5 (ref 7.35–7.45)
PH UA: 5 (ref 5–9)
PHOSPHORUS: 1.6 MG/DL (ref 2.5–4.5)
PHOSPHORUS: 2.4 MG/DL (ref 2.5–4.5)
PHOSPHORUS: 2.5 MG/DL (ref 2.5–4.5)
PHOSPHORUS: 2.6 MG/DL (ref 2.5–4.5)
PHOSPHORUS: 2.9 MG/DL (ref 2.5–4.5)
PLATELET # BLD: 127 E9/L (ref 130–450)
PLATELET # BLD: 131 E9/L (ref 130–450)
PLATELET # BLD: 139 E9/L (ref 130–450)
PLATELET # BLD: 152 E9/L (ref 130–450)
PLATELET # BLD: 160 E9/L (ref 130–450)
PLATELET # BLD: 169 E9/L (ref 130–450)
PLATELET # BLD: 174 E9/L (ref 130–450)
PLATELET # BLD: 178 E9/L (ref 130–450)
PLATELET # BLD: 191 E9/L (ref 130–450)
PLATELET # BLD: 205 E9/L (ref 130–450)
PLATELET # BLD: 206 E9/L (ref 130–450)
PLATELET # BLD: 221 E9/L (ref 130–450)
PLATELET # BLD: 227 E9/L (ref 130–450)
PMV BLD AUTO: 10.5 FL (ref 7–12)
PMV BLD AUTO: 10.5 FL (ref 7–12)
PMV BLD AUTO: 10.7 FL (ref 7–12)
PMV BLD AUTO: 11 FL (ref 7–12)
PMV BLD AUTO: 11.1 FL (ref 7–12)
PMV BLD AUTO: 12.1 FL (ref 7–12)
PMV BLD AUTO: 12.1 FL (ref 7–12)
PMV BLD AUTO: 12.5 FL (ref 7–12)
PMV BLD AUTO: 12.6 FL (ref 7–12)
PMV BLD AUTO: 12.6 FL (ref 7–12)
PMV BLD AUTO: 13 FL (ref 7–12)
PO2 37: 339.3 MMHG (ref 60–80)
PO2: 66.5 MMHG (ref 75–100)
PO2: 67.7 MMHG (ref 75–100)
PO2: 94.4 MMHG (ref 75–100)
POC SOURCE: ABNORMAL
POIKILOCYTES: ABNORMAL
POLYCHROMASIA: ABNORMAL
POTASSIUM REFLEX MAGNESIUM: 4.3 MMOL/L (ref 3.5–5)
POTASSIUM SERPL-SCNC: 3.3 MMOL/L (ref 3.5–5)
POTASSIUM SERPL-SCNC: 3.5 MMOL/L (ref 3.5–5)
POTASSIUM SERPL-SCNC: 3.7 MMOL/L (ref 3.5–5)
POTASSIUM SERPL-SCNC: 3.9 MMOL/L (ref 3.5–5)
POTASSIUM SERPL-SCNC: 3.9 MMOL/L (ref 3.5–5)
POTASSIUM SERPL-SCNC: 4 MMOL/L (ref 3.5–5)
POTASSIUM SERPL-SCNC: 4.1 MMOL/L (ref 3.5–5)
POTASSIUM SERPL-SCNC: 4.4 MMOL/L (ref 3.5–5)
POTASSIUM SERPL-SCNC: 4.8 MMOL/L (ref 3.5–5)
PRO-BNP: 320 PG/ML (ref 0–450)
PROCALCITONIN: 0.14 NG/ML (ref 0–0.08)
PROCALCITONIN: 0.2 NG/ML (ref 0–0.08)
PROSTATE SPECIFIC ANTIGEN: 3.33 NG/ML (ref 0–4)
PROTEIN UA: >=300 MG/DL
PS: 4 CMH20
RBC # BLD: 3.95 E12/L (ref 3.8–5.8)
RBC # BLD: 4.02 E12/L (ref 3.8–5.8)
RBC # BLD: 4.16 E12/L (ref 3.8–5.8)
RBC # BLD: 4.24 E12/L (ref 3.8–5.8)
RBC # BLD: 4.29 E12/L (ref 3.8–5.8)
RBC # BLD: 4.3 E12/L (ref 3.8–5.8)
RBC # BLD: 4.48 E12/L (ref 3.8–5.8)
RBC # BLD: 4.48 E12/L (ref 3.8–5.8)
RBC # BLD: 4.61 E12/L (ref 3.8–5.8)
RBC # BLD: 4.72 E12/L (ref 3.8–5.8)
RBC # BLD: 4.81 E12/L (ref 3.8–5.8)
RBC # BLD: 4.88 E12/L (ref 3.8–5.8)
RBC # BLD: 4.94 E12/L (ref 3.8–5.8)
RBC UA: >20 /HPF (ref 0–2)
RESPIRATORY SYNCYTIAL VIRUS BY PCR: NOT DETECTED
SARS-COV-2, NAAT: NOT DETECTED
SARS-COV-2, PCR: NOT DETECTED
SCHISTOCYTES: ABNORMAL
SMEAR, RESPIRATORY: ABNORMAL
SODIUM BLD-SCNC: 137 MMOL/L (ref 132–146)
SODIUM BLD-SCNC: 138 MMOL/L (ref 132–146)
SODIUM BLD-SCNC: 139 MMOL/L (ref 132–146)
SODIUM BLD-SCNC: 141 MMOL/L (ref 132–146)
SODIUM BLD-SCNC: 141 MMOL/L (ref 132–146)
SODIUM BLD-SCNC: 142 MMOL/L (ref 132–146)
SODIUM BLD-SCNC: 143 MMOL/L (ref 132–146)
SODIUM BLD-SCNC: 144 MMOL/L (ref 132–146)
SODIUM BLD-SCNC: 144 MMOL/L (ref 132–146)
SODIUM BLD-SCNC: 145 MMOL/L (ref 132–146)
SODIUM BLD-SCNC: 145 MMOL/L (ref 132–146)
SODIUM BLD-SCNC: 146 MMOL/L (ref 132–146)
SOURCE, BLOOD GAS: ABNORMAL
SPECIFIC GRAVITY UA: 1.02 (ref 1–1.03)
STREP PNEUMONIAE ANTIGEN, URINE: NORMAL
T4 FREE: 0.97 NG/DL (ref 0.93–1.7)
THB: 13.5 G/DL (ref 11.5–16.5)
THB: 14.5 G/DL (ref 11.5–16.5)
THB: 15.2 G/DL (ref 11.5–16.5)
THEOPHYLLINE LEVEL: 2.3 MCG/ML (ref 10–20)
THEOPHYLLINE LEVEL: 4 MCG/ML (ref 10–20)
TIME ANALYZED: 427
TIME ANALYZED: 450
TIME ANALYZED: 927
TOTAL PROTEIN: 5.3 G/DL (ref 6.4–8.3)
TOTAL PROTEIN: 5.4 G/DL (ref 6.4–8.3)
TOTAL PROTEIN: 5.6 G/DL (ref 6.4–8.3)
TOTAL PROTEIN: 5.7 G/DL (ref 6.4–8.3)
TOTAL PROTEIN: 5.8 G/DL (ref 6.4–8.3)
TOTAL PROTEIN: 6 G/DL (ref 6.4–8.3)
TOTAL PROTEIN: 6.1 G/DL (ref 6.4–8.3)
TOTAL PROTEIN: 6.3 G/DL (ref 6.4–8.3)
TOTAL PROTEIN: 6.4 G/DL (ref 6.4–8.3)
TOTAL PROTEIN: 6.7 G/DL (ref 6.4–8.3)
TROPONIN, HIGH SENSITIVITY: 74 NG/L (ref 0–11)
TROPONIN, HIGH SENSITIVITY: 77 NG/L (ref 0–11)
TROPONIN, HIGH SENSITIVITY: 92 NG/L (ref 0–11)
TSH SERPL DL<=0.05 MIU/L-ACNC: 3.16 UIU/ML (ref 0.27–4.2)
URINE CULTURE, ROUTINE: NORMAL
UROBILINOGEN, URINE: 1 E.U./DL
VANCOMYCIN TROUGH: 10.7 MCG/ML (ref 5–16)
WBC # BLD: 11.8 E9/L (ref 4.5–11.5)
WBC # BLD: 12.2 E9/L (ref 4.5–11.5)
WBC # BLD: 12.8 E9/L (ref 4.5–11.5)
WBC # BLD: 16.5 E9/L (ref 4.5–11.5)
WBC # BLD: 16.8 E9/L (ref 4.5–11.5)
WBC # BLD: 17.1 E9/L (ref 4.5–11.5)
WBC # BLD: 17.2 E9/L (ref 4.5–11.5)
WBC # BLD: 18.2 E9/L (ref 4.5–11.5)
WBC # BLD: 18.6 E9/L (ref 4.5–11.5)
WBC # BLD: 19.2 E9/L (ref 4.5–11.5)
WBC # BLD: 19.3 E9/L (ref 4.5–11.5)
WBC # BLD: 20.5 E9/L (ref 4.5–11.5)
WBC # BLD: 25.2 E9/L (ref 4.5–11.5)
WBC UA: ABNORMAL /HPF (ref 0–5)

## 2022-01-01 PROCEDURE — 6360000002 HC RX W HCPCS: Performed by: INTERNAL MEDICINE

## 2022-01-01 PROCEDURE — 36415 COLL VENOUS BLD VENIPUNCTURE: CPT

## 2022-01-01 PROCEDURE — 2500000003 HC RX 250 WO HCPCS: Performed by: STUDENT IN AN ORGANIZED HEALTH CARE EDUCATION/TRAINING PROGRAM

## 2022-01-01 PROCEDURE — 96374 THER/PROPH/DIAG INJ IV PUSH: CPT

## 2022-01-01 PROCEDURE — 36592 COLLECT BLOOD FROM PICC: CPT

## 2022-01-01 PROCEDURE — 94640 AIRWAY INHALATION TREATMENT: CPT

## 2022-01-01 PROCEDURE — 80198 ASSAY OF THEOPHYLLINE: CPT

## 2022-01-01 PROCEDURE — 2500000003 HC RX 250 WO HCPCS: Performed by: NURSE PRACTITIONER

## 2022-01-01 PROCEDURE — 80053 COMPREHEN METABOLIC PANEL: CPT

## 2022-01-01 PROCEDURE — 94660 CPAP INITIATION&MGMT: CPT

## 2022-01-01 PROCEDURE — 82805 BLOOD GASES W/O2 SATURATION: CPT

## 2022-01-01 PROCEDURE — 6370000000 HC RX 637 (ALT 250 FOR IP): Performed by: INTERNAL MEDICINE

## 2022-01-01 PROCEDURE — 84145 PROCALCITONIN (PCT): CPT

## 2022-01-01 PROCEDURE — 99222 1ST HOSP IP/OBS MODERATE 55: CPT

## 2022-01-01 PROCEDURE — 99233 SBSQ HOSP IP/OBS HIGH 50: CPT | Performed by: STUDENT IN AN ORGANIZED HEALTH CARE EDUCATION/TRAINING PROGRAM

## 2022-01-01 PROCEDURE — 2060000000 HC ICU INTERMEDIATE R&B

## 2022-01-01 PROCEDURE — 2580000003 HC RX 258: Performed by: INTERNAL MEDICINE

## 2022-01-01 PROCEDURE — 87040 BLOOD CULTURE FOR BACTERIA: CPT

## 2022-01-01 PROCEDURE — 85025 COMPLETE CBC W/AUTO DIFF WBC: CPT

## 2022-01-01 PROCEDURE — 2500000003 HC RX 250 WO HCPCS

## 2022-01-01 PROCEDURE — 97112 NEUROMUSCULAR REEDUCATION: CPT | Performed by: PHYSICAL THERAPIST

## 2022-01-01 PROCEDURE — 2720000010 HC SURG SUPPLY STERILE

## 2022-01-01 PROCEDURE — 83735 ASSAY OF MAGNESIUM: CPT

## 2022-01-01 PROCEDURE — 2000000000 HC ICU R&B

## 2022-01-01 PROCEDURE — 36600 WITHDRAWAL OF ARTERIAL BLOOD: CPT

## 2022-01-01 PROCEDURE — 84443 ASSAY THYROID STIM HORMONE: CPT

## 2022-01-01 PROCEDURE — 2580000003 HC RX 258

## 2022-01-01 PROCEDURE — 88112 CYTOPATH CELL ENHANCE TECH: CPT

## 2022-01-01 PROCEDURE — 87205 SMEAR GRAM STAIN: CPT

## 2022-01-01 PROCEDURE — 2700000000 HC OXYGEN THERAPY PER DAY

## 2022-01-01 PROCEDURE — 94669 MECHANICAL CHEST WALL OSCILL: CPT

## 2022-01-01 PROCEDURE — 99222 1ST HOSP IP/OBS MODERATE 55: CPT | Performed by: SURGERY

## 2022-01-01 PROCEDURE — 80202 ASSAY OF VANCOMYCIN: CPT

## 2022-01-01 PROCEDURE — 2580000003 HC RX 258: Performed by: NURSE PRACTITIONER

## 2022-01-01 PROCEDURE — 76937 US GUIDE VASCULAR ACCESS: CPT

## 2022-01-01 PROCEDURE — 84100 ASSAY OF PHOSPHORUS: CPT

## 2022-01-01 PROCEDURE — 2500000003 HC RX 250 WO HCPCS: Performed by: INTERNAL MEDICINE

## 2022-01-01 PROCEDURE — 87449 NOS EACH ORGANISM AG IA: CPT

## 2022-01-01 PROCEDURE — 2580000003 HC RX 258: Performed by: STUDENT IN AN ORGANIZED HEALTH CARE EDUCATION/TRAINING PROGRAM

## 2022-01-01 PROCEDURE — 74176 CT ABD & PELVIS W/O CONTRAST: CPT

## 2022-01-01 PROCEDURE — 6360000002 HC RX W HCPCS

## 2022-01-01 PROCEDURE — 97530 THERAPEUTIC ACTIVITIES: CPT

## 2022-01-01 PROCEDURE — 99233 SBSQ HOSP IP/OBS HIGH 50: CPT

## 2022-01-01 PROCEDURE — 71045 X-RAY EXAM CHEST 1 VIEW: CPT

## 2022-01-01 PROCEDURE — 87502 INFLUENZA DNA AMP PROBE: CPT

## 2022-01-01 PROCEDURE — 81001 URINALYSIS AUTO W/SCOPE: CPT

## 2022-01-01 PROCEDURE — 92610 EVALUATE SWALLOWING FUNCTION: CPT | Performed by: SPEECH-LANGUAGE PATHOLOGIST

## 2022-01-01 PROCEDURE — 99285 EMERGENCY DEPT VISIT HI MDM: CPT

## 2022-01-01 PROCEDURE — 97161 PT EVAL LOW COMPLEX 20 MIN: CPT | Performed by: PHYSICAL THERAPIST

## 2022-01-01 PROCEDURE — 87077 CULTURE AEROBIC IDENTIFY: CPT

## 2022-01-01 PROCEDURE — 84484 ASSAY OF TROPONIN QUANT: CPT

## 2022-01-01 PROCEDURE — 87088 URINE BACTERIA CULTURE: CPT

## 2022-01-01 PROCEDURE — 87206 SMEAR FLUORESCENT/ACID STAI: CPT

## 2022-01-01 PROCEDURE — 94667 MNPJ CHEST WALL 1ST: CPT

## 2022-01-01 PROCEDURE — 93005 ELECTROCARDIOGRAM TRACING: CPT | Performed by: STUDENT IN AN ORGANIZED HEALTH CARE EDUCATION/TRAINING PROGRAM

## 2022-01-01 PROCEDURE — 87324 CLOSTRIDIUM AG IA: CPT

## 2022-01-01 PROCEDURE — G0103 PSA SCREENING: HCPCS

## 2022-01-01 PROCEDURE — 87635 SARS-COV-2 COVID-19 AMP PRB: CPT

## 2022-01-01 PROCEDURE — 97530 THERAPEUTIC ACTIVITIES: CPT | Performed by: OCCUPATIONAL THERAPIST

## 2022-01-01 PROCEDURE — 2709999900 HC NON-CHARGEABLE SUPPLY

## 2022-01-01 PROCEDURE — A4216 STERILE WATER/SALINE, 10 ML: HCPCS | Performed by: NURSE PRACTITIONER

## 2022-01-01 PROCEDURE — 83605 ASSAY OF LACTIC ACID: CPT

## 2022-01-01 PROCEDURE — 6360000002 HC RX W HCPCS: Performed by: NURSE PRACTITIONER

## 2022-01-01 PROCEDURE — 94668 MNPJ CHEST WALL SBSQ: CPT

## 2022-01-01 PROCEDURE — 36569 INSJ PICC 5 YR+ W/O IMAGING: CPT

## 2022-01-01 PROCEDURE — 02HV33Z INSERTION OF INFUSION DEVICE INTO SUPERIOR VENA CAVA, PERCUTANEOUS APPROACH: ICD-10-PCS | Performed by: INTERNAL MEDICINE

## 2022-01-01 PROCEDURE — 82803 BLOOD GASES ANY COMBINATION: CPT

## 2022-01-01 PROCEDURE — 6370000000 HC RX 637 (ALT 250 FOR IP): Performed by: STUDENT IN AN ORGANIZED HEALTH CARE EDUCATION/TRAINING PROGRAM

## 2022-01-01 PROCEDURE — 6360000002 HC RX W HCPCS: Performed by: STUDENT IN AN ORGANIZED HEALTH CARE EDUCATION/TRAINING PROGRAM

## 2022-01-01 PROCEDURE — 96365 THER/PROPH/DIAG IV INF INIT: CPT

## 2022-01-01 PROCEDURE — 87070 CULTURE OTHR SPECIMN AEROBIC: CPT

## 2022-01-01 PROCEDURE — 84439 ASSAY OF FREE THYROXINE: CPT

## 2022-01-01 PROCEDURE — 83880 ASSAY OF NATRIURETIC PEPTIDE: CPT

## 2022-01-01 PROCEDURE — 87081 CULTURE SCREEN ONLY: CPT

## 2022-01-01 PROCEDURE — 87186 SC STD MICRODIL/AGAR DIL: CPT

## 2022-01-01 PROCEDURE — C1751 CATH, INF, PER/CENT/MIDLINE: HCPCS

## 2022-01-01 PROCEDURE — 74018 RADEX ABDOMEN 1 VIEW: CPT

## 2022-01-01 PROCEDURE — 97530 THERAPEUTIC ACTIVITIES: CPT | Performed by: PHYSICAL THERAPIST

## 2022-01-01 PROCEDURE — 97535 SELF CARE MNGMENT TRAINING: CPT | Performed by: OCCUPATIONAL THERAPIST

## 2022-01-01 PROCEDURE — 51798 US URINE CAPACITY MEASURE: CPT

## 2022-01-01 PROCEDURE — 0202U NFCT DS 22 TRGT SARS-COV-2: CPT

## 2022-01-01 PROCEDURE — 97165 OT EVAL LOW COMPLEX 30 MIN: CPT

## 2022-01-01 PROCEDURE — 96361 HYDRATE IV INFUSION ADD-ON: CPT

## 2022-01-01 PROCEDURE — 96367 TX/PROPH/DG ADDL SEQ IV INF: CPT

## 2022-01-01 PROCEDURE — 93005 ELECTROCARDIOGRAM TRACING: CPT | Performed by: INTERNAL MEDICINE

## 2022-01-01 RX ORDER — PREDNISONE 10 MG/1
20 TABLET ORAL DAILY
Status: DISCONTINUED | OUTPATIENT
Start: 2022-01-01 | End: 2022-01-01

## 2022-01-01 RX ORDER — MORPHINE SULFATE 2 MG/ML
2 INJECTION, SOLUTION INTRAMUSCULAR; INTRAVENOUS EVERY 4 HOURS PRN
Status: DISCONTINUED | OUTPATIENT
Start: 2022-01-01 | End: 2022-01-01 | Stop reason: HOSPADM

## 2022-01-01 RX ORDER — DILTIAZEM HYDROCHLORIDE 240 MG/1
240 CAPSULE, COATED, EXTENDED RELEASE ORAL DAILY
Status: DISCONTINUED | OUTPATIENT
Start: 2022-01-01 | End: 2022-01-01

## 2022-01-01 RX ORDER — HALOPERIDOL 5 MG/ML
2 INJECTION INTRAMUSCULAR ONCE
Status: COMPLETED | OUTPATIENT
Start: 2022-01-01 | End: 2022-01-01

## 2022-01-01 RX ORDER — HYDRALAZINE HYDROCHLORIDE 20 MG/ML
5 INJECTION INTRAMUSCULAR; INTRAVENOUS ONCE
Status: COMPLETED | OUTPATIENT
Start: 2022-01-01 | End: 2022-01-01

## 2022-01-01 RX ORDER — HEPARIN SODIUM (PORCINE) LOCK FLUSH IV SOLN 100 UNIT/ML 100 UNIT/ML
1 SOLUTION INTRAVENOUS EVERY 12 HOURS SCHEDULED
Status: DISCONTINUED | OUTPATIENT
Start: 2022-01-01 | End: 2022-01-01

## 2022-01-01 RX ORDER — LORAZEPAM 2 MG/ML
1 INJECTION INTRAMUSCULAR EVERY 4 HOURS PRN
Status: DISCONTINUED | OUTPATIENT
Start: 2022-01-01 | End: 2022-01-01

## 2022-01-01 RX ORDER — SODIUM CHLORIDE 0.9 % (FLUSH) 0.9 %
5-40 SYRINGE (ML) INJECTION EVERY 12 HOURS SCHEDULED
Status: DISCONTINUED | OUTPATIENT
Start: 2022-01-01 | End: 2022-01-01

## 2022-01-01 RX ORDER — ACETAMINOPHEN 325 MG/1
650 TABLET ORAL EVERY 6 HOURS PRN
Status: DISCONTINUED | OUTPATIENT
Start: 2022-01-01 | End: 2022-01-01

## 2022-01-01 RX ORDER — PREDNISONE 10 MG/1
TABLET ORAL
COMMUNITY

## 2022-01-01 RX ORDER — IPRATROPIUM BROMIDE AND ALBUTEROL SULFATE 2.5; .5 MG/3ML; MG/3ML
1 SOLUTION RESPIRATORY (INHALATION) ONCE
Status: COMPLETED | OUTPATIENT
Start: 2022-01-01 | End: 2022-01-01

## 2022-01-01 RX ORDER — SODIUM CHLORIDE 9 MG/ML
INJECTION, SOLUTION INTRAVENOUS
Status: COMPLETED
Start: 2022-01-01 | End: 2022-01-01

## 2022-01-01 RX ORDER — SODIUM CHLORIDE 9 MG/ML
INJECTION, SOLUTION INTRAVENOUS PRN
Status: DISCONTINUED | OUTPATIENT
Start: 2022-01-01 | End: 2022-01-01

## 2022-01-01 RX ORDER — 0.9 % SODIUM CHLORIDE 0.9 %
250 INTRAVENOUS SOLUTION INTRAVENOUS ONCE
Status: COMPLETED | OUTPATIENT
Start: 2022-01-01 | End: 2022-01-01

## 2022-01-01 RX ORDER — ZIPRASIDONE MESYLATE 20 MG/ML
5 INJECTION, POWDER, LYOPHILIZED, FOR SOLUTION INTRAMUSCULAR ONCE
Status: COMPLETED | OUTPATIENT
Start: 2022-01-01 | End: 2022-01-01

## 2022-01-01 RX ORDER — FAMOTIDINE 20 MG/1
20 TABLET, FILM COATED ORAL 2 TIMES DAILY
Status: DISCONTINUED | OUTPATIENT
Start: 2022-01-01 | End: 2022-01-01

## 2022-01-01 RX ORDER — FUROSEMIDE 10 MG/ML
40 INJECTION INTRAMUSCULAR; INTRAVENOUS ONCE
Status: COMPLETED | OUTPATIENT
Start: 2022-01-01 | End: 2022-01-01

## 2022-01-01 RX ORDER — HEPARIN SODIUM (PORCINE) LOCK FLUSH IV SOLN 100 UNIT/ML 100 UNIT/ML
3 SOLUTION INTRAVENOUS PRN
Status: DISCONTINUED | OUTPATIENT
Start: 2022-01-01 | End: 2022-01-01 | Stop reason: HOSPADM

## 2022-01-01 RX ORDER — SULFAMETHOXAZOLE AND TRIMETHOPRIM 800; 160 MG/1; MG/1
1 TABLET ORAL EVERY 12 HOURS SCHEDULED
Status: DISCONTINUED | OUTPATIENT
Start: 2022-01-01 | End: 2022-01-01

## 2022-01-01 RX ORDER — GLYCOPYRROLATE 0.2 MG/ML
0.4 INJECTION INTRAMUSCULAR; INTRAVENOUS 4 TIMES DAILY PRN
Status: DISCONTINUED | OUTPATIENT
Start: 2022-01-01 | End: 2022-01-01 | Stop reason: HOSPADM

## 2022-01-01 RX ORDER — SODIUM CHLORIDE 0.9 % (FLUSH) 0.9 %
5-40 SYRINGE (ML) INJECTION PRN
Status: DISCONTINUED | OUTPATIENT
Start: 2022-01-01 | End: 2022-01-01

## 2022-01-01 RX ORDER — ALBUTEROL SULFATE 2.5 MG/3ML
2.5 SOLUTION RESPIRATORY (INHALATION)
Status: DISCONTINUED | OUTPATIENT
Start: 2022-01-01 | End: 2022-01-01

## 2022-01-01 RX ORDER — 0.9 % SODIUM CHLORIDE 0.9 %
500 INTRAVENOUS SOLUTION INTRAVENOUS ONCE
Status: COMPLETED | OUTPATIENT
Start: 2022-01-01 | End: 2022-01-01

## 2022-01-01 RX ORDER — SODIUM CHLORIDE, SODIUM LACTATE, POTASSIUM CHLORIDE, CALCIUM CHLORIDE 600; 310; 30; 20 MG/100ML; MG/100ML; MG/100ML; MG/100ML
INJECTION, SOLUTION INTRAVENOUS CONTINUOUS
Status: ACTIVE | OUTPATIENT
Start: 2022-01-01 | End: 2022-01-01

## 2022-01-01 RX ORDER — ZIPRASIDONE MESYLATE 20 MG/ML
INJECTION, POWDER, LYOPHILIZED, FOR SOLUTION INTRAMUSCULAR
Status: DISCONTINUED
Start: 2022-01-01 | End: 2022-01-01

## 2022-01-01 RX ORDER — METHYLPREDNISOLONE SODIUM SUCCINATE 40 MG/ML
40 INJECTION, POWDER, LYOPHILIZED, FOR SOLUTION INTRAMUSCULAR; INTRAVENOUS EVERY 8 HOURS
Status: DISCONTINUED | OUTPATIENT
Start: 2022-01-01 | End: 2022-01-01

## 2022-01-01 RX ORDER — DEXAMETHASONE SODIUM PHOSPHATE 4 MG/ML
4 INJECTION, SOLUTION INTRA-ARTICULAR; INTRALESIONAL; INTRAMUSCULAR; INTRAVENOUS; SOFT TISSUE ONCE
Status: COMPLETED | OUTPATIENT
Start: 2022-01-01 | End: 2022-01-01

## 2022-01-01 RX ORDER — DEXMEDETOMIDINE HYDROCHLORIDE 4 UG/ML
.1-1.5 INJECTION, SOLUTION INTRAVENOUS CONTINUOUS
Status: DISCONTINUED | OUTPATIENT
Start: 2022-01-01 | End: 2022-01-01

## 2022-01-01 RX ORDER — SODIUM CHLORIDE, SODIUM LACTATE, POTASSIUM CHLORIDE, CALCIUM CHLORIDE 600; 310; 30; 20 MG/100ML; MG/100ML; MG/100ML; MG/100ML
INJECTION, SOLUTION INTRAVENOUS CONTINUOUS
Status: DISCONTINUED | OUTPATIENT
Start: 2022-01-01 | End: 2022-01-01

## 2022-01-01 RX ORDER — HEPARIN SODIUM (PORCINE) LOCK FLUSH IV SOLN 100 UNIT/ML 100 UNIT/ML
1 SOLUTION INTRAVENOUS PRN
Status: DISCONTINUED | OUTPATIENT
Start: 2022-01-01 | End: 2022-01-01 | Stop reason: HOSPADM

## 2022-01-01 RX ORDER — ACETYLCYSTEINE 200 MG/ML
600 SOLUTION ORAL; RESPIRATORY (INHALATION) EVERY 8 HOURS
Status: DISCONTINUED | OUTPATIENT
Start: 2022-01-01 | End: 2022-01-01

## 2022-01-01 RX ORDER — ARFORMOTEROL TARTRATE 15 UG/2ML
15 SOLUTION RESPIRATORY (INHALATION) 2 TIMES DAILY
Status: DISCONTINUED | OUTPATIENT
Start: 2022-01-01 | End: 2022-01-01

## 2022-01-01 RX ORDER — LORAZEPAM 2 MG/ML
0.5 INJECTION INTRAMUSCULAR EVERY 6 HOURS PRN
Status: DISCONTINUED | OUTPATIENT
Start: 2022-01-01 | End: 2022-01-01

## 2022-01-01 RX ORDER — DEXAMETHASONE SODIUM PHOSPHATE 4 MG/ML
4 INJECTION, SOLUTION INTRA-ARTICULAR; INTRALESIONAL; INTRAMUSCULAR; INTRAVENOUS; SOFT TISSUE EVERY 8 HOURS
Status: DISCONTINUED | OUTPATIENT
Start: 2022-01-01 | End: 2022-01-01

## 2022-01-01 RX ORDER — TRAMADOL HYDROCHLORIDE 50 MG/1
50 TABLET ORAL EVERY 6 HOURS PRN
Status: DISCONTINUED | OUTPATIENT
Start: 2022-01-01 | End: 2022-01-01

## 2022-01-01 RX ORDER — LORAZEPAM 2 MG/ML
1 INJECTION INTRAMUSCULAR ONCE
Status: COMPLETED | OUTPATIENT
Start: 2022-01-01 | End: 2022-01-01

## 2022-01-01 RX ORDER — LEVALBUTEROL INHALATION SOLUTION 0.63 MG/3ML
0.63 SOLUTION RESPIRATORY (INHALATION) EVERY 4 HOURS
Status: DISCONTINUED | OUTPATIENT
Start: 2022-01-01 | End: 2022-01-01

## 2022-01-01 RX ORDER — LORAZEPAM 2 MG/ML
1 INJECTION INTRAMUSCULAR
Status: DISCONTINUED | OUTPATIENT
Start: 2022-01-01 | End: 2022-01-01 | Stop reason: HOSPADM

## 2022-01-01 RX ORDER — IPRATROPIUM BROMIDE AND ALBUTEROL SULFATE 2.5; .5 MG/3ML; MG/3ML
1 SOLUTION RESPIRATORY (INHALATION) EVERY 4 HOURS
Status: DISCONTINUED | OUTPATIENT
Start: 2022-01-01 | End: 2022-01-01

## 2022-01-01 RX ORDER — HALOPERIDOL 5 MG/ML
2 INJECTION INTRAMUSCULAR
Status: DISCONTINUED | OUTPATIENT
Start: 2022-01-01 | End: 2022-01-01 | Stop reason: HOSPADM

## 2022-01-01 RX ORDER — DILTIAZEM HYDROCHLORIDE 5 MG/ML
10 INJECTION INTRAVENOUS ONCE
Status: COMPLETED | OUTPATIENT
Start: 2022-01-01 | End: 2022-01-01

## 2022-01-01 RX ORDER — MORPHINE SULFATE/PF 50 MG/50ML
1 PATIENT CONTROLLED ANALGESIA SYRINGE INTRAVENOUS CONTINUOUS
Status: DISCONTINUED | OUTPATIENT
Start: 2022-01-01 | End: 2022-01-01 | Stop reason: CLARIF

## 2022-01-01 RX ORDER — DOXYCYCLINE HYCLATE 100 MG/1
100 CAPSULE ORAL EVERY 12 HOURS SCHEDULED
Status: DISCONTINUED | OUTPATIENT
Start: 2022-01-01 | End: 2022-01-01

## 2022-01-01 RX ORDER — VITAMIN B COMPLEX
2000 TABLET ORAL DAILY
Status: DISCONTINUED | OUTPATIENT
Start: 2022-01-01 | End: 2022-01-01

## 2022-01-01 RX ORDER — BUDESONIDE 0.5 MG/2ML
0.5 INHALANT ORAL 2 TIMES DAILY
Status: DISCONTINUED | OUTPATIENT
Start: 2022-01-01 | End: 2022-01-01

## 2022-01-01 RX ORDER — LORAZEPAM 2 MG/ML
INJECTION INTRAMUSCULAR
Status: DISPENSED
Start: 2022-01-01 | End: 2022-01-01

## 2022-01-01 RX ORDER — PREDNISONE 10 MG/1
10 TABLET ORAL DAILY
Status: DISCONTINUED | OUTPATIENT
Start: 2022-01-01 | End: 2022-01-01

## 2022-01-01 RX ORDER — ATORVASTATIN CALCIUM 10 MG/1
10 TABLET, FILM COATED ORAL DAILY
Status: DISCONTINUED | OUTPATIENT
Start: 2022-01-01 | End: 2022-01-01

## 2022-01-01 RX ORDER — SODIUM CHLORIDE 9 MG/ML
INJECTION, SOLUTION INTRAVENOUS PRN
Status: DISCONTINUED | OUTPATIENT
Start: 2022-01-01 | End: 2022-01-01 | Stop reason: HOSPADM

## 2022-01-01 RX ORDER — LIDOCAINE HYDROCHLORIDE 10 MG/ML
5 INJECTION, SOLUTION EPIDURAL; INFILTRATION; INTRACAUDAL; PERINEURAL ONCE
Status: COMPLETED | OUTPATIENT
Start: 2022-01-01 | End: 2022-01-01

## 2022-01-01 RX ORDER — LORAZEPAM 2 MG/ML
0.5 INJECTION INTRAMUSCULAR EVERY 4 HOURS PRN
Status: DISCONTINUED | OUTPATIENT
Start: 2022-01-01 | End: 2022-01-01

## 2022-01-01 RX ORDER — HEPARIN SODIUM (PORCINE) LOCK FLUSH IV SOLN 100 UNIT/ML 100 UNIT/ML
3 SOLUTION INTRAVENOUS EVERY 12 HOURS SCHEDULED
Status: DISCONTINUED | OUTPATIENT
Start: 2022-01-01 | End: 2022-01-01

## 2022-01-01 RX ORDER — SODIUM CHLORIDE 0.9 % (FLUSH) 0.9 %
5-40 SYRINGE (ML) INJECTION PRN
Status: DISCONTINUED | OUTPATIENT
Start: 2022-01-01 | End: 2022-01-01 | Stop reason: HOSPADM

## 2022-01-01 RX ORDER — POTASSIUM CHLORIDE 20 MEQ/1
40 TABLET, EXTENDED RELEASE ORAL ONCE
Status: COMPLETED | OUTPATIENT
Start: 2022-01-01 | End: 2022-01-01

## 2022-01-01 RX ORDER — HALOPERIDOL 5 MG/ML
INJECTION INTRAMUSCULAR
Status: COMPLETED
Start: 2022-01-01 | End: 2022-01-01

## 2022-01-01 RX ADMIN — ALBUTEROL SULFATE 2.5 MG: 2.5 SOLUTION RESPIRATORY (INHALATION) at 21:38

## 2022-01-01 RX ADMIN — DILTIAZEM HYDROCHLORIDE 10 MG/HR: 5 INJECTION INTRAVENOUS at 07:25

## 2022-01-01 RX ADMIN — METOPROLOL TARTRATE 25 MG: 25 TABLET, FILM COATED ORAL at 22:05

## 2022-01-01 RX ADMIN — MORPHINE SULFATE 9 MG/HR: 10 INJECTION, SOLUTION INTRAMUSCULAR; INTRAVENOUS at 16:28

## 2022-01-01 RX ADMIN — IPRATROPIUM BROMIDE 0.5 MG: 0.5 SOLUTION RESPIRATORY (INHALATION) at 04:43

## 2022-01-01 RX ADMIN — FAMOTIDINE 20 MG: 20 TABLET, FILM COATED ORAL at 20:26

## 2022-01-01 RX ADMIN — MEROPENEM 1000 MG: 1 INJECTION, POWDER, FOR SOLUTION INTRAVENOUS at 15:17

## 2022-01-01 RX ADMIN — GLYCOPYRROLATE 0.4 MG: 0.2 INJECTION INTRAMUSCULAR; INTRAVENOUS at 08:40

## 2022-01-01 RX ADMIN — ARFORMOTEROL TARTRATE 15 MCG: 15 SOLUTION RESPIRATORY (INHALATION) at 05:25

## 2022-01-01 RX ADMIN — IPRATROPIUM BROMIDE 0.5 MG: 0.5 SOLUTION RESPIRATORY (INHALATION) at 10:14

## 2022-01-01 RX ADMIN — ATORVASTATIN CALCIUM 10 MG: 10 TABLET, FILM COATED ORAL at 17:11

## 2022-01-01 RX ADMIN — HEPARIN 100 UNITS: 100 SYRINGE at 08:39

## 2022-01-01 RX ADMIN — FAMOTIDINE 20 MG: 20 TABLET, FILM COATED ORAL at 08:48

## 2022-01-01 RX ADMIN — MEROPENEM 1000 MG: 1 INJECTION, POWDER, FOR SOLUTION INTRAVENOUS at 14:46

## 2022-01-01 RX ADMIN — LORAZEPAM 1 MG: 2 INJECTION INTRAMUSCULAR; INTRAVENOUS at 00:39

## 2022-01-01 RX ADMIN — Medication 10 ML: at 20:48

## 2022-01-01 RX ADMIN — APIXABAN 5 MG: 5 TABLET, FILM COATED ORAL at 08:16

## 2022-01-01 RX ADMIN — Medication 2000 UNITS: at 08:39

## 2022-01-01 RX ADMIN — HALOPERIDOL LACTATE 2 MG: 5 INJECTION, SOLUTION INTRAMUSCULAR at 02:07

## 2022-01-01 RX ADMIN — HEPARIN 300 UNITS: 100 SYRINGE at 09:49

## 2022-01-01 RX ADMIN — HEPARIN 300 UNITS: 100 SYRINGE at 20:41

## 2022-01-01 RX ADMIN — FAMOTIDINE 20 MG: 20 TABLET, FILM COATED ORAL at 14:41

## 2022-01-01 RX ADMIN — GLYCOPYRROLATE 0.4 MG: 0.2 INJECTION INTRAMUSCULAR; INTRAVENOUS at 15:09

## 2022-01-01 RX ADMIN — VANCOMYCIN HYDROCHLORIDE 1000 MG: 1 INJECTION, POWDER, LYOPHILIZED, FOR SOLUTION INTRAVENOUS at 16:49

## 2022-01-01 RX ADMIN — ACETYLCYSTEINE 600 MG: 200 SOLUTION ORAL; RESPIRATORY (INHALATION) at 14:39

## 2022-01-01 RX ADMIN — LEVALBUTEROL HYDROCHLORIDE 0.63 MG: 0.63 SOLUTION RESPIRATORY (INHALATION) at 06:36

## 2022-01-01 RX ADMIN — LORAZEPAM 1 MG: 2 INJECTION INTRAMUSCULAR; INTRAVENOUS at 14:34

## 2022-01-01 RX ADMIN — VANCOMYCIN HYDROCHLORIDE 1000 MG: 1 INJECTION, POWDER, LYOPHILIZED, FOR SOLUTION INTRAVENOUS at 15:24

## 2022-01-01 RX ADMIN — ACETYLCYSTEINE 600 MG: 200 SOLUTION ORAL; RESPIRATORY (INHALATION) at 14:44

## 2022-01-01 RX ADMIN — Medication 10 ML: at 08:22

## 2022-01-01 RX ADMIN — FAMOTIDINE 20 MG: 20 TABLET, FILM COATED ORAL at 08:56

## 2022-01-01 RX ADMIN — ACETYLCYSTEINE 600 MG: 200 SOLUTION ORAL; RESPIRATORY (INHALATION) at 05:07

## 2022-01-01 RX ADMIN — DOXYCYCLINE 100 MG: 100 INJECTION, POWDER, LYOPHILIZED, FOR SOLUTION INTRAVENOUS at 03:50

## 2022-01-01 RX ADMIN — METOPROLOL TARTRATE 25 MG: 25 TABLET, FILM COATED ORAL at 14:23

## 2022-01-01 RX ADMIN — DEXAMETHASONE SODIUM PHOSPHATE 4 MG: 4 INJECTION, SOLUTION INTRA-ARTICULAR; INTRALESIONAL; INTRAMUSCULAR; INTRAVENOUS; SOFT TISSUE at 02:59

## 2022-01-01 RX ADMIN — APIXABAN 5 MG: 5 TABLET, FILM COATED ORAL at 20:38

## 2022-01-01 RX ADMIN — DOXYCYCLINE 100 MG: 100 INJECTION, POWDER, LYOPHILIZED, FOR SOLUTION INTRAVENOUS at 14:38

## 2022-01-01 RX ADMIN — IPRATROPIUM BROMIDE 0.5 MG: 0.5 SOLUTION RESPIRATORY (INHALATION) at 14:13

## 2022-01-01 RX ADMIN — ACETYLCYSTEINE 600 MG: 200 SOLUTION ORAL; RESPIRATORY (INHALATION) at 14:21

## 2022-01-01 RX ADMIN — METOPROLOL TARTRATE 25 MG: 25 TABLET, FILM COATED ORAL at 15:51

## 2022-01-01 RX ADMIN — LEVALBUTEROL HYDROCHLORIDE 0.63 MG: 0.63 SOLUTION RESPIRATORY (INHALATION) at 01:48

## 2022-01-01 RX ADMIN — MORPHINE SULFATE 7.5 MG/HR: 10 INJECTION, SOLUTION INTRAMUSCULAR; INTRAVENOUS at 04:17

## 2022-01-01 RX ADMIN — IPRATROPIUM BROMIDE 0.5 MG: 0.5 SOLUTION RESPIRATORY (INHALATION) at 06:10

## 2022-01-01 RX ADMIN — HEPARIN 300 UNITS: 100 SYRINGE at 09:33

## 2022-01-01 RX ADMIN — BUDESONIDE 500 MCG: 0.5 SUSPENSION RESPIRATORY (INHALATION) at 18:01

## 2022-01-01 RX ADMIN — SODIUM CHLORIDE, POTASSIUM CHLORIDE, SODIUM LACTATE AND CALCIUM CHLORIDE: 600; 310; 30; 20 INJECTION, SOLUTION INTRAVENOUS at 01:29

## 2022-01-01 RX ADMIN — IPRATROPIUM BROMIDE 0.5 MG: 0.5 SOLUTION RESPIRATORY (INHALATION) at 14:20

## 2022-01-01 RX ADMIN — CEFEPIME HYDROCHLORIDE 2000 MG: 2 INJECTION, POWDER, FOR SOLUTION INTRAVENOUS at 02:55

## 2022-01-01 RX ADMIN — ALBUTEROL SULFATE 2.5 MG: 2.5 SOLUTION RESPIRATORY (INHALATION) at 01:53

## 2022-01-01 RX ADMIN — ATORVASTATIN CALCIUM 10 MG: 10 TABLET, FILM COATED ORAL at 18:15

## 2022-01-01 RX ADMIN — IPRATROPIUM BROMIDE 0.5 MG: 0.5 SOLUTION RESPIRATORY (INHALATION) at 01:33

## 2022-01-01 RX ADMIN — HEPARIN 100 UNITS: 100 SYRINGE at 08:24

## 2022-01-01 RX ADMIN — MORPHINE SULFATE 2 MG: 2 INJECTION, SOLUTION INTRAMUSCULAR; INTRAVENOUS at 09:34

## 2022-01-01 RX ADMIN — IPRATROPIUM BROMIDE 0.5 MG: 0.5 SOLUTION RESPIRATORY (INHALATION) at 16:05

## 2022-01-01 RX ADMIN — Medication 10 ML: at 19:57

## 2022-01-01 RX ADMIN — IPRATROPIUM BROMIDE 0.5 MG: 0.5 SOLUTION RESPIRATORY (INHALATION) at 21:58

## 2022-01-01 RX ADMIN — METOPROLOL TARTRATE 25 MG: 25 TABLET, FILM COATED ORAL at 09:15

## 2022-01-01 RX ADMIN — METOPROLOL TARTRATE 25 MG: 25 TABLET, FILM COATED ORAL at 14:38

## 2022-01-01 RX ADMIN — LEVALBUTEROL HYDROCHLORIDE 0.63 MG: 0.63 SOLUTION RESPIRATORY (INHALATION) at 09:22

## 2022-01-01 RX ADMIN — HEPARIN 300 UNITS: 100 SYRINGE at 08:24

## 2022-01-01 RX ADMIN — Medication 0.4 MCG/KG/HR: at 21:27

## 2022-01-01 RX ADMIN — APIXABAN 5 MG: 5 TABLET, FILM COATED ORAL at 14:41

## 2022-01-01 RX ADMIN — DILTIAZEM HYDROCHLORIDE 240 MG: 240 CAPSULE, COATED, EXTENDED RELEASE ORAL at 08:40

## 2022-01-01 RX ADMIN — IPRATROPIUM BROMIDE 0.5 MG: 0.5 SOLUTION RESPIRATORY (INHALATION) at 02:11

## 2022-01-01 RX ADMIN — Medication 10 ML: at 08:19

## 2022-01-01 RX ADMIN — LEVALBUTEROL HYDROCHLORIDE 0.63 MG: 0.63 SOLUTION RESPIRATORY (INHALATION) at 18:01

## 2022-01-01 RX ADMIN — LEVALBUTEROL HYDROCHLORIDE 0.63 MG: 0.63 SOLUTION RESPIRATORY (INHALATION) at 21:58

## 2022-01-01 RX ADMIN — LEVALBUTEROL HYDROCHLORIDE 0.63 MG: 0.63 SOLUTION RESPIRATORY (INHALATION) at 06:03

## 2022-01-01 RX ADMIN — PREDNISONE 20 MG: 10 TABLET ORAL at 08:22

## 2022-01-01 RX ADMIN — SULFAMETHOXAZOLE AND TRIMETHOPRIM 1 TABLET: 800; 160 TABLET ORAL at 20:36

## 2022-01-01 RX ADMIN — IPRATROPIUM BROMIDE 0.5 MG: 0.5 SOLUTION RESPIRATORY (INHALATION) at 18:01

## 2022-01-01 RX ADMIN — LEVALBUTEROL HYDROCHLORIDE 0.63 MG: 0.63 SOLUTION RESPIRATORY (INHALATION) at 12:35

## 2022-01-01 RX ADMIN — DOXYCYCLINE HYCLATE 100 MG: 100 CAPSULE ORAL at 08:18

## 2022-01-01 RX ADMIN — SODIUM CHLORIDE, POTASSIUM CHLORIDE, SODIUM LACTATE AND CALCIUM CHLORIDE: 600; 310; 30; 20 INJECTION, SOLUTION INTRAVENOUS at 13:38

## 2022-01-01 RX ADMIN — METOPROLOL TARTRATE 25 MG: 25 TABLET, FILM COATED ORAL at 08:22

## 2022-01-01 RX ADMIN — Medication 10 ML: at 08:24

## 2022-01-01 RX ADMIN — LEVALBUTEROL HYDROCHLORIDE 0.63 MG: 0.63 SOLUTION RESPIRATORY (INHALATION) at 18:29

## 2022-01-01 RX ADMIN — IPRATROPIUM BROMIDE 0.5 MG: 0.5 SOLUTION RESPIRATORY (INHALATION) at 14:42

## 2022-01-01 RX ADMIN — LEVALBUTEROL HYDROCHLORIDE 0.63 MG: 0.63 SOLUTION RESPIRATORY (INHALATION) at 14:43

## 2022-01-01 RX ADMIN — IPRATROPIUM BROMIDE AND ALBUTEROL SULFATE 1 AMPULE: .5; 3 SOLUTION RESPIRATORY (INHALATION) at 11:17

## 2022-01-01 RX ADMIN — IPRATROPIUM BROMIDE AND ALBUTEROL SULFATE 1 AMPULE: .5; 2.5 SOLUTION RESPIRATORY (INHALATION) at 14:29

## 2022-01-01 RX ADMIN — ATORVASTATIN CALCIUM 10 MG: 10 TABLET, FILM COATED ORAL at 17:08

## 2022-01-01 RX ADMIN — BUDESONIDE 500 MCG: 0.5 SUSPENSION RESPIRATORY (INHALATION) at 04:43

## 2022-01-01 RX ADMIN — Medication 2000 UNITS: at 08:13

## 2022-01-01 RX ADMIN — APIXABAN 5 MG: 5 TABLET, FILM COATED ORAL at 20:09

## 2022-01-01 RX ADMIN — ACETYLCYSTEINE 600 MG: 200 SOLUTION ORAL; RESPIRATORY (INHALATION) at 14:43

## 2022-01-01 RX ADMIN — APIXABAN 5 MG: 5 TABLET, FILM COATED ORAL at 08:39

## 2022-01-01 RX ADMIN — DILTIAZEM HYDROCHLORIDE 240 MG: 240 CAPSULE, COATED, EXTENDED RELEASE ORAL at 08:22

## 2022-01-01 RX ADMIN — IPRATROPIUM BROMIDE 0.5 MG: 0.5 SOLUTION RESPIRATORY (INHALATION) at 04:37

## 2022-01-01 RX ADMIN — DEXAMETHASONE SODIUM PHOSPHATE 4 MG: 4 INJECTION, SOLUTION INTRA-ARTICULAR; INTRALESIONAL; INTRAMUSCULAR; INTRAVENOUS; SOFT TISSUE at 14:36

## 2022-01-01 RX ADMIN — FAMOTIDINE 20 MG: 20 TABLET, FILM COATED ORAL at 08:21

## 2022-01-01 RX ADMIN — MORPHINE SULFATE 9 MG/HR: 10 INJECTION, SOLUTION INTRAMUSCULAR; INTRAVENOUS at 04:41

## 2022-01-01 RX ADMIN — HEPARIN 100 UNITS: 100 SYRINGE at 19:58

## 2022-01-01 RX ADMIN — ACETYLCYSTEINE 600 MG: 200 SOLUTION ORAL; RESPIRATORY (INHALATION) at 21:22

## 2022-01-01 RX ADMIN — SULFAMETHOXAZOLE AND TRIMETHOPRIM 600 MG: 80; 16 INJECTION, SOLUTION, CONCENTRATE INTRAVENOUS at 23:10

## 2022-01-01 RX ADMIN — HEPARIN 300 UNITS: 100 SYRINGE at 09:45

## 2022-01-01 RX ADMIN — HEPARIN 100 UNITS: 100 SYRINGE at 08:22

## 2022-01-01 RX ADMIN — DILTIAZEM HYDROCHLORIDE 240 MG: 240 CAPSULE, COATED, EXTENDED RELEASE ORAL at 08:39

## 2022-01-01 RX ADMIN — LEVALBUTEROL HYDROCHLORIDE 0.63 MG: 0.63 SOLUTION RESPIRATORY (INHALATION) at 10:14

## 2022-01-01 RX ADMIN — DOXYCYCLINE HYCLATE 100 MG: 100 CAPSULE ORAL at 08:23

## 2022-01-01 RX ADMIN — ATORVASTATIN CALCIUM 10 MG: 10 TABLET, FILM COATED ORAL at 17:57

## 2022-01-01 RX ADMIN — FAMOTIDINE 20 MG: 20 TABLET, FILM COATED ORAL at 08:40

## 2022-01-01 RX ADMIN — BUDESONIDE 500 MCG: 0.5 SUSPENSION RESPIRATORY (INHALATION) at 09:31

## 2022-01-01 RX ADMIN — GLYCOPYRROLATE 0.4 MG: 0.2 INJECTION INTRAMUSCULAR; INTRAVENOUS at 02:54

## 2022-01-01 RX ADMIN — ROFLUMILAST 500 MCG: 500 TABLET ORAL at 08:23

## 2022-01-01 RX ADMIN — IPRATROPIUM BROMIDE 0.5 MG: 0.5 SOLUTION RESPIRATORY (INHALATION) at 09:31

## 2022-01-01 RX ADMIN — DILTIAZEM HYDROCHLORIDE 240 MG: 240 CAPSULE, COATED, EXTENDED RELEASE ORAL at 08:10

## 2022-01-01 RX ADMIN — Medication 2000 UNITS: at 10:22

## 2022-01-01 RX ADMIN — PREDNISONE 10 MG: 10 TABLET ORAL at 12:30

## 2022-01-01 RX ADMIN — HEPARIN 300 UNITS: 100 SYRINGE at 08:23

## 2022-01-01 RX ADMIN — SULFAMETHOXAZOLE AND TRIMETHOPRIM 600 MG: 80; 16 INJECTION, SOLUTION, CONCENTRATE INTRAVENOUS at 08:20

## 2022-01-01 RX ADMIN — Medication 2000 UNITS: at 08:10

## 2022-01-01 RX ADMIN — VANCOMYCIN HYDROCHLORIDE 1000 MG: 1 INJECTION, POWDER, LYOPHILIZED, FOR SOLUTION INTRAVENOUS at 02:26

## 2022-01-01 RX ADMIN — APIXABAN 5 MG: 5 TABLET, FILM COATED ORAL at 22:44

## 2022-01-01 RX ADMIN — DILTIAZEM HYDROCHLORIDE 240 MG: 240 CAPSULE, COATED, EXTENDED RELEASE ORAL at 14:41

## 2022-01-01 RX ADMIN — LEVALBUTEROL HYDROCHLORIDE 0.63 MG: 0.63 SOLUTION RESPIRATORY (INHALATION) at 14:39

## 2022-01-01 RX ADMIN — Medication 10 ML: at 10:36

## 2022-01-01 RX ADMIN — MEROPENEM 1000 MG: 1 INJECTION, POWDER, FOR SOLUTION INTRAVENOUS at 05:53

## 2022-01-01 RX ADMIN — ARFORMOTEROL TARTRATE 15 MCG: 15 SOLUTION RESPIRATORY (INHALATION) at 05:53

## 2022-01-01 RX ADMIN — METOPROLOL TARTRATE 25 MG: 25 TABLET, FILM COATED ORAL at 08:55

## 2022-01-01 RX ADMIN — SODIUM CHLORIDE, POTASSIUM CHLORIDE, SODIUM LACTATE AND CALCIUM CHLORIDE: 600; 310; 30; 20 INJECTION, SOLUTION INTRAVENOUS at 15:34

## 2022-01-01 RX ADMIN — IPRATROPIUM BROMIDE AND ALBUTEROL SULFATE 1 AMPULE: .5; 2.5 SOLUTION RESPIRATORY (INHALATION) at 08:39

## 2022-01-01 RX ADMIN — Medication 10 ML: at 22:01

## 2022-01-01 RX ADMIN — METHYLPREDNISOLONE SODIUM SUCCINATE 40 MG: 40 INJECTION, POWDER, FOR SOLUTION INTRAMUSCULAR; INTRAVENOUS at 08:48

## 2022-01-01 RX ADMIN — DOXYCYCLINE HYCLATE 100 MG: 100 CAPSULE ORAL at 09:15

## 2022-01-01 RX ADMIN — MORPHINE SULFATE 2 MG: 2 INJECTION, SOLUTION INTRAMUSCULAR; INTRAVENOUS at 22:06

## 2022-01-01 RX ADMIN — IPRATROPIUM BROMIDE 0.5 MG: 0.5 SOLUTION RESPIRATORY (INHALATION) at 18:34

## 2022-01-01 RX ADMIN — LORAZEPAM 1 MG: 2 INJECTION INTRAMUSCULAR; INTRAVENOUS at 02:53

## 2022-01-01 RX ADMIN — GLYCOPYRROLATE 0.4 MG: 0.2 INJECTION INTRAMUSCULAR; INTRAVENOUS at 17:23

## 2022-01-01 RX ADMIN — LEVALBUTEROL HYDROCHLORIDE 0.63 MG: 0.63 SOLUTION RESPIRATORY (INHALATION) at 09:31

## 2022-01-01 RX ADMIN — ACETAMINOPHEN 650 MG: 325 TABLET ORAL at 21:23

## 2022-01-01 RX ADMIN — ACETYLCYSTEINE 600 MG: 200 SOLUTION ORAL; RESPIRATORY (INHALATION) at 05:48

## 2022-01-01 RX ADMIN — DOXYCYCLINE HYCLATE 100 MG: 100 CAPSULE ORAL at 20:26

## 2022-01-01 RX ADMIN — HEPARIN 100 UNITS: 100 SYRINGE at 08:56

## 2022-01-01 RX ADMIN — IPRATROPIUM BROMIDE 0.5 MG: 0.5 SOLUTION RESPIRATORY (INHALATION) at 10:18

## 2022-01-01 RX ADMIN — Medication 2000 UNITS: at 08:48

## 2022-01-01 RX ADMIN — Medication 10 ML: at 08:23

## 2022-01-01 RX ADMIN — IPRATROPIUM BROMIDE 0.5 MG: 0.5 SOLUTION RESPIRATORY (INHALATION) at 01:49

## 2022-01-01 RX ADMIN — SULFAMETHOXAZOLE AND TRIMETHOPRIM 600 MG: 80; 16 INJECTION, SOLUTION, CONCENTRATE INTRAVENOUS at 23:50

## 2022-01-01 RX ADMIN — IPRATROPIUM BROMIDE 0.5 MG: 0.5 SOLUTION RESPIRATORY (INHALATION) at 09:52

## 2022-01-01 RX ADMIN — Medication 10 ML: at 20:47

## 2022-01-01 RX ADMIN — BUDESONIDE 500 MCG: 0.5 SUSPENSION RESPIRATORY (INHALATION) at 05:07

## 2022-01-01 RX ADMIN — LEVALBUTEROL HYDROCHLORIDE 0.63 MG: 0.63 SOLUTION RESPIRATORY (INHALATION) at 09:10

## 2022-01-01 RX ADMIN — Medication 10 ML: at 22:02

## 2022-01-01 RX ADMIN — SODIUM CHLORIDE, PRESERVATIVE FREE 20 MG: 5 INJECTION INTRAVENOUS at 08:17

## 2022-01-01 RX ADMIN — Medication 10 ML: at 22:27

## 2022-01-01 RX ADMIN — IPRATROPIUM BROMIDE 0.5 MG: 0.5 SOLUTION RESPIRATORY (INHALATION) at 07:04

## 2022-01-01 RX ADMIN — METOPROLOL TARTRATE 25 MG: 25 TABLET, FILM COATED ORAL at 08:21

## 2022-01-01 RX ADMIN — DOXYCYCLINE HYCLATE 100 MG: 100 CAPSULE ORAL at 08:39

## 2022-01-01 RX ADMIN — DOXYCYCLINE 100 MG: 100 INJECTION, POWDER, LYOPHILIZED, FOR SOLUTION INTRAVENOUS at 15:35

## 2022-01-01 RX ADMIN — IPRATROPIUM BROMIDE AND ALBUTEROL SULFATE 1 AMPULE: .5; 2.5 SOLUTION RESPIRATORY (INHALATION) at 01:28

## 2022-01-01 RX ADMIN — DOXYCYCLINE HYCLATE 100 MG: 100 CAPSULE ORAL at 19:57

## 2022-01-01 RX ADMIN — ARFORMOTEROL TARTRATE 15 MCG: 15 SOLUTION RESPIRATORY (INHALATION) at 06:11

## 2022-01-01 RX ADMIN — Medication 10 ML: at 20:09

## 2022-01-01 RX ADMIN — FAMOTIDINE 20 MG: 20 TABLET, FILM COATED ORAL at 08:23

## 2022-01-01 RX ADMIN — DILTIAZEM HYDROCHLORIDE 10 MG: 5 INJECTION, SOLUTION INTRAVENOUS at 07:05

## 2022-01-01 RX ADMIN — MORPHINE SULFATE 2 MG: 2 INJECTION, SOLUTION INTRAMUSCULAR; INTRAVENOUS at 08:22

## 2022-01-01 RX ADMIN — LEVALBUTEROL HYDROCHLORIDE 0.63 MG: 0.63 SOLUTION RESPIRATORY (INHALATION) at 14:22

## 2022-01-01 RX ADMIN — FAMOTIDINE 20 MG: 20 TABLET, FILM COATED ORAL at 08:39

## 2022-01-01 RX ADMIN — LEVALBUTEROL HYDROCHLORIDE 0.63 MG: 0.63 SOLUTION RESPIRATORY (INHALATION) at 04:43

## 2022-01-01 RX ADMIN — LORAZEPAM 1 MG: 2 INJECTION INTRAMUSCULAR; INTRAVENOUS at 11:04

## 2022-01-01 RX ADMIN — ATORVASTATIN CALCIUM 10 MG: 10 TABLET, FILM COATED ORAL at 17:48

## 2022-01-01 RX ADMIN — LORAZEPAM 1 MG: 2 INJECTION INTRAMUSCULAR; INTRAVENOUS at 15:09

## 2022-01-01 RX ADMIN — METOPROLOL TARTRATE 25 MG: 25 TABLET, FILM COATED ORAL at 08:39

## 2022-01-01 RX ADMIN — POTASSIUM PHOSPHATE, MONOBASIC POTASSIUM PHOSPHATE, DIBASIC 10 MMOL: 224; 236 INJECTION, SOLUTION, CONCENTRATE INTRAVENOUS at 04:42

## 2022-01-01 RX ADMIN — ARFORMOTEROL TARTRATE 15 MCG: 15 SOLUTION RESPIRATORY (INHALATION) at 18:50

## 2022-01-01 RX ADMIN — HEPARIN 300 UNITS: 100 SYRINGE at 20:02

## 2022-01-01 RX ADMIN — ARFORMOTEROL TARTRATE 15 MCG: 15 SOLUTION RESPIRATORY (INHALATION) at 18:12

## 2022-01-01 RX ADMIN — CEFEPIME HYDROCHLORIDE 2000 MG: 2 INJECTION, POWDER, FOR SOLUTION INTRAVENOUS at 15:25

## 2022-01-01 RX ADMIN — MORPHINE SULFATE 2 MG: 2 INJECTION, SOLUTION INTRAMUSCULAR; INTRAVENOUS at 05:05

## 2022-01-01 RX ADMIN — SODIUM CHLORIDE, POTASSIUM CHLORIDE, SODIUM LACTATE AND CALCIUM CHLORIDE: 600; 310; 30; 20 INJECTION, SOLUTION INTRAVENOUS at 00:45

## 2022-01-01 RX ADMIN — HALOPERIDOL LACTATE 2 MG: 5 INJECTION, SOLUTION INTRAMUSCULAR at 13:14

## 2022-01-01 RX ADMIN — HEPARIN 100 UNITS: 100 SYRINGE at 20:09

## 2022-01-01 RX ADMIN — BUDESONIDE 500 MCG: 0.5 SUSPENSION RESPIRATORY (INHALATION) at 18:30

## 2022-01-01 RX ADMIN — Medication 10 ML: at 08:59

## 2022-01-01 RX ADMIN — BUDESONIDE 500 MCG: 0.5 SUSPENSION RESPIRATORY (INHALATION) at 04:37

## 2022-01-01 RX ADMIN — MEROPENEM 1000 MG: 1 INJECTION, POWDER, FOR SOLUTION INTRAVENOUS at 14:37

## 2022-01-01 RX ADMIN — ALBUTEROL SULFATE 2.5 MG: 2.5 SOLUTION RESPIRATORY (INHALATION) at 14:17

## 2022-01-01 RX ADMIN — HALOPERIDOL LACTATE 2 MG: 5 INJECTION, SOLUTION INTRAMUSCULAR at 16:25

## 2022-01-01 RX ADMIN — GLYCOPYRROLATE 0.4 MG: 0.2 INJECTION INTRAMUSCULAR; INTRAVENOUS at 23:25

## 2022-01-01 RX ADMIN — IPRATROPIUM BROMIDE 0.5 MG: 0.5 SOLUTION RESPIRATORY (INHALATION) at 05:53

## 2022-01-01 RX ADMIN — HEPARIN 300 UNITS: 100 SYRINGE at 09:14

## 2022-01-01 RX ADMIN — IPRATROPIUM BROMIDE 0.5 MG: 0.5 SOLUTION RESPIRATORY (INHALATION) at 09:48

## 2022-01-01 RX ADMIN — Medication 10 ML: at 20:16

## 2022-01-01 RX ADMIN — MORPHINE SULFATE 2 MG: 2 INJECTION, SOLUTION INTRAMUSCULAR; INTRAVENOUS at 15:43

## 2022-01-01 RX ADMIN — ARFORMOTEROL TARTRATE 15 MCG: 15 SOLUTION RESPIRATORY (INHALATION) at 05:46

## 2022-01-01 RX ADMIN — FAMOTIDINE 20 MG: 20 TABLET, FILM COATED ORAL at 08:19

## 2022-01-01 RX ADMIN — APIXABAN 5 MG: 5 TABLET, FILM COATED ORAL at 08:21

## 2022-01-01 RX ADMIN — IPRATROPIUM BROMIDE 0.5 MG: 0.5 SOLUTION RESPIRATORY (INHALATION) at 18:08

## 2022-01-01 RX ADMIN — DILTIAZEM HYDROCHLORIDE 240 MG: 240 CAPSULE, COATED, EXTENDED RELEASE ORAL at 08:56

## 2022-01-01 RX ADMIN — APIXABAN 5 MG: 5 TABLET, FILM COATED ORAL at 22:05

## 2022-01-01 RX ADMIN — APIXABAN 5 MG: 5 TABLET, FILM COATED ORAL at 10:23

## 2022-01-01 RX ADMIN — ALBUTEROL SULFATE 2.5 MG: 2.5 SOLUTION RESPIRATORY (INHALATION) at 22:31

## 2022-01-01 RX ADMIN — BUDESONIDE 500 MCG: 0.5 SUSPENSION RESPIRATORY (INHALATION) at 06:11

## 2022-01-01 RX ADMIN — Medication 10 ML: at 21:25

## 2022-01-01 RX ADMIN — LEVALBUTEROL HYDROCHLORIDE 0.63 MG: 0.63 SOLUTION RESPIRATORY (INHALATION) at 01:39

## 2022-01-01 RX ADMIN — Medication 10 ML: at 20:10

## 2022-01-01 RX ADMIN — IPRATROPIUM BROMIDE 0.5 MG: 0.5 SOLUTION RESPIRATORY (INHALATION) at 21:34

## 2022-01-01 RX ADMIN — PREDNISONE 20 MG: 10 TABLET ORAL at 15:43

## 2022-01-01 RX ADMIN — IPRATROPIUM BROMIDE AND ALBUTEROL SULFATE 1 AMPULE: .5; 2.5 SOLUTION RESPIRATORY (INHALATION) at 22:17

## 2022-01-01 RX ADMIN — METHYLPREDNISOLONE SODIUM SUCCINATE 40 MG: 40 INJECTION, POWDER, FOR SOLUTION INTRAMUSCULAR; INTRAVENOUS at 10:00

## 2022-01-01 RX ADMIN — Medication 2000 UNITS: at 08:56

## 2022-01-01 RX ADMIN — LEVALBUTEROL HYDROCHLORIDE 0.63 MG: 0.63 SOLUTION RESPIRATORY (INHALATION) at 18:00

## 2022-01-01 RX ADMIN — IPRATROPIUM BROMIDE 0.5 MG: 0.5 SOLUTION RESPIRATORY (INHALATION) at 06:11

## 2022-01-01 RX ADMIN — FAMOTIDINE 20 MG: 20 TABLET, FILM COATED ORAL at 19:57

## 2022-01-01 RX ADMIN — IPRATROPIUM BROMIDE 0.5 MG: 0.5 SOLUTION RESPIRATORY (INHALATION) at 09:58

## 2022-01-01 RX ADMIN — IPRATROPIUM BROMIDE 0.5 MG: 0.5 SOLUTION RESPIRATORY (INHALATION) at 14:18

## 2022-01-01 RX ADMIN — ARFORMOTEROL TARTRATE 15 MCG: 15 SOLUTION RESPIRATORY (INHALATION) at 06:10

## 2022-01-01 RX ADMIN — ROFLUMILAST 500 MCG: 500 TABLET ORAL at 08:55

## 2022-01-01 RX ADMIN — LEVALBUTEROL HYDROCHLORIDE 0.63 MG: 0.63 SOLUTION RESPIRATORY (INHALATION) at 14:21

## 2022-01-01 RX ADMIN — LORAZEPAM 1 MG: 2 INJECTION INTRAMUSCULAR; INTRAVENOUS at 09:58

## 2022-01-01 RX ADMIN — PREDNISONE 10 MG: 10 TABLET ORAL at 08:16

## 2022-01-01 RX ADMIN — ARFORMOTEROL TARTRATE 15 MCG: 15 SOLUTION RESPIRATORY (INHALATION) at 06:03

## 2022-01-01 RX ADMIN — ARFORMOTEROL TARTRATE 15 MCG: 15 SOLUTION RESPIRATORY (INHALATION) at 17:21

## 2022-01-01 RX ADMIN — MEROPENEM 1000 MG: 1 INJECTION, POWDER, FOR SOLUTION INTRAVENOUS at 21:39

## 2022-01-01 RX ADMIN — MORPHINE SULFATE 2 MG: 2 INJECTION, SOLUTION INTRAMUSCULAR; INTRAVENOUS at 02:04

## 2022-01-01 RX ADMIN — LEVALBUTEROL HYDROCHLORIDE 0.63 MG: 0.63 SOLUTION RESPIRATORY (INHALATION) at 01:34

## 2022-01-01 RX ADMIN — ACETYLCYSTEINE 600 MG: 200 SOLUTION ORAL; RESPIRATORY (INHALATION) at 04:43

## 2022-01-01 RX ADMIN — LEVALBUTEROL HYDROCHLORIDE 0.63 MG: 0.63 SOLUTION RESPIRATORY (INHALATION) at 22:22

## 2022-01-01 RX ADMIN — ROFLUMILAST 500 MCG: 500 TABLET ORAL at 08:40

## 2022-01-01 RX ADMIN — ROFLUMILAST 500 MCG: 500 TABLET ORAL at 08:16

## 2022-01-01 RX ADMIN — IPRATROPIUM BROMIDE 0.5 MG: 0.5 SOLUTION RESPIRATORY (INHALATION) at 14:30

## 2022-01-01 RX ADMIN — BUDESONIDE 500 MCG: 0.5 SUSPENSION RESPIRATORY (INHALATION) at 17:21

## 2022-01-01 RX ADMIN — BUDESONIDE 500 MCG: 0.5 SUSPENSION RESPIRATORY (INHALATION) at 17:58

## 2022-01-01 RX ADMIN — ROFLUMILAST 500 MCG: 500 TABLET ORAL at 08:13

## 2022-01-01 RX ADMIN — DOXYCYCLINE HYCLATE 100 MG: 100 CAPSULE ORAL at 08:21

## 2022-01-01 RX ADMIN — METOPROLOL TARTRATE 25 MG: 25 TABLET, FILM COATED ORAL at 21:24

## 2022-01-01 RX ADMIN — ALBUTEROL SULFATE 2.5 MG: 2.5 SOLUTION RESPIRATORY (INHALATION) at 18:08

## 2022-01-01 RX ADMIN — Medication 2000 UNITS: at 08:22

## 2022-01-01 RX ADMIN — FAMOTIDINE 20 MG: 20 TABLET, FILM COATED ORAL at 21:24

## 2022-01-01 RX ADMIN — METOPROLOL TARTRATE 25 MG: 25 TABLET, FILM COATED ORAL at 20:26

## 2022-01-01 RX ADMIN — SULFAMETHOXAZOLE AND TRIMETHOPRIM 600 MG: 80; 16 INJECTION, SOLUTION, CONCENTRATE INTRAVENOUS at 23:45

## 2022-01-01 RX ADMIN — ACETYLCYSTEINE 600 MG: 200 SOLUTION ORAL; RESPIRATORY (INHALATION) at 06:36

## 2022-01-01 RX ADMIN — Medication 10 ML: at 08:56

## 2022-01-01 RX ADMIN — VANCOMYCIN HYDROCHLORIDE 1000 MG: 1 INJECTION, POWDER, LYOPHILIZED, FOR SOLUTION INTRAVENOUS at 15:19

## 2022-01-01 RX ADMIN — DOXYCYCLINE HYCLATE 100 MG: 100 CAPSULE ORAL at 21:24

## 2022-01-01 RX ADMIN — ATORVASTATIN CALCIUM 10 MG: 10 TABLET, FILM COATED ORAL at 16:46

## 2022-01-01 RX ADMIN — IPRATROPIUM BROMIDE AND ALBUTEROL SULFATE 1 AMPULE: .5; 2.5 SOLUTION RESPIRATORY (INHALATION) at 12:29

## 2022-01-01 RX ADMIN — Medication 10 ML: at 08:48

## 2022-01-01 RX ADMIN — ACETYLCYSTEINE 600 MG: 200 SOLUTION ORAL; RESPIRATORY (INHALATION) at 14:22

## 2022-01-01 RX ADMIN — BUDESONIDE 500 MCG: 0.5 SUSPENSION RESPIRATORY (INHALATION) at 06:36

## 2022-01-01 RX ADMIN — IPRATROPIUM BROMIDE AND ALBUTEROL SULFATE 1 AMPULE: .5; 2.5 SOLUTION RESPIRATORY (INHALATION) at 06:11

## 2022-01-01 RX ADMIN — APIXABAN 5 MG: 5 TABLET, FILM COATED ORAL at 21:24

## 2022-01-01 RX ADMIN — Medication 0.2 MCG/KG/HR: at 14:38

## 2022-01-01 RX ADMIN — FAMOTIDINE 20 MG: 20 TABLET, FILM COATED ORAL at 20:09

## 2022-01-01 RX ADMIN — BUDESONIDE 500 MCG: 0.5 SUSPENSION RESPIRATORY (INHALATION) at 18:34

## 2022-01-01 RX ADMIN — ALBUTEROL SULFATE 2.5 MG: 2.5 SOLUTION RESPIRATORY (INHALATION) at 13:37

## 2022-01-01 RX ADMIN — APIXABAN 5 MG: 5 TABLET, FILM COATED ORAL at 08:23

## 2022-01-01 RX ADMIN — ATORVASTATIN CALCIUM 10 MG: 10 TABLET, FILM COATED ORAL at 08:34

## 2022-01-01 RX ADMIN — APIXABAN 5 MG: 5 TABLET, FILM COATED ORAL at 08:22

## 2022-01-01 RX ADMIN — BUDESONIDE 500 MCG: 0.5 SUSPENSION RESPIRATORY (INHALATION) at 16:02

## 2022-01-01 RX ADMIN — MEROPENEM 1000 MG: 1 INJECTION, POWDER, FOR SOLUTION INTRAVENOUS at 16:43

## 2022-01-01 RX ADMIN — METOPROLOL TARTRATE 25 MG: 25 TABLET, FILM COATED ORAL at 08:13

## 2022-01-01 RX ADMIN — METOPROLOL TARTRATE 25 MG: 25 TABLET, FILM COATED ORAL at 16:01

## 2022-01-01 RX ADMIN — IPRATROPIUM BROMIDE AND ALBUTEROL SULFATE 1 AMPULE: .5; 2.5 SOLUTION RESPIRATORY (INHALATION) at 16:01

## 2022-01-01 RX ADMIN — IPRATROPIUM BROMIDE AND ALBUTEROL SULFATE 1 AMPULE: .5; 2.5 SOLUTION RESPIRATORY (INHALATION) at 08:43

## 2022-01-01 RX ADMIN — APIXABAN 5 MG: 5 TABLET, FILM COATED ORAL at 20:04

## 2022-01-01 RX ADMIN — APIXABAN 5 MG: 5 TABLET, FILM COATED ORAL at 19:57

## 2022-01-01 RX ADMIN — BUDESONIDE 500 MCG: 0.5 SUSPENSION RESPIRATORY (INHALATION) at 16:05

## 2022-01-01 RX ADMIN — SULFAMETHOXAZOLE AND TRIMETHOPRIM 600 MG: 80; 16 INJECTION, SOLUTION, CONCENTRATE INTRAVENOUS at 15:09

## 2022-01-01 RX ADMIN — METHYLPREDNISOLONE SODIUM SUCCINATE 40 MG: 40 INJECTION, POWDER, FOR SOLUTION INTRAMUSCULAR; INTRAVENOUS at 10:23

## 2022-01-01 RX ADMIN — METHYLPREDNISOLONE SODIUM SUCCINATE 40 MG: 40 INJECTION, POWDER, FOR SOLUTION INTRAMUSCULAR; INTRAVENOUS at 17:11

## 2022-01-01 RX ADMIN — ALBUTEROL SULFATE 2.5 MG: 2.5 SOLUTION RESPIRATORY (INHALATION) at 18:12

## 2022-01-01 RX ADMIN — ARFORMOTEROL TARTRATE 15 MCG: 15 SOLUTION RESPIRATORY (INHALATION) at 18:01

## 2022-01-01 RX ADMIN — LEVALBUTEROL HYDROCHLORIDE 0.63 MG: 0.63 SOLUTION RESPIRATORY (INHALATION) at 04:36

## 2022-01-01 RX ADMIN — IPRATROPIUM BROMIDE 0.5 MG: 0.5 SOLUTION RESPIRATORY (INHALATION) at 01:21

## 2022-01-01 RX ADMIN — SODIUM CHLORIDE 25 ML: 9 INJECTION, SOLUTION INTRAVENOUS at 18:33

## 2022-01-01 RX ADMIN — LORAZEPAM 1 MG: 2 INJECTION INTRAMUSCULAR; INTRAVENOUS at 08:02

## 2022-01-01 RX ADMIN — LEVALBUTEROL HYDROCHLORIDE 0.63 MG: 0.63 SOLUTION RESPIRATORY (INHALATION) at 09:48

## 2022-01-01 RX ADMIN — CEFEPIME HYDROCHLORIDE 2000 MG: 2 INJECTION, POWDER, FOR SOLUTION INTRAVENOUS at 03:08

## 2022-01-01 RX ADMIN — IPRATROPIUM BROMIDE 0.5 MG: 0.5 SOLUTION RESPIRATORY (INHALATION) at 18:31

## 2022-01-01 RX ADMIN — METOPROLOL TARTRATE 25 MG: 25 TABLET, FILM COATED ORAL at 15:30

## 2022-01-01 RX ADMIN — LORAZEPAM 1 MG: 2 INJECTION INTRAMUSCULAR; INTRAVENOUS at 12:55

## 2022-01-01 RX ADMIN — HALOPERIDOL LACTATE 2 MG: 5 INJECTION, SOLUTION INTRAMUSCULAR at 16:17

## 2022-01-01 RX ADMIN — Medication 2000 UNITS: at 08:18

## 2022-01-01 RX ADMIN — ARFORMOTEROL TARTRATE 15 MCG: 15 SOLUTION RESPIRATORY (INHALATION) at 09:31

## 2022-01-01 RX ADMIN — FAMOTIDINE 20 MG: 20 TABLET, FILM COATED ORAL at 20:38

## 2022-01-01 RX ADMIN — LEVALBUTEROL HYDROCHLORIDE 0.63 MG: 0.63 SOLUTION RESPIRATORY (INHALATION) at 18:20

## 2022-01-01 RX ADMIN — MORPHINE SULFATE 2 MG: 2 INJECTION, SOLUTION INTRAMUSCULAR; INTRAVENOUS at 17:02

## 2022-01-01 RX ADMIN — ACETYLCYSTEINE 600 MG: 200 SOLUTION ORAL; RESPIRATORY (INHALATION) at 06:10

## 2022-01-01 RX ADMIN — LEVALBUTEROL HYDROCHLORIDE 0.63 MG: 0.63 SOLUTION RESPIRATORY (INHALATION) at 02:11

## 2022-01-01 RX ADMIN — ACETYLCYSTEINE 600 MG: 200 SOLUTION ORAL; RESPIRATORY (INHALATION) at 05:53

## 2022-01-01 RX ADMIN — Medication 250 ML: at 01:53

## 2022-01-01 RX ADMIN — LORAZEPAM 1 MG: 2 INJECTION INTRAMUSCULAR; INTRAVENOUS at 03:54

## 2022-01-01 RX ADMIN — ATORVASTATIN CALCIUM 10 MG: 10 TABLET, FILM COATED ORAL at 08:22

## 2022-01-01 RX ADMIN — BUDESONIDE 500 MCG: 0.5 SUSPENSION RESPIRATORY (INHALATION) at 18:21

## 2022-01-01 RX ADMIN — ATORVASTATIN CALCIUM 10 MG: 10 TABLET, FILM COATED ORAL at 16:51

## 2022-01-01 RX ADMIN — LEVALBUTEROL HYDROCHLORIDE 0.63 MG: 0.63 SOLUTION RESPIRATORY (INHALATION) at 06:10

## 2022-01-01 RX ADMIN — DOXYCYCLINE 100 MG: 100 INJECTION, POWDER, LYOPHILIZED, FOR SOLUTION INTRAVENOUS at 16:21

## 2022-01-01 RX ADMIN — ARFORMOTEROL TARTRATE 15 MCG: 15 SOLUTION RESPIRATORY (INHALATION) at 18:28

## 2022-01-01 RX ADMIN — SODIUM CHLORIDE 100 MG: 9 INJECTION, SOLUTION INTRAVENOUS at 03:51

## 2022-01-01 RX ADMIN — ACETYLCYSTEINE 600 MG: 200 SOLUTION ORAL; RESPIRATORY (INHALATION) at 21:58

## 2022-01-01 RX ADMIN — MORPHINE SULFATE 1 MG/HR: 10 INJECTION, SOLUTION INTRAMUSCULAR; INTRAVENOUS at 13:53

## 2022-01-01 RX ADMIN — GLYCOPYRROLATE 0.4 MG: 0.2 INJECTION INTRAMUSCULAR; INTRAVENOUS at 16:44

## 2022-01-01 RX ADMIN — VANCOMYCIN HYDROCHLORIDE 1000 MG: 1 INJECTION, POWDER, LYOPHILIZED, FOR SOLUTION INTRAVENOUS at 15:29

## 2022-01-01 RX ADMIN — MORPHINE SULFATE 2 MG: 2 INJECTION, SOLUTION INTRAMUSCULAR; INTRAVENOUS at 02:09

## 2022-01-01 RX ADMIN — ALBUTEROL SULFATE 2.5 MG: 2.5 SOLUTION RESPIRATORY (INHALATION) at 05:25

## 2022-01-01 RX ADMIN — METHYLPREDNISOLONE SODIUM SUCCINATE 40 MG: 40 INJECTION, POWDER, FOR SOLUTION INTRAMUSCULAR; INTRAVENOUS at 16:17

## 2022-01-01 RX ADMIN — SODIUM CHLORIDE, PRESERVATIVE FREE 10 ML: 5 INJECTION INTRAVENOUS at 20:39

## 2022-01-01 RX ADMIN — DOXYCYCLINE 100 MG: 100 INJECTION, POWDER, LYOPHILIZED, FOR SOLUTION INTRAVENOUS at 03:57

## 2022-01-01 RX ADMIN — ALBUTEROL SULFATE 2.5 MG: 2.5 SOLUTION RESPIRATORY (INHALATION) at 06:10

## 2022-01-01 RX ADMIN — ARFORMOTEROL TARTRATE 15 MCG: 15 SOLUTION RESPIRATORY (INHALATION) at 06:36

## 2022-01-01 RX ADMIN — BUDESONIDE 500 MCG: 0.5 SUSPENSION RESPIRATORY (INHALATION) at 06:03

## 2022-01-01 RX ADMIN — CEFEPIME HYDROCHLORIDE 2000 MG: 2 INJECTION, POWDER, FOR SOLUTION INTRAVENOUS at 04:39

## 2022-01-01 RX ADMIN — IPRATROPIUM BROMIDE 0.5 MG: 0.5 SOLUTION RESPIRATORY (INHALATION) at 05:45

## 2022-01-01 RX ADMIN — IPRATROPIUM BROMIDE 0.5 MG: 0.5 SOLUTION RESPIRATORY (INHALATION) at 17:21

## 2022-01-01 RX ADMIN — IPRATROPIUM BROMIDE 0.5 MG: 0.5 SOLUTION RESPIRATORY (INHALATION) at 17:58

## 2022-01-01 RX ADMIN — HEPARIN 100 UNITS: 100 SYRINGE at 20:38

## 2022-01-01 RX ADMIN — LEVALBUTEROL HYDROCHLORIDE 0.63 MG: 0.63 SOLUTION RESPIRATORY (INHALATION) at 17:21

## 2022-01-01 RX ADMIN — HEPARIN 300 UNITS: 100 SYRINGE at 05:57

## 2022-01-01 RX ADMIN — SODIUM CHLORIDE, PRESERVATIVE FREE 10 ML: 5 INJECTION INTRAVENOUS at 21:26

## 2022-01-01 RX ADMIN — LEVALBUTEROL HYDROCHLORIDE 0.63 MG: 0.63 SOLUTION RESPIRATORY (INHALATION) at 05:53

## 2022-01-01 RX ADMIN — ARFORMOTEROL TARTRATE 15 MCG: 15 SOLUTION RESPIRATORY (INHALATION) at 04:37

## 2022-01-01 RX ADMIN — FAMOTIDINE 20 MG: 20 TABLET, FILM COATED ORAL at 20:28

## 2022-01-01 RX ADMIN — IPRATROPIUM BROMIDE AND ALBUTEROL SULFATE 1 AMPULE: .5; 2.5 SOLUTION RESPIRATORY (INHALATION) at 18:50

## 2022-01-01 RX ADMIN — METOPROLOL TARTRATE 25 MG: 25 TABLET, FILM COATED ORAL at 19:56

## 2022-01-01 RX ADMIN — DOXYCYCLINE 100 MG: 100 INJECTION, POWDER, LYOPHILIZED, FOR SOLUTION INTRAVENOUS at 04:38

## 2022-01-01 RX ADMIN — HEPARIN 300 UNITS: 100 SYRINGE at 19:57

## 2022-01-01 RX ADMIN — SODIUM CHLORIDE 250 ML: 900 INJECTION, SOLUTION INTRAVENOUS at 01:53

## 2022-01-01 RX ADMIN — DEXAMETHASONE SODIUM PHOSPHATE 4 MG: 4 INJECTION, SOLUTION INTRA-ARTICULAR; INTRALESIONAL; INTRAMUSCULAR; INTRAVENOUS; SOFT TISSUE at 11:43

## 2022-01-01 RX ADMIN — APIXABAN 5 MG: 5 TABLET, FILM COATED ORAL at 08:13

## 2022-01-01 RX ADMIN — LEVALBUTEROL HYDROCHLORIDE 0.63 MG: 0.63 SOLUTION RESPIRATORY (INHALATION) at 02:02

## 2022-01-01 RX ADMIN — MORPHINE SULFATE 2 MG: 2 INJECTION, SOLUTION INTRAMUSCULAR; INTRAVENOUS at 22:11

## 2022-01-01 RX ADMIN — Medication 10 ML: at 20:45

## 2022-01-01 RX ADMIN — LIDOCAINE HYDROCHLORIDE 5 ML: 10 SOLUTION INTRAVENOUS at 11:02

## 2022-01-01 RX ADMIN — Medication 10 ML: at 09:15

## 2022-01-01 RX ADMIN — LEVALBUTEROL HYDROCHLORIDE 0.63 MG: 0.63 SOLUTION RESPIRATORY (INHALATION) at 09:58

## 2022-01-01 RX ADMIN — BUDESONIDE 500 MCG: 0.5 SUSPENSION RESPIRATORY (INHALATION) at 05:25

## 2022-01-01 RX ADMIN — IPRATROPIUM BROMIDE 0.5 MG: 0.5 SOLUTION RESPIRATORY (INHALATION) at 22:29

## 2022-01-01 RX ADMIN — IPRATROPIUM BROMIDE 0.5 MG: 0.5 SOLUTION RESPIRATORY (INHALATION) at 09:22

## 2022-01-01 RX ADMIN — ACETYLCYSTEINE 600 MG: 200 SOLUTION ORAL; RESPIRATORY (INHALATION) at 21:33

## 2022-01-01 RX ADMIN — IPRATROPIUM BROMIDE 0.5 MG: 0.5 SOLUTION RESPIRATORY (INHALATION) at 14:43

## 2022-01-01 RX ADMIN — LORAZEPAM 1 MG: 2 INJECTION INTRAMUSCULAR; INTRAVENOUS at 13:06

## 2022-01-01 RX ADMIN — POTASSIUM CHLORIDE 40 MEQ: 1500 TABLET, EXTENDED RELEASE ORAL at 08:40

## 2022-01-01 RX ADMIN — LEVALBUTEROL HYDROCHLORIDE 0.63 MG: 0.63 SOLUTION RESPIRATORY (INHALATION) at 01:21

## 2022-01-01 RX ADMIN — BUDESONIDE 500 MCG: 0.5 SUSPENSION RESPIRATORY (INHALATION) at 18:28

## 2022-01-01 RX ADMIN — APIXABAN 5 MG: 5 TABLET, FILM COATED ORAL at 20:36

## 2022-01-01 RX ADMIN — IPRATROPIUM BROMIDE 0.5 MG: 0.5 SOLUTION RESPIRATORY (INHALATION) at 22:21

## 2022-01-01 RX ADMIN — SODIUM CHLORIDE, POTASSIUM CHLORIDE, SODIUM LACTATE AND CALCIUM CHLORIDE: 600; 310; 30; 20 INJECTION, SOLUTION INTRAVENOUS at 14:30

## 2022-01-01 RX ADMIN — FAMOTIDINE 20 MG: 20 TABLET, FILM COATED ORAL at 08:16

## 2022-01-01 RX ADMIN — LORAZEPAM 0.5 MG: 2 INJECTION INTRAMUSCULAR; INTRAVENOUS at 09:01

## 2022-01-01 RX ADMIN — DOXYCYCLINE HYCLATE 100 MG: 100 CAPSULE ORAL at 20:09

## 2022-01-01 RX ADMIN — BUDESONIDE 500 MCG: 0.5 SUSPENSION RESPIRATORY (INHALATION) at 06:31

## 2022-01-01 RX ADMIN — DILTIAZEM HYDROCHLORIDE 240 MG: 240 CAPSULE, COATED, EXTENDED RELEASE ORAL at 08:48

## 2022-01-01 RX ADMIN — ARFORMOTEROL TARTRATE 15 MCG: 15 SOLUTION RESPIRATORY (INHALATION) at 16:05

## 2022-01-01 RX ADMIN — MORPHINE SULFATE 2 MG: 2 INJECTION, SOLUTION INTRAMUSCULAR; INTRAVENOUS at 08:16

## 2022-01-01 RX ADMIN — Medication 2000 UNITS: at 08:40

## 2022-01-01 RX ADMIN — MORPHINE SULFATE 2 MG: 2 INJECTION, SOLUTION INTRAMUSCULAR; INTRAVENOUS at 22:57

## 2022-01-01 RX ADMIN — FAMOTIDINE 20 MG: 20 TABLET, FILM COATED ORAL at 22:44

## 2022-01-01 RX ADMIN — METOPROLOL TARTRATE 25 MG: 25 TABLET, FILM COATED ORAL at 15:06

## 2022-01-01 RX ADMIN — SODIUM CHLORIDE, PRESERVATIVE FREE 10 ML: 5 INJECTION INTRAVENOUS at 17:35

## 2022-01-01 RX ADMIN — ALBUTEROL SULFATE 2.5 MG: 2.5 SOLUTION RESPIRATORY (INHALATION) at 10:18

## 2022-01-01 RX ADMIN — DOXYCYCLINE HYCLATE 100 MG: 100 CAPSULE ORAL at 20:01

## 2022-01-01 RX ADMIN — IPRATROPIUM BROMIDE 0.5 MG: 0.5 SOLUTION RESPIRATORY (INHALATION) at 12:35

## 2022-01-01 RX ADMIN — BUDESONIDE 500 MCG: 0.5 SUSPENSION RESPIRATORY (INHALATION) at 18:50

## 2022-01-01 RX ADMIN — HEPARIN 300 UNITS: 100 SYRINGE at 08:56

## 2022-01-01 RX ADMIN — FAMOTIDINE 20 MG: 20 TABLET, FILM COATED ORAL at 10:22

## 2022-01-01 RX ADMIN — LEVALBUTEROL HYDROCHLORIDE 0.63 MG: 0.63 SOLUTION RESPIRATORY (INHALATION) at 16:04

## 2022-01-01 RX ADMIN — ACETYLCYSTEINE 600 MG: 200 SOLUTION ORAL; RESPIRATORY (INHALATION) at 14:13

## 2022-01-01 RX ADMIN — LEVALBUTEROL HYDROCHLORIDE 0.63 MG: 0.63 SOLUTION RESPIRATORY (INHALATION) at 07:04

## 2022-01-01 RX ADMIN — IPRATROPIUM BROMIDE 0.5 MG: 0.5 SOLUTION RESPIRATORY (INHALATION) at 09:10

## 2022-01-01 RX ADMIN — MEROPENEM 1000 MG: 1 INJECTION, POWDER, FOR SOLUTION INTRAVENOUS at 22:24

## 2022-01-01 RX ADMIN — ZIPRASIDONE MESYLATE 5 MG: 20 INJECTION, POWDER, LYOPHILIZED, FOR SOLUTION INTRAMUSCULAR at 03:00

## 2022-01-01 RX ADMIN — METOPROLOL TARTRATE 25 MG: 25 TABLET, FILM COATED ORAL at 08:40

## 2022-01-01 RX ADMIN — HEPARIN 100 UNITS: 100 SYRINGE at 22:02

## 2022-01-01 RX ADMIN — IPRATROPIUM BROMIDE 0.5 MG: 0.5 SOLUTION RESPIRATORY (INHALATION) at 06:03

## 2022-01-01 RX ADMIN — Medication 500 ML: at 02:55

## 2022-01-01 RX ADMIN — SODIUM CHLORIDE, POTASSIUM CHLORIDE, SODIUM LACTATE AND CALCIUM CHLORIDE: 600; 310; 30; 20 INJECTION, SOLUTION INTRAVENOUS at 08:16

## 2022-01-01 RX ADMIN — MORPHINE SULFATE 9 MG/HR: 10 INJECTION, SOLUTION INTRAMUSCULAR; INTRAVENOUS at 15:56

## 2022-01-01 RX ADMIN — CEFEPIME HYDROCHLORIDE 2000 MG: 2 INJECTION, POWDER, FOR SOLUTION INTRAVENOUS at 15:33

## 2022-01-01 RX ADMIN — VANCOMYCIN HYDROCHLORIDE 1000 MG: 1 INJECTION, POWDER, LYOPHILIZED, FOR SOLUTION INTRAVENOUS at 03:27

## 2022-01-01 RX ADMIN — LEVALBUTEROL HYDROCHLORIDE 0.63 MG: 0.63 SOLUTION RESPIRATORY (INHALATION) at 14:30

## 2022-01-01 RX ADMIN — HEPARIN 300 UNITS: 100 SYRINGE at 21:28

## 2022-01-01 RX ADMIN — DILTIAZEM HYDROCHLORIDE 240 MG: 240 CAPSULE, COATED, EXTENDED RELEASE ORAL at 08:23

## 2022-01-01 RX ADMIN — SULFAMETHOXAZOLE AND TRIMETHOPRIM 1 TABLET: 800; 160 TABLET ORAL at 08:40

## 2022-01-01 RX ADMIN — Medication 2000 UNITS: at 09:15

## 2022-01-01 RX ADMIN — DILTIAZEM HYDROCHLORIDE 240 MG: 240 CAPSULE, COATED, EXTENDED RELEASE ORAL at 10:22

## 2022-01-01 RX ADMIN — LEVALBUTEROL HYDROCHLORIDE 0.63 MG: 0.63 SOLUTION RESPIRATORY (INHALATION) at 14:42

## 2022-01-01 RX ADMIN — Medication 10 ML: at 20:01

## 2022-01-01 RX ADMIN — MEROPENEM 1000 MG: 1 INJECTION, POWDER, FOR SOLUTION INTRAVENOUS at 06:29

## 2022-01-01 RX ADMIN — MEROPENEM 1000 MG: 1 INJECTION, POWDER, FOR SOLUTION INTRAVENOUS at 06:05

## 2022-01-01 RX ADMIN — HEPARIN 300 UNITS: 100 SYRINGE at 21:25

## 2022-01-01 RX ADMIN — METHYLPREDNISOLONE SODIUM SUCCINATE 40 MG: 40 INJECTION, POWDER, FOR SOLUTION INTRAMUSCULAR; INTRAVENOUS at 00:27

## 2022-01-01 RX ADMIN — LORAZEPAM 1 MG: 2 INJECTION INTRAMUSCULAR; INTRAVENOUS at 12:35

## 2022-01-01 RX ADMIN — FAMOTIDINE 20 MG: 20 TABLET, FILM COATED ORAL at 09:15

## 2022-01-01 RX ADMIN — DILTIAZEM HYDROCHLORIDE 15 MG/HR: 5 INJECTION INTRAVENOUS at 16:59

## 2022-01-01 RX ADMIN — APIXABAN 5 MG: 5 TABLET, FILM COATED ORAL at 08:18

## 2022-01-01 RX ADMIN — Medication 10 ML: at 08:21

## 2022-01-01 RX ADMIN — ACETYLCYSTEINE 600 MG: 200 SOLUTION ORAL; RESPIRATORY (INHALATION) at 14:29

## 2022-01-01 RX ADMIN — IPRATROPIUM BROMIDE 0.5 MG: 0.5 SOLUTION RESPIRATORY (INHALATION) at 01:39

## 2022-01-01 RX ADMIN — APIXABAN 5 MG: 5 TABLET, FILM COATED ORAL at 20:01

## 2022-01-01 RX ADMIN — LEVALBUTEROL HYDROCHLORIDE 0.63 MG: 0.63 SOLUTION RESPIRATORY (INHALATION) at 09:34

## 2022-01-01 RX ADMIN — MEROPENEM 1000 MG: 1 INJECTION, POWDER, FOR SOLUTION INTRAVENOUS at 22:28

## 2022-01-01 RX ADMIN — HEPARIN 100 UNITS: 100 SYRINGE at 22:38

## 2022-01-01 RX ADMIN — CEFEPIME HYDROCHLORIDE 2000 MG: 2 INJECTION, POWDER, FOR SOLUTION INTRAVENOUS at 15:31

## 2022-01-01 RX ADMIN — ATORVASTATIN CALCIUM 10 MG: 10 TABLET, FILM COATED ORAL at 08:13

## 2022-01-01 RX ADMIN — HEPARIN 100 UNITS: 100 SYRINGE at 08:13

## 2022-01-01 RX ADMIN — LEVALBUTEROL HYDROCHLORIDE 0.63 MG: 0.63 SOLUTION RESPIRATORY (INHALATION) at 21:34

## 2022-01-01 RX ADMIN — METOPROLOL TARTRATE 25 MG: 25 TABLET, FILM COATED ORAL at 20:01

## 2022-01-01 RX ADMIN — ATORVASTATIN CALCIUM 10 MG: 10 TABLET, FILM COATED ORAL at 18:06

## 2022-01-01 RX ADMIN — METOPROLOL TARTRATE 25 MG: 25 TABLET, FILM COATED ORAL at 20:08

## 2022-01-01 RX ADMIN — ARFORMOTEROL TARTRATE 15 MCG: 15 SOLUTION RESPIRATORY (INHALATION) at 18:30

## 2022-01-01 RX ADMIN — DEXAMETHASONE SODIUM PHOSPHATE 4 MG: 4 INJECTION, SOLUTION INTRA-ARTICULAR; INTRALESIONAL; INTRAMUSCULAR; INTRAVENOUS; SOFT TISSUE at 20:26

## 2022-01-01 RX ADMIN — ARFORMOTEROL TARTRATE 15 MCG: 15 SOLUTION RESPIRATORY (INHALATION) at 06:31

## 2022-01-01 RX ADMIN — SODIUM CHLORIDE 500 ML: 9 INJECTION, SOLUTION INTRAVENOUS at 02:55

## 2022-01-01 RX ADMIN — VANCOMYCIN HYDROCHLORIDE 1000 MG: 1 INJECTION, POWDER, LYOPHILIZED, FOR SOLUTION INTRAVENOUS at 03:18

## 2022-01-01 RX ADMIN — VANCOMYCIN HYDROCHLORIDE 1000 MG: 1 INJECTION, POWDER, LYOPHILIZED, FOR SOLUTION INTRAVENOUS at 03:55

## 2022-01-01 RX ADMIN — FAMOTIDINE 20 MG: 20 TABLET, FILM COATED ORAL at 20:01

## 2022-01-01 RX ADMIN — IPRATROPIUM BROMIDE 0.5 MG: 0.5 SOLUTION RESPIRATORY (INHALATION) at 06:31

## 2022-01-01 RX ADMIN — HYDRALAZINE HYDROCHLORIDE 5 MG: 20 INJECTION, SOLUTION INTRAMUSCULAR; INTRAVENOUS at 06:40

## 2022-01-01 RX ADMIN — METHYLPREDNISOLONE SODIUM SUCCINATE 40 MG: 40 INJECTION, POWDER, FOR SOLUTION INTRAMUSCULAR; INTRAVENOUS at 17:08

## 2022-01-01 RX ADMIN — MEROPENEM 1000 MG: 1 INJECTION, POWDER, FOR SOLUTION INTRAVENOUS at 06:18

## 2022-01-01 RX ADMIN — IPRATROPIUM BROMIDE 0.5 MG: 0.5 SOLUTION RESPIRATORY (INHALATION) at 18:12

## 2022-01-01 RX ADMIN — LEVALBUTEROL HYDROCHLORIDE 0.63 MG: 0.63 SOLUTION RESPIRATORY (INHALATION) at 18:31

## 2022-01-01 RX ADMIN — LEVALBUTEROL HYDROCHLORIDE 0.63 MG: 0.63 SOLUTION RESPIRATORY (INHALATION) at 22:44

## 2022-01-01 RX ADMIN — HEPARIN 100 UNITS: 100 SYRINGE at 08:21

## 2022-01-01 RX ADMIN — IPRATROPIUM BROMIDE 0.5 MG: 0.5 SOLUTION RESPIRATORY (INHALATION) at 09:33

## 2022-01-01 RX ADMIN — HEPARIN 100 UNITS: 100 SYRINGE at 20:45

## 2022-01-01 RX ADMIN — IPRATROPIUM BROMIDE 0.5 MG: 0.5 SOLUTION RESPIRATORY (INHALATION) at 14:38

## 2022-01-01 RX ADMIN — ROFLUMILAST 500 MCG: 500 TABLET ORAL at 08:22

## 2022-01-01 RX ADMIN — FUROSEMIDE 40 MG: 10 INJECTION, SOLUTION INTRAMUSCULAR; INTRAVENOUS at 03:38

## 2022-01-01 RX ADMIN — SODIUM CHLORIDE: 9 INJECTION, SOLUTION INTRAVENOUS at 14:45

## 2022-01-01 RX ADMIN — BUDESONIDE 500 MCG: 0.5 SUSPENSION RESPIRATORY (INHALATION) at 06:10

## 2022-01-01 RX ADMIN — APIXABAN 5 MG: 5 TABLET, FILM COATED ORAL at 20:16

## 2022-01-01 RX ADMIN — ROFLUMILAST 500 MCG: 500 TABLET ORAL at 09:15

## 2022-01-01 RX ADMIN — DOXYCYCLINE 100 MG: 100 INJECTION, POWDER, LYOPHILIZED, FOR SOLUTION INTRAVENOUS at 03:34

## 2022-01-01 RX ADMIN — AMINOPHYLLINE 0.2 MG/KG/HR: 25 INJECTION, SOLUTION INTRAVENOUS at 14:32

## 2022-01-01 RX ADMIN — DOXYCYCLINE 100 MG: 100 INJECTION, POWDER, LYOPHILIZED, FOR SOLUTION INTRAVENOUS at 17:07

## 2022-01-01 RX ADMIN — IPRATROPIUM BROMIDE 0.5 MG: 0.5 SOLUTION RESPIRATORY (INHALATION) at 05:25

## 2022-01-01 RX ADMIN — Medication 2000 UNITS: at 08:04

## 2022-01-01 RX ADMIN — BUDESONIDE 500 MCG: 0.5 SUSPENSION RESPIRATORY (INHALATION) at 05:53

## 2022-01-01 RX ADMIN — LEVALBUTEROL HYDROCHLORIDE 0.63 MG: 0.63 SOLUTION RESPIRATORY (INHALATION) at 18:34

## 2022-01-01 RX ADMIN — METOPROLOL TARTRATE 25 MG: 25 TABLET, FILM COATED ORAL at 08:23

## 2022-01-01 RX ADMIN — LEVALBUTEROL HYDROCHLORIDE 0.63 MG: 0.63 SOLUTION RESPIRATORY (INHALATION) at 01:44

## 2022-01-01 RX ADMIN — ARFORMOTEROL TARTRATE 15 MCG: 15 SOLUTION RESPIRATORY (INHALATION) at 07:04

## 2022-01-01 RX ADMIN — CEFEPIME HYDROCHLORIDE 2000 MG: 2 INJECTION, POWDER, FOR SOLUTION INTRAVENOUS at 03:45

## 2022-01-01 RX ADMIN — ACETYLCYSTEINE 600 MG: 200 SOLUTION ORAL; RESPIRATORY (INHALATION) at 21:34

## 2022-01-01 RX ADMIN — SULFAMETHOXAZOLE AND TRIMETHOPRIM 600 MG: 80; 16 INJECTION, SOLUTION, CONCENTRATE INTRAVENOUS at 15:49

## 2022-01-01 RX ADMIN — ARFORMOTEROL TARTRATE 15 MCG: 15 SOLUTION RESPIRATORY (INHALATION) at 05:08

## 2022-01-01 RX ADMIN — Medication 10 ML: at 22:26

## 2022-01-01 RX ADMIN — CEFEPIME HYDROCHLORIDE 2000 MG: 2 INJECTION, POWDER, FOR SOLUTION INTRAVENOUS at 17:08

## 2022-01-01 RX ADMIN — LORAZEPAM 1 MG: 2 INJECTION INTRAMUSCULAR; INTRAVENOUS at 08:40

## 2022-01-01 RX ADMIN — MEROPENEM 1000 MG: 1 INJECTION, POWDER, FOR SOLUTION INTRAVENOUS at 21:22

## 2022-01-01 RX ADMIN — LEVALBUTEROL HYDROCHLORIDE 0.63 MG: 0.63 SOLUTION RESPIRATORY (INHALATION) at 14:13

## 2022-01-01 RX ADMIN — MORPHINE SULFATE 2 MG: 2 INJECTION, SOLUTION INTRAMUSCULAR; INTRAVENOUS at 12:30

## 2022-01-01 RX ADMIN — HEPARIN 100 UNITS: 100 SYRINGE at 08:10

## 2022-01-01 RX ADMIN — Medication 5 ML: at 20:40

## 2022-01-01 RX ADMIN — ACETYLCYSTEINE 600 MG: 200 SOLUTION ORAL; RESPIRATORY (INHALATION) at 04:37

## 2022-01-01 RX ADMIN — FAMOTIDINE 20 MG: 20 TABLET, FILM COATED ORAL at 20:04

## 2022-01-01 RX ADMIN — ACETYLCYSTEINE 600 MG: 200 SOLUTION ORAL; RESPIRATORY (INHALATION) at 07:04

## 2022-01-01 RX ADMIN — IPRATROPIUM BROMIDE 0.5 MG: 0.5 SOLUTION RESPIRATORY (INHALATION) at 18:28

## 2022-01-01 RX ADMIN — IPRATROPIUM BROMIDE AND ALBUTEROL SULFATE 1 AMPULE: .5; 2.5 SOLUTION RESPIRATORY (INHALATION) at 05:06

## 2022-01-01 RX ADMIN — HEPARIN 300 UNITS: 100 SYRINGE at 20:10

## 2022-01-01 RX ADMIN — APIXABAN 5 MG: 5 TABLET, FILM COATED ORAL at 08:48

## 2022-01-01 RX ADMIN — HALOPERIDOL LACTATE 2 MG: 5 INJECTION, SOLUTION INTRAMUSCULAR at 04:47

## 2022-01-01 RX ADMIN — Medication 10 ML: at 20:40

## 2022-01-01 RX ADMIN — ARFORMOTEROL TARTRATE 15 MCG: 15 SOLUTION RESPIRATORY (INHALATION) at 18:08

## 2022-01-01 RX ADMIN — MEROPENEM 1000 MG: 1 INJECTION, POWDER, FOR SOLUTION INTRAVENOUS at 21:37

## 2022-01-01 RX ADMIN — ACETYLCYSTEINE 600 MG: 200 SOLUTION ORAL; RESPIRATORY (INHALATION) at 22:43

## 2022-01-01 RX ADMIN — ACETYLCYSTEINE 600 MG: 200 SOLUTION ORAL; RESPIRATORY (INHALATION) at 06:03

## 2022-01-01 RX ADMIN — BUDESONIDE 500 MCG: 0.5 SUSPENSION RESPIRATORY (INHALATION) at 18:12

## 2022-01-01 RX ADMIN — LORAZEPAM 1 MG: 2 INJECTION INTRAMUSCULAR; INTRAVENOUS at 15:24

## 2022-01-01 RX ADMIN — ROFLUMILAST 500 MCG: 500 TABLET ORAL at 08:21

## 2022-01-01 RX ADMIN — MEROPENEM 1000 MG: 1 INJECTION, POWDER, FOR SOLUTION INTRAVENOUS at 06:01

## 2022-01-01 RX ADMIN — SULFAMETHOXAZOLE AND TRIMETHOPRIM 600 MG: 80; 16 INJECTION, SOLUTION, CONCENTRATE INTRAVENOUS at 08:21

## 2022-01-01 RX ADMIN — APIXABAN 5 MG: 5 TABLET, FILM COATED ORAL at 09:15

## 2022-01-01 RX ADMIN — IPRATROPIUM BROMIDE 0.5 MG: 0.5 SOLUTION RESPIRATORY (INHALATION) at 01:44

## 2022-01-01 RX ADMIN — APIXABAN 5 MG: 5 TABLET, FILM COATED ORAL at 08:40

## 2022-01-01 RX ADMIN — METOPROLOL TARTRATE 25 MG: 25 TABLET, FILM COATED ORAL at 20:36

## 2022-01-01 RX ADMIN — HALOPERIDOL LACTATE 2 MG: 5 INJECTION, SOLUTION INTRAMUSCULAR at 06:44

## 2022-01-01 RX ADMIN — ACETYLCYSTEINE 600 MG: 200 SOLUTION ORAL; RESPIRATORY (INHALATION) at 22:29

## 2022-01-01 RX ADMIN — ALBUTEROL SULFATE 2.5 MG: 2.5 SOLUTION RESPIRATORY (INHALATION) at 06:34

## 2022-01-01 RX ADMIN — ATORVASTATIN CALCIUM 10 MG: 10 TABLET, FILM COATED ORAL at 17:06

## 2022-01-01 RX ADMIN — ALBUTEROL SULFATE 2.5 MG: 2.5 SOLUTION RESPIRATORY (INHALATION) at 09:47

## 2022-01-01 RX ADMIN — ROFLUMILAST 500 MCG: 500 TABLET ORAL at 08:39

## 2022-01-01 RX ADMIN — LEVALBUTEROL HYDROCHLORIDE 0.63 MG: 0.63 SOLUTION RESPIRATORY (INHALATION) at 22:29

## 2022-01-01 RX ADMIN — ARFORMOTEROL TARTRATE 15 MCG: 15 SOLUTION RESPIRATORY (INHALATION) at 16:02

## 2022-01-01 RX ADMIN — APIXABAN 5 MG: 5 TABLET, FILM COATED ORAL at 20:28

## 2022-01-01 RX ADMIN — GLYCOPYRROLATE 0.4 MG: 0.2 INJECTION INTRAMUSCULAR; INTRAVENOUS at 13:07

## 2022-01-01 RX ADMIN — ACETAMINOPHEN 650 MG: 325 TABLET ORAL at 14:29

## 2022-01-01 RX ADMIN — DILTIAZEM HYDROCHLORIDE 240 MG: 240 CAPSULE, COATED, EXTENDED RELEASE ORAL at 08:24

## 2022-01-01 RX ADMIN — DILTIAZEM HYDROCHLORIDE 240 MG: 240 CAPSULE, COATED, EXTENDED RELEASE ORAL at 18:21

## 2022-01-01 RX ADMIN — SULFAMETHOXAZOLE AND TRIMETHOPRIM 600 MG: 80; 16 INJECTION, SOLUTION, CONCENTRATE INTRAVENOUS at 15:14

## 2022-01-01 RX ADMIN — ARFORMOTEROL TARTRATE 15 MCG: 15 SOLUTION RESPIRATORY (INHALATION) at 04:43

## 2022-01-01 RX ADMIN — METOPROLOL TARTRATE 25 MG: 25 TABLET, FILM COATED ORAL at 15:09

## 2022-01-01 RX ADMIN — FAMOTIDINE 20 MG: 20 TABLET, FILM COATED ORAL at 22:04

## 2022-01-01 RX ADMIN — METOPROLOL TARTRATE 25 MG: 25 TABLET, FILM COATED ORAL at 20:39

## 2022-01-01 RX ADMIN — ARFORMOTEROL TARTRATE 15 MCG: 15 SOLUTION RESPIRATORY (INHALATION) at 18:22

## 2022-01-01 RX ADMIN — DILTIAZEM HYDROCHLORIDE 240 MG: 240 CAPSULE, COATED, EXTENDED RELEASE ORAL at 08:13

## 2022-01-01 RX ADMIN — FAMOTIDINE 20 MG: 20 TABLET, FILM COATED ORAL at 08:13

## 2022-01-01 RX ADMIN — IPRATROPIUM BROMIDE AND ALBUTEROL SULFATE 1 AMPULE: .5; 2.5 SOLUTION RESPIRATORY (INHALATION) at 21:22

## 2022-01-01 RX ADMIN — SODIUM CHLORIDE, PRESERVATIVE FREE 20 MG: 5 INJECTION INTRAVENOUS at 20:48

## 2022-01-01 RX ADMIN — HALOPERIDOL LACTATE 2 MG: 5 INJECTION, SOLUTION INTRAMUSCULAR at 03:49

## 2022-01-01 RX ADMIN — ARFORMOTEROL TARTRATE 15 MCG: 15 SOLUTION RESPIRATORY (INHALATION) at 18:34

## 2022-01-01 RX ADMIN — FAMOTIDINE 20 MG: 20 TABLET, FILM COATED ORAL at 20:16

## 2022-01-01 RX ADMIN — HEPARIN 300 UNITS: 100 SYRINGE at 20:45

## 2022-01-01 RX ADMIN — HALOPERIDOL 2 MG: 5 INJECTION INTRAMUSCULAR at 13:14

## 2022-01-01 RX ADMIN — BUDESONIDE 500 MCG: 0.5 SUSPENSION RESPIRATORY (INHALATION) at 18:08

## 2022-01-01 RX ADMIN — ACETYLCYSTEINE 600 MG: 200 SOLUTION ORAL; RESPIRATORY (INHALATION) at 14:31

## 2022-01-01 RX ADMIN — IPRATROPIUM BROMIDE 0.5 MG: 0.5 SOLUTION RESPIRATORY (INHALATION) at 09:47

## 2022-01-01 RX ADMIN — LORAZEPAM 0.5 MG: 2 INJECTION INTRAMUSCULAR; INTRAVENOUS at 11:59

## 2022-01-01 RX ADMIN — BUDESONIDE 500 MCG: 0.5 SUSPENSION RESPIRATORY (INHALATION) at 07:04

## 2022-01-01 RX ADMIN — FAMOTIDINE 20 MG: 20 TABLET, FILM COATED ORAL at 20:36

## 2022-01-01 RX ADMIN — IPRATROPIUM BROMIDE 0.5 MG: 0.5 SOLUTION RESPIRATORY (INHALATION) at 22:43

## 2022-01-01 RX ADMIN — APIXABAN 5 MG: 5 TABLET, FILM COATED ORAL at 08:04

## 2022-01-01 RX ADMIN — IPRATROPIUM BROMIDE 0.5 MG: 0.5 SOLUTION RESPIRATORY (INHALATION) at 18:21

## 2022-01-01 RX ADMIN — LORAZEPAM 1 MG: 2 INJECTION INTRAMUSCULAR; INTRAVENOUS at 22:57

## 2022-01-01 RX ADMIN — LEVALBUTEROL HYDROCHLORIDE 0.63 MG: 0.63 SOLUTION RESPIRATORY (INHALATION) at 09:52

## 2022-01-01 RX ADMIN — IPRATROPIUM BROMIDE 0.5 MG: 0.5 SOLUTION RESPIRATORY (INHALATION) at 02:01

## 2022-01-01 RX ADMIN — Medication 2000 UNITS: at 08:23

## 2022-01-01 RX ADMIN — IPRATROPIUM BROMIDE 0.5 MG: 0.5 SOLUTION RESPIRATORY (INHALATION) at 06:36

## 2022-01-01 RX ADMIN — ACETYLCYSTEINE 600 MG: 200 SOLUTION ORAL; RESPIRATORY (INHALATION) at 22:21

## 2022-01-01 RX ADMIN — LEVALBUTEROL HYDROCHLORIDE 0.63 MG: 0.63 SOLUTION RESPIRATORY (INHALATION) at 05:45

## 2022-01-01 RX ADMIN — BUDESONIDE 500 MCG: 0.5 SUSPENSION RESPIRATORY (INHALATION) at 05:46

## 2022-01-01 RX ADMIN — MEROPENEM 1000 MG: 1 INJECTION, POWDER, FOR SOLUTION INTRAVENOUS at 15:21

## 2022-01-01 RX ADMIN — APIXABAN 5 MG: 5 TABLET, FILM COATED ORAL at 08:56

## 2022-01-01 RX ADMIN — ARFORMOTEROL TARTRATE 15 MCG: 15 SOLUTION RESPIRATORY (INHALATION) at 17:58

## 2022-01-01 RX ADMIN — METHYLPREDNISOLONE SODIUM SUCCINATE 40 MG: 40 INJECTION, POWDER, FOR SOLUTION INTRAMUSCULAR; INTRAVENOUS at 01:12

## 2022-01-01 RX ADMIN — HALOPERIDOL LACTATE 2 MG: 5 INJECTION, SOLUTION INTRAMUSCULAR at 01:25

## 2022-01-01 RX ADMIN — APIXABAN 5 MG: 5 TABLET, FILM COATED ORAL at 20:26

## 2022-01-01 RX ADMIN — DILTIAZEM HYDROCHLORIDE 240 MG: 240 CAPSULE, COATED, EXTENDED RELEASE ORAL at 09:15

## 2022-01-01 RX ADMIN — GLYCOPYRROLATE 0.4 MG: 0.2 INJECTION INTRAMUSCULAR; INTRAVENOUS at 06:52

## 2022-01-01 RX ADMIN — METOPROLOL TARTRATE 25 MG: 25 TABLET, FILM COATED ORAL at 08:18

## 2022-01-01 RX ADMIN — VANCOMYCIN HYDROCHLORIDE 1000 MG: 1 INJECTION, POWDER, LYOPHILIZED, FOR SOLUTION INTRAVENOUS at 03:30

## 2022-01-01 RX ADMIN — IPRATROPIUM BROMIDE 0.5 MG: 0.5 SOLUTION RESPIRATORY (INHALATION) at 14:22

## 2022-01-01 RX ADMIN — SODIUM CHLORIDE, PRESERVATIVE FREE 10 ML: 5 INJECTION INTRAVENOUS at 05:57

## 2022-01-01 RX ADMIN — METOPROLOL TARTRATE 25 MG: 25 TABLET, FILM COATED ORAL at 22:44

## 2022-01-01 RX ADMIN — IPRATROPIUM BROMIDE AND ALBUTEROL SULFATE 1 AMPULE: .5; 2.5 SOLUTION RESPIRATORY (INHALATION) at 10:08

## 2022-01-01 RX ADMIN — ACETYLCYSTEINE 600 MG: 200 SOLUTION ORAL; RESPIRATORY (INHALATION) at 12:33

## 2022-01-01 RX ADMIN — CEFEPIME HYDROCHLORIDE 2000 MG: 2 INJECTION, POWDER, FOR SOLUTION INTRAVENOUS at 04:58

## 2022-01-01 RX ADMIN — ROFLUMILAST 500 MCG: 500 TABLET ORAL at 08:48

## 2022-01-01 RX ADMIN — ALBUTEROL SULFATE 2.5 MG: 2.5 SOLUTION RESPIRATORY (INHALATION) at 02:02

## 2022-01-01 RX ADMIN — ROFLUMILAST 500 MCG: 500 TABLET ORAL at 08:18

## 2022-01-01 ASSESSMENT — PAIN SCALES - PAIN ASSESSMENT IN ADVANCED DEMENTIA (PAINAD)
BREATHING: 0
FACIALEXPRESSION: 0
BODYLANGUAGE: 0
BREATHING: 1
TOTALSCORE: 1
BODYLANGUAGE: 0
BREATHING: 0
CONSOLABILITY: 0
BODYLANGUAGE: 0
BREATHING: 0
BODYLANGUAGE: 1
TOTALSCORE: 0
CONSOLABILITY: 0
BREATHING: 0
TOTALSCORE: 0
NEGVOCALIZATION: 0
NEGVOCALIZATION: 0
CONSOLABILITY: 0
FACIALEXPRESSION: 0
NEGVOCALIZATION: 0
NEGVOCALIZATION: 0
TOTALSCORE: 0
CONSOLABILITY: 0
NEGVOCALIZATION: 0
TOTALSCORE: 0
TOTALSCORE: 0
FACIALEXPRESSION: 0
BODYLANGUAGE: 0
BODYLANGUAGE: 0
BREATHING: 0
FACIALEXPRESSION: 0
NEGVOCALIZATION: 0
BODYLANGUAGE: 0
BREATHING: 0
TOTALSCORE: 0
FACIALEXPRESSION: 0
BREATHING: 0
TOTALSCORE: 0
BODYLANGUAGE: 0
NEGVOCALIZATION: 1
BODYLANGUAGE: 0
NEGVOCALIZATION: 0
CONSOLABILITY: 0
NEGVOCALIZATION: 0
BREATHING: 0
FACIALEXPRESSION: 0
BREATHING: 0
CONSOLABILITY: 0
NEGVOCALIZATION: 0
FACIALEXPRESSION: 0
CONSOLABILITY: 2
FACIALEXPRESSION: 0
BODYLANGUAGE: 0
BODYLANGUAGE: 0
CONSOLABILITY: 0
BREATHING: 0
FACIALEXPRESSION: 0
BODYLANGUAGE: 0
BODYLANGUAGE: 0
BREATHING: 0
NEGVOCALIZATION: 0
TOTALSCORE: 0
NEGVOCALIZATION: 0
CONSOLABILITY: 0
CONSOLABILITY: 0
NEGVOCALIZATION: 0
FACIALEXPRESSION: 0
CONSOLABILITY: 0
BODYLANGUAGE: 1
FACIALEXPRESSION: 0
BODYLANGUAGE: 0
FACIALEXPRESSION: 0
FACIALEXPRESSION: 0
CONSOLABILITY: 0
FACIALEXPRESSION: 0
BODYLANGUAGE: 0
NEGVOCALIZATION: 0
TOTALSCORE: 0
NEGVOCALIZATION: 0
BREATHING: 0
TOTALSCORE: 0
BREATHING: 0
TOTALSCORE: 0
NEGVOCALIZATION: 0
CONSOLABILITY: 0
TOTALSCORE: 5
BREATHING: 0
BODYLANGUAGE: 0
BREATHING: 0
TOTALSCORE: 0
CONSOLABILITY: 0
CONSOLABILITY: 0
BREATHING: 0
TOTALSCORE: 0
FACIALEXPRESSION: 0
NEGVOCALIZATION: 0

## 2022-01-01 ASSESSMENT — PAIN SCALES - GENERAL
PAINLEVEL_OUTOF10: 0
PAINLEVEL_OUTOF10: 5
PAINLEVEL_OUTOF10: 0
PAINLEVEL_OUTOF10: 4
PAINLEVEL_OUTOF10: 0
PAINLEVEL_OUTOF10: 9
PAINLEVEL_OUTOF10: 0
PAINLEVEL_OUTOF10: 0
PAINLEVEL_OUTOF10: 7
PAINLEVEL_OUTOF10: 0
PAINLEVEL_OUTOF10: 6
PAINLEVEL_OUTOF10: 0
PAINLEVEL_OUTOF10: 5
PAINLEVEL_OUTOF10: 0
PAINLEVEL_OUTOF10: 3
PAINLEVEL_OUTOF10: 0

## 2022-01-01 ASSESSMENT — PAIN - FUNCTIONAL ASSESSMENT
PAIN_FUNCTIONAL_ASSESSMENT: PREVENTS OR INTERFERES SOME ACTIVE ACTIVITIES AND ADLS
PAIN_FUNCTIONAL_ASSESSMENT: NONE - DENIES PAIN
PAIN_FUNCTIONAL_ASSESSMENT: INTOLERABLE, UNABLE TO DO ANY ACTIVE OR PASSIVE ACTIVITIES
PAIN_FUNCTIONAL_ASSESSMENT: NONE - DENIES PAIN

## 2022-01-01 ASSESSMENT — PAIN DESCRIPTION - DESCRIPTORS
DESCRIPTORS: ACHING;DULL;DISCOMFORT
DESCRIPTORS: ACHING;DULL;THROBBING
DESCRIPTORS: DISCOMFORT
DESCRIPTORS: OTHER (COMMENT)
DESCRIPTORS: DISCOMFORT

## 2022-01-01 ASSESSMENT — PAIN DESCRIPTION - ONSET: ONSET: ON-GOING

## 2022-01-01 ASSESSMENT — ENCOUNTER SYMPTOMS: TACHYPNEA: 1

## 2022-01-01 ASSESSMENT — PAIN DESCRIPTION - ORIENTATION
ORIENTATION: LEFT;UPPER
ORIENTATION: POSTERIOR;MID

## 2022-01-01 ASSESSMENT — PAIN DESCRIPTION - LOCATION
LOCATION: GENERALIZED
LOCATION: GENERALIZED
LOCATION: NECK
LOCATION: GENERALIZED
LOCATION: LEG
LOCATION: GENERALIZED

## 2022-01-01 ASSESSMENT — PAIN DESCRIPTION - FREQUENCY: FREQUENCY: INTERMITTENT

## 2022-01-01 ASSESSMENT — PAIN DESCRIPTION - PAIN TYPE: TYPE: ACUTE PAIN

## 2022-06-24 PROBLEM — J18.9 PNEUMONIA: Status: ACTIVE | Noted: 2022-01-01

## 2022-06-24 NOTE — ED NOTES
Pt switched over to his normal 5L NC at this time - Respiratory notified he is trialing off of bipap as well and states that he should tolerate NC well     Ramirez Duarte RN  06/24/22 0003

## 2022-06-24 NOTE — CONSULTS
PULMONARY MEDICINE CONSULT    Consult requested by:  Dr Morales Hernández  Reason for consult: Respiratory failure    HPI    [de-identified]year old person with PMH of COPD with chronic hypoxemic respiratory failure (on 5L ), hyperlipidemia, as described below admitted to hospital for management of worsening dyspnea. The patient has a CXR and CT with right lower lobe infiltrate. The patient is receiving azithromycin and cefepime, methylprednisolone and bronchodilators. He is chronically anticoagulated with apixaban.      Pt with worsening dyspnea, cough, sputum, no fever or chills    Social: Lives at home  Smoking: Denies  Alcoholic beverages ingestion: Occasional  Pets: Denies    Review of Systems - History obtained from the patient  General ROS: positive for  - malaise  Psychological ROS: negative  Ophthalmic ROS: negative for - blurry vision  ENT ROS: negative for - nasal discharge  Allergy and Immunology ROS: negative for - postnasal drip  Hematological and Lymphatic ROS: negative for - bleeding problems  Endocrine ROS: negative for - polydipsia/polyuria  Breast ROS: negative for breast lumps  Respiratory ROS: positive for - cough, shortness of breath, sputum changes, tachypnea and wheezing  Cardiovascular ROS: negative for - chest pain  Gastrointestinal ROS: no abdominal pain, change in bowel habits, or black or bloody stools  Genito-Urinary ROS: no dysuria, trouble voiding, or hematuria  Musculoskeletal ROS: negative for - muscular weakness  Neurological ROS: no TIA or stroke symptoms  Dermatological ROS: negative for - rash        /87   Pulse 99   Temp 97.8 °F (36.6 °C) (Infrared)   Resp 22   Ht 5' 11\" (1.803 m)   Wt 294 lb 12.8 oz (133.7 kg)   SpO2 90%   BMI 41.12 kg/m²   General: Awake, oriented to place, time and person  HEENT: No head lesions, PERRL, EOMI, mouth without lesions, no nasal lesions, no cervical adenopathy palpated  Respiratory: Lungs with equal breath sounds bilaterally, no adventitious sounds auscultated, no accessory muscle use  CV: Regular rate, no murmurs, no JVD, no leg edema  Abdomen: Soft, non tender, + bowel sounds, no lesions  Skin: Hydrated, adequate turgor, no rash, capillary refill <2 seconds  Extremities: Muscular strength 4/5 in 4 limbs, moves 4 limbs spontaneously, distal pulses present  Neurology: Awake and alert, follows commands, moves 4 limbs on command and spontaneously, neck is supple, no meningitic signs present. A/P:  Acute on chronic hypoxemic respiratory failure secondary to pneumonia -Community acquired (pneumococcus vs atypicals vs pseudomonas in a patient with COPD  --Cultures of blood and sputum ordered  --Check HIV, pneumococcal and legionella urinary antigens  --Antimicrobial therapy: Azithromycin and cefepime  --Bronchodilators: Arformoterol/budesonide + albuterol/iparatropium q 4 hrs + albuterol PRN   --Steroids: Solumedrol 40 mg IV q 8 hrs for total 5 days  --Incentive spirometry and flutter valve 10 times/hour while awake    DVT prophylaxis - anticoagulated with apixaban     Rest of supportive care as per primary team    Hyperlipidemia  --Continue statin     Hypertension  --On antihypertensives      Past Medical History:   Diagnosis Date    COPD (chronic obstructive pulmonary disease) ([facility] [address]     Hyperlipidemia      No family history on file.   Social History     Socioeconomic History    Marital status:      Spouse name: Not on file    Number of children: Not on file    Years of education: Not on file    Highest education level: Not on file   Occupational History    Not on file   Tobacco Use    Smoking status: Former Smoker     Quit date: 2009     Years since quittin.6    Smokeless tobacco: Former User   Vaping Use    Vaping Use: Never used   Substance and Sexual Activity    Alcohol use: Not Currently    Drug use: No    Sexual activity: Not Currently   Other Topics Concern    Not on file   Social History Narrative    Not on file Social Determinants of Health     Financial Resource Strain:     Difficulty of Paying Living Expenses: Not on file   Food Insecurity:     Worried About Running Out of Food in the Last Year: Not on file    Elke of Food in the Last Year: Not on file   Transportation Needs:     Lack of Transportation (Medical): Not on file    Lack of Transportation (Non-Medical):  Not on file   Physical Activity:     Days of Exercise per Week: Not on file    Minutes of Exercise per Session: Not on file   Stress:     Feeling of Stress : Not on file   Social Connections:     Frequency of Communication with Friends and Family: Not on file    Frequency of Social Gatherings with Friends and Family: Not on file    Attends Yarsanism Services: Not on file    Active Member of [address] [place] [address] [employer] or Organizations: Not on file    Attends Club or Organization Meetings: Not on file    Marital Status: Not on file   Intimate Partner Violence:     Fear of Current or Ex-Partner: Not on file    Emotionally Abused: Not on file    Physically Abused: Not on file    Sexually Abused: Not on file   Housing Stability:     Unable to Pay for Housing in the Last Year: Not on file    Number of [place] in the Last Year: Not on file    Unstable Housing in the Last Year: Not on file     Current Facility-Administered Medications   Medication Dose Route Frequency Provider Last Rate Last Admin    albuterol (PROVENTIL) nebulizer solution 2.5 mg  2.5 mg Nebulization 6 times per day Mitchel Naqvi MD   2.5 mg at 06/24/22 1337    apixaban (ELIQUIS) tablet 5 mg  5 mg Oral BID Mitchel Naqvi MD   5 mg at 06/24/22 1441    dilTIAZem (CARDIZEM CD) extended release capsule 240 mg  240 mg Oral Daily Mitchel Naqvi MD   240 mg at 06/24/22 1441    famotidine (PEPCID) tablet 20 mg  20 mg Oral BID Mitchel Naqvi MD   20 mg at 06/24/22 1441    atorvastatin (LIPITOR) tablet 10 mg  10 mg Oral Daily Mitchel Naqvi MD        [START ON 6/25/2022] vitamin D (CHOLECALCIFEROL) tablet 2,000 Units  2,000 Units Oral Daily Tere Oliva MD        cefepime (MAXIPIME) 2000 mg IVPB minibag  2,000 mg IntraVENous Q12H Tere Oliva MD        budesonide (PULMICORT) nebulizer suspension 500 mcg  0.5 mg Nebulization BID Tere Oliva MD   500 mcg at 06/24/22 0931    Arformoterol Tartrate (BROVANA) nebulizer solution 15 mcg  15 mcg Nebulization BID eTre Oliva MD   15 mcg at 06/24/22 0931    doxycycline (VIBRAMYCIN) 100 mg in sodium chloride 0.9 % 100 mL IVPB  100 mg IntraVENous Q12H Tere Oliva  mL/hr at 06/24/22 1535 100 mg at 06/24/22 1535    methylPREDNISolone sodium (SOLU-MEDROL) injection 40 mg  40 mg IntraVENous Q8H Bahaa A Awadalla, MD   40 mg at 06/24/22 1000    ipratropium (ATROVENT) 0.02 % nebulizer solution 0.5 mg  0.5 mg Nebulization 4x daily Tere Oliva MD         MEDICATIONS:   albuterol  2.5 mg Nebulization 6 times per day    apixaban  5 mg Oral BID    dilTIAZem  240 mg Oral Daily    famotidine  20 mg Oral BID    atorvastatin  10 mg Oral Daily    [START ON 6/25/2022] Vitamin D  2,000 Units Oral Daily    cefepime  2,000 mg IntraVENous Q12H    budesonide  0.5 mg Nebulization BID    Arformoterol Tartrate  15 mcg Nebulization BID    doxycycline (VIBRAMYCIN) IV  100 mg IntraVENous Q12H    methylPREDNISolone  40 mg IntraVENous Q8H    ipratropium  0.5 mg Nebulization 4x daily           OBJECTIVE:  Vitals:    06/24/22 1515   BP: 134/87   Pulse: 99   Resp: 22   Temp: 97.8 °F (36.6 °C)   SpO2: 90%     FiO2 : 50 %  O2 Flow Rate (L/min): 5 L/min  O2 Device: Nasal cannula        LABS:  WBC   Date Value Ref Range Status   06/24/2022 20.5 (H) 4.5 - 11.5 E9/L Final   12/22/2020 12.7 (H) 4.5 - 11.5 E9/L Final   12/21/2020 11.6 (H) 4.5 - 11.5 E9/L Final     Hemoglobin   Date Value Ref Range Status   06/24/2022 14.7 12.5 - 16.5 g/dL Final   12/22/2020 14.1 12.5 - 16.5 g/dL Final   12/21/2020 11.9 (L) 12.5 - 16.5 g/dL Final Hematocrit   Date Value Ref Range Status   06/24/2022 46.0 37.0 - 54.0 % Final   12/22/2020 43.1 37.0 - 54.0 % Final   12/21/2020 36.1 (L) 37.0 - 54.0 % Final     MCV   Date Value Ref Range Status   06/24/2022 94.3 80.0 - 99.9 fL Final   12/22/2020 91.5 80.0 - 99.9 fL Final   12/21/2020 91.2 80.0 - 99.9 fL Final     Platelets   Date Value Ref Range Status   06/24/2022 227 130 - 450 E9/L Final   12/22/2020 228 130 - 450 E9/L Final   12/21/2020 185 130 - 450 E9/L Final     Sodium   Date Value Ref Range Status   06/24/2022 137 132 - 146 mmol/L Final   12/24/2020 140 132 - 146 mmol/L Final   12/22/2020 145 132 - 146 mmol/L Final     Potassium   Date Value Ref Range Status   12/24/2020 3.4 (L) 3.5 - 5.0 mmol/L Final   12/22/2020 3.7 3.5 - 5.0 mmol/L Final   12/21/2020 3.3 (L) 3.5 - 5.0 mmol/L Final     Potassium reflex Magnesium   Date Value Ref Range Status   06/24/2022 4.3 3.5 - 5.0 mmol/L Final   12/18/2020 3.7 3.5 - 5.0 mmol/L Final   07/23/2019 4.6 3.5 - 5.0 mmol/L Final     Chloride   Date Value Ref Range Status   06/24/2022 97 (L) 98 - 107 mmol/L Final   12/24/2020 103 98 - 107 mmol/L Final   12/22/2020 109 (H) 98 - 107 mmol/L Final     CO2   Date Value Ref Range Status   06/24/2022 25 22 - 29 mmol/L Final   12/24/2020 29 22 - 29 mmol/L Final   12/22/2020 25 22 - 29 mmol/L Final     BUN   Date Value Ref Range Status   06/24/2022 10 6 - 23 mg/dL Final   12/24/2020 23 8 - 23 mg/dL Final   12/22/2020 29 (H) 8 - 23 mg/dL Final     CREATININE   Date Value Ref Range Status   06/24/2022 1.2 0.7 - 1.2 mg/dL Final   12/24/2020 1.0 0.7 - 1.2 mg/dL Final   12/22/2020 1.2 0.7 - 1.2 mg/dL Final     Glucose   Date Value Ref Range Status   06/24/2022 158 (H) 74 - 99 mg/dL Final   12/24/2020 98 74 - 99 mg/dL Final   12/22/2020 121 (H) 74 - 99 mg/dL Final     Calcium   Date Value Ref Range Status   06/24/2022 9.0 8.6 - 10.2 mg/dL Final   12/24/2020 8.4 (L) 8.6 - 10.2 mg/dL Final   12/22/2020 8.9 8.6 - 10.2 mg/dL Final Phosphorus   Date Value Ref Range Status   06/24/2022 1.6 (L) 2.5 - 4.5 mg/dL Final   12/23/2020 3.0 2.5 - 4.5 mg/dL Final   12/21/2020 2.4 (L) 2.5 - 4.5 mg/dL Final     Recent Labs     06/24/22  0023   HCO3 24.9   BE 1.4   O2SAT 100.0*       RADIOLOGY:  CT ABDOMEN PELVIS WO CONTRAST Additional Contrast? None   Final Result   Limited study due to patient motion. No acute intra-abdominal or pelvic findings. Right lower lobe consolidative changes, suggestive of a pneumonia. XR CHEST PORTABLE   Final Result   Bilateral basilar disease. The possibility of a bilateral lower lobe   pneumonia cannot be excluded in the appropriate setting. PROBLEM LIST:  Principal Problem:    Pneumonia  Resolved Problems:    * No resolved hospital problems.  *        Harish Sanchez MD  Pulmonary and Critical Care Medicine

## 2022-06-24 NOTE — ED PROVIDER NOTES
Rinku Nails is an 81-year-old male with a past medical history of COPD and chronic respiratory failure on oxygen at home who presented to emergency department with concern for increasing shortness of breath. Family called EMS as patient was very tachypneic at home and appeared to be in distress. EMS found patient to be 80% on baseline oxygen patient was placed on a nonrebreather and brought to emergency department. Patient just states that he has had increasing shortness of breath for several days. Patient denies fever, chills, chest pain, abdominal pain. Patient's symptoms are severe and constant. HPI and ROS are limited due to acuity of condition, patient is also a poor historian and does not know his baseline oxygen levels just stated she uses oxygen \"wide open\". Patient is on eliquis. Patient was given DuoNeb treatments by EMS and Solu-Medrol. The history is provided by the patient, medical records and the EMS personnel. Review of Systems   Unable to perform ROS: Acuity of condition        Physical Exam  Vitals and nursing note reviewed. Constitutional:       General: He is in acute distress. Appearance: He is ill-appearing. HENT:      Head: Normocephalic and atraumatic. Eyes:      General: No scleral icterus. Conjunctiva/sclera: Conjunctivae normal.      Pupils: Pupils are equal, round, and reactive to light. Cardiovascular:      Rate and Rhythm: Regular rhythm. Tachycardia present. Pulmonary:      Effort: Tachypnea and prolonged expiration present. Breath sounds: Rhonchi present. Abdominal:      General: Bowel sounds are normal. There is no distension. Palpations: Abdomen is soft. Tenderness: There is no abdominal tenderness. There is no guarding or rebound. Musculoskeletal:      Cervical back: Normal range of motion and neck supple. No rigidity or tenderness. No muscular tenderness. Right lower leg: No edema. Left lower leg: No edema.    Skin: PE  Tachycardia improved with small IVF bolus x2       ED Course as of 06/24/22 0552 Fri Jun 24, 2022 0420 EKG: This EKG is signed and interpreted by me. Rate: 102  Rhythm: Sinus  Interpretation: non-specific EKG, no St elevation   Comparison: changes compared to previous EKG   Repeat EKG [SS]      ED Course User Index  [SS] Mary Ellen Carreon MD      ED Course as of 06/24/22 0552 Fri Jun 24, 2022 0420 EKG: This EKG is signed and interpreted by me. Rate: 102  Rhythm: Sinus  Interpretation: non-specific EKG, no St elevation   Comparison: changes compared to previous EKG   Repeat EKG [SS]      ED Course User Index  [SS] Mary Ellen Carreon MD       --------------------------------------------- PAST HISTORY ---------------------------------------------  Past Medical History:  has a past medical history of COPD (chronic obstructive pulmonary disease) ([facility] [address] and Hyperlipidemia. Past Surgical History:  has a past surgical history that includes Hemorrhoid surgery; eye surgery (Right); Colonoscopy; laparoscopic appendectomy (N/A, 2/7/2019); and Appendectomy (2019). Social History:  reports that he quit smoking about 12 years ago. He has quit using smokeless tobacco. He reports previous alcohol use. He reports that he does not use drugs. Family History: family history is not on file. The patients home medications have been reviewed. Allergies: Patient has no known allergies.     -------------------------------------------------- RESULTS -------------------------------------------------    LABS:  Results for orders placed or performed during the hospital encounter of 06/24/22   COVID-19, Rapid    Specimen: Nasopharyngeal Swab   Result Value Ref Range    SARS-CoV-2, NAAT Not Detected Not Detected   RAPID INFLUENZA A/B ANTIGENS    Specimen: Nasopharyngeal   Result Value Ref Range    Influenza A by PCR Not Detected Not Detected    Influenza B by PCR Not Detected Not Detected   Comprehensive Metabolic Panel w/ Reflex to MG   Result Value Ref Range    Sodium 137 132 - 146 mmol/L    Potassium reflex Magnesium 4.3 3.5 - 5.0 mmol/L    Chloride 97 (L) 98 - 107 mmol/L    CO2 25 22 - 29 mmol/L    Anion Gap 15 7 - 16 mmol/L    Glucose 158 (H) 74 - 99 mg/dL    BUN 10 6 - 23 mg/dL    CREATININE 1.2 0.7 - 1.2 mg/dL    GFR Non-African American 58 >=60 mL/min/1.73    GFR African American >60     Calcium 9.0 8.6 - 10.2 mg/dL    Total Protein 6.7 6.4 - 8.3 g/dL    Albumin 3.8 3.5 - 5.2 g/dL    Total Bilirubin 1.0 0.0 - 1.2 mg/dL    Alkaline Phosphatase 99 40 - 129 U/L    ALT 36 0 - 40 U/L    AST 38 0 - 39 U/L   CBC with Auto Differential   Result Value Ref Range    WBC 20.5 (H) 4.5 - 11.5 E9/L    RBC 4.88 3.80 - 5.80 E12/L    Hemoglobin 14.7 12.5 - 16.5 g/dL    Hematocrit 46.0 37.0 - 54.0 %    MCV 94.3 80.0 - 99.9 fL    MCH 30.1 26.0 - 35.0 pg    MCHC 32.0 32.0 - 34.5 %    RDW 13.9 11.5 - 15.0 fL    Platelets 620 374 - 643 E9/L    MPV 10.5 7.0 - 12.0 fL    Neutrophils % 77.4 43.0 - 80.0 %    Lymphocytes % 13.0 (L) 20.0 - 42.0 %    Monocytes % 7.8 2.0 - 12.0 %    Eosinophils % 0.9 0.0 - 6.0 %    Basophils % 0.2 0.0 - 2.0 %    Neutrophils Absolute 15.99 (H) 1.80 - 7.30 E9/L    Lymphocytes Absolute 2.67 1.50 - 4.00 E9/L    Monocytes Absolute 1.64 (H) 0.10 - 0.95 E9/L    Eosinophils Absolute 0.18 0.05 - 0.50 E9/L    Basophils Absolute 0.00 0.00 - 0.20 E9/L    Metamyelocytes Relative 0.9 0.0 - 1.0 %    Polychromasia 1+     Poikilocytes 1+     Ovalocytes 1+    Troponin   Result Value Ref Range    Troponin, High Sensitivity 74 (H) 0 - 11 ng/L   Brain Natriuretic Peptide   Result Value Ref Range    Pro- 0 - 450 pg/mL   Lactate, Sepsis   Result Value Ref Range    Lactic Acid, Sepsis 4.3 (HH) 0.5 - 1.9 mmol/L   Lactate, Sepsis   Result Value Ref Range    Lactic Acid, Sepsis 2.1 (H) 0.5 - 1.9 mmol/L   Phosphorus   Result Value Ref Range    Phosphorus 1.6 (L) 2.5 - 4.5 mg/dL   Magnesium   Result Value Ref Range Magnesium 1.6 1.6 - 2.6 mg/dL   TSH   Result Value Ref Range    TSH 3.160 0.270 - 4.200 uIU/mL   T4, Free   Result Value Ref Range    T4 Free 0.97 0.93 - 1.70 ng/dL   Arterial Blood Gas, Respiratory Only   Result Value Ref Range    POC Source Arterial     FIO2 100.0     PH 37 7. 456 (H) 7.350 - 7.450    PCO2 37 35.3 35.0 - 45.0 mmHg    PO2 37 339.3 (H) 60.0 - 80.0 mmHg    HCO3 24.9 22.0 - 26.0 mmol/L    B.E. 1.4 -3.0 - 3.0 mmol/L    O2 Sat 100.0 (H) 92.0 - 98.5 %     ID 2,321     DEVICE 17,310,521,400,678     Delivery Systems BiPAP16/8    Troponin   Result Value Ref Range    Troponin, High Sensitivity 92 (H) 0 - 11 ng/L   Troponin   Result Value Ref Range    Troponin, High Sensitivity 77 (H) 0 - 11 ng/L       RADIOLOGY:  CT ABDOMEN PELVIS WO CONTRAST Additional Contrast? None   Final Result   Limited study due to patient motion. No acute intra-abdominal or pelvic findings. Right lower lobe consolidative changes, suggestive of a pneumonia. XR CHEST PORTABLE   Final Result   Bilateral basilar disease. The possibility of a bilateral lower lobe   pneumonia cannot be excluded in the appropriate setting. EKG:  This EKG is signed and interpreted by me. Rate: 139  Rhythm: Sinus  Interpretation: non-specific EKG, incomplete right bundle branch block anterior infarct  Comparison: stable as compared to patient's most recent EKG      ------------------------- NURSING NOTES AND VITALS REVIEWED ---------------------------  Date / Time Roomed:  6/24/2022 12:11 AM  ED Bed Assignment:  03/03    The nursing notes within the ED encounter and vital signs as below have been reviewed.      Patient Vitals for the past 24 hrs:   BP Temp Temp src Pulse Resp SpO2 Height Weight   06/24/22 0534 126/72 -- -- 97 19 96 % -- --   06/24/22 0404 123/73 -- -- (!) 102 20 96 % -- --   06/24/22 0304 120/75 -- -- (!) 103 22 96 % -- --   06/24/22 0259 -- 97.6 °F (36.4 °C) Axillary -- -- -- -- --   06/24/22 0234 112/75 -- -- (!) 118 20 95 % -- --   06/24/22 0205 -- -- -- (!) 117 19 94 % -- --   06/24/22 0204 116/65 -- -- (!) 119 17 -- -- --   06/24/22 0134 120/64 -- -- (!) 124 22 97 % -- --   06/24/22 0034 130/75 -- -- (!) 139 (!) 43 96 % -- --   06/24/22 0032 -- -- -- (!) 137 (!) 40 96 % -- --   06/24/22 0021 132/78 -- -- (!) 141 (!) 36 98 % 5' 11\" (1.803 m) 294 lb 12.8 oz (133.7 kg)       Oxygen Saturation Interpretation: abnormal    ------------------------------------------ PROGRESS NOTES ------------------------------------------  Re-evaluation(s):  Time: 3am  Patients symptoms are improving  Repeat physical examination is improved    Counseling:  I have spoken with the patient and discussed todays results, in addition to providing specific details for the plan of care and counseling regarding the diagnosis and prognosis. Their questions are answered at this time and they are agreeable with the plan of admission.    --------------------------------- ADDITIONAL PROVIDER NOTES ---------------------------------  Consultations:   Spoke with Dr. Jozef Sadler. Discussed case. They will admit the patient. This patient's ED course included: a personal history and physicial examination, re-evaluation prior to disposition, multiple bedside re-evaluations, IV medications, cardiac monitoring, continuous pulse oximetry and complex medical decision making and emergency management    This patient has remained hemodynamically stable during their ED course. Please note that the withdrawal or failure to initiate urgent interventions for this patient would likely result in a life threatening deterioration or permanent disability. Accordingly this patient received 30 minutes of critical care time, excluding separately billable procedures. Diagnosis:  1. Pneumonia due to infectious organism, unspecified laterality, unspecified part of lung    2. Septicemia ([facility] [address]    3.  Acute on chronic respiratory failure with hypoxia (HCC) 4. Lactic acidosis        Disposition:  Patient's disposition: Admit to telemetry  Patient's condition is stable.             Jakob Earl MD  06/24/22 9405

## 2022-06-24 NOTE — ED NOTES
Pt assisted on and off bedpan at this time, tolerated well.  Cleaned up and given new warm blanket     Barbara Hurtado RN  06/24/22 4620

## 2022-06-25 NOTE — PROGRESS NOTES
Patient having profound dyspnea while attempting to stand and use urinal, patient refused assistance, refused to wear BiPAP, and refused offer of bedside commode.

## 2022-06-25 NOTE — PROGRESS NOTES
Patient walked to the bathroom with wheeled walker, poorly tolerated, very unsteady on his feet. PT/OT consults in however they were unable to see him today will eval tomorrow. This nurse encouraged the patient to get up to the bedside chair but he refused each time and states he is still too weak.

## 2022-06-25 NOTE — PROGRESS NOTES
Department of Internal Medicine  General Internal Medicine  Attending Progress Note      SUBJECTIVE:    Feels better today  Breathing is easier  Cough off and on  No nausea , vomiting  No urinary or bowel complaints  Appetite is good    Medications    Current Facility-Administered Medications: albuterol (PROVENTIL) nebulizer solution 2.5 mg, 2.5 mg, Nebulization, 6 times per day  apixaban (ELIQUIS) tablet 5 mg, 5 mg, Oral, BID  dilTIAZem (CARDIZEM CD) extended release capsule 240 mg, 240 mg, Oral, Daily  famotidine (PEPCID) tablet 20 mg, 20 mg, Oral, BID  atorvastatin (LIPITOR) tablet 10 mg, 10 mg, Oral, Daily  vitamin D (CHOLECALCIFEROL) tablet 2,000 Units, 2,000 Units, Oral, Daily  cefepime (MAXIPIME) 2000 mg IVPB minibag, 2,000 mg, IntraVENous, Q12H  budesonide (PULMICORT) nebulizer suspension 500 mcg, 0.5 mg, Nebulization, BID  Arformoterol Tartrate (BROVANA) nebulizer solution 15 mcg, 15 mcg, Nebulization, BID  doxycycline (VIBRAMYCIN) 100 mg in sodium chloride 0.9 % 100 mL IVPB, 100 mg, IntraVENous, Q12H  methylPREDNISolone sodium (SOLU-MEDROL) injection 40 mg, 40 mg, IntraVENous, Q8H  ipratropium (ATROVENT) 0.02 % nebulizer solution 0.5 mg, 0.5 mg, Nebulization, 4x daily **AND** [CANCELED] Nebulizer tx intermittent, , , 4x daily    Physical    VITALS:  /64   Pulse 81   Temp 98.2 °F (36.8 °C) (Oral)   Resp 22   Ht 5' 11\" (1.803 m)   Wt 294 lb 12.8 oz (133.7 kg)   SpO2 93%   BMI 41.12 kg/m²   TEMPERATURE:  Current - Temp: 98.2 °F (36.8 °C);  Max - Temp  Av.9 °F (36.6 °C)  Min: 97.6 °F (36.4 °C)  Max: 98.2 °F (36.8 °C)  RESPIRATIONS RANGE: Resp  Av.4  Min: 16  Max: 22  PULSE RANGE: Pulse  Av.4  Min: 81  Max: 99  BLOOD PRESSURE RANGE:  Systolic (98YCI), RIW:899 , Min:121 , ZMS:212   ; Diastolic (94LSR), QBL:58, Min:58, Max:87    PULSE OXIMETRY RANGE: SpO2  Av.9 %  Min: 90 %  Max: 95 %  24HR INTAKE/OUTPUT:      Intake/Output Summary (Last 24 hours) at 2022 1048  Last data filed at 6/25/2022 4761  Gross per 24 hour   Intake 180 ml   Output 650 ml   Net -470 ml       CONSTITUTIONAL: Awake, no distress, appears as stated age. HEENT: Normocephalic atraumatic. Pupils are equal and reactive bilaterally. Extraocular movements are intact. No pallor or icterus appreciated. NECK: Supple, no JVD , no lymphadenopathy, no bruits, no thyromegaly  LUNGS:  diminished air movements b/l in all Lung fields, with some improvement, exp ronchi, and inspiratory crackles  CARDIOVASCULAR: Regular rate and rhythm, no murmur rub or gallop. ABDOMEN: Soft, nontender,distended,  bowel sounds are positive, no organomegaly appreciated. NEUROLOGIC: Awake, alert, oriented x 3 , no focal deficits noted. Agitated. EXT: trace  edema, no clubbing, or cyanosis. CBC with Differential:    Lab Results   Component Value Date    WBC 18.2 06/25/2022    RBC 4.02 06/25/2022    HGB 12.3 06/25/2022    HCT 38.1 06/25/2022     06/25/2022    MCV 94.8 06/25/2022    MCH 30.6 06/25/2022    MCHC 32.3 06/25/2022    RDW 13.8 06/25/2022    METASPCT 0.9 06/24/2022    LYMPHOPCT 4.3 06/25/2022    MONOPCT 3.4 06/25/2022    BASOPCT 0.1 06/25/2022    MONOSABS 0.61 06/25/2022    LYMPHSABS 0.79 06/25/2022    EOSABS 0.00 06/25/2022    BASOSABS 0.01 06/25/2022     CMP:    Lab Results   Component Value Date     06/25/2022    K 4.1 06/25/2022    K 4.3 06/24/2022     06/25/2022    CO2 27 06/25/2022    BUN 22 06/25/2022    CREATININE 1.1 06/25/2022    GFRAA >60 06/25/2022    LABGLOM >60 06/25/2022    GLUCOSE 146 06/25/2022    PROT 6.1 06/25/2022    LABALBU 3.1 06/25/2022    CALCIUM 8.7 06/25/2022    BILITOT 0.5 06/25/2022    ALKPHOS 71 06/25/2022    AST 40 06/25/2022    ALT 27 06/25/2022         ASSESSMENT AND PLAN      1. Rt lower lobe community acquired pneumonia   Continue iv Cefepime , Doxycycline  Blood cultures no growth so far  Urine Legionella and strep pneumo antigens pending  Pulmonology following.   Continue same today. 2.Acute on chronic respiratory failure secondary to above  Now on nasal cannula at 5 L/min  Wean as tolerated    3. COPD, with exacerbation  Severe long standing disease  Iv Solumedrol 40 mg q 8 hours, same today    4. Leukocytosis secondary to pneumonia, trending down. 5.A Fib , paroxysmal, sinus rhythm on the monitor  Cartia  mg daily . Eliquis 5 mg po bid  Continue same. 6.Hyperlipidemia  Lipitor 10 mg daily    7. GERD  Continue pepcid 20 mg po bid      PT/OT consults       Za Diaz MD, MD.  10:48 AM  6/25/2022

## 2022-06-25 NOTE — PLAN OF CARE
Problem: Discharge Planning  Goal: Discharge to home or other facility with appropriate resources  5/42/1323 1516 by Gloria Hazel RN  Outcome: Progressing     Problem: Safety - Adult  Goal: Free from fall injury  6/53/8337 3784 by Gloria Hazel RN  Outcome: Progressing     Problem: ABCDS Injury Assessment  Goal: Absence of physical injury  8/06/1564 4001 by Gloria Hazel RN  Outcome: Progressing

## 2022-06-25 NOTE — CONSULTS
PULMONARY MEDICINE CONSULT    Consult requested by:  Dr Romeo Ormond  Reason for consult: Respiratory failure    HPI    [de-identified]year old person with PMH of COPD with chronic hypoxemic respiratory failure (on 5L ), hyperlipidemia, as described below admitted to hospital for management of worsening dyspnea. The patient has a CXR and CT with right lower lobe infiltrate. The patient is receiving azithromycin and cefepime, methylprednisolone and bronchodilators. He is chronically anticoagulated with apixaban. Mr Tameka Higgins feels somewhat worse today, states he cannot go to the bathroom due to dyspnea  No growth on cultures      /64   Pulse 81   Temp 98.2 °F (36.8 °C) (Oral)   Resp 22   Ht 5' 11\" (1.803 m)   Wt 294 lb 12.8 oz (133.7 kg)   SpO2 93%   BMI 41.12 kg/m²   General: Awake, oriented to place, time and person  HEENT: No head lesions, PERRL, EOMI, mouth without lesions, no nasal lesions, no cervical adenopathy palpated  Respiratory: Lungs with equal breath sounds bilaterally, no adventitious sounds auscultated, no accessory muscle use  CV: Regular rate, no murmurs, no JVD, no leg edema  Abdomen: Soft, non tender, + bowel sounds, no lesions  Skin: Hydrated, adequate turgor, no rash, capillary refill <2 seconds  Extremities: Muscular strength 4/5 in 4 limbs, moves 4 limbs spontaneously, distal pulses present  Neurology: Awake and alert, follows commands, moves 4 limbs on command and spontaneously, neck is supple, no meningitic signs present.         A/P:  Acute on chronic hypoxemic respiratory failure secondary to pneumonia -Community acquired (pneumococcus vs atypicals vs pseudomonas in a patient with COPD  --Cultures of blood and sputum ordered, pending  --Check pneumococcal and legionella urinary antigens  --Antimicrobial therapy: Doxycyclin and cefepime (at risk of Pseudomonas)  --Bronchodilators: Arformoterol/budesonide + albuterol/iparatropium q 4 hrs + albuterol PRN   --Steroids: Solumedrol 40 mg IV q 8 hrs for total 5 days  --Incentive spirometry and flutter valve 10 times/hour while awake    DVT prophylaxis - anticoagulated with apixaban     Rest of supportive care as per primary team    Hyperlipidemia  --Continue statin     Hypertension  --On antihypertensives      Past Medical History:   Diagnosis Date    COPD (chronic obstructive pulmonary disease) ([facility] [address]     Hyperlipidemia      No family history on file. Social History     Socioeconomic History    Marital status:      Spouse name: Not on file    Number of children: Not on file    Years of education: Not on file    Highest education level: Not on file   Occupational History    Not on file   Tobacco Use    Smoking status: Former Smoker     Quit date: 2009     Years since quittin.6    Smokeless tobacco: Former User   Vaping Use    Vaping Use: Never used   Substance and Sexual Activity    Alcohol use: Not Currently    Drug use: No    Sexual activity: Not Currently   Other Topics Concern    Not on file   Social History Narrative    Not on file     Social Determinants of Health     Financial Resource Strain:     Difficulty of Paying Living Expenses: Not on file   Food Insecurity:     Worried About [address] in the Last Year: Not on file    [address] in the Last Year: Not on file   Transportation Needs:     Lack of Transportation (Medical): Not on file    Lack of Transportation (Non-Medical):  Not on file   Physical Activity:     Days of Exercise per Week: Not on file    Minutes of Exercise per Session: Not on file   Stress:     Feeling of Stress : Not on file   Social Connections:     Frequency of Communication with Friends and Family: Not on file    Frequency of Social Gatherings with Friends and Family: Not on file    Attends [employer] Services: Not on file    Active Member of Clubs or Organizations: Not on file    Attends Club or Organization Meetings: Not on file    Marital Status: Not on file   Intimate Partner Violence: Fear of Current or Ex-Partner: Not on file    Emotionally Abused: Not on file    Physically Abused: Not on file    Sexually Abused: Not on file   Housing Stability:     Unable to Pay for Housing in the Last Year: Not on file    Number of [place] in the Last Year: Not on file    Unstable Housing in the Last Year: Not on file     Current Facility-Administered Medications   Medication Dose Route Frequency Provider Last Rate Last Admin    albuterol (PROVENTIL) nebulizer solution 2.5 mg  2.5 mg Nebulization 6 times per day Edison Huizar MD   2.5 mg at 06/25/22 1018    apixaban (ELIQUIS) tablet 5 mg  5 mg Oral BID Edison Huizar MD   5 mg at 06/25/22 0848    dilTIAZem (CARDIZEM CD) extended release capsule 240 mg  240 mg Oral Daily Edison Huizar MD   240 mg at 06/25/22 0848    famotidine (PEPCID) tablet 20 mg  20 mg Oral BID Edison Huizar MD   20 mg at 06/25/22 0848    atorvastatin (LIPITOR) tablet 10 mg  10 mg Oral Daily Edison Huizar MD   10 mg at 06/24/22 1711    vitamin D (CHOLECALCIFEROL) tablet 2,000 Units  2,000 Units Oral Daily Edison Huizar MD   2,000 Units at 06/25/22 0848    cefepime (MAXIPIME) 2000 mg IVPB minibag  2,000 mg IntraVENous Q12H Edison Huizar MD   Stopped at 06/25/22 1023    budesonide (PULMICORT) nebulizer suspension 500 mcg  0.5 mg Nebulization BID Edison Huizar MD   500 mcg at 06/25/22 0631    Arformoterol Tartrate (BROVANA) nebulizer solution 15 mcg  15 mcg Nebulization BID Edison Huizar MD   15 mcg at 06/25/22 0631    doxycycline (VIBRAMYCIN) 100 mg in sodium chloride 0.9 % 100 mL IVPB  100 mg IntraVENous Q12H Edison Huizar MD   Stopped at 06/25/22 0458    methylPREDNISolone sodium (SOLU-MEDROL) injection 40 mg  40 mg IntraVENous Q8H Edison Huizar MD   40 mg at 06/25/22 0848    ipratropium (ATROVENT) 0.02 % nebulizer solution 0.5 mg  0.5 mg Nebulization 4x daily Edison Huizar MD   0.5 mg at 06/25/22 1018     MEDICATIONS:   albuterol  2.5 mg Nebulization 6 times per day    apixaban  5 mg Oral BID    dilTIAZem  240 mg Oral Daily    famotidine  20 mg Oral BID    atorvastatin  10 mg Oral Daily    Vitamin D  2,000 Units Oral Daily    cefepime  2,000 mg IntraVENous Q12H    budesonide  0.5 mg Nebulization BID    Arformoterol Tartrate  15 mcg Nebulization BID    doxycycline (VIBRAMYCIN) IV  100 mg IntraVENous Q12H    methylPREDNISolone  40 mg IntraVENous Q8H    ipratropium  0.5 mg Nebulization 4x daily            OBJECTIVE:  Vitals:    06/25/22 1030   BP:    Pulse:    Resp: 22   Temp:    SpO2:      FiO2 : 45 %  O2 Flow Rate (L/min): 5 L/min  O2 Device: Nasal cannula        LABS:  WBC   Date Value Ref Range Status   06/25/2022 18.2 (H) 4.5 - 11.5 E9/L Final   06/24/2022 20.5 (H) 4.5 - 11.5 E9/L Final   12/22/2020 12.7 (H) 4.5 - 11.5 E9/L Final     Hemoglobin   Date Value Ref Range Status   06/25/2022 12.3 (L) 12.5 - 16.5 g/dL Final   06/24/2022 14.7 12.5 - 16.5 g/dL Final   12/22/2020 14.1 12.5 - 16.5 g/dL Final     Hematocrit   Date Value Ref Range Status   06/25/2022 38.1 37.0 - 54.0 % Final   06/24/2022 46.0 37.0 - 54.0 % Final   12/22/2020 43.1 37.0 - 54.0 % Final     MCV   Date Value Ref Range Status   06/25/2022 94.8 80.0 - 99.9 fL Final   06/24/2022 94.3 80.0 - 99.9 fL Final   12/22/2020 91.5 80.0 - 99.9 fL Final     Platelets   Date Value Ref Range Status   06/25/2022 178 130 - 450 E9/L Final   06/24/2022 227 130 - 450 E9/L Final   12/22/2020 228 130 - 450 E9/L Final     Sodium   Date Value Ref Range Status   06/25/2022 141 132 - 146 mmol/L Final   06/24/2022 137 132 - 146 mmol/L Final   12/24/2020 140 132 - 146 mmol/L Final     Potassium   Date Value Ref Range Status   06/25/2022 4.1 3.5 - 5.0 mmol/L Final   12/24/2020 3.4 (L) 3.5 - 5.0 mmol/L Final   12/22/2020 3.7 3.5 - 5.0 mmol/L Final     Potassium reflex Magnesium   Date Value Ref Range Status   06/24/2022 4.3 3.5 - 5.0 mmol/L Final   12/18/2020 3.7 3.5 - 5.0 mmol/L Final   07/23/2019 4.6 3.5 - 5.0 mmol/L Final     Chloride   Date Value Ref Range Status   06/25/2022 104 98 - 107 mmol/L Final   06/24/2022 97 (L) 98 - 107 mmol/L Final   12/24/2020 103 98 - 107 mmol/L Final     CO2   Date Value Ref Range Status   06/25/2022 27 22 - 29 mmol/L Final   06/24/2022 25 22 - 29 mmol/L Final   12/24/2020 29 22 - 29 mmol/L Final     BUN   Date Value Ref Range Status   06/25/2022 22 6 - 23 mg/dL Final   06/24/2022 10 6 - 23 mg/dL Final   12/24/2020 23 8 - 23 mg/dL Final     CREATININE   Date Value Ref Range Status   06/25/2022 1.1 0.7 - 1.2 mg/dL Final   06/24/2022 1.2 0.7 - 1.2 mg/dL Final   12/24/2020 1.0 0.7 - 1.2 mg/dL Final     Glucose   Date Value Ref Range Status   06/25/2022 146 (H) 74 - 99 mg/dL Final   06/24/2022 158 (H) 74 - 99 mg/dL Final   12/24/2020 98 74 - 99 mg/dL Final     Calcium   Date Value Ref Range Status   06/25/2022 8.7 8.6 - 10.2 mg/dL Final   06/24/2022 9.0 8.6 - 10.2 mg/dL Final   12/24/2020 8.4 (L) 8.6 - 10.2 mg/dL Final     Total Protein   Date Value Ref Range Status   06/25/2022 6.1 (L) 6.4 - 8.3 g/dL Final   06/24/2022 6.7 6.4 - 8.3 g/dL Final   12/22/2020 6.3 (L) 6.4 - 8.3 g/dL Final     Albumin   Date Value Ref Range Status   06/25/2022 3.1 (L) 3.5 - 5.2 g/dL Final   06/24/2022 3.8 3.5 - 5.2 g/dL Final   12/22/2020 3.3 (L) 3.5 - 5.2 g/dL Final     Total Bilirubin   Date Value Ref Range Status   06/25/2022 0.5 0.0 - 1.2 mg/dL Final   06/24/2022 1.0 0.0 - 1.2 mg/dL Final   12/22/2020 0.3 0.0 - 1.2 mg/dL Final     Alkaline Phosphatase   Date Value Ref Range Status   06/25/2022 71 40 - 129 U/L Final   06/24/2022 99 40 - 129 U/L Final   12/22/2020 68 40 - 129 U/L Final     AST   Date Value Ref Range Status   06/25/2022 40 (H) 0 - 39 U/L Final   06/24/2022 38 0 - 39 U/L Final   12/22/2020 44 (H) 0 - 39 U/L Final     ALT   Date Value Ref Range Status   06/25/2022 27 0 - 40 U/L Final   06/24/2022 36 0 - 40 U/L Final   12/22/2020 25 0 - 40 U/L Final     GFR Non-   Date Value Ref Range Status   06/25/2022 >60 >=60 mL/min/1.73 Final     Comment:     Chronic Kidney Disease: less than 60 ml/min/1.73 sq.m. Kidney Failure: less than 15 ml/min/1.73 sq.m. Results valid for patients 18 years and older. 06/24/2022 58 >=60 mL/min/1.73 Final     Comment:     Chronic Kidney Disease: less than 60 ml/min/1.73 sq.m. Kidney Failure: less than 15 ml/min/1.73 sq.m. Results valid for patients 18 years and older. 12/24/2020 >60 >=60 mL/min/1.73 Final     Comment:     Chronic Kidney Disease: less than 60 ml/min/1.73 sq.m. Kidney Failure: less than 15 ml/min/1.73 sq.m. Results valid for patients 18 years and older. GFR    Date Value Ref Range Status   06/25/2022 >60  Final   06/24/2022 >60  Final   12/24/2020 >60  Final     Magnesium   Date Value Ref Range Status   06/24/2022 1.6 1.6 - 2.6 mg/dL Final   12/22/2020 2.4 1.6 - 2.6 mg/dL Final   12/21/2020 2.1 1.6 - 2.6 mg/dL Final     Phosphorus   Date Value Ref Range Status   06/24/2022 1.6 (L) 2.5 - 4.5 mg/dL Final   12/23/2020 3.0 2.5 - 4.5 mg/dL Final   12/21/2020 2.4 (L) 2.5 - 4.5 mg/dL Final     Recent Labs     06/24/22  0023   HCO3 24.9   BE 1.4   O2SAT 100.0*       RADIOLOGY:  CT ABDOMEN PELVIS WO CONTRAST Additional Contrast? None   Final Result   Limited study due to patient motion. No acute intra-abdominal or pelvic findings. Right lower lobe consolidative changes, suggestive of a pneumonia. XR CHEST PORTABLE   Final Result   Bilateral basilar disease. The possibility of a bilateral lower lobe   pneumonia cannot be excluded in the appropriate setting. PROBLEM LIST:  Principal Problem:    Pneumonia  Resolved Problems:    * No resolved hospital problems.  *        Brian Stacy MD  Pulmonary and Critical Care Medicine

## 2022-06-25 NOTE — H&P
[address]                              HISTORY AND PHYSICAL    PATIENT NAME: Los Whelan                     :        1940  MED REC NO:   [id number]                            ROOM:       4354  ACCOUNT NO:   [de-identified]                           ADMIT DATE: 2022  PROVIDER:     Brent Tejeda MD    CHIEF COMPLAINT:  Shortness of breath. HISTORY OF PRESENT ILLNESS:  The patient is an [de-identified] gentleman  with past medical history significant for severe COPD and chronic  respiratory failure. He presented to the emergency department after  emergency medical services were called because of the patient being very  short of breath and tachypneic at home. When the emergency medical  services arrived, the patient was found to be in respiratory distress  with oxygen saturation of 80% on his baseline oxygen. The patient was  placed on a nonrebreather and brought to the emergency department. Apparently, the patient has been having shortness of breath for the last  couple of days. He denied having any fevers or chills. He reports  excessive sputum with yellowish tinge to the sputum. Denied having any  chest pain. Denied any abdominal pain. No nausea, vomiting, or  diarrhea. No urinary complaints. The patient was given DuoNeb  treatments by the EMS and Solu-Medrol. In the ER, the patient was  placed on BiPAP because of ABGs revealing hypoxia and hypercapnia. He  had a chest x-ray done, which revealed evidence of a pneumonia. CT scan  of the abdomen was done, which showed consolidation of the base of the  right lung. The patient was given cefepime and doxycycline IV and was  admitted for further evaluation and management. By the time, I came to  see the patient, he was off the BiPAP and on oxygen at 5 to 6 liters per  minute. He stated that he felt a little better.     PAST MEDICAL HISTORY:  1.  COPD, status post bronchoscopy in 2018 with no lesions or malignant  cells. 2.  Chronic respiratory failure, on home oxygen at 4 liters per minute. 3.  Abdominal aortic aneurysm 3 cm in diameter in , repeat  ultrasound done in  with no aneurysm detected. 4.  History of multiple lung nodules in the past, followed up with  yearly chest x-ray. 5.  Hyperlipidemia. 6.  Atrial fibrillation with rapid ventricular response in 2020, Dr. Fanny Galdamez is his cardiologist.    PAST SURGICAL HISTORY:  1. Right cataract surgery in the past.  2.  _____ surgery many years ago. SOCIAL HISTORY:  The patient has a 50-pack years history of smoking. He  drinks beer about 12-pack per week. He is . He is retired and  used to be an . FAMILY HISTORY:  Father  and had dementia. Mother  and  had coronary artery disease at the age of 76s. He had two brothers, who  are  with lung cancer. Three sisters alive. He has one son,  who has diabetes mellitus type 2 and one daughter healthy. ALLERGIES:  No known drug allergies. MEDICATIONS:  Albuterol aerosol treatments four times daily as needed,  aspirin 81 mg daily, Cartia  mg daily, Dulera 200/5 two puffs  b.i.d., Eliquis 5 mg b.i.d., prednisone 10 mg daily, Spiriva Respimat  2.5 mcg per inhalation two puffs daily, Ventolin HFA 90 mcg per  inhalation two puffs daily, vitamin D 2000 units daily, Zocor 40 mg  nightly. REVIEW OF SYSTEMS:  Unremarkable other than what is stated in the  history of present illness and past medical history. PHYSICAL EXAMINATION:  GENERAL APPEARANCE:  An [de-identified]year-old gentleman, who has cushingoid facies  from chronic steroid use. He is lying flat in bed. Mild respiratory  distress. VITAL SIGNS:  Temperature 97.6, pulse 98 per minute, respiratory rate 22  per minute, blood pressure 121/78, saturation oxygen 95% on 5 to 6  liters of oxygen by nasal cannula.   HEENT: Normocephalic, atraumatic. His pupils are equal and reactive  bilaterally. Extraocular movements are intact. No conjunctival pallor  or icterus appreciated. NECK:  Supple. No lymphadenopathy. No thyromegaly. CHEST:  Severely diminished throughout with poor air movements. HEART:  Distant heart sounds. No murmur, rub, or gallop noted. ABDOMEN:  Obese, soft, distended. Bowel sounds are present with  tympanitic note. No organomegaly appreciated. EXTREMITIES:  There is no clubbing, no cyanosis. Trace edema. He has  osteoarthritic changes of the hands bilaterally. NEUROLOGIC:  He is awake, alert, and oriented x3 without focal deficits. LABORATORY DATA:  Sodium 137, potassium 4.3, chloride 97, bicarb 25, BUN  10, creatinine of 1.2. White cell count 20.5, hemoglobin 14.7,  hematocrit 46.0, platelet count of 989. ASSESSMENT AND PLAN:  1. Right lower lobe community-acquired pneumonia. The patient is  immunocompromised secondary to chronic steroid use. We will continue  with cefepime 2 gm IV q.12 hours and doxycycline 100 mg IV q.12 hours. Blood cultures have been sent. Pulmonology consulted and urine  Legionella and Strep pneumoniae antigens ordered. Respiratory panel  also ordered. We will continue with current IV antibiotics and await  those results. 2.  Acute on chronic respiratory failure. Pulmonology consulted. We  will monitor his respiratory status closely. He is currently on 6  liters of oxygen by nasal cannula. We will monitor closely. 3.  COPD with exacerbation. We will place him on Solu-Medrol 40 mg IV  q.8 hours. We will keep him on Brovana inhalation twice daily and  budesonide inhalation twice daily. Albuterol aerosol treatments q.4  hours while awake and Atrovent aerosol treatments four times daily as  ordered by Pulmonology. 4.  Abdominal distension. The patient had a CT scan of the abdomen,  which did not reveal any significant pathology.   We will monitor for  now. 5.  History of atrial fibrillation. We will check EKG. We will  continue with Cartia XT and Eliquis 5 mg b.i.d. We will consult his  cardiologist, Dr. Luz Carson. 6.  History of hyperlipidemia. Continue Zocor 40 mg nightly. 7.  History of impaired fasting glucose secondary to chronic steroid  use.         Doreen Calvin MD    D: 06/24/2022 19:07:29       T: 06/24/2022 22:32:33     PETEY/TE  Job#: [id number]     Doc#: [id number]    CC:

## 2022-06-26 NOTE — PROGRESS NOTES
Pulmonary/Critical Care Progress Note    We are following patient for acute superimposed on chronic respiratory failure, agitation, hypertension, severe COPD with exacerbation, pneumonia,    SUBJECTIVE:  I was called earlier for the patient's increasing agitation marked by refusal to wear his BiPAP and being physically aggressive when the nurses were trying to help him. We gave the patient 2 mg of intravenous haloperidol which gradually took its effect and calmed him to the point where we tried BiPAP again. However, he again rejected this. Arterial blood gases earlier today were reasonably acceptable with the patient on BiPAP at that time using 8 L/min oxygen flow. I came to the floor and assessed the patient with the conclusion that he belongs in the intensive care unit. However, the patient's wife says that he has a living well and did not want to be on the ventilator. She will bring us a copy of the document so that we can read it and assess the degree to which he wanted to be treated. The patient did begin to receive steroids yesterday morning so that there could possibly be a component of steroid psychosis. However, during the time that I was at the bedside on the floor, even though he was agitated, he was still making fairly good sense. Patient will require a Precedex drip and IV fluids to the point where we may be able to replace the BiPAP and avoid the decision regarding intubation ambulatory support.     MEDICATIONS:   sterile water        albuterol  2.5 mg Nebulization 6 times per day    apixaban  5 mg Oral BID    dilTIAZem  240 mg Oral Daily    famotidine  20 mg Oral BID    atorvastatin  10 mg Oral Daily    Vitamin D  2,000 Units Oral Daily    cefepime  2,000 mg IntraVENous Q12H    budesonide  0.5 mg Nebulization BID    Arformoterol Tartrate  15 mcg Nebulization BID    doxycycline (VIBRAMYCIN) IV  100 mg IntraVENous Q12H    methylPREDNISolone  40 mg IntraVENous Q8H    ipratropium  0.5 mg Nebulization 4x daily       LORazepam      REVIEW OF SYSTEMS:  Constitutional: Denies fever, weight loss, night sweats, and fatigue  Skin: Denies pigmentation, dark lesions, and rashes   HEENT: Denies hearing loss, tinnitus, ear drainage, epistaxis, sore throat, and hoarseness. Cardiovascular: Denies palpitations, chest pain, and chest pressure. Respiratory: Denies cough, but is positive for dyspnea at rest, hemoptysis, apnea, and choking. Gastrointestinal: Denies nausea, vomiting, poor appetite, diarrhea, heartburn or reflux  Genitourinary: Denies dysuria, frequency, urgency or hematuria  Musculoskeletal: Denies myalgias, muscle weakness, and bone pain  Neurological: Denies dizziness, vertigo, headache, and focal weakness  Psychological: Denies anxiety and depression  Endocrine: Denies heat intolerance and cold intolerance  Hematopoietic/Lymphatic: Denies bleeding problems and blood transfusions    OBJECTIVE:  Vitals:    06/26/22 1245   BP: (!) 180/79   Pulse: (!) 109   Resp: 26   Temp: 98.4 °F (36.9 °C)   SpO2: 94%     FiO2 : 45 %  O2 Flow Rate (L/min): 8 L/min  O2 Device: Nasal cannula    PHYSICAL EXAM:  Constitutional: No fever, chills, diaphoresis  Skin: No skin lesions, no skin breakdown  HEENT: Unremarkable  Neck: JVD, lymphadenopathy, thyromegaly  Cardiovascular: S1, S2 regular. No S3 murmurs rubs present  Respiratory: Diminished breath sounds bilaterally with expiratory wheezes over both posterior lung fields.   Few inspiratory crackles are present over both posterior lower lung fields  Gastrointestinal: Soft, markedly obese but protuberant abdomen, nontender without hepatosplenomegaly  Genitourinary: No CVA tenderness  Extremities: No clubbing, cyanosis, or edema  Neurological: No CVA tenderness  Psychological: Agitated and a bit irrational at times    LABS:  WBC   Date Value Ref Range Status   06/26/2022 16.8 (H) 4.5 - 11.5 E9/L Final   06/25/2022 18.2 (H) 4.5 - 11.5 E9/L Final 06/24/2022 20.5 (H) 4.5 - 11.5 E9/L Final     Hemoglobin   Date Value Ref Range Status   06/26/2022 12.6 12.5 - 16.5 g/dL Final   06/25/2022 12.3 (L) 12.5 - 16.5 g/dL Final   06/24/2022 14.7 12.5 - 16.5 g/dL Final     Hematocrit   Date Value Ref Range Status   06/26/2022 39.7 37.0 - 54.0 % Final   06/25/2022 38.1 37.0 - 54.0 % Final   06/24/2022 46.0 37.0 - 54.0 % Final     MCV   Date Value Ref Range Status   06/26/2022 95.4 80.0 - 99.9 fL Final   06/25/2022 94.8 80.0 - 99.9 fL Final   06/24/2022 94.3 80.0 - 99.9 fL Final     Platelets   Date Value Ref Range Status   06/26/2022 205 130 - 450 E9/L Final   06/25/2022 178 130 - 450 E9/L Final   06/24/2022 227 130 - 450 E9/L Final     Sodium   Date Value Ref Range Status   06/26/2022 143 132 - 146 mmol/L Final   06/25/2022 141 132 - 146 mmol/L Final   06/24/2022 137 132 - 146 mmol/L Final     Potassium   Date Value Ref Range Status   06/26/2022 4.1 3.5 - 5.0 mmol/L Final   06/25/2022 4.1 3.5 - 5.0 mmol/L Final   12/24/2020 3.4 (L) 3.5 - 5.0 mmol/L Final     Potassium reflex Magnesium   Date Value Ref Range Status   06/24/2022 4.3 3.5 - 5.0 mmol/L Final   12/18/2020 3.7 3.5 - 5.0 mmol/L Final   07/23/2019 4.6 3.5 - 5.0 mmol/L Final     Chloride   Date Value Ref Range Status   06/26/2022 104 98 - 107 mmol/L Final   06/25/2022 104 98 - 107 mmol/L Final   06/24/2022 97 (L) 98 - 107 mmol/L Final     CO2   Date Value Ref Range Status   06/26/2022 26 22 - 29 mmol/L Final   06/25/2022 27 22 - 29 mmol/L Final   06/24/2022 25 22 - 29 mmol/L Final     BUN   Date Value Ref Range Status   06/26/2022 31 (H) 6 - 23 mg/dL Final   06/25/2022 22 6 - 23 mg/dL Final   06/24/2022 10 6 - 23 mg/dL Final     CREATININE   Date Value Ref Range Status   06/26/2022 1.1 0.7 - 1.2 mg/dL Final   06/25/2022 1.1 0.7 - 1.2 mg/dL Final   06/24/2022 1.2 0.7 - 1.2 mg/dL Final     Glucose   Date Value Ref Range Status   06/26/2022 141 (H) 74 - 99 mg/dL Final   06/25/2022 146 (H) 74 - 99 mg/dL Final 06/24/2022 158 (H) 74 - 99 mg/dL Final     Calcium   Date Value Ref Range Status   06/26/2022 8.6 8.6 - 10.2 mg/dL Final   06/25/2022 8.7 8.6 - 10.2 mg/dL Final   06/24/2022 9.0 8.6 - 10.2 mg/dL Final     Total Protein   Date Value Ref Range Status   06/26/2022 6.3 (L) 6.4 - 8.3 g/dL Final   06/25/2022 6.1 (L) 6.4 - 8.3 g/dL Final   06/24/2022 6.7 6.4 - 8.3 g/dL Final     Albumin   Date Value Ref Range Status   06/26/2022 3.4 (L) 3.5 - 5.2 g/dL Final   06/25/2022 3.1 (L) 3.5 - 5.2 g/dL Final   06/24/2022 3.8 3.5 - 5.2 g/dL Final     Total Bilirubin   Date Value Ref Range Status   06/26/2022 0.4 0.0 - 1.2 mg/dL Final   06/25/2022 0.5 0.0 - 1.2 mg/dL Final   06/24/2022 1.0 0.0 - 1.2 mg/dL Final     Alkaline Phosphatase   Date Value Ref Range Status   06/26/2022 72 40 - 129 U/L Final   06/25/2022 71 40 - 129 U/L Final   06/24/2022 99 40 - 129 U/L Final     AST   Date Value Ref Range Status   06/26/2022 44 (H) 0 - 39 U/L Final   06/25/2022 40 (H) 0 - 39 U/L Final   06/24/2022 38 0 - 39 U/L Final     ALT   Date Value Ref Range Status   06/26/2022 30 0 - 40 U/L Final   06/25/2022 27 0 - 40 U/L Final   06/24/2022 36 0 - 40 U/L Final     GFR Non-   Date Value Ref Range Status   06/26/2022 >60 >=60 mL/min/1.73 Final     Comment:     Chronic Kidney Disease: less than 60 ml/min/1.73 sq.m. Kidney Failure: less than 15 ml/min/1.73 sq.m. Results valid for patients 18 years and older. 06/25/2022 >60 >=60 mL/min/1.73 Final     Comment:     Chronic Kidney Disease: less than 60 ml/min/1.73 sq.m. Kidney Failure: less than 15 ml/min/1.73 sq.m. Results valid for patients 18 years and older. 06/24/2022 58 >=60 mL/min/1.73 Final     Comment:     Chronic Kidney Disease: less than 60 ml/min/1.73 sq.m. Kidney Failure: less than 15 ml/min/1.73 sq.m. Results valid for patients 18 years and older.        GFR    Date Value Ref Range Status   06/26/2022 >60  Final   06/25/2022 >60  Final   06/24/2022 >60  Final     Magnesium   Date Value Ref Range Status   06/24/2022 1.6 1.6 - 2.6 mg/dL Final   12/22/2020 2.4 1.6 - 2.6 mg/dL Final   12/21/2020 2.1 1.6 - 2.6 mg/dL Final     Phosphorus   Date Value Ref Range Status   06/24/2022 1.6 (L) 2.5 - 4.5 mg/dL Final   12/23/2020 3.0 2.5 - 4.5 mg/dL Final   12/21/2020 2.4 (L) 2.5 - 4.5 mg/dL Final     Recent Labs     06/26/22  0927   PH 7.474*   PO2 94.4   PCO2 31.9*   HCO3 22.9   BE 0.2   O2SAT 97.2       RADIOLOGY:  CT ABDOMEN PELVIS WO CONTRAST Additional Contrast? None   Final Result   Limited study due to patient motion. No acute intra-abdominal or pelvic findings. Right lower lobe consolidative changes, suggestive of a pneumonia. XR CHEST PORTABLE   Final Result   Bilateral basilar disease. The possibility of a bilateral lower lobe   pneumonia cannot be excluded in the appropriate setting. PROBLEM LIST:  Principal Problem:    Pneumonia  Resolved Problems:    * No resolved hospital problems. *      IMPRESSION:  1. Acute hypoxemic respiratory failure  2. Right lower lobe pneumonia  3. Severe COPD with exacerbation  4. Marked obesity  5. Hyperlipoidemia  6. Anticoagulated for prior history of atrial fibrillation but currently in sinus rhythm    PLAN:  1. Transfer to ICU  2. We will try to continue IV steroids  3. Change antibiotics to doxycycline and Zosyn to cover for aspiration  4. IV fluids  5. Dexmedetomidine infusion  6. Await decision family as to whether they want to override the patient's prior indication that he does not want to be intubated    ATTESTATION:  ICU Staff Physician note of personal involvement in Care  As the attending physician, I certify that I personally reviewed the patients history and personally examined the patient to confirm the physical findings described above,  And that I reviewed the relevant imaging studies and available reports.   I also discussed the differential diagnosis and all of the proposed management plans with the patient and individuals accompanying the patient to this visit. They had the opportunity to ask questions about the proposed management plans and to have those questions answered. This patient has a high probability of sudden, clinically significant deterioration, which requires the highest level of physician preparedness to intervene urgently. I managed/supervised life or organ supporting interventions that required frequent physician assessment. I devoted my full attention to the direct care of this patient for the amount of time indicated below. Time I spent with the family or surrogate(s) is included only if the patient was incapable of providing the necessary information or participating in medical decisions - Time devoted to teaching and to any procedures I billed separately is not included.     CRITICAL CARE TIME:  39 minutes    Electronically signed by Shzaia Love MD on 6/26/2022 at 1:47 PM

## 2022-06-26 NOTE — PROGRESS NOTES
Called to room to assess patient. Patient appears anxious and shaky c/o increased SOB with RR 36/min, HR is 108, SpO2 is 93%, Br/s coarse right base otherwise clear. Placed on Bipap 12/8 with 8L bleed in for increased SOB. Patient now seems less anxious, RR decreased to 24/min.

## 2022-06-26 NOTE — PLAN OF CARE
Problem: Safety - Medical Restraint  Goal: Remains free of injury from restraints (Restraint for Interference with Medical Device)  Description: INTERVENTIONS:  1. Determine that other, less restrictive measures have been tried or would not be effective before applying the restraint  2. Evaluate the patient's condition at the time of restraint application  3. Inform patient/family regarding the reason for restraint  4.  Q2H: Monitor safety, psychosocial status, comfort, nutrition and hydration  Outcome: Progressing  Flowsheets (Taken 6/26/2022 0488)  Remains free of injury from restraints (restraint for interference with medical device): Every 2 hours: Monitor safety, psychosocial status, comfort, nutrition and hydration

## 2022-06-26 NOTE — PROGRESS NOTES
Department of Internal Medicine  General Internal Medicine  Attending Progress Note      SUBJECTIVE:    Had respiratory distress and placed on Bipap  Very confused and agitated this morning    Medications    Current Facility-Administered Medications: LORazepam (ATIVAN) injection 0.5 mg, 0.5 mg, IntraVENous, Q6H PRN  sterile water injection, , ,   albuterol (PROVENTIL) nebulizer solution 2.5 mg, 2.5 mg, Nebulization, 6 times per day  apixaban (ELIQUIS) tablet 5 mg, 5 mg, Oral, BID  dilTIAZem (CARDIZEM CD) extended release capsule 240 mg, 240 mg, Oral, Daily  famotidine (PEPCID) tablet 20 mg, 20 mg, Oral, BID  atorvastatin (LIPITOR) tablet 10 mg, 10 mg, Oral, Daily  vitamin D (CHOLECALCIFEROL) tablet 2,000 Units, 2,000 Units, Oral, Daily  cefepime (MAXIPIME) 2000 mg IVPB minibag, 2,000 mg, IntraVENous, Q12H  budesonide (PULMICORT) nebulizer suspension 500 mcg, 0.5 mg, Nebulization, BID  Arformoterol Tartrate (BROVANA) nebulizer solution 15 mcg, 15 mcg, Nebulization, BID  doxycycline (VIBRAMYCIN) 100 mg in sodium chloride 0.9 % 100 mL IVPB, 100 mg, IntraVENous, Q12H  methylPREDNISolone sodium (SOLU-MEDROL) injection 40 mg, 40 mg, IntraVENous, Q8H  ipratropium (ATROVENT) 0.02 % nebulizer solution 0.5 mg, 0.5 mg, Nebulization, 4x daily **AND** [CANCELED] Nebulizer tx intermittent, , , 4x daily    Physical    VITALS:  BP (!) 180/79   Pulse (!) 109   Temp 98.4 °F (36.9 °C) (Oral)   Resp 26   Ht 5' 11\" (1.803 m)   Wt 294 lb 12.8 oz (133.7 kg)   SpO2 94%   BMI 41.12 kg/m²   TEMPERATURE:  Current - Temp: 98.4 °F (36.9 °C);  Max - Temp  Av.3 °F (36.8 °C)  Min: 98.1 °F (36.7 °C)  Max: 98.4 °F (36.9 °C)  RESPIRATIONS RANGE: Resp  Av  Min: 20  Max: 29  PULSE RANGE: Pulse  Av  Min: 80  Max: 109  BLOOD PRESSURE RANGE:  Systolic (33ZAB), REF:057 , Min:143 , EOB:[birth year]   ; Diastolic (46COK), ZAH:13, Min:67, Max:104    PULSE OXIMETRY RANGE: SpO2  Av.2 %  Min: 90 %  Max: 95 %  24HR INTAKE/OUTPUT: Intake/Output Summary (Last 24 hours) at 6/26/2022 1351  Last data filed at 6/26/2022 0858  Gross per 24 hour   Intake 1192.26 ml   Output 300 ml   Net 892.26 ml       CONSTITUTIONAL: Awake, agitated,  appears as stated age. HEENT: Normocephalic atraumatic. Pupils are equal and reactive bilaterally. Extraocular movements are intact. No pallor or icterus appreciated. NECK: Supple, no JVD , no lymphadenopathy, no bruits, no thyromegaly  LUNGS:  diminished air movements b/l in all Lung fields,  exp ronchi, and inspiratory crackles  CARDIOVASCULAR: Regular rate and rhythm, tachycardia, no murmur rub or gallop. ABDOMEN: Soft, nontender,distended,  bowel sounds are positive, no organomegaly appreciated. NEUROLOGIC: Awake, alert, confused , no focal deficits noted. Agitated. EXT: trace  edema, no clubbing, or cyanosis. CBC with Differential:    Lab Results   Component Value Date    WBC 16.8 06/26/2022    RBC 4.16 06/26/2022    HGB 12.6 06/26/2022    HCT 39.7 06/26/2022     06/26/2022    MCV 95.4 06/26/2022    MCH 30.3 06/26/2022    MCHC 31.7 06/26/2022    RDW 14.0 06/26/2022    METASPCT 0.9 06/24/2022    LYMPHOPCT 1.8 06/26/2022    MONOPCT 1.8 06/26/2022    BASOPCT 0.1 06/26/2022    MONOSABS 0.34 06/26/2022    LYMPHSABS 0.34 06/26/2022    EOSABS 0.00 06/26/2022    BASOSABS 0.00 06/26/2022     CMP:    Lab Results   Component Value Date     06/26/2022    K 4.1 06/26/2022    K 4.3 06/24/2022     06/26/2022    CO2 26 06/26/2022    BUN 31 06/26/2022    CREATININE 1.1 06/26/2022    GFRAA >60 06/26/2022    LABGLOM >60 06/26/2022    GLUCOSE 141 06/26/2022    PROT 6.3 06/26/2022    LABALBU 3.4 06/26/2022    CALCIUM 8.6 06/26/2022    BILITOT 0.4 06/26/2022    ALKPHOS 72 06/26/2022    AST 44 06/26/2022    ALT 30 06/26/2022         ASSESSMENT AND PLAN      1. Rt lower lobe community acquired pneumonia   Continue iv Cefepime , Doxycycline  Blood cultures no growth so far  Urine Legionella and strep pneumo antigens presumptive negative  Pulmonology following. Continue same today. 2.Acute on chronic respiratory failure secondary to above  Now on Bipap, worse this morning  ABG's ordered  Lorazepam 0.5 mg iv as needed for agitation    3. COPD, with exacerbation  Severe long standing disease  Iv Solumedrol 40 mg q 8 hours, same today    4. Leukocytosis secondary to pneumonia, trending down. 5.A Fib , paroxysmal, sinus rhythm on the monitor  Cartia  mg daily . Eliquis 5 mg po bid  Continue same. 6.Hyperlipidemia  Lipitor 10 mg daily    7. GERD  Continue pepcid 20 mg po bid      8. Agitations, lorazepam 0.5 mg iv q 6 hours as needed.        Lizette Murphy MD, MD.  1:51 PM  6/26/2022

## 2022-06-26 NOTE — SIGNIFICANT EVENT
Rapid response team called as patient was very tachypneic and combative. He had received lorazepam and Haldol for agitation without much improvement, and would not tolerate BiPAP. He is very combative, limiting physical exam.  Respirations with slight accessory muscle use but not in significant distress, lungs very diminished but otherwise clear. Pulmonary/critical care following and at bedside. During encounter, the effects of haldol became apparent and he started to calm down. He will be transferred to ICU. Family updated by Dr. Higinio Merlos.

## 2022-06-26 NOTE — PROGRESS NOTES
Patient disoriented throughout the night asking whose bedroom he is in and where his children/grandchildren are. Patient easily redirected. Patient refused to wear BiPAP throughout the night. Patient refusing to wear a gown at this time.

## 2022-06-26 NOTE — PROGRESS NOTES
Dr. Jimmye Boeck notified that patient will be transferred to ICU. Family present during RRT. Dr. Bibiana Marin discussed situation with them and pending transfer to ICU.

## 2022-06-26 NOTE — ACP (ADVANCE CARE PLANNING)
Advance Care Planning   Healthcare Decision Maker:    Primary Decision Maker:  Francie Kovacs - Spouse - [phone]    Secondary Decision Maker: Naman Noriegar Child - [phone]      Electronically signed by Duane Holliday RN-BC on 6/26/2022 at 2:11 PM

## 2022-06-27 NOTE — PROGRESS NOTES
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 I believe that yesterday's episode was clearly the result of parenteral steroid treatment  And has largely resolved since the last dose was given in the afternoon yesterday. The patient is now, and is much less short of breath as well. He was placed on Precedex last evening but it is clearly not necessary now and we will discontinue it. However, we will continue to observe him closely in the intensive care unit for today. Additionally, we will add the phosphodiesterase inhibitor Roflumilast for long-term use in order to maximize bronchodilator treatment. The chest x-ray is improved markedly on his current regimen of doxycycline and cefepime. IV fluids will be reduced from 100 to 50 cc/h of lactated Ringer's and can be discontinued tomorrow if his oral intake improves. Moreover, we will change his bronchodilator regimen to include albuterol and ipratropium (DuoNeb), every 4 hours around-the-clock. He remains in sinus rhythm on diltiazem p.o.       MEDICATIONS:   Roflumilast  500 mcg Oral Daily    famotidine  20 mg Oral BID    ipratropium-albuterol  1 ampule Inhalation Q4H    lidocaine PF  5 mL IntraDERmal Once    sodium chloride flush  5-40 mL IntraVENous 2 times per day    heparin flush  3 mL IntraVENous 2 times per day    apixaban  5 mg Oral BID    dilTIAZem  240 mg Oral Daily    atorvastatin  10 mg Oral Daily    Vitamin D  2,000 Units Oral Daily    cefepime  2,000 mg IntraVENous Q12H    budesonide  0.5 mg Nebulization BID    Arformoterol Tartrate  15 mcg Nebulization BID    doxycycline (VIBRAMYCIN) IV  100 mg IntraVENous Q12H      lactated ringers 100 mL/hr at 06/27/22 0754    sodium chloride       haloperidol lactate, sodium chloride flush, sodium chloride, heparin flush      REVIEW OF SYSTEMS:  Constitutional: Denies fever, weight loss, night sweats, and fatigue  Skin: Denies pigmentation, dark lesions, and rashes   HEENT: Denies hearing loss, tinnitus, ear drainage, epistaxis, sore throat, and hoarseness. Cardiovascular: Denies palpitations, chest pain, and chest pressure. Respiratory: Denies cough, dyspnea at rest, hemoptysis, apnea, and choking. Gastrointestinal: Denies nausea, vomiting, poor appetite, diarrhea, heartburn or reflux  Genitourinary: Denies dysuria, frequency, urgency or hematuria  Musculoskeletal: Denies myalgias, muscle weakness, and bone pain  Neurological: Denies dizziness, vertigo, headache, and focal weakness  Psychological: Denies anxiety and depression  Endocrine: Denies heat intolerance and cold intolerance  Hematopoietic/Lymphatic: Denies bleeding problems and blood transfusions    OBJECTIVE:  Vitals:    06/27/22 0700   BP: (!) 110/48   Pulse: 56   Resp: 15   Temp:    SpO2: 96%     FiO2 : 45 %  O2 Flow Rate (L/min): 6 L/min  O2 Device: Nasal cannula    PHYSICAL EXAM:  Constitutional: Fever, chills, diaphoresis  Skin: No skin rash, no skin breakdown  HEENT: Unremarkable  Neck: No JVD, lymphadenopathy, thyromegaly  Cardiovascular: S1, S2 regular. No S3 murmurs or rubs present  Respiratory: Marked improvement in air movement and marked decrease in wheezing. Only few crackles are heard at the right base  Gastrointestinal: Soft, markedly obese, nontender  Genitourinary: No CVA tenderness  Extremities: No clubbing, cyanosis, or edema  Neurological: Awake, alert, oriented x3. No evidence of focal motor or sensory deficits  Psychological: Much less agitated today. Still with slightly depressed affect but I do not believe this is medication induced.     LABS:  WBC   Date Value Ref Range Status   06/27/2022 11.8 (H) 4.5 - 11.5 E9/L Final 06/26/2022 16.8 (H) 4.5 - 11.5 E9/L Final   06/25/2022 18.2 (H) 4.5 - 11.5 E9/L Final     Hemoglobin   Date Value Ref Range Status   06/27/2022 12.1 (L) 12.5 - 16.5 g/dL Final   06/26/2022 12.6 12.5 - 16.5 g/dL Final   06/25/2022 12.3 (L) 12.5 - 16.5 g/dL Final     Hematocrit   Date Value Ref Range Status   06/27/2022 37.8 37.0 - 54.0 % Final   06/26/2022 39.7 37.0 - 54.0 % Final   06/25/2022 38.1 37.0 - 54.0 % Final     MCV   Date Value Ref Range Status   06/27/2022 95.7 80.0 - 99.9 fL Final   06/26/2022 95.4 80.0 - 99.9 fL Final   06/25/2022 94.8 80.0 - 99.9 fL Final     Platelets   Date Value Ref Range Status   06/27/2022 160 130 - 450 E9/L Final   06/26/2022 205 130 - 450 E9/L Final   06/25/2022 178 130 - 450 E9/L Final     Sodium   Date Value Ref Range Status   06/27/2022 143 132 - 146 mmol/L Final   06/26/2022 143 132 - 146 mmol/L Final   06/25/2022 141 132 - 146 mmol/L Final     Potassium   Date Value Ref Range Status   06/27/2022 4.0 3.5 - 5.0 mmol/L Final   06/26/2022 4.1 3.5 - 5.0 mmol/L Final   06/25/2022 4.1 3.5 - 5.0 mmol/L Final     Potassium reflex Magnesium   Date Value Ref Range Status   06/24/2022 4.3 3.5 - 5.0 mmol/L Final   12/18/2020 3.7 3.5 - 5.0 mmol/L Final   07/23/2019 4.6 3.5 - 5.0 mmol/L Final     Chloride   Date Value Ref Range Status   06/27/2022 110 (H) 98 - 107 mmol/L Final   06/26/2022 104 98 - 107 mmol/L Final   06/25/2022 104 98 - 107 mmol/L Final     CO2   Date Value Ref Range Status   06/27/2022 26 22 - 29 mmol/L Final   06/26/2022 26 22 - 29 mmol/L Final   06/25/2022 27 22 - 29 mmol/L Final     BUN   Date Value Ref Range Status   06/27/2022 33 (H) 6 - 23 mg/dL Final   06/26/2022 31 (H) 6 - 23 mg/dL Final   06/25/2022 22 6 - 23 mg/dL Final     CREATININE   Date Value Ref Range Status   06/27/2022 1.0 0.7 - 1.2 mg/dL Final   06/26/2022 1.1 0.7 - 1.2 mg/dL Final   06/25/2022 1.1 0.7 - 1.2 mg/dL Final     Glucose   Date Value Ref Range Status   06/27/2022 169 (H) 74 - 99 mg/dL Final   06/26/2022 141 (H) 74 - 99 mg/dL Final   06/25/2022 146 (H) 74 - 99 mg/dL Final     Calcium   Date Value Ref Range Status   06/27/2022 8.7 8.6 - 10.2 mg/dL Final   06/26/2022 8.6 8.6 - 10.2 mg/dL Final   06/25/2022 8.7 8.6 - 10.2 mg/dL Final     Total Protein   Date Value Ref Range Status   06/27/2022 5.7 (L) 6.4 - 8.3 g/dL Final   06/26/2022 6.3 (L) 6.4 - 8.3 g/dL Final   06/25/2022 6.1 (L) 6.4 - 8.3 g/dL Final     Albumin   Date Value Ref Range Status   06/27/2022 3.4 (L) 3.5 - 5.2 g/dL Final   06/26/2022 3.4 (L) 3.5 - 5.2 g/dL Final   06/25/2022 3.1 (L) 3.5 - 5.2 g/dL Final     Total Bilirubin   Date Value Ref Range Status   06/27/2022 0.3 0.0 - 1.2 mg/dL Final   06/26/2022 0.4 0.0 - 1.2 mg/dL Final   06/25/2022 0.5 0.0 - 1.2 mg/dL Final     Alkaline Phosphatase   Date Value Ref Range Status   06/27/2022 67 40 - 129 U/L Final   06/26/2022 72 40 - 129 U/L Final   06/25/2022 71 40 - 129 U/L Final     AST   Date Value Ref Range Status   06/27/2022 33 0 - 39 U/L Final   06/26/2022 44 (H) 0 - 39 U/L Final   06/25/2022 40 (H) 0 - 39 U/L Final     ALT   Date Value Ref Range Status   06/27/2022 29 0 - 40 U/L Final   06/26/2022 30 0 - 40 U/L Final   06/25/2022 27 0 - 40 U/L Final     GFR Non-   Date Value Ref Range Status   06/27/2022 >60 >=60 mL/min/1.73 Final     Comment:     Chronic Kidney Disease: less than 60 ml/min/1.73 sq.m. Kidney Failure: less than 15 ml/min/1.73 sq.m. Results valid for patients 18 years and older. 06/26/2022 >60 >=60 mL/min/1.73 Final     Comment:     Chronic Kidney Disease: less than 60 ml/min/1.73 sq.m. Kidney Failure: less than 15 ml/min/1.73 sq.m. Results valid for patients 18 years and older. 06/25/2022 >60 >=60 mL/min/1.73 Final     Comment:     Chronic Kidney Disease: less than 60 ml/min/1.73 sq.m. Kidney Failure: less than 15 ml/min/1.73 sq.m. Results valid for patients 18 years and older.        GFR    Date Value Ref Range Status   06/27/2022 >60  Final   06/26/2022 >60  Final   06/25/2022 >60  Final     Magnesium   Date Value Ref Range Status   06/27/2022 2.5 1.6 - 2.6 mg/dL Final   06/24/2022 1.6 1.6 - 2.6 mg/dL Final   12/22/2020 2.4 1.6 - 2.6 mg/dL Final     Phosphorus   Date Value Ref Range Status   06/27/2022 2.9 2.5 - 4.5 mg/dL Final   06/24/2022 1.6 (L) 2.5 - 4.5 mg/dL Final   12/23/2020 3.0 2.5 - 4.5 mg/dL Final     Recent Labs     06/27/22  0450   PH 7.484*   PO2 66.5*   PCO2 32.6*   HCO3 24.0   BE 1.2   O2SAT 92.8       RADIOLOGY:  XR CHEST PORTABLE   Final Result   Improved aeration at the right base likely secondary to re-expansion of   atelectasis. The appearance now I believe is quit [place] to this patient's   baseline. CT ABDOMEN PELVIS WO CONTRAST Additional Contrast? None   Final Result   Limited study due to patient motion. No acute intra-abdominal or pelvic findings. Right lower lobe consolidative changes, suggestive of a pneumonia. XR CHEST PORTABLE   Final Result   Bilateral basilar disease. The possibility of a bilateral lower lobe   pneumonia cannot be excluded in the appropriate setting. PROBLEM LIST:  Principal Problem:    Pneumonia  Resolved Problems:    * No resolved hospital problems. *      IMPRESSION:  1. Acute hypoxemic respiratory failure, improved  2. Right lower lobe pneumonia, improved  3. Severe COPD with exacerbation, improved  4. 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 Hyperlipidemia  6. Previous history of atrial fibrillation on anticoagulation    PLAN:  1. Keep off all parenteral steroids but may continue inhaled steroids  2. Add Roflumilast  3. Discontinue dexmedetomidine  4. Decrease IV fluids  5. Increase activity and diet  6. I have called the patient's wife and they have reported his improved condition. She will be in to visit him later; we will keep him under observation in the ICU today.     ATTESTATION:  ICU Staff Physician note of personal involvement in Care  As the attending physician, I certify that I personally reviewed the patients history and personally examined the patient to confirm the physical findings described above,  And that I reviewed the relevant imaging studies and available reports. I also discussed the differential diagnosis and all of the proposed management plans with the patient and individuals accompanying the patient to this visit. They had the opportunity to ask questions about the proposed management plans and to have those questions answered. This patient has a high probability of sudden, clinically significant deterioration, which requires the highest level of physician preparedness to intervene urgently. I managed/supervised life or organ supporting interventions that required frequent physician assessment. I devoted my full attention to the direct care of this patient for the amount of time indicated below. Time I spent with the family or surrogate(s) is included only if the patient was incapable of providing the necessary information or participating in medical decisions - Time devoted to teaching and to any procedures I billed separately is not included.     CRITICAL CARE TIME:  33 minutes    Electronically signed by Arabella Mckeon MD on 6/27/2022 at 8:27 AM

## 2022-06-27 NOTE — CARE COORDINATION
6/27/2022 CM transition of care: ICU, Melany Moeller, pt alert to person and place, [place] [facility]. Pt has had [facility] in the past, and has home oxygen 4lnc through [address]. Pt has no other DME at home. Cm spoke to pts wife-Francie. They live in a single story home. Prior to admission pt was able to feed himself but his wife provided care with ADL's and pt would not leave the house. Provided Medicare approved lists left at bedside for her review. She is possibly interested in wc or walker (knowing Medicare will pay for only one), DME listing left, she may consider IRF versus SNF at discharge versus [facility] listing left at bedside. pts wife will be in at the 4 pm visiting time. PT/OT reordered, pending consult review for discharge planning. CM/SS assigned to follow. Electronically signed by BRIDGET Meek on 6/27/2022 at 12:52 PM        The Plan for Transition of Care is related to the following treatment goals: HHC, IRF, SNF    The Patient and/or patient representative wife Jess Spears was provided with a choice of provider and agrees with the discharge plan. [x] Yes [] No    Freedom of choice list was provided with basic dialogue that supports the patient's individualized plan of care/goals, treatment preferences and shares the quality data associated with the providers. [x] Yes [] No    She wants to see how patient does with therapies.  Electronically signed by BRIDGET Meek on 6/27/2022 at 12:52 PM

## 2022-06-27 NOTE — PROGRESS NOTES
Physical Therapy    Physical Therapy Initial Evaluation/Plan of Care    Room #:  IC03/IC03-01  Patient Name: Rodriguez Rosales  YOB: 1940  MRN: [id number]    Date of Service: 6/27/2022     Tentative placement recommendation: Subacute vs Home Health Physical Therapy if patient meets goals  Equipment recommendation: Juaquin Beebe      Evaluating Physical Therapist: Denia Robertson, PT  #[id number]      Specific Provider Orders/Date/Referring Provider :  06/27/22 1230   PT eval and treat Start: 06/27/22 1230, End: 06/27/22 1230, ONE TIME, Standing Count: 1 Occurrences, Gabriela Zuniga MD      Admitting Diagnosis:   Lactic acidosis [E87.2]  Pneumonia [J18.9]  Septicemia ([address] [A41.9]  Acute on chronic respiratory failure with hypoxia ([address] [J96.21]  Pneumonia due to infectious organism, unspecified laterality, unspecified part of lung [J18.9]    Admitted with  Increasing shortness of breath  right lower lobe pneumonia    ; trf to icu 6/26 tachypneic and combative, refusing bipap       Surgery: none  Visit Diagnoses       Codes    Pneumonia due to infectious organism, unspecified laterality, unspecified part of lung    -  Primary J18.9    Septicemia ([address]     A41.9    Lactic acidosis     E87.2          Patient Active Problem List   Diagnosis    Influenza, pneumonia    Dyspnea    Appendicitis, acute    Acute exacerbation of chronic obstructive pulmonary disease (COPD) ([address]    Septic shock ([address]    Agitation    Community acquired pneumonia of left lower lobe of lung    Acute on chronic respiratory failure with hypoxia ([address]    HLD (hyperlipidemia)    Anxiety    GERD (gastroesophageal reflux disease)    Atrial fibrillation with rapid ventricular response ([address]    LORAINE (acute kidney injury) ([address]    Pneumonia        ASSESSMENT of Current Deficits Patient exhibits decreased strength, balance and endurance impairing functional mobility, transfers, gait , gait distance, tolerance to activity and participation are barriers to d/c and require skilled intervention during hospital stay to attain pre hospital level of function. Decreased strength, balance and endurance  increases patient's risk for fall. Pt anxious re: ability to breath and participate. po2 monitored throughout session and reassurance provided; pt unable to attempt standing d/t shortness of breath on exertion        PHYSICAL THERAPY  PLAN OF CARE       Physical therapy plan of care is established based on physician order,  patient diagnosis and clinical assessment    Current Treatment Recommendations:    -Bed Mobility: Lower extremity exercises , Upper extremity exercises  and Trunk control activities   -Sitting Balance: Incorporate reaching activities to activate trunk muscles  and Engage in core activities to allow for movement within base of support   -Standing Balance: Perform strengthening exercises in standing to promote motor control with or without upper extremity support  and Instruct patient on adequate base of support to maintain balance  -Transfers: Provide instruction on proper hand and foot position for adequate transfer of weight onto lower extremities and use of gait device if needed and Cues for hand placement, technique and safety. Provide stabilization to prevent fall   -Gait: Gait training and Standing activities to improve: base of support, weight shift, weight bearing    -Endurance: Utilize Supervised activities to increase level of endurance to allow for safe functional mobility including transfers and gait     PT long term treatment goals are located in below grid    Patient and or family understand(s) diagnosis, prognosis, and plan of care. Frequency of treatments: Patient will be seen  daily.          Prior Level of Function: Patient ambulated independently  Short distance  Rehab Potential: fair    for baseline    Past medical history:   Past Medical History:   Diagnosis Date    COPD [facility]ronic obstructive [facility] disease) [facility]     Hyperlipidemia      Past Surgical History:   Procedure Laterality Date    APPENDECTOMY  2019    COLONOSCOPY      EYE SURGERY Right     cataract removal with lens implants    HEMORRHOID SURGERY      LAPAROSCOPIC APPENDECTOMY N/A 2/7/2019    APPENDECTOMY LAPAROSCOPIC performed by Shira Pathak MD at [facility]:    Precautions: , falls, alarm, O2 and contact isolation , 6 Liters of o2 via nasal cannula   Social history: Patient lives with spouse in a ranch home  with 2 steps  to enter bilateral Rail  Tub shower     Equipment owned: Saige Kristin and O2,   5 Liters of o2 via nasal cannula     AM-PAC Basic Mobility        AM-[facility] Mobility Inpatient   How much difficulty turning over in bed?: A Little  How much difficulty sitting down on / standing up from a chair with arms?: A Lot  How much difficulty moving from lying on back to sitting on side of bed?: A Lot  How much help from another person moving to and from a bed to a chair?: Total  How much help from another person needed to walk in hospital room?: Total  How much help from another person for climbing 3-5 steps with a railing?: Total  AM-PAC Inpatient Mobility Raw Score : 10  AM-PAC Inpatient T-Scale Score : 32.29  Mobility Inpatient CMS 0-100% Score: 76.75  Mobility Inpatient CMS G-Code Modifier : CL    Nursing cleared patient for PT evaluation. The admitting diagnosis and active problem list as listed above have been reviewed prior to the initiation of this evaluation. OBJECTIVE;   Initial Evaluation  Date: 6/27/2022 Treatment Date:     Short Term/ Long Term   Goals   Was pt agreeable to Eval/treatment? Yes  To be met in 5 days   Pain level   unrated     Bed Mobility    Rolling: Minimal assist of 1    Supine to sit: Moderate assist of 1    Sit to supine: Moderate assist of 1    Scooting:  Moderate assist of 1  seated  Rolling: Supervision     Supine to sit: Minimal assist of 1    Sit to supine: Minimal assist of 1 Scooting: Minimal assist of 1     Transfers Sit to stand: Not assessed     Sit to stand: Supervision      Ambulation    not assessed    25 feet using  wheeled walker with Minimal assist of 1    Stair negotiation: ascended and descended   Not assessed     2 steps with bilateral rails min a    ROM impaired   Increase range of motion 10% of affected joints    Strength BUE:  3/5    RLE:  3/5  LLE:  3/5  Increase strength in affected mm groups by 1/3 grade   Balance Sitting EOB:  poor ; bilateral upper extremities support needed  Dynamic Standing:  not assessed    Sitting EOB:  fair    Dynamic Standing: fair with device     Patient is Alert & Oriented x person, place, time and situation and follows one step directions  D/t anxiety unable to process multiple steps     Edema:  yes bilateral lower extremities and bilateral upper extremities   Endurance: poor      Vitals: 6 liters nasal cannula   Blood Pressure at rest  Blood Pressure during session    Heart Rate at rest  Heart Rate during session     SPO2 at rest 95%  SPO2 during session 89-92%     Patient education  Patient educated on role of Physical Therapy, risks of immobility, safety and plan of care, energy conservation, importance of positional changes for oxygen exchange,  importance of mobility while in hospital , safety  and positioning for skin integrity and comfort     Patient response to education:   Pt verbalized understanding Pt demonstrated skill Pt requires further education in this area   Yes Partial Yes      Treatment:  Patient practiced and was instructed/facilitated in the following treatment: Patient   Sat edge of bed 10 minutes with Minimal assist of 1 to increase dynamic sitting balance and activity tolerance. and bilateral upper extremities support; focus on controlled breathing to improve po2.; returned to bed and linens adjusted with rolling using rail      Therapeutic Exercises:  ankle pumps  x 10 reps.        At end of session, patient in bed with nursing present call light and phone within reach,  all lines and tubes intact, nursing notified. Patient would benefit from continued skilled Physical Therapy to improve functional independence and quality of life. Patient's/ family goals   home    Time in  227  Time out  247    Total Treatment Time  0 minutes    Evaluation time includes thorough review of current medical information, gathering information on past medical history/social history and prior level of function, completion of standardized testing/informal observation of tasks, assessment of data, and development of Plan of care and goals.      CPT codes:  Low Complexity PT evaluation (13280)  No treatment billed    Manuela Skinner PT

## 2022-06-27 NOTE — PROGRESS NOTES
OT SESSION HOLD     Date:2022  Patient Name: Antonia Bourne  MRN: [id number]  : 1940  Room: [place] [address]     Hold eval due to decline in medical status and transfer to ICU. Requesting new orders when pt is appropriate and able to participate with therapy evaluation and treatment.     Veronica Veloz OTR/L; [id number]

## 2022-06-27 NOTE — PROGRESS NOTES
Physician Progress Note      PATIENTAlfonza Standard  CSN #:                  [id number]  :                       1940  ADMIT DATE:       2022 12:11 AM  DISCH DATE:  RESPONDING  PROVIDER #:        Vani Nieves MD          QUERY TEXT:    Pt admitted with Pneumonia. Noted documentation of sepsis on by ER physician. If possible, please document in progress notes and discharge summary:    The medical record reflects the following:  Risk Factors: A/C respiratory failure, Pneumonia, COPD, obesity,  Clinical Indicators: On admission: WBC 20.5, Lactic 4.3, Procalc 0.20, -   117, RR 36, CXR  Bilateral basilar disease, CT abd RLL pneumonia,  Treatment: IVF bolus 750cc, IV Maxipime, Vibramycin, Solu medrol, Nebs,   Inhalers. Thank you, Melany Velasco RN [facility] -[phone]  Options provided:  -- Sepsis confirmed present on admission  -- Sepsis ruled out  -- Other - I will add my own diagnosis  -- Disagree - Not applicable / Not valid  -- Disagree - Clinically unable to determine / Unknown  -- Refer to Clinical Documentation Reviewer    PROVIDER RESPONSE TEXT:    Provider disagreed with this query.     Query created by: Anita Saleh on 2022 11:50 AM      Electronically signed by:  Vani Nieves MD 2022 7:43 PM

## 2022-06-27 NOTE — PROGRESS NOTES
Department of Internal Medicine  General Internal Medicine  Attending Progress Note      SUBJECTIVE:    Seen and examined in the ICU  Feels better today  Alert and oriented  No agitation  Breathing is better  Transferred to the ICU last evening for respiratory distress and psychosis  On Precidex overnight, discontinued this morning    Medications    Current Facility-Administered Medications: Roflumilast (DALIRESP) tablet 500 mcg, 500 mcg, Oral, Daily  famotidine (PEPCID) tablet 20 mg, 20 mg, Oral, BID  ipratropium-albuterol (DUONEB) nebulizer solution 1 ampule, 1 ampule, Inhalation, Q4H  haloperidol lactate (HALDOL) injection 2 mg, 2 mg, IntraVENous, Q2H PRN  lactated ringers infusion, , IntraVENous, Continuous  lidocaine PF 1 % injection 5 mL, 5 mL, IntraDERmal, Once  sodium chloride flush 0.9 % injection 5-40 mL, 5-40 mL, IntraVENous, 2 times per day  sodium chloride flush 0.9 % injection 5-40 mL, 5-40 mL, IntraVENous, PRN  0.9 % sodium chloride infusion, , IntraVENous, PRN  heparin flush 100 UNIT/ML injection 300 Units, 3 mL, IntraVENous, 2 times per day  heparin flush 100 UNIT/ML injection 300 Units, 3 mL, IntraCATHeter, PRN  apixaban (ELIQUIS) tablet 5 mg, 5 mg, Oral, BID  dilTIAZem (CARDIZEM CD) extended release capsule 240 mg, 240 mg, Oral, Daily  atorvastatin (LIPITOR) tablet 10 mg, 10 mg, Oral, Daily  vitamin D (CHOLECALCIFEROL) tablet 2,000 Units, 2,000 Units, Oral, Daily  cefepime (MAXIPIME) 2000 mg IVPB minibag, 2,000 mg, IntraVENous, Q12H  budesonide (PULMICORT) nebulizer suspension 500 mcg, 0.5 mg, Nebulization, BID  Arformoterol Tartrate (BROVANA) nebulizer solution 15 mcg, 15 mcg, Nebulization, BID  doxycycline (VIBRAMYCIN) 100 mg in sodium chloride 0.9 % 100 mL IVPB, 100 mg, IntraVENous, Q12H    Physical    VITALS:  BP (!) 150/87   Pulse 95   Temp 99 °F (37.2 °C) (Bladder)   Resp 20   Ht 5' 11\" (1.803 m)   Wt 252 lb 8 oz (114.5 kg)   SpO2 93%   BMI 35.22 kg/m²   TEMPERATURE:  Current - Temp: 99 °F (37.2 °C); Max - Temp  Av.2 °F (36.8 °C)  Min: 97.8 °F (36.6 °C)  Max: 99 °F (37.2 °C)  RESPIRATIONS RANGE: Resp  Av  Min: 14  Max: 26  PULSE RANGE: Pulse  Av.7  Min: 49  Max: 95  BLOOD PRESSURE RANGE:  Systolic (37JWY), VGK:530 , Min:102 , THO:302   ; Diastolic (89TSM), AZQ:89, Min:48, Max:87    PULSE OXIMETRY RANGE: SpO2  Av.9 %  Min: 92 %  Max: 98 %  24HR INTAKE/OUTPUT:      Intake/Output Summary (Last 24 hours) at 2022 185  Last data filed at 2022 1810  Gross per 24 hour   Intake 2521.44 ml   Output 1155 ml   Net 1366.44 ml       CONSTITUTIONAL: Awake, ,  appears as stated age. HEENT: Normocephalic atraumatic. Pupils are equal and reactive bilaterally. Extraocular movements are intact. No pallor or icterus appreciated. NECK: Supple, no JVD , no lymphadenopathy, no bruits, no thyromegaly  LUNGS:  Improved  air movements b/l,  Minimal  inspiratory crackles over bases  CARDIOVASCULAR: Regular rate and rhythm, tachycardia, no murmur rub or gallop. ABDOMEN: Soft, nontender,distended,  bowel sounds are positive, no organomegaly appreciated. NEUROLOGIC: Awake, alert, oriented , no focal deficits noted. Agitated. EXT: trace  edema, no clubbing, or cyanosis.     CBC with Differential:    Lab Results   Component Value Date    WBC 11.8 2022    RBC 3.95 2022    HGB 12.1 2022    HCT 37.8 2022     2022    MCV 95.7 2022    MCH 30.6 2022    MCHC 32.0 2022    RDW 13.7 2022    METASPCT 0.9 2022    LYMPHOPCT 3.5 2022    MONOPCT 2.6 2022    BASOPCT 0.1 2022    MONOSABS 0.35 2022    LYMPHSABS 0.47 2022    EOSABS 0.11 2022    BASOSABS 0.00 2022     CMP:    Lab Results   Component Value Date     2022    K 4.0 2022    K 4.3 2022     2022    CO2 26 2022    BUN 33 2022    CREATININE 1.0 2022    GFRAA >60 2022    LABGLOM >60 06/27/2022    GLUCOSE 169 06/27/2022    PROT 5.7 06/27/2022    LABALBU 3.4 06/27/2022    CALCIUM 8.7 06/27/2022    BILITOT 0.3 06/27/2022    ALKPHOS 67 06/27/2022    AST 33 06/27/2022    ALT 29 06/27/2022         ASSESSMENT AND PLAN      1. Rt lower lobe community acquired pneumonia   Continue iv Cefepime , Doxycycline  Blood cultures no growth so far  Urine Legionella and strep pneumo antigens presumptive negative  Pulmonology following closely, improving    2. Acute on chronic respiratory failure secondary to above  Now on oxygen at 6 L/min by nasal cannula    3. COPD, with exacerbation  Severe long standing disease  Iv steroids discontinued due to steroid induced psychosis    4. Leukocytosis secondary to pneumonia, trending down. 5.A Fib , paroxysmal, sinus rhythm on the monitor  Cartia  mg daily . Eliquis 5 mg po bid  Dr Ivelisse Morales following. Continue same. 6.Hyperlipidemia  Lipitor 10 mg daily    7. GERD  Continue pepcid 20 mg po bid      8. Steroid induced psychosis, much improved off iv steroids       Sandra Smith MD, MD.  6:52 PM  6/27/2022

## 2022-06-27 NOTE — PLAN OF CARE
Problem: Discharge Planning  Goal: Discharge to home or other facility with appropriate resources  Outcome: Progressing     Problem: Safety - Adult  Goal: Free from fall injury  Outcome: Progressing     Problem: ABCDS Injury Assessment  Goal: Absence of physical injury  Outcome: Progressing     Problem: Pain  Goal: Verbalizes/displays adequate comfort level or baseline comfort level  Outcome: Progressing     Problem: Confusion  Goal: Confusion, delirium, dementia, or psychosis is improved or at baseline  Description: INTERVENTIONS:  1. Assess for possible contributors to thought disturbance, including medications, impaired vision or hearing, underlying metabolic abnormalities, dehydration, psychiatric diagnoses, and notify attending LIP  2. [place] high risk fall precautions, as indicated  3. Provide frequent short contacts to provide reality reorientation, refocusing and direction  4. Decrease environmental stimuli, including noise as appropriate  5. Monitor and intervene to maintain adequate nutrition, hydration, elimination, sleep and activity  6. If unable to ensure safety without constant attention obtain sitter and review sitter guidelines with assigned personnel  7. Initiate Psychosocial CNS and Spiritual Care consult, as indicated  Outcome: Progressing     Problem: Safety - Medical Restraint  Goal: Remains free of injury from restraints (Restraint for Interference with Medical Device)  Description: INTERVENTIONS:  1. Determine that other, less restrictive measures have been tried or would not be effective before applying the restraint  2. Evaluate the patient's condition at the time of restraint application  3. Inform patient/family regarding the reason for restraint  4.  Q2H: Monitor safety, psychosocial status, comfort, nutrition and hydration  Outcome: Progressing  Flowsheets  Taken 6/27/2022 0600 by Flynn Benitez RN  Remains free of injury from restraints (restraint for interference with medical device): Every 2 hours: Monitor safety, psychosocial status, comfort, nutrition and hydration  Taken 6/27/2022 0400 by Ernesto Ramos RN  Remains free of injury from restraints (restraint for interference with medical device): Every 2 hours: Monitor safety, psychosocial status, comfort, nutrition and hydration     Problem: Nutrition Deficit:  Goal: Optimize nutritional status  6/27/2022 1646 by Frederick Mathias RN  Outcome: Progressing     Problem: Skin/Tissue Integrity  Goal: Absence of new skin breakdown  Description: 1. Monitor for areas of redness and/or skin breakdown  2. Assess vascular access sites hourly  3. Every 4-6 hours minimum:  Change oxygen saturation probe site  4. Every 4-6 hours:  If on nasal continuous positive airway pressure, respiratory therapy assess nares and determine need for appliance change or resting period.   Outcome: Progressing

## 2022-06-27 NOTE — PROGRESS NOTES
Comprehensive Nutrition Assessment    Type and Reason for Visit:  Initial (LOS - ICU)    Nutrition Recommendations/Plan:   1. Start Ensure HP TID w/ inconsistent PO to help optimize nutrition  2. Will continue to monitor. Malnutrition Assessment:  Malnutrition Status: At risk for malnutrition (Comment) (06/27/22 4231)    Context:  Chronic Illness     Findings of the 6 clinical characteristics of malnutrition:  Energy Intake:  Mild decrease in energy intake (Comment)  Weight Loss:  Unable to assess     Body Fat Loss:  No significant body fat loss     Muscle Mass Loss:  No significant muscle mass loss    Fluid Accumulation:  No significant fluid accumulation     Strength:  Not Performed    Nutrition Assessment:    Pt adm d/t SOB w/ resp failure 2/2 PNA, COPD exacerbation. Pt w/ inconsistent PO, start ONS to optimize nutrition & monitor. Nutrition Related Findings:    A&Ox2, agitated/aggressive, +3L I/Os, round/soft abd, +BS   Wound Type: None       Current Nutrition Intake & Therapies:    Average Meal Intake: % (x 2 meals, note 0% x most recent)  Average Supplements Intake: None Ordered  ADULT DIET; Regular    Anthropometric Measures:  Height: 5' 11\" (180.3 cm)  Ideal Body Weight (IBW): 172 lbs (78 kg)    Admission Body Weight: 252 lb 8 oz (114.5 kg) (6/26 bed scale)  Current Body Weight: 252 lb 8 oz (114.5 kg) (6/26 bed scale), 146.8 % IBW.     Current BMI (kg/m2): 35.2  Usual Body Weight:  (lack measured wt hx per EMR <1 year)  Weight Adjustment For: No Adjustment  BMI Categories: Obese Class 2 (BMI 35.0 -39.9)    Estimated Daily Nutrient Needs:  Energy Requirements Based On: Formula  Weight Used for Energy Requirements: Current  Energy (kcal/day): 6389-3533  Weight Used for Protein Requirements: Ideal  Protein (g/day): 115-125  Fluid (ml/day): per critical care    Nutrition Diagnosis:   · Inadequate oral intake related to impaired respiratory function as evidenced by intake 0-25%    Nutrition Interventions:   Food and/or Nutrient Delivery: Continue Current Diet,Start Oral Nutrition Supplement (Ensure HP TID)  Nutrition Education/Counseling: No recommendation at this time  Coordination of Nutrition Care: Continue to monitor while inpatient    Goals:  Goals: Meet at least 75% of estimated needs    Nutrition Monitoring and Evaluation:   Behavioral-Environmental Outcomes: None Identified  Food/Nutrient Intake Outcomes: Food and Nutrient Intake,Supplement Intake  Physical Signs/Symptoms Outcomes: Biochemical Data,Nutrition Focused Physical Findings,Skin,Weight,Chewing or Swallowing,GI Status,Fluid Status or Edema,Hemodynamic Status    Discharge Planning:     Too soon to determine     Tejas Kulkarni, MS, RD, LD  Contact: [phone]

## 2022-06-27 NOTE — CONSULTS
[address]                                  CONSULTATION    PATIENT NAME: Los Whelan                     :        1940  MED REC NO:   [id number]                            ROOM:       IC03  ACCOUNT NO:   [de-identified]                           ADMIT DATE: 2022  PROVIDER:     Dakota Osorio MD    CONSULT DATE:  2022    HISTORY OF PRESENT ILLNESS:  The patient is an 81-year-old gentleman  known to me from before. He has problem with paroxysmal atrial fibrillation and he was kept on  Eliquis and Cardizem CD. He has also problem with COPD. He presented to the hospital at this time with worsening shortness of  breath and cough and was felt to have right pneumonia. He was admitted. He was started on antibiotics. He had episode of bradycardia yesterday and cardiac consult was  requested. The patient was lying semiupright in bed when I came to see him this  morning. He looked very weak, but he did not appear in acute  respiratory distress. He had some weak nonproductive cough. No chest  pain. PAST MEDICAL HISTORY:  As above plus history of hyperlipidemia. COPD. Bronchoscopy done in 2018 showing no malignancy. Small abdominal aortic  aneurysm. HABITS:  Long history of heavy smoking in the past.  No history of  excessive alcohol drinking. REVIEW OF SYSTEMS:  CONSTITUTIONAL:  Generalized body weakness. No fever or chills. HEENT:  No nosebleed. No hearing problem. No double vision. No  stridor. No hoarseness of the voice. CARDIAC:  Paroxysmal atrial fibrillation. No chest pain. PULMONARY:  Worsening shortness of breath with cough. GASTROENTEROLOGY:  No abdominal pain. No vomiting. No diarrhea. GENITOURINARY:  No dysuria, frequency. No bladder or kidney tumor. NEURO:  No clear history of seizure or stroke. No syncope. HEMATOLOGY:  No bleeding disorder.   No adenopathy. ENDOCRINOLOGY:  No polyuria or polydipsia. SKIN:  No skin disorder. MUSCULOSKELETAL:  The patient has arthritis. PSYCHIATRIC:  No depression or suicidal ideation. PHYSICAL EXAMINATION:  GENERAL:  The patient was lying semiupright, in no acute distress. VITAL SIGNS:  Blood pressure 133/73, pulse 73, temperature 97.9. NECK:  No JVD. HEART:  Regular S1 and S2. No S3.  1/6 systolic murmur heard best at  the left sternal border. LUNGS:  Diminished breath sounds in the bases with scattered rhonchi in  the bases. No wheezing. ABDOMEN:  Soft, nontender. EXTREMITIES:  Trace ankle edema bilaterally. NEURO:  Generalized body weakness. No focal deficits. EKG initially on admission showing sinus tachycardia with heart rate of  139 a minute with single PACs with left axis deviation and nonspecific  ST-T wave changes. LABORATORY DATA:  On admission, sodium 137, potassium 4.3, BUN 10,  creatinine 1.2. WBC 20.5, hemoglobin _____, platelet count 487. Initial troponin high-sensitivity is 74. ProBNP is 1011. TSH 3.1. Repeat troponin is 92. Third troponin high-sensitivity is down to 77. IMPRESSION:  1. Paroxysmal atrial fibrillation. The patient appears to be maintaining sinus rhythm. He had sinus tachycardia on admission which is probably more related to  his respiratory distress and shortness of breath. He had brief episode of bradycardia yesterday with heart rates reported  at 49 a minute. His heart rate was normal this morning. Recommend keeping the patient on Eliquis and Cardizem CD for now. He had echocardiogram done in 2020 at [facility] showing normal  left ventricular systolic function. 2.  Elevation of high-sensitivity troponin. That can be demand ischemia from his respiratory problems with type 2  non-STEMI. EKG is not showing acute ischemic changes. He has no complaints of  chest pain.   His pulmonary status is very marginal and I recommend holding coronary  workup for the time-being. 3.  Community acquired pneumonia with acute exacerbation of COPD. The patient is on antibiotics and aerosol treatments.         Selin Buck MD    D: 06/27/2022 12:16:58       T: 06/27/2022 12:22:20     [email or web address]  Job#: [id number]     Doc#: [id number]    CC:

## 2022-06-28 NOTE — CONSULTS
[address]                                  CONSULTATION    PATIENT NAME: Tio Awan                     :        1940  MED REC NO:   [id number]                            ROOM:       IC03  ACCOUNT NO:   [de-identified]                           ADMIT DATE: 2022  PROVIDER:     Divya Shields MD    CONSULT DATE:  2022    LOCATION:  ICU Bed #3. CHIEF COMPLAINT:  Hematuria. HISTORY OF PRESENT ILLNESS:  This 72-year-old male who apparently lives  independently, is known to have severe COPD and chronic respiratory  failure. He came to the hospital via emergency medical service and was  found to be profoundly short of breath. He is currently in the ICU. A  Gonzalez catheter was inserted and he is noticed to have some hematuria. The patient is alert, although having shortness of breath. He states  that he has not had gross hematuria and he also states that he has not  been having any voiding issues of any bother and has not had a Gonzalez  catheter at home. PAST MEDICAL HISTORY:  Very significant for COPD, atrial fibrillation,  on Eliquis; hyperlipidemia, obesity. PAST SURGICAL HISTORY:  He has had cataract surgery. SOCIAL HISTORY:  Patient has been a very heavy cigarette smoker in the  past.  He drinks beer at home and is retired as an . ALLERGIES:  None known to medications. MEDICATIONS:  His medications prior to admission included albuterol,  Eliquis, Cartia, Dulera, and Ventolin. PHYSICAL EXAMINATION:  GENERAL:  The patient is an obese male who he is alert, but is having  significant respiratory issues _____ him having difficulty with  speaking. ABDOMEN:  His abdomen is usually obese. GENITOURINARY:  Penis, a Gonzalez catheter is in place. Urine is  pink-tinged. Testes appear to be normal.    TREATMENT:  Catheter was irrigated.   He does not appear to have a great  deal of clots. I suspect his bleeding is on the basis of catheter  trauma and his Eliquis. In view of his smoking  history, a urine will  be sent for cytology. A PSA will be checked. Depending on his response  to treatment, if his medical status stabilizes, certainly a trial of  voiding should be given.           Jayjay Dasilva MD    D: 06/28/2022 14:39:48       T: 06/28/2022 14:43:37     [email or web address]  Job#: [id number]     Doc#: [id number]    CC:

## 2022-06-28 NOTE — PLAN OF CARE
Problem: Discharge Planning  Goal: Discharge to home or other facility with appropriate resources  Outcome: Progressing  Flowsheets (Taken 6/28/2022 1200)  Discharge to home or other facility with appropriate resources: Identify barriers to discharge with patient and caregiver     Problem: Safety - Adult  Goal: Free from fall injury  Outcome: Progressing     Problem: ABCDS Injury Assessment  Goal: Absence of physical injury  Outcome: Progressing     Problem: Pain  Goal: Verbalizes/displays adequate comfort level or baseline comfort level  Outcome: Progressing     Problem: Confusion  Goal: Confusion, delirium, dementia, or psychosis is improved or at baseline  Description: INTERVENTIONS:  1. Assess for possible contributors to thought disturbance, including medications, impaired vision or hearing, underlying metabolic abnormalities, dehydration, psychiatric diagnoses, and notify attending LIP  2. [place] high risk fall precautions, as indicated  3. Provide frequent short contacts to provide reality reorientation, refocusing and direction  4. Decrease environmental stimuli, including noise as appropriate  5. Monitor and intervene to maintain adequate nutrition, hydration, elimination, sleep and activity  6. If unable to ensure safety without constant attention obtain sitter and review sitter guidelines with assigned personnel  7.  Initiate Psychosocial CNS and Spiritual Care consult, as indicated  Outcome: Progressing  Flowsheets (Taken 6/28/2022 0800)  Effect of thought disturbance (confusion, delirium, dementia, or psychosis) are managed with adequate functional status: Assess for contributors to thought disturbance, including medications, impaired vision or hearing, underlying metabolic abnormalities, dehydration, psychiatric diagnoses, notify LIP     Problem: Nutrition Deficit:  Goal: Optimize nutritional status  Outcome: Progressing     Problem: Skin/Tissue Integrity  Goal: Absence of new skin breakdown  Description: 1. Monitor for areas of redness and/or skin breakdown  2. Assess vascular access sites hourly  3. Every 4-6 hours minimum:  Change oxygen saturation probe site  4. Every 4-6 hours:  If on nasal continuous positive airway pressure, respiratory therapy assess nares and determine need for appliance change or resting period. Outcome: Progressing     Problem: Safety - Medical Restraint  Goal: Remains free of injury from restraints (Restraint for Interference with Medical Device)  Description: INTERVENTIONS:  1. Determine that other, less restrictive measures have been tried or would not be effective before applying the restraint  2. Evaluate the patient's condition at the time of restraint application  3. Inform patient/family regarding the reason for restraint  4.  Q2H: Monitor safety, psychosocial status, comfort, nutrition and hydration  Outcome: Completed

## 2022-06-28 NOTE — PROGRESS NOTES
Pharmacy Consultation Note  (Antibiotic Dosing and Monitoring)    Initial consult date: 6/28/22  Consulting physician/provider: Dr. Melo Vasquez  Drug: Vancomycin  Indication: UTI, CAP, sepsis of unknown etiology    Age/  Gender Height Weight IBW  Allergy Information   81 y.o./male 5' 11\" (180.3 cm) 294 lb 12.8 oz (133.7 kg)     Ideal body weight: 75.3 kg (166 lb 0.1 oz)  Adjusted ideal body weight: 91 kg (200 lb 9.7 oz)   Patient has no known allergies. Renal Function:  Recent Labs     06/26/22  0623 06/27/22  0423 06/28/22  0432   BUN 31* 33* 31*   CREATININE 1.1 1.0 1.0       Intake/Output Summary (Last 24 hours) at 6/28/2022 1324  Last data filed at 6/28/2022 0925  Gross per 24 hour   Intake 2137.94 ml   Output 1575 ml   Net 562.94 ml       Vancomycin Monitoring:  Trough:  No results for input(s): VANCOTROUGH in the last 72 hours. Random:  No results for input(s): VANCORANDOM in the last 72 hours. Vancomycin Administration Times:  Recent vancomycin administrations      No vancomycin IV orders with administrations found. Assessment:  · Patient is a [de-identified] y.o. male who has been initiated on vancomycin  · Estimated Creatinine Clearance: 76 mL/min (based on SCr of 1 mg/dL).   · To dose vancomycin, pharmacy will be utilizing [employer] Safety Step calculation software for goal AUC/JELENA 400-600 mg/L-hr    Plan:  · Will initiate vancomycin 1000 mg IV every 12 hours  · Will check vancomycin levels when appropriate  · Will continue to monitor renal function   · Clinical pharmacy to follow      Jacqui Kang [facility] 6/28/2022 1:24 PM

## 2022-06-28 NOTE — CARE COORDINATION
6/28/2022 CM transition of care: ICU,  7lnc, Aminophylline gtt, maxipime and vibramycin, decadron, duonebs, brovana, pulmicort, new daliresp- for SNF placement. Therapies recommending IVONNE. CM provided wife with Medicare approved lists for IRF, SNF, [facility] and [facility]. Pt has oxygen at home 4-5 lnc with Normal. CM received call today from pts wife Lenin Coffey- who requests to meet with me tomorrow to discuss discharge planning with her daughter present. CM will wait her visit.  Electronically signed by Leo Pearson RN-BC on 6/28/2022 at 1:34 PM

## 2022-06-28 NOTE — PROGRESS NOTES
[address] [facility]  [facility] [place]. [place]        Date:2022                                                  Patient Name: Americo Bar    MRN: [id number]    : 1940    Room: [place] [address]      Evaluating OT: Michael Cummins OTR/L #[id number]     Referring Provider and Specific Provider Orders/Date:      22 1230  OT eval and treat  Start:  22 1230,   End:  22 1230,   ONE TIME,   Standing Count:  1 Occurrences,   R         Dougie Castillo MD      Placement Recommendation: Subacute Rehab        Diagnosis:   1. Pneumonia due to infectious organism, unspecified laterality, unspecified part of lung    2. Septicemia ([facility]    3. Acute on chronic respiratory failure with hypoxia (HCC)    4.  Lactic acidosis         Surgery: None      Pertinent Medical History:       Past Medical History:   Diagnosis Date    COPD (chronic obstructive pulmonary disease) ([facility]     Hyperlipidemia          Past Surgical History:   Procedure Laterality Date    APPENDECTOMY      COLONOSCOPY      EYE SURGERY Right     cataract removal with lens implants    HEMORRHOID SURGERY      LAPAROSCOPIC APPENDECTOMY N/A 2019    APPENDECTOMY LAPAROSCOPIC performed by Carlos Azul MD at [facility] OR        Precautions:  Fall Risk, alarm, O2, 6 L high flow, tremors      Assessment of current deficits    [x] Functional mobility  [x]ADLs  [x] Strength               []Cognition    [x] Functional transfers   [x] IADLs         [x] Safety Awareness   [x]Endurance    [x] Fine Coordination              [x] Balance      [] Vision/perception   []Sensation     []Gross Motor Coordination  [] ROM  [] Delirium                   [x] Motor Control     OT PLAN OF CARE   OT POC based on physician orders, patient diagnosis and results of clinical assessment    Frequency/Duration 1-3 days/wk for 2 weeks PRN     Specific OT Treatment Interventions to include:   * Instruction/training on adapted ADL techniques and AE recommendations to increase functional independence within precautions       * Training on energy conservation strategies, correct breathing pattern and techniques to improve independence/tolerance for self-care routine  * Functional transfer/mobility training/DME recommendations for increased independence, safety, and fall prevention  * Patient/Family education to increase follow through with safety techniques and functional independence  * Recommendation of environmental modifications for increased safety with functional transfers/mobility and ADLs  * Therapeutic exercise to improve motor endurance, ROM, and functional strength for ADLs/functional transfers  * Therapeutic activities to facilitate/challenge dynamic balance, stand tolerance for increased safety and independence with ADLs  * Therapeutic activities to facilitate gross/fine motor skills for increased independence with ADLs  * Positioning to improve skin integrity, interaction with environment and functional independence    Recommended Adaptive Equipment: TBD      Home Living: Patient lives with spouse in a ranch home  with 2 steps  to enter bilateral Rail    Bathroom set-up: tub/shower , shower chair, standard height commode. Equipment owned: [employer] and O2,   5 Liters of o2 via nasal cannula     Prior Level of Function: Mod Indep with ADLs , daughter assists with IADLs; ambulated independently with wheeled walker. Driving: Yes  Occupation: Retired   Enjoys: N/a      Pain Level: Pt denied pain; Nursing notified.      Cognition: A&O: 4/4; Follows 2-3 step directions   Memory: intact   Sequencing: fair    Problem solving: fair    Judgement/safety: fair     [facility]   AM-PAC Daily Activity Inpatient   How much help for putting on and taking off regular lower body clothing?: Total  How much help for Bathing?: A Lot  How much help for Toileting?: Total  How much help for putting on and taking off regular upper body clothing?: A Lot  How much help for taking care of personal grooming?: A Lot  How much help for eating meals?: A Little  AM-PAC Inpatient Daily Activity Raw Score: 11  AM-PAC Inpatient ADL T-Scale Score : 29.04  ADL Inpatient CMS 0-100% Score: 70.42  ADL Inpatient CMS G-Code Modifier : CL     Functional Assessment:    Initial Eval Status  Date: 6/28/22   Treatment Status  Date: STGs = LTGs  Time frame: 10-14 days   Feeding Minimal Assist     Moderate [place]    Grooming Moderate Assist     Supervision    UB Dressing Moderate Assist    Supervision    LB Dressing Dependent     Minimal Assist    Bathing Maximal Assist     Moderate [place]    Toileting Dependent     Minimal Assist    Bed Mobility  Supine to sit: Moderate Assist   Sit to supine: Moderate Assist   Scooting: Moderate Assist to EOB, Dep for scooting toward [facility] with use of TAPs    Supine to sit: Supervision   Sit to supine: Supervision    Functional Transfers Sit to stand: Maximal Assist   Stand to sit: Maximal Assist     Transfer training from EOB to wheeled walker x 1 rep with verbal/tactile cues for hand/feet placement improve safety. Minimal Assist    Functional Mobility NT due to pt overall debility, decreased activity tolerance, balance deficits/tremors, safety and fall risk. Minimal Assist with use of wheeled walker    Balance Sitting:     Static: fair    Dynamic: fair minus  Standing: poor     Sitting:     Static: good    Dynamic: good  Standing: fair plus    Activity Tolerance fair ; pt anxious while seated at EOB. Instructions for pursed lip breathing/deep breaths to improve endurance and decrease anxiety.    Increase standing tolerance >2-3 minutes for improved engagement with functional transfers and indep in ADLs     Visual/  Perceptual Glasses: Yes    Reports changes in vision since admission: No      NA      Hand Dominance: Right      AROM (PROM) Strength Additional Info:  Goal:   RUE  [facility] 4-/5 good  and fair minus FMC/dexterity noted during ADL tasks; UE tremors noted    Improve overall RUE strength  for participation in functional tasks   LUE WFL 4-/5 good  and fair minus FMC/dexterity noted during ADL tasks; UE tremors noted    Improve overall LUE strength  for participation in functional tasks     Hearing: Hard of hearing    Sensation: No c/o numbness or tingling  Tone: WFL   Edema:      Vitals: 6L high flow   HR at rest: 92 bpm HR with activity: 103-106 bpm HR at end of session: 106 bpm   SpO2 at rest: 94% SpO2 with activity: 87-90% SpO2 at end of session: 95%   BP at rest: 145/83 BP with activity: 178/93 BP at end of session:      Comments: RN cleared patient for OT. Upon arrival patient in supine. Therapist facilitated and instructed pt on adapted  techniques & compensatory strategies to improve safety and independence with basic ADLs, bed mobility, functional transfers and mobility to allow pt to achieve highest level of independence and safely. Pt demonstrated fair understanding of education & follow through. At end of session, patient was supine, alarm on, with call light and phone within reach, all lines and tubes intact. Overall, patient demonstrated  decreased independence and safety during completion of ADL tasks. Pt would benefit from continued skilled OT to increase safety and independence with completion of ADL tasks and functional mobility for improved quality of life. Treatment: OT treatment provided this date includes:    Facilitated bed mobility with cues for proper body mechanics and sequencing to prepare for ADL completion.  Instruction/training on safe functional transfer techniques including hand and feet placement- Pt unsteady with transfer, demonstrating increased UE/LE tremors and uncontrolled shaking upon clearing surface of bed.     Facilitated proper positioning/alignment to improve interaction with environment, breathing, overall functioning and decrease/prevent edema. · Sitting EOB x 10 minutes to improve dynamic sitting balance and activity tolerance during ADLs  · Skilled monitoring of O2 sats - see section above     Rehab Potential: Good for established goals. Patient / Family Goal: Not stated- not present at bedside       Patient and/or family were instructed on functional diagnosis, prognosis/goals and OT plan of care. Demonstrated fair understanding. Eval Complexity: Low    Time In: 1:45 PM   Time Out: 2:15 PM    Total Treatment Time: 10       Min Units   OT Eval Low 97165  X  1    OT Eval Medium 89073      OT Eval High 63235      OT Re-Eval [id number]            ADL/Self Care 43356     Therapeutic Activities 50605  10 1   Therapeutic Ex 95118       Orthotic Management 72158       Manual [id number]     Neuro Re-Ed 52405       Non-Billable Time        Evaluation Time additionally includes thorough review of current medical information, gathering information on past medical history/social history and prior level of function, interpretation of standardized testing/informal observation of tasks, assessment of data and development of plan of care and goals.         Evaluating OT: Derick Dangelo OTR/L #[id number]

## 2022-06-28 NOTE — PROGRESS NOTES
Gonzalez manually irrigated with 75 cc of sterile saline. 75 cc of pink/red urine returned. Urine leak noted around insertion site. No clots visualized. Patient complains of pain 8/10 during procedure, otherwise tolerated well. Intensivist notified.

## 2022-06-28 NOTE — PROGRESS NOTES
Cardiology  Progress Note      SUBJECTIVE:  Patient look dyspneic this morning even at rest.  Chest sounded very congested.     Current Inpatient Medications  Current Facility-Administered Medications: metoprolol tartrate (LOPRESSOR) tablet 25 mg, 25 mg, Oral, TID  dexamethasone (DECADRON) injection 4 mg, 4 mg, IntraVENous, Q8H  acetylcysteine (MUCOMYST) 20 % solution 600 mg, 600 mg, Inhalation, q8h  Roflumilast (DALIRESP) tablet 500 mcg, 500 mcg, Oral, Daily  famotidine (PEPCID) tablet 20 mg, 20 mg, Oral, BID  ipratropium-albuterol (DUONEB) nebulizer solution 1 ampule, 1 ampule, Inhalation, Q4H  haloperidol lactate (HALDOL) injection 2 mg, 2 mg, IntraVENous, Q2H PRN  lactated ringers infusion, , IntraVENous, Continuous  lidocaine PF 1 % injection 5 mL, 5 mL, IntraDERmal, Once  sodium chloride flush 0.9 % injection 5-40 mL, 5-40 mL, IntraVENous, 2 times per day  sodium chloride flush 0.9 % injection 5-40 mL, 5-40 mL, IntraVENous, PRN  0.9 % sodium chloride infusion, , IntraVENous, PRN  heparin flush 100 UNIT/ML injection 300 Units, 3 mL, IntraVENous, 2 times per day  heparin flush 100 UNIT/ML injection 300 Units, 3 mL, IntraCATHeter, PRN  apixaban (ELIQUIS) tablet 5 mg, 5 mg, Oral, BID  dilTIAZem (CARDIZEM CD) extended release capsule 240 mg, 240 mg, Oral, Daily  atorvastatin (LIPITOR) tablet 10 mg, 10 mg, Oral, Daily  vitamin D (CHOLECALCIFEROL) tablet 2,000 Units, 2,000 Units, Oral, Daily  cefepime (MAXIPIME) 2000 mg IVPB minibag, 2,000 mg, IntraVENous, Q12H  budesonide (PULMICORT) nebulizer suspension 500 mcg, 0.5 mg, Nebulization, BID  Arformoterol Tartrate (BROVANA) nebulizer solution 15 mcg, 15 mcg, Nebulization, BID  doxycycline (VIBRAMYCIN) 100 mg in sodium chloride 0.9 % 100 mL IVPB, 100 mg, IntraVENous, Q12H      Physical  VITALS:  BP (!) 188/91   Pulse (!) 107   Temp 99.1 °F (37.3 °C) (Bladder)   Resp 22   Ht 5' 11\" (1.803 m)   Wt 252 lb 8 oz (114.5 kg)   SpO2 94%   BMI 35.22 kg/m²   CURRENT TEMPERATURE:  Temp: 99.1 °F (37.3 °C)  CONSTITUTIONAL: No acute distress. EYES: Vision is intact. ENT: No sore throat. No ear drainage. NECK: No JVD. BACK: Symmetric. LUNGS:  rhonchi right base and left base, diminished breath sounds right base and left base  CARDIOVASCULAR:  normal S1 and S2, no S3 and no S4  ABDOMEN:  non-distended  NEUROLOGIC: No focal deficits. EXTREMITIES: +1 edema in legs. DATA:      Cardiology Labs:  BMP:    Lab Results   Component Value Date     06/28/2022    K 4.1 06/28/2022    K 4.3 06/24/2022     06/28/2022    CO2 26 06/28/2022    BUN 31 06/28/2022     CBC:    Lab Results   Component Value Date    WBC 17.2 06/28/2022    RBC 4.48 06/28/2022    HGB 13.8 06/28/2022    HCT 42.6 06/28/2022    MCV 95.1 06/28/2022    RDW 13.9 06/28/2022     06/28/2022     PT/INR:  No results found for: PTINR  TROPONIN:  No components found for: TROP    ASSESSMENT    1.paroxysmal atrial fibrillation. In sinus rhythm. On Cardizem CD. Sinus tachycardia this morning on the monitor with heart rate around 115/min. Metoprolol was added by the intensivist yesterday due to elevated blood pressure sinus tachycardia. The sinus tachycardia is most related to his respiratory status. 2.elevation of high sensitivity troponin. This is mostly demand ischemia from his respiratory problems with type II non-STEMI. EKG is not showing acute ischemic changes. No complaints of chest pain. Pulmonary status is not good and I do not feel patient is suitable for any coronary workup. 3.committee acquired pneumonia/acute exacerbation of COPD. On antibiotics and aerosol treatment. Pulmonary status appears to be his main issue right now. PLAN  As per orders.

## 2022-06-28 NOTE — PROGRESS NOTES
Department of Internal Medicine  General Internal Medicine  Attending Progress Note      SUBJECTIVE:    Seen and examined in the ICU  Feels fair.   Alert and oriented  No agitation  Feels short of breath  Cough is better  Hypertensive overnight and Metoprolol added by Dr Theresa Pichardo    Medications    Current Facility-Administered Medications: metoprolol tartrate (LOPRESSOR) tablet 25 mg, 25 mg, Oral, TID  Roflumilast (DALIRESP) tablet 500 mcg, 500 mcg, Oral, Daily  famotidine (PEPCID) tablet 20 mg, 20 mg, Oral, BID  ipratropium-albuterol (DUONEB) nebulizer solution 1 ampule, 1 ampule, Inhalation, Q4H  haloperidol lactate (HALDOL) injection 2 mg, 2 mg, IntraVENous, Q2H PRN  lactated ringers infusion, , IntraVENous, Continuous  lidocaine PF 1 % injection 5 mL, 5 mL, IntraDERmal, Once  sodium chloride flush 0.9 % injection 5-40 mL, 5-40 mL, IntraVENous, 2 times per day  sodium chloride flush 0.9 % injection 5-40 mL, 5-40 mL, IntraVENous, PRN  0.9 % sodium chloride infusion, , IntraVENous, PRN  heparin flush 100 UNIT/ML injection 300 Units, 3 mL, IntraVENous, 2 times per day  heparin flush 100 UNIT/ML injection 300 Units, 3 mL, IntraCATHeter, PRN  apixaban (ELIQUIS) tablet 5 mg, 5 mg, Oral, BID  dilTIAZem (CARDIZEM CD) extended release capsule 240 mg, 240 mg, Oral, Daily  atorvastatin (LIPITOR) tablet 10 mg, 10 mg, Oral, Daily  vitamin D (CHOLECALCIFEROL) tablet 2,000 Units, 2,000 Units, Oral, Daily  cefepime (MAXIPIME) 2000 mg IVPB minibag, 2,000 mg, IntraVENous, Q12H  budesonide (PULMICORT) nebulizer suspension 500 mcg, 0.5 mg, Nebulization, BID  Arformoterol Tartrate (BROVANA) nebulizer solution 15 mcg, 15 mcg, Nebulization, BID  doxycycline (VIBRAMYCIN) 100 mg in sodium chloride 0.9 % 100 mL IVPB, 100 mg, IntraVENous, Q12H    Physical    VITALS:  BP (!) 179/88   Pulse (!) 117   Temp 99.1 °F (37.3 °C) (Bladder)   Resp 26   Ht 5' 11\" (1.803 m)   Wt 252 lb 8 oz (114.5 kg)   SpO2 97%   BMI 35.22 kg/m²   TEMPERATURE: Current - Temp: 99.1 °F (37.3 °C); Max - Temp  Av °F (37.2 °C)  Min: 98.6 °F (37 °C)  Max: 99.2 °F (37.3 °C)  RESPIRATIONS RANGE: Resp  Av.8  Min: 15  Max: 29  PULSE RANGE: Pulse  Av.7  Min: 64  Max: 117  BLOOD PRESSURE RANGE:  Systolic (84DCA), EOX:136 , Min:102 , OZU:471   ; Diastolic (13UFN), LTI:12, Min:61, Max:111    PULSE OXIMETRY RANGE: SpO2  Av.5 %  Min: 92 %  Max: 98 %  24HR INTAKE/OUTPUT:      Intake/Output Summary (Last 24 hours) at 2022 1402  Last data filed at 2022 0400  Gross per 24 hour   Intake 800.59 ml   Output 1400 ml   Net -599.41 ml       CONSTITUTIONAL: Awake,no distress ,  appears as stated age. HEENT: Normocephalic atraumatic. Pupils are equal and reactive bilaterally. Extraocular movements are intact. No pallor or icterus appreciated. NECK: Supple, no JVD , no lymphadenopathy, no bruits, no thyromegaly  LUNGS:  Improved  air movements b/l,  Minimal  inspiratory crackles over bases  CARDIOVASCULAR: Regular rhythm, tachycardia, no murmur rub or gallop. ABDOMEN: Soft, nontender,distended,  bowel sounds are positive, no organomegaly appreciated. NEUROLOGIC: Awake, alert, oriented , no focal deficits noted. Agitated. EXT: trace  edema, no clubbing, or cyanosis.   Gonzalez with blood tinged urine    CBC with Differential:    Lab Results   Component Value Date    WBC 17.2 2022    RBC 4.48 2022    HGB 13.8 2022    HCT 42.6 2022     2022    MCV 95.1 2022    MCH 30.8 2022    MCHC 32.4 2022    RDW 13.9 2022    METASPCT 0.9 2022    LYMPHOPCT 5.7 2022    MONOPCT 7.7 2022    BASOPCT 0.2 2022    MONOSABS 1.33 2022    LYMPHSABS 0.98 2022    EOSABS 0.00 2022    BASOSABS 0.03 2022     CMP:    Lab Results   Component Value Date     2022    K 4.1 2022    K 4.3 2022     2022    CO2 26 2022    BUN 31 2022    CREATININE 1.0 06/28/2022    GFRAA >60 06/28/2022    LABGLOM >60 06/28/2022    GLUCOSE 138 06/28/2022    PROT 6.4 06/28/2022    LABALBU 3.5 06/28/2022    CALCIUM 9.1 06/28/2022    BILITOT 0.5 06/28/2022    ALKPHOS 71 06/28/2022    AST 45 06/28/2022    ALT 38 06/28/2022         ASSESSMENT AND PLAN      1. Rt lower lobe community acquired pneumonia   Continue iv Cefepime , Doxycycline  Blood cultures no growth so far  Urine Legionella and strep pneumo antigens presumptive negative  Pulmonology following closely, improving    2. Acute on chronic respiratory failure secondary to above  Now on oxygen at 8 L/min by nasal cannula    3. COPD, with exacerbation  Severe long standing disease  Iv steroids discontinued due to steroid induced psychosis  Daliresp added by Pulmonology    4. Leukocytosis secondary to pneumonia,. 5.A Fib , paroxysmal, sinus rhythm on the monitor  Cartia  mg daily . Eliquis 5 mg po bid  Dr Ricardo Daughertys following. Continue same. 6.Hyperlipidemia  Lipitor 10 mg daily    7. GERD  Continue pepcid 20 mg po bid      8. Steroid induced psychosis, much improved off iv steroids    9. Hematuri, send urine for UA with microscopy  Irrigate riley    10. HTN, Metoprolol added, monitor blood pressure closely today.     11.PT/OT and  consult for rehab        Cheryl Grubbs MD, MD.  8:08 AM  6/28/2022

## 2022-06-28 NOTE — PROGRESS NOTES
Pulmonary/Critical Care Progress Note    We are following patient for right lower lobe pneumonia, severe COPD with exacerbation, acute, superimposed on chronic respiratory failure (patient uses 5 L/min nasal oxygen at home, resolved steroid psychosis, systemic hypertension    SUBJECTIVE:  Patient's psychotic episode is cleared completely. However, he is starting to mobilize more mucus and is having a hard time coughing it up. We are being aggressive with bronchopulmonary hygiene including flutter device and chest percussion with vest and we will need to probably restart Mucomyst because of his continued difficulty in bringing up secretions. His wife is in the room currently and she understands the nature and gravity of this problem. He is maintaining normal sinus rhythm on oral diltiazem. The patient was able to tolerate 1 4 mg dose of dexamethasone yesterday and did not have any significant reaction. Since there is still significant wheezing, we will continue this at 4 mg every 8 hours and see how he tolerates it. Additionally, we will check a urine culture since there are bacteria in his urine although he is on cefepime. In case the patient has an enterococcal infection, he may require additional treatment.     MEDICATIONS:   metoprolol tartrate  25 mg Oral TID    Roflumilast  500 mcg Oral Daily    famotidine  20 mg Oral BID    ipratropium-albuterol  1 ampule Inhalation Q4H    lidocaine PF  5 mL IntraDERmal Once    sodium chloride flush  5-40 mL IntraVENous 2 times per day    heparin flush  3 mL IntraVENous 2 times per day    apixaban  5 mg Oral BID    dilTIAZem  240 mg Oral Daily    atorvastatin  10 mg Oral Daily    Vitamin D  2,000 Units Oral Daily    cefepime  2,000 mg IntraVENous Q12H    budesonide  0.5 mg Nebulization BID    Arformoterol Tartrate  15 mcg Nebulization BID    doxycycline (VIBRAMYCIN) IV  100 mg IntraVENous Q12H      lactated ringers 50 mL/hr at 06/28/22 0816    sodium chloride       haloperidol lactate, sodium chloride flush, sodium chloride, heparin flush      REVIEW OF SYSTEMS:  Constitutional: Denies fever, weight loss, night sweats, and fatigue  Skin: Denies pigmentation, dark lesions, and rashes   HEENT: Denies hearing loss, tinnitus, ear drainage, epistaxis, sore throat, and hoarseness. Cardiovascular: Denies palpitations, chest pain, and chest pressure. Respiratory: Positive for cough, dyspnea at rest, grade 4 hemoptysis, apnea, and choking. Gastrointestinal: Denies nausea, vomiting, poor appetite, diarrhea, heartburn or reflux  Genitourinary: Denies dysuria, frequency, urgency or hematuria  Musculoskeletal: Denies myalgias, muscle weakness, and bone pain  Neurological: Denies dizziness, vertigo, headache, and focal weakness  Psychological: Denies anxiety and depression  Endocrine: Denies heat intolerance and cold intolerance  Hematopoietic/Lymphatic: Denies bleeding problems and blood transfusions    OBJECTIVE:  Vitals:    06/28/22 1008   BP:    Pulse: (!) 107   Resp: 22   Temp:    SpO2: 94%     FiO2 : 45 %  O2 Flow Rate (L/min): 7 L/min  O2 Device: Nasal cannula    PHYSICAL EXAM:  Constitutional: No fever, chills, diaphoresis  Skin: Skin rash, no skin breakdown  HEENT: Unremarkable  Neck: No JVD, lymphadenopathy, thyromegaly  Cardiovascular: 1, S2 regular. No S3 murmurs rubs present  Respiratory: Expiratory wheezes more anteriorly than posteriorly. Secretions are more in his larger airways and small airways  Gastrointestinal: Soft, obese, nontender  Genitourinary: Some blood in urine. No CVA tenderness  Extremities: No clubbing, cyanosis, or edema  Neurological: Awake, alert, oriented x3. No evidence of focal motor or sensory deficits  Psychological: Much less agitated. He is probably at baseline in terms of his personality.   Affect is appropriate    LABS:  WBC   Date Value Ref Range Status   06/28/2022 17.2 (H) 4.5 - 11.5 E9/L Final   06/27/2022 11.8 (H) 4.5 - 11.5 E9/L Final   06/26/2022 16.8 (H) 4.5 - 11.5 E9/L Final     Hemoglobin   Date Value Ref Range Status   06/28/2022 13.8 12.5 - 16.5 g/dL Final   06/27/2022 12.1 (L) 12.5 - 16.5 g/dL Final   06/26/2022 12.6 12.5 - 16.5 g/dL Final     Hematocrit   Date Value Ref Range Status   06/28/2022 42.6 37.0 - 54.0 % Final   06/27/2022 37.8 37.0 - 54.0 % Final   06/26/2022 39.7 37.0 - 54.0 % Final     MCV   Date Value Ref Range Status   06/28/2022 95.1 80.0 - 99.9 fL Final   06/27/2022 95.7 80.0 - 99.9 fL Final   06/26/2022 95.4 80.0 - 99.9 fL Final     Platelets   Date Value Ref Range Status   06/28/2022 191 130 - 450 E9/L Final   06/27/2022 160 130 - 450 E9/L Final   06/26/2022 205 130 - 450 E9/L Final     Sodium   Date Value Ref Range Status   06/28/2022 146 132 - 146 mmol/L Final   06/27/2022 143 132 - 146 mmol/L Final   06/26/2022 143 132 - 146 mmol/L Final     Potassium   Date Value Ref Range Status   06/28/2022 4.1 3.5 - 5.0 mmol/L Final   06/27/2022 4.0 3.5 - 5.0 mmol/L Final   06/26/2022 4.1 3.5 - 5.0 mmol/L Final     Potassium reflex Magnesium   Date Value Ref Range Status   06/24/2022 4.3 3.5 - 5.0 mmol/L Final   12/18/2020 3.7 3.5 - 5.0 mmol/L Final   07/23/2019 4.6 3.5 - 5.0 mmol/L Final     Chloride   Date Value Ref Range Status   06/28/2022 110 (H) 98 - 107 mmol/L Final   06/27/2022 110 (H) 98 - 107 mmol/L Final   06/26/2022 104 98 - 107 mmol/L Final     CO2   Date Value Ref Range Status   06/28/2022 26 22 - 29 mmol/L Final   06/27/2022 26 22 - 29 mmol/L Final   06/26/2022 26 22 - 29 mmol/L Final     BUN   Date Value Ref Range Status   06/28/2022 31 (H) 6 - 23 mg/dL Final   06/27/2022 33 (H) 6 - 23 mg/dL Final   06/26/2022 31 (H) 6 - 23 mg/dL Final     CREATININE   Date Value Ref Range Status   06/28/2022 1.0 0.7 - 1.2 mg/dL Final   06/27/2022 1.0 0.7 - 1.2 mg/dL Final   06/26/2022 1.1 0.7 - 1.2 mg/dL Final     Glucose   Date Value Ref Range Status   06/28/2022 138 (H) 74 - 99 mg/dL Final   06/27/2022 169 (H) 74 - 99 mg/dL Final   06/26/2022 141 (H) 74 - 99 mg/dL Final     Calcium   Date Value Ref Range Status   06/28/2022 9.1 8.6 - 10.2 mg/dL Final   06/27/2022 8.7 8.6 - 10.2 mg/dL Final   06/26/2022 8.6 8.6 - 10.2 mg/dL Final     Total Protein   Date Value Ref Range Status   06/28/2022 6.4 6.4 - 8.3 g/dL Final   06/27/2022 5.7 (L) 6.4 - 8.3 g/dL Final   06/26/2022 6.3 (L) 6.4 - 8.3 g/dL Final     Albumin   Date Value Ref Range Status   06/28/2022 3.5 3.5 - 5.2 g/dL Final   06/27/2022 3.4 (L) 3.5 - 5.2 g/dL Final   06/26/2022 3.4 (L) 3.5 - 5.2 g/dL Final     Total Bilirubin   Date Value Ref Range Status   06/28/2022 0.5 0.0 - 1.2 mg/dL Final   06/27/2022 0.3 0.0 - 1.2 mg/dL Final   06/26/2022 0.4 0.0 - 1.2 mg/dL Final     Alkaline Phosphatase   Date Value Ref Range Status   06/28/2022 71 40 - 129 U/L Final   06/27/2022 67 40 - 129 U/L Final   06/26/2022 72 40 - 129 U/L Final     AST   Date Value Ref Range Status   06/28/2022 45 (H) 0 - 39 U/L Final   06/27/2022 33 0 - 39 U/L Final   06/26/2022 44 (H) 0 - 39 U/L Final     ALT   Date Value Ref Range Status   06/28/2022 38 0 - 40 U/L Final   06/27/2022 29 0 - 40 U/L Final   06/26/2022 30 0 - 40 U/L Final     GFR Non-   Date Value Ref Range Status   06/28/2022 >60 >=60 mL/min/1.73 Final     Comment:     Chronic Kidney Disease: less than 60 ml/min/1.73 sq.m. Kidney Failure: less than 15 ml/min/1.73 sq.m. Results valid for patients 18 years and older. 06/27/2022 >60 >=60 mL/min/1.73 Final     Comment:     Chronic Kidney Disease: less than 60 ml/min/1.73 sq.m. Kidney Failure: less than 15 ml/min/1.73 sq.m. Results valid for patients 18 years and older. 06/26/2022 >60 >=60 mL/min/1.73 Final     Comment:     Chronic Kidney Disease: less than 60 ml/min/1.73 sq.m. Kidney Failure: less than 15 ml/min/1.73 sq.m. Results valid for patients 18 years and older.        GFR    Date Value Ref Range Status physician, I certify that I personally reviewed the patients history and personally examined the patient to confirm the physical findings described above,  And that I reviewed the relevant imaging studies and available reports. I also discussed the differential diagnosis and all of the proposed management plans with the patient and individuals accompanying the patient to this visit. They had the opportunity to ask questions about the proposed management plans and to have those questions answered. This patient has a high probability of sudden, clinically significant deterioration, which requires the highest level of physician preparedness to intervene urgently. I managed/supervised life or organ supporting interventions that required frequent physician assessment. I devoted my full attention to the direct care of this patient for the amount of time indicated below. Time I spent with the family or surrogate(s) is included only if the patient was incapable of providing the necessary information or participating in medical decisions - Time devoted to teaching and to any procedures I billed separately is not included.     CRITICAL CARE TIME:  36 minutes    Electronically signed by Nir Dalton MD on 6/28/2022 at 10:35 AM

## 2022-06-29 NOTE — PLAN OF CARE
Problem: Discharge Planning  Goal: Discharge to home or other facility with appropriate resources  Outcome: Progressing     Problem: Safety - Adult  Goal: Free from fall injury  Outcome: Progressing     Problem: ABCDS Injury Assessment  Goal: Absence of physical injury  Outcome: Progressing     Problem: Pain  Goal: Verbalizes/displays adequate comfort level or baseline comfort level  Outcome: Progressing     Problem: Confusion  Goal: Confusion, delirium, dementia, or psychosis is improved or at baseline  Description: INTERVENTIONS:  1. Assess for possible contributors to thought disturbance, including medications, impaired vision or hearing, underlying metabolic abnormalities, dehydration, psychiatric diagnoses, and notify attending LIP  2. Waterfall high risk fall precautions, as indicated  3. Provide frequent short contacts to provide reality reorientation, refocusing and direction  4. Decrease environmental stimuli, including noise as appropriate  5. Monitor and intervene to maintain adequate nutrition, hydration, elimination, sleep and activity  6. If unable to ensure safety without constant attention obtain sitter and review sitter guidelines with assigned personnel  7. Initiate Psychosocial CNS and Spiritual Care consult, as indicated  Outcome: Progressing     Problem: Nutrition Deficit:  Goal: Optimize nutritional status  Outcome: Progressing     Problem: Skin/Tissue Integrity  Goal: Absence of new skin breakdown  Description: 1. Monitor for areas of redness and/or skin breakdown  2. Assess vascular access sites hourly  3. Every 4-6 hours minimum:  Change oxygen saturation probe site  4. Every 4-6 hours:  If on nasal continuous positive airway pressure, respiratory therapy assess nares and determine need for appliance change or resting period.   Outcome: Progressing     Problem: Safety - Medical Restraint  Goal: Remains free of injury from restraints (Restraint for Interference with Medical Device)  Description: INTERVENTIONS:  1. Determine that other, less restrictive measures have been tried or would not be effective before applying the restraint  2. Evaluate the patient's condition at the time of restraint application  3. Inform patient/family regarding the reason for restraint  4.  Q2H: Monitor safety, psychosocial status, comfort, nutrition and hydration  6/29/2022 0913 by Palak Lucero RN  Outcome: Progressing  6/29/2022 0732 by Helen Alcaraz RN  Outcome: Not Progressing  Flowsheets  Taken 6/29/2022 0726 by Palak Lucero RN  Remains free of injury from restraints (restraint for interference with medical device): Every 2 hours: Monitor safety, psychosocial status, comfort, nutrition and hydration  Taken 6/29/2022 0718 by Helen Alcaraz RN  Remains free of injury from restraints (restraint for interference with medical device): Every 2 hours: Monitor safety, psychosocial status, comfort, nutrition and hydration

## 2022-06-29 NOTE — PROGRESS NOTES
Pharmacy Consultation Note  (Antibiotic Dosing and Monitoring)    Initial consult date: 6/28/22  Consulting physician/provider: Dr. Rachell Cushing  Drug: Vancomycin  Indication: UTI, CAP, sepsis of unknown etiology    Age/  Gender Height Weight IBW  Allergy Information   81 y.o./male 5' 11\" (180.3 cm) 294 lb 12.8 oz (133.7 kg)     Ideal body weight: 75.3 kg (166 lb 0.1 oz)  Adjusted ideal body weight: 91 kg (200 lb 9.7 oz)   Patient has no known allergies. Renal Function:  Recent Labs     06/27/22  0423 06/28/22  0432 06/29/22  0414   BUN 33* 31* 32*   CREATININE 1.0 1.0 1.0       Intake/Output Summary (Last 24 hours) at 6/29/2022 1111  Last data filed at 6/29/2022 0900  Gross per 24 hour   Intake 2111.46 ml   Output 1750 ml   Net 361.46 ml       Vancomycin Monitoring:  Trough:  No results for input(s): VANCOTROUGH in the last 72 hours. Random:  No results for input(s): VANCORANDOM in the last 72 hours. Recent vancomycin administrations                   vancomycin (VANCOCIN) 1,000 mg in sodium chloride 0.9 % 250 mL IVPB ([id number]) (mg) 1,000 mg New Bag 06/29/22 0226     1,000 mg New Bag 06/28/22 1524                 Assessment:  · Patient is a [de-identified] y.o. male who has been initiated on vancomycin  Estimated Creatinine Clearance: 76 mL/min (based on SCr of 1 mg/dL).   · To dose vancomycin, pharmacy will be utilizing [employer] calculation software for goal AUC/JELENA 400-600 mg/L-hr    Plan:  · Will continue vancomycin 1000 mg IV every 12 hours  · Will check vancomycin level 6/30 prior to morning dose  · Will continue to monitor renal function   · Clinical pharmacy to follow      Celsa Cardenas [address] [place] [address] 6/29/2022 11:11 AM

## 2022-06-29 NOTE — PROGRESS NOTES
Cardiology  Progress Note      SUBJECTIVE:  Patient looks very weak today. Shortness of breath even at rest although somewhat better from yesterday.     Current Inpatient Medications  Current Facility-Administered Medications: [COMPLETED] dilTIAZem injection 10 mg, 10 mg, IntraVENous, Once **FOLLOWED BY** dilTIAZem 125 mg in dextrose 5 % 125 mL infusion, 15 mg/hr, IntraVENous, Continuous  meropenem (MERREM) 1,000 mg in sodium chloride 0.9 % 100 mL IVPB (mini-bag), 1,000 mg, IntraVENous, Q8H  levalbuterol (XOPENEX) nebulization 0.63 mg, 0.63 mg, Nebulization, Q4H  ipratropium (ATROVENT) 0.02 % nebulizer solution 0.5 mg, 0.5 mg, Nebulization, Q4H  metoprolol tartrate (LOPRESSOR) tablet 25 mg, 25 mg, Oral, TID  acetylcysteine (MUCOMYST) 20 % solution 600 mg, 600 mg, Inhalation, q8h  acetaminophen (TYLENOL) tablet 650 mg, 650 mg, Oral, Q6H PRN  vancomycin (VANCOCIN) 1,000 mg in sodium chloride 0.9 % 250 mL IVPB ([id number]), 1,000 mg, IntraVENous, Q12H  doxycycline hyclate (VIBRAMYCIN) capsule 100 mg, 100 mg, Oral, 2 times per day  Roflumilast (DALIRESP) tablet 500 mcg, 500 mcg, Oral, Daily  famotidine (PEPCID) tablet 20 mg, 20 mg, Oral, BID  haloperidol lactate (HALDOL) injection 2 mg, 2 mg, IntraVENous, Q2H PRN  lactated ringers infusion, , IntraVENous, Continuous  sodium chloride flush 0.9 % injection 5-40 mL, 5-40 mL, IntraVENous, 2 times per day  sodium chloride flush 0.9 % injection 5-40 mL, 5-40 mL, IntraVENous, PRN  0.9 % sodium chloride infusion, , IntraVENous, PRN  heparin flush 100 UNIT/ML injection 300 Units, 3 mL, IntraVENous, 2 times per day  heparin flush 100 UNIT/ML injection 300 Units, 3 mL, IntraCATHeter, PRN  apixaban (ELIQUIS) tablet 5 mg, 5 mg, Oral, BID  [Held by provider] dilTIAZem (CARDIZEM CD) extended release capsule 240 mg, 240 mg, Oral, Daily  atorvastatin (LIPITOR) tablet 10 mg, 10 mg, Oral, Daily  vitamin D (CHOLECALCIFEROL) tablet 2,000 Units, 2,000 Units, Oral, Daily  budesonide (PULMICORT) nebulizer suspension 500 mcg, 0.5 mg, Nebulization, BID  Arformoterol Tartrate (BROVANA) nebulizer solution 15 mcg, 15 mcg, Nebulization, BID      Physical  VITALS:  /85   Pulse (!) 126   Temp 97.7 °F (36.5 °C) (Axillary)   Resp 24   Ht 5' 11\" (1.803 m)   Wt 252 lb 8 oz (114.5 kg)   SpO2 93%   BMI 35.22 kg/m²   CURRENT TEMPERATURE:  Temp: 97.7 °F (36.5 °C)  CONSTITUTIONAL: No acute distress. EYES: Vision is intact. ENT: No sore throat. No ear drainage. NECK: No JVD. BACK: Symmetric. LUNGS:  diminished breath sounds right base and left base  CARDIOVASCULAR:  irregularly irregular rhythm, no S3 and no S4  ABDOMEN:  non-distended  NEUROLOGIC: No focal deficits. EXTREMITIES: No edema cyanosis or clubbing. Cardiology Labs:  BMP:    Lab Results   Component Value Date     06/29/2022    K 4.1 06/29/2022    K 4.3 06/24/2022     06/29/2022    CO2 26 06/29/2022    BUN 32 06/29/2022     CBC:    Lab Results   Component Value Date    WBC 12.2 06/29/2022    RBC 4.24 06/29/2022    HGB 13.0 06/29/2022    HCT 40.5 06/29/2022    MCV 95.5 06/29/2022    RDW 14.0 06/29/2022     06/29/2022     PT/INR:  No results found for: PTINR  TROPONIN:  No components found for: TROP    ASSESSMENT    1.paroxysmal atrial fibrillation. Patient was in sinus rhythm on admission. He went into atrial fibrillation last night with rapid ventricular response. This atrial fibrillation started after patient was put on theophylline drip for his respiratory problems. Recommend for now stopping theophylline drip. Patient was started on Cardizem drip for heart rate control. Hopefully patient will convert to sinus rhythm . We may consider antiarrhythmic medication like sotalol for continues to have episodes of atrial fibrillation. 2.elevation of high sensitivity troponin. This is mostly demand ischemia from his respiratory problems with type II non-STEMI. EKG is not showing acute ischemic changes.   No complaints of chest pain. Pulmonary status is not good and I do not feel patient is suitable for any coronary workup. 3.committee acquired pneumonia/acute exacerbation of COPD. On antibiotics and aerosol treatment. Pulmonary status appears to be his main issue right now. Case is discussed with the intensivist.    PLAN  As per orders.

## 2022-06-29 NOTE — PROGRESS NOTES
Pt had become increasingly agitated and confused over the past couple hours. PRN haldol was given with no noticeable improvement. While trying to console and reorient him, he became combative, screaming and swinging his call light at the nursing staff and myself. Code-violet was called. We were unable to reach the on-call intensivist, so Dr John Justin was called and gave order for 5mg geodon, which was then given. Patient now resting comfortably, though agitated when stimulated.

## 2022-06-29 NOTE — PROGRESS NOTES
6/29/2022 9:51 AM  Service: Urology  Group: [facility] ([facility])    Dayne Kiran  [id number]    Subjective:    Remains in the ICU   Nursing at bedside doing linen change and cleaning patient  His urine is mainor in color, gross hematuria has resolved   Developed Afib with RVR today     Review of Systems  Unable to obtain due to confusion       Scheduled Meds:   meropenem  1,000 mg IntraVENous Q8H    metoprolol tartrate  25 mg Oral TID    acetylcysteine  600 mg Inhalation q8h    vancomycin  1,000 mg IntraVENous Q12H    doxycycline hyclate  100 mg Oral 2 times per day    Roflumilast  500 mcg Oral Daily    famotidine  20 mg Oral BID    ipratropium-albuterol  1 ampule Inhalation Q4H    lidocaine PF  5 mL IntraDERmal Once    sodium chloride flush  5-40 mL IntraVENous 2 times per day    heparin flush  3 mL IntraVENous 2 times per day    apixaban  5 mg Oral BID    [Held by provider] dilTIAZem  240 mg Oral Daily    atorvastatin  10 mg Oral Daily    Vitamin D  2,000 Units Oral Daily    budesonide  0.5 mg Nebulization BID    Arformoterol Tartrate  15 mcg Nebulization BID       Objective:  Vitals:    06/29/22 0912   BP:    Pulse: (!) 112   Resp:    Temp:    SpO2: 94%         Allergies: Patient has no known allergies.     General Appearance: awake and alert, confused, no distress   Skin: no rash or erythema  Head: normocephalic and atraumatic  Pulmonary/Chest: normal air movement, no respiratory distress  Abdomen: soft, non-tender, non-distended  Genitourinary: riley draining mainor urine   Extremities: no cyanosis, clubbing or edema         Labs:     Recent Labs     06/29/22  0414      K 4.1   *   CO2 26   BUN 32*   CREATININE 1.0   GLUCOSE 163*   CALCIUM 8.6       Lab Results   Component Value Date    HGB 13.0 06/29/2022    HCT 40.5 06/29/2022       Lab Results   Component Value Date    PSA 3.33 06/28/2022         Assessment/Plan:  Gross hematuria in the setting of traumatic riley insertion on Eliquis     Gross hematuria has resolved, urine mainor in color   Irrigate PRN gross hematuria   Urine cytology is pending   Cont the riley catheter   Voiding trial can be considered when the patient is fully ambulatory and medically stable   No further  interventions are planned at this time  Please call with any further gross hematuria or concerns   Thank you for allowing us to participate in his care       Jeff Lofton, [address] [facility]  Urology

## 2022-06-29 NOTE — PROGRESS NOTES
Pulmonary/Critical Care Progress Note    We are following patient for pneumonia (resolved), severe COPD with exacerbation, steroid psychosis, systemic hypertension, probable urinary tract infection with hematuria yesterday    SUBJECTIVE:  The patient became agitated last night probably secondary to dexamethasone and dementia. We have stopped dexamethasone and he is now adjusted and cooperative. He took his pills without any problem today. With the aid of the dexamethasone as well as Roflumilast, his wheezing has improved from yesterday. The patient developed atrial fibrillation earlier this morning with rapid ventricular response. He has been placed on a diltiazem drip after an additional 10 mg bolus. The drip is currently at 10 mg/h consistent with his usual oral dose of 240 mg/day. We will hopefully be able to get him out of bed later today. We will also continue doxycycline, vancomycin, and meropenem for an additional 1 to 2 days. He is on a theophylline drip at a very low-dose but given his atrial fibrillation, we will discontinue this.     MEDICATIONS:   meropenem  1,000 mg IntraVENous Q8H    metoprolol tartrate  25 mg Oral TID    acetylcysteine  600 mg Inhalation q8h    vancomycin  1,000 mg IntraVENous Q12H    doxycycline hyclate  100 mg Oral 2 times per day    Roflumilast  500 mcg Oral Daily    famotidine  20 mg Oral BID    ipratropium-albuterol  1 ampule Inhalation Q4H    lidocaine PF  5 mL IntraDERmal Once    sodium chloride flush  5-40 mL IntraVENous 2 times per day    heparin flush  3 mL IntraVENous 2 times per day    apixaban  5 mg Oral BID    [Held by provider] dilTIAZem  240 mg Oral Daily    atorvastatin  10 mg Oral Daily    Vitamin D  2,000 Units Oral Daily    budesonide  0.5 mg Nebulization BID    Arformoterol Tartrate  15 mcg Nebulization BID      dilTIAZem 10 mg/hr (06/29/22 7760)    aminophylline 1 mg/mL infusion 0.2 mg/kg/hr (06/28/22 0122)    lactated ringers 50 mL/hr at 06/29/22 0129    sodium chloride       acetaminophen, haloperidol lactate, sodium chloride flush, sodium chloride, heparin flush      REVIEW OF SYSTEMS:  Constitutional: Denies fever, weight loss, night sweats, and fatigue  Skin: Denies pigmentation, dark lesions, and rashes   HEENT: Denies hearing loss, tinnitus, ear drainage, epistaxis, sore throat, and hoarseness. Cardiovascular: Denies palpitations, chest pain, and chest pressure. Respiratory: Denies cough, dyspnea at rest, hemoptysis, apnea, and choking. Gastrointestinal: Denies nausea, vomiting, poor appetite, diarrhea, heartburn or reflux  Genitourinary: Denies dysuria, frequency, urgency or hematuria  Musculoskeletal: Denies myalgias, muscle weakness, and bone pain  Neurological: Denies dizziness, vertigo, headache, and focal weakness  Psychological: Denies anxiety and depression  Endocrine: Denies heat intolerance and cold intolerance  Hematopoietic/Lymphatic: Denies bleeding problems and blood transfusions    OBJECTIVE:  Vitals:    06/29/22 0912   BP:    Pulse: (!) 112   Resp:    Temp:    SpO2: 94%     FiO2 : 45 %  O2 Flow Rate (L/min): 10 L/min  O2 Device: Nasal cannula    PHYSICAL EXAM:  Constitutional: No fever, chills, diaphoresis  Skin: No skin rash, no skin breakdown  HEENT: Unremarkable  Neck: JVD, lymphadenopathy, thyromegaly  Cardiovascular: S1, S2 slightly irregular consistent with atrial fibrillation. No S3 or rubs present  Respiratory: Decreased breath sounds bilaterally. Soft expiratory wheezes are noted. No inspiratory crackles are heard  Gastrointestinal:, Obese, nontender  Genitourinary: No CVA tenderness  Extremities: No clubbing, cyanosis, or edema  Neurological: Awake alert and oriented x3. No evidence of focal motor or sensory deficits  Psychological: Still with a depressed affect.   Cooperative however and nonagitated at this time    LABS:  WBC   Date Value Ref Range Status   06/29/2022 12.2 (H) 4.5 - 11.5 E9/L Final 06/28/2022 17.2 (H) 4.5 - 11.5 E9/L Final   06/27/2022 11.8 (H) 4.5 - 11.5 E9/L Final     Hemoglobin   Date Value Ref Range Status   06/29/2022 13.0 12.5 - 16.5 g/dL Final   06/28/2022 13.8 12.5 - 16.5 g/dL Final   06/27/2022 12.1 (L) 12.5 - 16.5 g/dL Final     Hematocrit   Date Value Ref Range Status   06/29/2022 40.5 37.0 - 54.0 % Final   06/28/2022 42.6 37.0 - 54.0 % Final   06/27/2022 37.8 37.0 - 54.0 % Final     MCV   Date Value Ref Range Status   06/29/2022 95.5 80.0 - 99.9 fL Final   06/28/2022 95.1 80.0 - 99.9 fL Final   06/27/2022 95.7 80.0 - 99.9 fL Final     Platelets   Date Value Ref Range Status   06/29/2022 139 130 - 450 E9/L Final   06/28/2022 191 130 - 450 E9/L Final   06/27/2022 160 130 - 450 E9/L Final     Sodium   Date Value Ref Range Status   06/29/2022 144 132 - 146 mmol/L Final   06/28/2022 146 132 - 146 mmol/L Final   06/27/2022 143 132 - 146 mmol/L Final     Potassium   Date Value Ref Range Status   06/29/2022 4.1 3.5 - 5.0 mmol/L Final   06/28/2022 4.1 3.5 - 5.0 mmol/L Final   06/27/2022 4.0 3.5 - 5.0 mmol/L Final     Potassium reflex Magnesium   Date Value Ref Range Status   06/24/2022 4.3 3.5 - 5.0 mmol/L Final   12/18/2020 3.7 3.5 - 5.0 mmol/L Final   07/23/2019 4.6 3.5 - 5.0 mmol/L Final     Chloride   Date Value Ref Range Status   06/29/2022 109 (H) 98 - 107 mmol/L Final   06/28/2022 110 (H) 98 - 107 mmol/L Final   06/27/2022 110 (H) 98 - 107 mmol/L Final     CO2   Date Value Ref Range Status   06/29/2022 26 22 - 29 mmol/L Final   06/28/2022 26 22 - 29 mmol/L Final   06/27/2022 26 22 - 29 mmol/L Final     BUN   Date Value Ref Range Status   06/29/2022 32 (H) 6 - 23 mg/dL Final   06/28/2022 31 (H) 6 - 23 mg/dL Final   06/27/2022 33 (H) 6 - 23 mg/dL Final     CREATININE   Date Value Ref Range Status   06/29/2022 1.0 0.7 - 1.2 mg/dL Final   06/28/2022 1.0 0.7 - 1.2 mg/dL Final   06/27/2022 1.0 0.7 - 1.2 mg/dL Final     Glucose   Date Value Ref Range Status   06/29/2022 163 (H) 74 - 99 mg/dL Final   06/28/2022 138 (H) 74 - 99 mg/dL Final   06/27/2022 169 (H) 74 - 99 mg/dL Final     Calcium   Date Value Ref Range Status   06/29/2022 8.6 8.6 - 10.2 mg/dL Final   06/28/2022 9.1 8.6 - 10.2 mg/dL Final   06/27/2022 8.7 8.6 - 10.2 mg/dL Final     Total Protein   Date Value Ref Range Status   06/29/2022 5.4 (L) 6.4 - 8.3 g/dL Final   06/28/2022 6.4 6.4 - 8.3 g/dL Final   06/27/2022 5.7 (L) 6.4 - 8.3 g/dL Final     Albumin   Date Value Ref Range Status   06/29/2022 3.2 (L) 3.5 - 5.2 g/dL Final   06/28/2022 3.5 3.5 - 5.2 g/dL Final   06/27/2022 3.4 (L) 3.5 - 5.2 g/dL Final     Total Bilirubin   Date Value Ref Range Status   06/29/2022 0.6 0.0 - 1.2 mg/dL Final   06/28/2022 0.5 0.0 - 1.2 mg/dL Final   06/27/2022 0.3 0.0 - 1.2 mg/dL Final     Alkaline Phosphatase   Date Value Ref Range Status   06/29/2022 63 40 - 129 U/L Final   06/28/2022 71 40 - 129 U/L Final   06/27/2022 67 40 - 129 U/L Final     AST   Date Value Ref Range Status   06/29/2022 43 (H) 0 - 39 U/L Final   06/28/2022 45 (H) 0 - 39 U/L Final   06/27/2022 33 0 - 39 U/L Final     ALT   Date Value Ref Range Status   06/29/2022 45 (H) 0 - 40 U/L Final   06/28/2022 38 0 - 40 U/L Final   06/27/2022 29 0 - 40 U/L Final     GFR Non-   Date Value Ref Range Status   06/29/2022 >60 >=60 mL/min/1.73 Final     Comment:     Chronic Kidney Disease: less than 60 ml/min/1.73 sq.m. Kidney Failure: less than 15 ml/min/1.73 sq.m. Results valid for patients 18 years and older. 06/28/2022 >60 >=60 mL/min/1.73 Final     Comment:     Chronic Kidney Disease: less than 60 ml/min/1.73 sq.m. Kidney Failure: less than 15 ml/min/1.73 sq.m. Results valid for patients 18 years and older. 06/27/2022 >60 >=60 mL/min/1.73 Final     Comment:     Chronic Kidney Disease: less than 60 ml/min/1.73 sq.m. Kidney Failure: less than 15 ml/min/1.73 sq.m. Results valid for patients 18 years and older.        GFR    Date Value Ref Range Status   06/29/2022 >60  Final   06/28/2022 >60  Final   06/27/2022 >60  Final     Magnesium   Date Value Ref Range Status   06/29/2022 2.3 1.6 - 2.6 mg/dL Final   06/28/2022 2.6 1.6 - 2.6 mg/dL Final   06/27/2022 2.5 1.6 - 2.6 mg/dL Final     Phosphorus   Date Value Ref Range Status   06/29/2022 2.6 2.5 - 4.5 mg/dL Final   06/28/2022 2.5 2.5 - 4.5 mg/dL Final   06/27/2022 2.9 2.5 - 4.5 mg/dL Final     Recent Labs     06/27/22  0450   PH 7.484*   PO2 66.5*   PCO2 32.6*   HCO3 24.0   BE 1.2   O2SAT 92.8       RADIOLOGY:  XR CHEST PORTABLE   Final Result   Areas of a bi basilar atelectasis, no significant changes since the June 28   study. XR CHEST PORTABLE   Final Result   Bibasilar atelectasis         XR CHEST PORTABLE   Final Result   Improved aeration at the right base likely secondary to re-expansion of   atelectasis. The appearance now I believe is quit [place] to this patient's   baseline. CT ABDOMEN PELVIS WO CONTRAST Additional Contrast? None   Final Result   Limited study due to patient motion. No acute intra-abdominal or pelvic findings. Right lower lobe consolidative changes, suggestive of a pneumonia. XR CHEST PORTABLE   Final Result   Bilateral basilar disease. The possibility of a bilateral lower lobe   pneumonia cannot be excluded in the appropriate setting. PROBLEM LIST:  Principal Problem:    Pneumonia  Resolved Problems:    * No resolved hospital problems. *      IMPRESSION:  1. Acute hypoxemic respiratory failure  2. Severe COPD with exacerbation  3. Pneumonia, resolved  4. Possible urinary tract infection  5. Steroid psychosis  6. Hyperlipidemia  7. Currently in atrial fibrillation on diltiazem drip    PLAN:  1. Continue diltiazem drip  2. Dexamethasone has been discontinued  3. Twelve-lead EKG  4. Continue Roflumilast  5. Continue aerosolized bronchodilators and inhaled steroids  6.  PT and OT to help mobilize patient out of bed  7. Await urine culture results  8. Discontinue IV theophylline drip you have atrial fibrillation    ATTESTATION:  ICU Staff Physician note of personal involvement in Care  As the attending physician, I certify that I personally reviewed the patients history and personally examined the patient to confirm the physical findings described above,  And that I reviewed the relevant imaging studies and available reports. I also discussed the differential diagnosis and all of the proposed management plans with the patient and individuals accompanying the patient to this visit. They had the opportunity to ask questions about the proposed management plans and to have those questions answered. This patient has a high probability of sudden, clinically significant deterioration, which requires the highest level of physician preparedness to intervene urgently. I managed/supervised life or organ supporting interventions that required frequent physician assessment. I devoted my full attention to the direct care of this patient for the amount of time indicated below. Time I spent with the family or surrogate(s) is included only if the patient was incapable of providing the necessary information or participating in medical decisions - Time devoted to teaching and to any procedures I billed separately is not included.     CRITICAL CARE TIME:  34 minutes    Electronically signed by Leandro Segovia MD on 6/29/2022 at 9:29 AM

## 2022-06-29 NOTE — PROGRESS NOTES
Department of Internal Medicine  General Internal Medicine  Attending Progress Note      SUBJECTIVE:    Seen and examined in the ICU  Combative last night and Geodon 5 mg im was given  He also went into A fi with RVR and placed on Cardizem drip  Sleeping comfortably earlier this morning  Easy to arouse      Medications    Current Facility-Administered Medications: [COMPLETED] dilTIAZem injection 10 mg, 10 mg, IntraVENous, Once **FOLLOWED BY** dilTIAZem 125 mg in dextrose 5 % 125 mL infusion, 15 mg/hr, IntraVENous, Continuous  meropenem (MERREM) 1,000 mg in sodium chloride 0.9 % 100 mL IVPB (mini-bag), 1,000 mg, IntraVENous, Q8H  levalbuterol (XOPENEX) nebulization 0.63 mg, 0.63 mg, Nebulization, Q4H  ipratropium (ATROVENT) 0.02 % nebulizer solution 0.5 mg, 0.5 mg, Nebulization, Q4H  metoprolol tartrate (LOPRESSOR) tablet 25 mg, 25 mg, Oral, TID  acetylcysteine (MUCOMYST) 20 % solution 600 mg, 600 mg, Inhalation, q8h  acetaminophen (TYLENOL) tablet 650 mg, 650 mg, Oral, Q6H PRN  vancomycin (VANCOCIN) 1,000 mg in sodium chloride 0.9 % 250 mL IVPB (Wabf0Wak), 1,000 mg, IntraVENous, Q12H  doxycycline hyclate (VIBRAMYCIN) capsule 100 mg, 100 mg, Oral, 2 times per day  Roflumilast (DALIRESP) tablet 500 mcg, 500 mcg, Oral, Daily  famotidine (PEPCID) tablet 20 mg, 20 mg, Oral, BID  haloperidol lactate (HALDOL) injection 2 mg, 2 mg, IntraVENous, Q2H PRN  lactated ringers infusion, , IntraVENous, Continuous  sodium chloride flush 0.9 % injection 5-40 mL, 5-40 mL, IntraVENous, 2 times per day  sodium chloride flush 0.9 % injection 5-40 mL, 5-40 mL, IntraVENous, PRN  0.9 % sodium chloride infusion, , IntraVENous, PRN  heparin flush 100 UNIT/ML injection 300 Units, 3 mL, IntraVENous, 2 times per day  heparin flush 100 UNIT/ML injection 300 Units, 3 mL, IntraCATHeter, PRN  apixaban (ELIQUIS) tablet 5 mg, 5 mg, Oral, BID  [Held by provider] dilTIAZem (CARDIZEM CD) extended release capsule 240 mg, 240 mg, Oral, Daily  atorvastatin (LIPITOR) tablet 10 mg, 10 mg, Oral, Daily  vitamin D (CHOLECALCIFEROL) tablet 2,000 Units, 2,000 Units, Oral, Daily  budesonide (PULMICORT) nebulizer suspension 500 mcg, 0.5 mg, Nebulization, BID  Arformoterol Tartrate (BROVANA) nebulizer solution 15 mcg, 15 mcg, Nebulization, BID    Physical    VITALS:  BP (!) 183/88   Pulse 95   Temp (!) 96.6 °F (35.9 °C) (Axillary)   Resp 17   Ht 5' 11\" (1.803 m)   Wt 252 lb 8 oz (114.5 kg)   SpO2 92%   BMI 35.22 kg/m²   TEMPERATURE:  Current - Temp: (!) 96.6 °F (35.9 °C); Max - Temp  Av.3 °F (36.8 °C)  Min: 96.6 °F (35.9 °C)  Max: 99.5 °F (37.5 °C)  RESPIRATIONS RANGE: Resp  Av.1  Min: 13  Max: 25  PULSE RANGE: Pulse  Av.3  Min: 64  Max: 126  BLOOD PRESSURE RANGE:  Systolic (64NBK), FKF:087 , Min:118 , TVC:633   ; Diastolic (38XEI), FBP:54, Min:62, Max:124    PULSE OXIMETRY RANGE: SpO2  Av.3 %  Min: 92 %  Max: 96 %  24HR INTAKE/OUTPUT:      Intake/Output Summary (Last 24 hours) at 2022 1720  Last data filed at 2022 1400  Gross per 24 hour   Intake 851.12 ml   Output 2450 ml   Net -1598.88 ml       CONSTITUTIONAL: Sleepy but easy to arouse,no distress ,  appears as stated age. HEENT: Normocephalic atraumatic. Pupils are equal and reactive bilaterally. Extraocular movements are intact. No pallor or icterus appreciated. NECK: Supple, no JVD , no lymphadenopathy, no bruits, no thyromegaly  LUNGS: diminished   air movements b/l,  Minimal  inspiratory crackles over bases  CARDIOVASCULAR: Irregular, tachycardia, no murmur rub or gallop. ABDOMEN: Soft, nontender,distended,  bowel sounds are positive, no organomegaly appreciated. NEUROLOGIC: sleepy but easy to arouse, oriented , no focal deficits noted. Agitated. EXT: trace  edema, no clubbing, or cyanosis.   Gonzalez with clear urine    CBC with Differential:    Lab Results   Component Value Date/Time    WBC 12.2 2022 04:14 AM    RBC 4.24 2022 04:14 AM    HGB 13.0 2022 04:14 AM HCT 40.5 06/29/2022 04:14 AM     06/29/2022 04:14 AM    MCV 95.5 06/29/2022 04:14 AM    MCH 30.7 06/29/2022 04:14 AM    MCHC 32.1 06/29/2022 04:14 AM    RDW 14.0 06/29/2022 04:14 AM    METASPCT 0.9 06/24/2022 12:27 AM    LYMPHOPCT 4.3 06/29/2022 04:14 AM    MONOPCT 3.5 06/29/2022 04:14 AM    BASOPCT 0.2 06/29/2022 04:14 AM    MONOSABS 0.49 06/29/2022 04:14 AM    LYMPHSABS 0.49 06/29/2022 04:14 AM    EOSABS 0.00 06/29/2022 04:14 AM    BASOSABS 0.00 06/29/2022 04:14 AM     CMP:    Lab Results   Component Value Date/Time     06/29/2022 04:14 AM    K 4.1 06/29/2022 04:14 AM    K 4.3 06/24/2022 12:27 AM     06/29/2022 04:14 AM    CO2 26 06/29/2022 04:14 AM    BUN 32 06/29/2022 04:14 AM    CREATININE 1.0 06/29/2022 04:14 AM    GFRAA >60 06/29/2022 04:14 AM    LABGLOM >60 06/29/2022 04:14 AM    GLUCOSE 163 06/29/2022 04:14 AM    PROT 5.4 06/29/2022 04:14 AM    LABALBU 3.2 06/29/2022 04:14 AM    CALCIUM 8.6 06/29/2022 04:14 AM    BILITOT 0.6 06/29/2022 04:14 AM    ALKPHOS 63 06/29/2022 04:14 AM    AST 43 06/29/2022 04:14 AM    ALT 45 06/29/2022 04:14 AM         ASSESSMENT AND PLAN      1. Rt lower lobe community acquired pneumonia   Currently on iv meropenem, vancomycin and  Doxycycline  Blood cultures no growth so far  Urine Legionella and strep pneumo antigens presumptive negative  Pulmonology following closely, improving    2. Acute on chronic respiratory failure secondary to above  Now on oxygen at 8 to 10  L/min by nasal cannula    3. COPD, with exacerbation  Severe long standing disease  Iv steroids discontinued due to steroid induced psychosis  Daliresp added by Pulmonology    4. Leukocytosis secondary to pneumonia,. 5.A Fib with rvr overnight  Cardizem drip started  Eliquis 5 mg po bid  Dr Adrian Waldrop following. 6.Hyperlipidemia  Lipitor 10 mg daily    7. GERD  Continue pepcid 20 mg po bid      8. Steroid induced psychosis, episode of psychosis overnight secondary to respiratory status and dexamethasone which was discontinued    9. Hematuri, send urine for UA with microscopy  Cleared after irrigation, Urology following    10. HTN, Metoprolol added, monitor blood pressure.     11.PT/OT and  consult for rehab        Sandra Smith MD, MD.  5:20 PM  6/29/2022

## 2022-06-29 NOTE — CARE COORDINATION
6/29/2022 Cm transition of care: ICU, increased oxygen needs to 10 lnc, Agitated/confused last fercho- Geodon and haldol given. Therapies recommending SNF. CM provided wife with Medicare approved lists for SNF-CM to meet with wife Barbara Menezes and daughter today. Pt has oxygen at home 4-5 lnc with [facility]. [facility]- planning SNF. Cm following.   Electronically signed by Sofia Rudd RN-BC on 6/29/2022 at 9:02 AM

## 2022-06-30 NOTE — HOME CARE
[id number] [facility] IV PRICING:    Ordered therapy at time of quote: vanco 1gm iv q12h + merrem 1gm iv q8h  Coverage: mpd copay for drug; 80% coverage for per bob  Total pt responsibility (after deductible met): $255.99/week  Avinash Alexis LPN, [facility]

## 2022-06-30 NOTE — PROGRESS NOTES
Patient NT suctioned at the bedside. Suction catheter passed through right nares without any resistance. Cough/gag reflexes intact. Obtained moderate amount of thick yellow sputum. Patient tolerated procedure well.

## 2022-06-30 NOTE — CARE COORDINATION
6/30/2022 DISCHARGE PLANNING; Family called with Choices for SNF: [facility] [place] (h)-can not accept, WOC- in this order. Referral's called to [facility] [place] to review- possible Click [facility]- Amalia QUINTER[facility]- they can not accept. , wait to hear from Cedric Garza. If Not a Click 6 (which is good for 48 hrs) PRECERT NEEDED, JUDITH needs signedMELISSA if SNF. [address]- if needed- referral placed on VM of Heather. Electronically signed by Deanne Cagle, RN-BC  on 6/30/2022 at 2:03 PM      Addendum:    SNF: [facility]- unable to accept, [facility] (h)-can not accept, WOC referral to [facility] she will review. If not a click \"9\" then [address], JUDITH needs signedMELISSA. [address]- if needed- referral placed on VM of Heather.    Electronically signed by Deanne Cagle, RNZaynabBC on 6/30/2022 at 2:54 PM

## 2022-06-30 NOTE — PROGRESS NOTES
Pharmacy Consultation Note  (Antibiotic Dosing and Monitoring)    Initial consult date: 6/28/22  Consulting physician/provider: Dr. Rachell Cushing  Drug: Vancomycin  Indication: UTI, CAP, sepsis of unknown etiology    Age/  Gender Height Weight IBW  Allergy Information   81 y.o./male 5' 11\" (180.3 cm) 294 lb 12.8 oz (133.7 kg)     Ideal body weight: 75.3 kg (166 lb 0.1 oz)  Adjusted ideal body weight: 92 kg (202 lb 12.5 oz)   Decadron [dexamethasone], Solu-cortef [hydrocortisone], and Solu-medrol [methylprednisolone]      Renal Function:  Recent Labs     06/28/22  0432 06/29/22  0414 06/30/22  0159   BUN 31* 32* 29*   CREATININE 1.0 1.0 1.0       Intake/Output Summary (Last 24 hours) at 6/30/2022 0920  Last data filed at 6/30/2022 0617  Gross per 24 hour   Intake 1516.26 ml   Output 2075 ml   Net -558.74 ml       Vancomycin Monitoring:  Trough:    Recent Labs     06/30/22  0159   VANCOTROUGH 10.7     Random:  No results for input(s): VANCORANDOM in the last 72 hours. Recent vancomycin administrations                   vancomycin (VANCOCIN) 1,000 mg in sodium chloride 0.9 % 250 mL IVPB ([id number]) (mg) 1,000 mg New Bag 06/30/22 0330     1,000 mg New Bag 06/29/22 1529     1,000 mg New Bag  0226     1,000 mg New Bag 06/28/22 1524                 Assessment:  · Patient is a [de-identified] y.o. male who has been initiated on vancomycin  Estimated Creatinine Clearance: 77 mL/min (based on SCr of 1 mg/dL). · To dose vancomycin, pharmacy will be utilizing iDoc24 calculation software for goal AUC/JELENA 400-600 mg/L-hr. · 6/30: vanco level 10.7; scr stable, continue as ordered.      Plan:  · Will continue vancomycin 1000 mg IV every 12 hours  · Will check vancomycin level as necessary  · Will continue to monitor renal function   · Clinical pharmacy to follow      Celsa Cardenas [facility] 6/30/2022 9:20 AM

## 2022-06-30 NOTE — PLAN OF CARE
maintained or improved  Outcome: Progressing     Problem: Respiratory - Adult  Goal: Achieves optimal ventilation and oxygenation  Outcome: Progressing     Problem: Cardiovascular - Adult  Goal: Maintains optimal cardiac output and hemodynamic stability  Outcome: Progressing     Problem: Skin/Tissue Integrity - Adult  Goal: Skin integrity remains intact  Outcome: Progressing

## 2022-06-30 NOTE — PROGRESS NOTES
Patient is refusing to take all respiratory medications. I asked the patient 3 separate time if he wanted to take his breathing treatments and he aggressively kept responding no. He asked me to show him all the medications that he would receive in the treatment and I presented them to him. He still proceeded to refuse the treatment. This patient was a code violet the previous day for the same behavior. Will continue to monitor patient.

## 2022-06-30 NOTE — PROGRESS NOTES
Entered patient's room and introduced self and role. Patient said he didn't need a nurse and was adamant that I didn't touch him to listen to his lungs or abdomen; midnight assessment documented to the best of my ability, audible crackles and expiratory wheezes present but patient is still refusing his PM breathing treatments at this time; refused IV fluids to be connected to him, unsure when they were disconnected per [facility]. Patient has not been physically aggressive yet but will continue to monitor.  Felipe Hendrickson RN

## 2022-06-30 NOTE — PROGRESS NOTES
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 However, patient is now in atrial fibrillation again. Dr. Avi Sutherland and I discussed the possibility of initiating amiodarone yesterday if his atrial fibrillation persisted. We will wait till he comes around today and discuss this possibility. In the meantime, he is tolerating metoprolol and oral diltiazem without difficulty. The patient was given 5 mg of hydralazine early this morning when his blood pressure benjamin prior to receiving his oral BP medications. Theophylline drip was discontinued yesterday morning so I do not think this is playing into this issue. We also changed from albuterol to leave albuterol. Meropenem is helpful in treating his right lower lobe pneumonia as well as his urinary tract infection. We will try to mobilize him out of bed today which would be helpful in many respects, if he is capable of this.     MEDICATIONS:   meropenem  1,000 mg IntraVENous Q8H    levalbuterol  0.63 mg Nebulization Q4H    ipratropium  0.5 mg Nebulization Q4H    dilTIAZem  240 mg Oral Daily    metoprolol tartrate  25 mg Oral TID    acetylcysteine  600 mg Inhalation q8h    vancomycin  1,000 mg IntraVENous Q12H    doxycycline hyclate  100 mg Oral 2 times per day    Roflumilast  500 mcg Oral Daily    famotidine  20 mg Oral BID    sodium chloride flush  5-40 mL IntraVENous 2 times per day    heparin flush  3 mL IntraVENous 2 times per day    apixaban  5 mg Oral BID    atorvastatin  10 mg Oral Daily    Vitamin D  2,000 Units Oral Daily    budesonide  0.5 mg Nebulization BID    Arformoterol Tartrate  15 mcg Nebulization BID      lactated ringers Stopped (06/30/22 0243)    sodium chloride       acetaminophen, haloperidol lactate, sodium chloride flush, sodium chloride, heparin flush      REVIEW OF SYSTEMS:  Constitutional: Denies fever, weight loss, night sweats, and fatigue  Skin: Denies pigmentation, dark lesions, and rashes   HEENT: Denies hearing loss, tinnitus, ear drainage, epistaxis, sore throat, and hoarseness. Cardiovascular: Denies palpitations, chest pain, and chest pressure. Respiratory: Denies cough, endorses dyspnea at rest, denies hemoptysis, apnea, and choking. Gastrointestinal: Denies nausea, vomiting, poor appetite, diarrhea, heartburn or reflux  Genitourinary: Denies dysuria, frequency, urgency or hematuria  Musculoskeletal: Denies myalgias, muscle weakness, and bone pain  Neurological: Denies dizziness, vertigo, headache, and focal weakness  Psychological: Denies anxiety and depression  Endocrine: Denies heat intolerance and cold intolerance  Hematopoietic/Lymphatic: Denies bleeding problems and blood transfusions    OBJECTIVE:  Vitals:    06/30/22 0700   BP: (!) 148/71   Pulse: 97   Resp: 18   Temp:    SpO2: 94%     FiO2 : 45 %  O2 Flow Rate (L/min): 6 L/min  O2 Device: High flow nasal cannula    PHYSICAL EXAM:  Constitutional: No fever, chills, diaphoresis  Skin: No skin rash, no skin breakdown  HEENT: Unremarkable  Neck: No JVD, lymphadenopathy, thyromegaly  Cardiovascular: S1, S2 slightly irregular. No S3 or rubs present  Respiratory: Decreased breath sounds bilaterally. Soft expiratory wheezes are present on both sides. Minimal inspiratory crackles at right base without pleural rubs  Gastrointestinal: Soft, markedly obese, nontender  Genitourinary: No CVA tenderness or gross hematuria  Extremities: No clubbing, cyanosis, or edema  Neurological: Awake, alert, cooperative. Agitation has resolved.   Moves all extremities without focal deficits  Psychological: Depressed affect.   However, he is cooperative and not restless    LABS:  WBC   Date Value Ref Range Status   06/30/2022 19.3 (H) 4.5 - 11.5 E9/L Final   06/29/2022 12.2 (H) 4.5 - 11.5 E9/L Final   06/28/2022 17.2 (H) 4.5 - 11.5 E9/L Final     Hemoglobin   Date Value Ref Range Status   06/30/2022 14.2 12.5 - 16.5 g/dL Final   06/29/2022 13.0 12.5 - 16.5 g/dL Final   06/28/2022 13.8 12.5 - 16.5 g/dL Final     Hematocrit   Date Value Ref Range Status   06/30/2022 44.5 37.0 - 54.0 % Final   06/29/2022 40.5 37.0 - 54.0 % Final   06/28/2022 42.6 37.0 - 54.0 % Final     MCV   Date Value Ref Range Status   06/30/2022 94.3 80.0 - 99.9 fL Final   06/29/2022 95.5 80.0 - 99.9 fL Final   06/28/2022 95.1 80.0 - 99.9 fL Final     Platelets   Date Value Ref Range Status   06/30/2022 174 130 - 450 E9/L Final   06/29/2022 139 130 - 450 E9/L Final   06/28/2022 191 130 - 450 E9/L Final     Sodium   Date Value Ref Range Status   06/30/2022 145 132 - 146 mmol/L Final   06/29/2022 144 132 - 146 mmol/L Final   06/28/2022 146 132 - 146 mmol/L Final     Potassium   Date Value Ref Range Status   06/30/2022 4.4 3.5 - 5.0 mmol/L Final   06/29/2022 4.1 3.5 - 5.0 mmol/L Final   06/28/2022 4.1 3.5 - 5.0 mmol/L Final     Potassium reflex Magnesium   Date Value Ref Range Status   06/24/2022 4.3 3.5 - 5.0 mmol/L Final   12/18/2020 3.7 3.5 - 5.0 mmol/L Final   07/23/2019 4.6 3.5 - 5.0 mmol/L Final     Chloride   Date Value Ref Range Status   06/30/2022 109 (H) 98 - 107 mmol/L Final   06/29/2022 109 (H) 98 - 107 mmol/L Final   06/28/2022 110 (H) 98 - 107 mmol/L Final     CO2   Date Value Ref Range Status   06/30/2022 28 22 - 29 mmol/L Final   06/29/2022 26 22 - 29 mmol/L Final   06/28/2022 26 22 - 29 mmol/L Final     BUN   Date Value Ref Range Status   06/30/2022 29 (H) 6 - 23 mg/dL Final   06/29/2022 32 (H) 6 - 23 mg/dL Final   06/28/2022 31 (H) 6 - 23 mg/dL Final     CREATININE   Date Value Ref Range Status   06/30/2022 1.0 0.7 - 1.2 mg/dL Final   06/29/2022 1.0 0.7 - 1.2 mg/dL Final   06/28/2022 1.0 0.7 - 1.2 mg/dL Final     Glucose   Date Value Ref Range Status   06/30/2022 127 (H) 74 - 99 mg/dL Final   06/29/2022 163 (H) 74 - 99 mg/dL Final   06/28/2022 138 (H) 74 - 99 mg/dL Final     Calcium   Date Value Ref Range Status   06/30/2022 8.7 8.6 - 10.2 mg/dL Final   06/29/2022 8.6 8.6 - 10.2 mg/dL Final   06/28/2022 9.1 8.6 - 10.2 mg/dL Final     Total Protein   Date Value Ref Range Status   06/30/2022 6.0 (L) 6.4 - 8.3 g/dL Final   06/29/2022 5.4 (L) 6.4 - 8.3 g/dL Final   06/28/2022 6.4 6.4 - 8.3 g/dL Final     Albumin   Date Value Ref Range Status   06/30/2022 3.5 3.5 - 5.2 g/dL Final   06/29/2022 3.2 (L) 3.5 - 5.2 g/dL Final   06/28/2022 3.5 3.5 - 5.2 g/dL Final     Total Bilirubin   Date Value Ref Range Status   06/30/2022 0.8 0.0 - 1.2 mg/dL Final   06/29/2022 0.6 0.0 - 1.2 mg/dL Final   06/28/2022 0.5 0.0 - 1.2 mg/dL Final     Alkaline Phosphatase   Date Value Ref Range Status   06/30/2022 71 40 - 129 U/L Final   06/29/2022 63 40 - 129 U/L Final   06/28/2022 71 40 - 129 U/L Final     AST   Date Value Ref Range Status   06/30/2022 54 (H) 0 - 39 U/L Final   06/29/2022 43 (H) 0 - 39 U/L Final   06/28/2022 45 (H) 0 - 39 U/L Final     ALT   Date Value Ref Range Status   06/30/2022 63 (H) 0 - 40 U/L Final   06/29/2022 45 (H) 0 - 40 U/L Final   06/28/2022 38 0 - 40 U/L Final     GFR Non-   Date Value Ref Range Status   06/30/2022 >60 >=60 mL/min/1.73 Final     Comment:     Chronic Kidney Disease: less than 60 ml/min/1.73 sq.m. Kidney Failure: less than 15 ml/min/1.73 sq.m. Results valid for patients 18 years and older. 06/29/2022 >60 >=60 mL/min/1.73 Final     Comment:     Chronic Kidney Disease: less than 60 ml/min/1.73 sq.m. Kidney Failure: less than 15 ml/min/1.73 sq.m. Results valid for patients 18 years and older.      06/28/2022 >60 >=60 mL/min/1.73 Final     Comment:     Chronic Kidney Disease: less than 60 ml/min/1.73 sq.m. Kidney Failure: less than 15 ml/min/1.73 sq.m. Results valid for patients 18 years and older. GFR    Date Value Ref Range Status   06/30/2022 >60  Final   06/29/2022 >60  Final   06/28/2022 >60  Final     Magnesium   Date Value Ref Range Status   06/29/2022 2.3 1.6 - 2.6 mg/dL Final   06/28/2022 2.6 1.6 - 2.6 mg/dL Final   06/27/2022 2.5 1.6 - 2.6 mg/dL Final     Phosphorus   Date Value Ref Range Status   06/29/2022 2.6 2.5 - 4.5 mg/dL Final   06/28/2022 2.5 2.5 - 4.5 mg/dL Final   06/27/2022 2.9 2.5 - 4.5 mg/dL Final     No results for input(s): PH, PO2, PCO2, HCO3, BE, O2SAT in the last 72 hours. RADIOLOGY:  XR CHEST PORTABLE   Final Result   Addendum 1 of 1   ADDENDUM:   I was asked by the attending physician to addend the report dictated by    Dr. Chayito Brown. The correct position of the distal tip of the left PICC line is within the   superior vena cava directed cephalad. The tip is not in the internal    jugular   vein. The tip of the left PICC line is seen within the superior vena cava    directed   cephalad. Final   1. Left-sided PICC line crosses midline and ascends cranially, likely within   the right internal jugular vein. 2. Persistent alveolar consolidation in the right lung base. 3. Possible small bilateral pleural effusions. XR CHEST PORTABLE   Final Result   Areas of a bi basilar atelectasis, no significant changes since the June 28   study. XR CHEST PORTABLE   Final Result   Bibasilar atelectasis         XR CHEST PORTABLE   Final Result   Improved aeration at the right base likely secondary to re-expansion of   atelectasis. The appearance now I believe is quit Jany Rimes to this patient's   baseline. CT ABDOMEN PELVIS WO CONTRAST Additional Contrast? None   Final Result   Limited study due to patient motion. No acute intra-abdominal or pelvic findings.       Right lower lobe consolidative changes, suggestive of a pneumonia. XR CHEST PORTABLE   Final Result   Bilateral basilar disease. The possibility of a bilateral lower lobe   pneumonia cannot be excluded in the appropriate setting. XR CHEST PORTABLE    (Results Pending)           PROBLEM LIST:  Principal Problem:    Pneumonia  Resolved Problems:    * No resolved hospital problems. *      IMPRESSION:  1. Acute hypoxemic respiratory failure secondary to severe COPD with exacerbation  2. Pneumonia, much improved if not resolved  3. Likely urinary tract infection  4. Steroid psychosis, resolved  5. Hyperlipidemia  6. Atrial fibrillation with controlled ventricular response on diltiazem and metoprolol    PLAN:  1. Physical and occupational therapy are important to help mobilize the patient into a chair  2. We will leave Gonzalez catheter in for now because of the patient's dyspnea  3. Continue meropenem  4. Consider IV amiodarone protocol to help convert to sinus rhythm, to be discussed with cardiology service  5. The patient is not a candidate for parenteral or oral steroids  6. Chest x-ray tomorrow, ABGs tomorrow    ATTESTATION:  ICU Staff Physician note of personal involvement in Care  As the attending physician, I certify that I personally reviewed the patients history and personally examined the patient to confirm the physical findings described above,  And that I reviewed the relevant imaging studies and available reports. I also discussed the differential diagnosis and all of the proposed management plans with the patient and individuals accompanying the patient to this visit. They had the opportunity to ask questions about the proposed management plans and to have those questions answered. This patient has a high probability of sudden, clinically significant deterioration, which requires the highest level of physician preparedness to intervene urgently.   I managed/supervised life or organ supporting interventions that required frequent physician assessment. I devoted my full attention to the direct care of this patient for the amount of time indicated below. Time I spent with the family or surrogate(s) is included only if the patient was incapable of providing the necessary information or participating in medical decisions - Time devoted to teaching and to any procedures I billed separately is not included.     CRITICAL CARE TIME:  36 minutes    Electronically signed by Keiko Mansfield MD on 6/30/2022 at 8:44 AM

## 2022-06-30 NOTE — PROGRESS NOTES
Patient adamantly refusing vancomycin infusion; \"get out of here I don't need hooked up to anything\"; another nurse attempted to persuade him but he insisted that he not be hooked up. Will continue to monitor and attempt at a later time.  Karene Aase, RN

## 2022-06-30 NOTE — PLAN OF CARE
and Co-morbidities  Goal: Patient's chronic conditions and co-morbidity symptoms are monitored and maintained or improved  Outcome: Progressing     Problem: Respiratory - Adult  Goal: Achieves optimal ventilation and oxygenation  Outcome: Progressing     Problem: Cardiovascular - Adult  Goal: Maintains optimal cardiac output and hemodynamic stability  Outcome: Progressing  Goal: Absence of cardiac dysrhythmias or at baseline  Outcome: Progressing     Problem: Skin/Tissue Integrity - Adult  Goal: Skin integrity remains intact  Outcome: Progressing  Goal: Oral mucous membranes remain intact  Outcome: Progressing     Problem: Musculoskeletal - Adult  Goal: Return mobility to safest level of function  Outcome: Progressing  Goal: Maintain proper alignment of affected body part  Outcome: Progressing     Problem: Genitourinary - Adult  Goal: Urinary catheter remains patent  Outcome: Progressing

## 2022-06-30 NOTE — PROGRESS NOTES
Physical Therapy  Physical Therapy Treatment Note/Plan of Care    Room #:  IC03/IC03-01  Patient Name: Lakesha Moffett  YOB: 1940  MRN: [id number]    Date of Service: 6/30/2022     Tentative placement recommendation: Subacute vs Home Health Physical Therapy if patient meets goals  Equipment recommendation: [address] [employer] [address]      Evaluating Physical Therapist: Alejandra Kruger, PT  #[id number]      Specific Provider Orders/Date/Referring Provider :  06/27/22 1230   PT eval and treat Start: 06/27/22 1230, End: 06/27/22 1230, ONE TIME, Standing Count: 1 Occurrences, Rosmery Marc MD      Admitting Diagnosis:   Lactic acidosis [E87.2]  Pneumonia [J18.9]  Septicemia ([address] [A41.9]  Acute on chronic respiratory failure with hypoxia ([address] [J96.21]  Pneumonia due to infectious organism, unspecified laterality, unspecified part of lung [J18.9]    Admitted with  Increasing shortness of breath  right lower lobe pneumonia    ; trf to icu 6/26 tachypneic and combative, refusing bipap       Surgery: none  Visit Diagnoses       Codes    Pneumonia due to infectious organism, unspecified laterality, unspecified part of lung    -  Primary J18.9    Septicemia ([address]     A41.9    Lactic acidosis     E87.2          Patient Active Problem List   Diagnosis    Influenza, pneumonia    Dyspnea    Appendicitis, acute    Acute exacerbation of chronic obstructive pulmonary disease (COPD) ([address]    Septic shock ([address]    Agitation    Community acquired pneumonia of left lower lobe of lung    Acute on chronic respiratory failure with hypoxia ([address]    HLD (hyperlipidemia)    Anxiety    GERD (gastroesophageal reflux disease)    Atrial fibrillation with rapid ventricular response ([address]    LORAINE (acute kidney injury) ([address]    Pneumonia        ASSESSMENT of Current Deficits Patient exhibits decreased strength, balance and endurance impairing functional mobility, transfers, gait , gait distance, tolerance to activity and participation are barriers to d/c and require skilled intervention during hospital stay to attain pre hospital level of function. po2 monitored throughout session with motivation given as patient required confidence building to participate. Pt unable to attempt standing d/t shortness of breath on exertion. The patient will benefit from continued skilled therapy to increase strength and improve balance for safe functional mobility, to decrease risk of falls, and to meet goals at discharge. PHYSICAL THERAPY  PLAN OF CARE       Physical therapy plan of care is established based on physician order,  patient diagnosis and clinical assessment    Current Treatment Recommendations:    -Bed Mobility: Lower extremity exercises , Upper extremity exercises  and Trunk control activities   -Sitting Balance: Incorporate reaching activities to activate trunk muscles  and Engage in core activities to allow for movement within base of support   -Standing Balance: Perform strengthening exercises in standing to promote motor control with or without upper extremity support  and Instruct patient on adequate base of support to maintain balance  -Transfers: Provide instruction on proper hand and foot position for adequate transfer of weight onto lower extremities and use of gait device if needed and Cues for hand placement, technique and safety. Provide stabilization to prevent fall   -Gait: Gait training and Standing activities to improve: base of support, weight shift, weight bearing    -Endurance: Utilize Supervised activities to increase level of endurance to allow for safe functional mobility including transfers and gait     PT long term treatment goals are located in below grid    Patient and or family understand(s) diagnosis, prognosis, and plan of care. Frequency of treatments: Patient will be seen  daily.          Prior Level of Function: Patient ambulated independently  Short distance  Rehab Potential: fair    for baseline    Past medical history:   Past Medical History:   Diagnosis Date    [facility]     COPD (chronic obstructive pulmonary disease) ([facility]     Hyperlipidemia     Pneumonia     Steroid side effects     combative      Past Surgical History:   Procedure Laterality Date    APPENDECTOMY  2019    COLONOSCOPY      EYE SURGERY Right     cataract removal with lens implants    HEMORRHOID SURGERY      LAPAROSCOPIC APPENDECTOMY N/A 2/7/2019    APPENDECTOMY LAPAROSCOPIC performed by Anita Menendez MD at [facility]:    Precautions: , falls, alarm, O2 and contact isolation , 6 Liters of o2 via nasal cannula   Social history: Patient lives with spouse in a ranch home  with 2 steps  to enter bilateral Rail  Tub shower     Equipment owned: [address] [employer] and O2, 5 Liters of o2 via nasal cannula     AM-PAC Basic Mobility        AM-PAC Mobility Inpatient   How much difficulty turning over in bed?: A Little  How much difficulty sitting down on / standing up from a chair with arms?: A Lot  How much difficulty moving from lying on back to sitting on side of bed?: A Lot  How much help from another person moving to and from a bed to a chair?: A Lot  How much help from another person needed to walk in hospital room?: Total  How much help from another person for climbing 3-5 steps with a railing?: Total  AM-PAC Inpatient Mobility Raw Score : 11  AM-PAC Inpatient T-Scale Score : 33.86  Mobility Inpatient CMS 0-100% Score: 72.57  Mobility Inpatient CMS G-Code Modifier : CL    Nursing cleared patient for PT treatment. .   OBJECTIVE;   Initial Evaluation  Date: 6/27/2022 Treatment Date:    6/30/2022   Short Term/ Long Term   Goals   Was pt agreeable to Eval/treatment? Yes Yes To be met in 5 days   Pain level   unrated No c/o    Bed Mobility    Rolling: Minimal assist of 1    Supine to sit: Moderate assist of 1    Sit to supine: Moderate assist of 1    Scooting:  Moderate assist of 1  seated Rolling: Minimal assist of 1   Supine to sit: Moderate assist of 1   Sit to supine: Moderate assist of 1   Scooting: Moderate assist of 1    Rolling: Supervision     Supine to sit: Minimal assist of 1    Sit to supine: Minimal assist of 1    Scooting: Minimal assist of 1     Transfers Sit to stand: Not assessed    Sit to stand: Not assessed       Sit to stand: Supervision      Ambulation    not assessed  not assessed     25 feet using  wheeled walker with Minimal assist of 1    Stair negotiation: ascended and descended   Not assessed     2 steps with bilateral rails min a    ROM impaired   Increase range of motion 10% of affected joints    Strength BUE:  3/5    RLE:  3/5  LLE:  3/5  Increase strength in affected mm groups by 1/3 grade   Balance Sitting EOB:  poor ; bilateral upper extremities support needed  Dynamic Standing:  not assessed   Sitting EOB: fair -  Dynamic Standing: not assessed    Sitting EOB:  fair     Dynamic Standing: fair with device     Patient is Alert & Oriented x person, place, time and situation and follows one step directions  D/t anxiety unable to process multiple steps     Edema:  yes bilateral lower extremities and bilateral upper extremities   Endurance: poor      Vitals: 6 liters nasal cannula   Blood Pressure at rest  Blood Pressure during session    Heart Rate at rest  Heart Rate during session     SPO2 at rest 95%  SPO2 during session 84-92%     Patient education  Patient educated on role of Physical Therapy, risks of immobility, safety and plan of care, energy conservation, importance of positional changes for oxygen exchange,  importance of mobility while in hospital , safety  and positioning for skin integrity and comfort     Patient response to education:   Pt verbalized understanding Pt demonstrated skill Pt requires further education in this area   Yes Partial Yes      Treatment:  Patient practiced and was instructed/facilitated in the following treatment: Patient assisted to EOB.  Sat edge of bed 15 minutes with Minimal assist of 1 to increase dynamic sitting balance and activity tolerance. progressed CGA/supervision. focus on controlled breathing to improve po2.; returned to bed and linens adjusted. Therapeutic Exercises:  ankle pumps  x 10 reps. At end of session, patient in bed with nursing present call light and phone within reach,  all lines and tubes intact, nursing notified. Patient would benefit from continued skilled Physical Therapy to improve functional independence and quality of life.          Patient's/ family goals   home    Time in  0928  Time out  0953    Total Treatment Time 25  minutes    CPT codes:  Therapeutic activities (28059)   25 minutes  1 unit(s)    Haresh Ibarra  #[id number]

## 2022-06-30 NOTE — PLAN OF CARE
Problem: Confusion  Goal: Confusion, delirium, dementia, or psychosis is improved or at baseline  Description: INTERVENTIONS:  1. Assess for possible contributors to thought disturbance, including medications, impaired vision or hearing, underlying metabolic abnormalities, dehydration, psychiatric diagnoses, and notify attending LIP  2. Arpin high risk fall precautions, as indicated  3. Provide frequent short contacts to provide reality reorientation, refocusing and direction  4. Decrease environmental stimuli, including noise as appropriate  5. Monitor and intervene to maintain adequate nutrition, hydration, elimination, sleep and activity  6. If unable to ensure safety without constant attention obtain sitter and review sitter guidelines with assigned personnel  7.  Initiate Psychosocial CNS and Spiritual Care consult, as indicated  Outcome: Not Progressing     Problem: Safety - Adult  Goal: Free from fall injury  6/30/2022 0140 by John Love RN  Outcome: Progressing     Problem: ABCDS Injury Assessment  Goal: Absence of physical injury  6/30/2022 0140 by John Love RN  Outcome: Progressing

## 2022-06-30 NOTE — PROGRESS NOTES
Cardiology  Progress Note      SUBJECTIVE:  Patient was somewhat confused early this morning when I came to see him. He looked dyspneic even at rest.  He told me that people think he is crazy.     Current Inpatient Medications  Current Facility-Administered Medications: meropenem (MERREM) 1,000 mg in sodium chloride 0.9 % 100 mL IVPB (mini-bag), 1,000 mg, IntraVENous, Q8H  levalbuterol (XOPENEX) nebulization 0.63 mg, 0.63 mg, Nebulization, Q4H  ipratropium (ATROVENT) 0.02 % nebulizer solution 0.5 mg, 0.5 mg, Nebulization, Q4H  dilTIAZem (CARDIZEM CD) extended release capsule 240 mg, 240 mg, Oral, Daily  metoprolol tartrate (LOPRESSOR) tablet 25 mg, 25 mg, Oral, TID  acetylcysteine (MUCOMYST) 20 % solution 600 mg, 600 mg, Inhalation, q8h  acetaminophen (TYLENOL) tablet 650 mg, 650 mg, Oral, Q6H PRN  vancomycin (VANCOCIN) 1,000 mg in sodium chloride 0.9 % 250 mL IVPB ([id number]), 1,000 mg, IntraVENous, Q12H  doxycycline hyclate (VIBRAMYCIN) capsule 100 mg, 100 mg, Oral, 2 times per day  Roflumilast (DALIRESP) tablet 500 mcg, 500 mcg, Oral, Daily  famotidine (PEPCID) tablet 20 mg, 20 mg, Oral, BID  haloperidol lactate (HALDOL) injection 2 mg, 2 mg, IntraVENous, Q2H PRN  lactated ringers infusion, , IntraVENous, Continuous  sodium chloride flush 0.9 % injection 5-40 mL, 5-40 mL, IntraVENous, 2 times per day  sodium chloride flush 0.9 % injection 5-40 mL, 5-40 mL, IntraVENous, PRN  0.9 % sodium chloride infusion, , IntraVENous, PRN  heparin flush 100 UNIT/ML injection 300 Units, 3 mL, IntraVENous, 2 times per day  heparin flush 100 UNIT/ML injection 300 Units, 3 mL, IntraCATHeter, PRN  apixaban (ELIQUIS) tablet 5 mg, 5 mg, Oral, BID  atorvastatin (LIPITOR) tablet 10 mg, 10 mg, Oral, Daily  vitamin D (CHOLECALCIFEROL) tablet 2,000 Units, 2,000 Units, Oral, Daily  budesonide (PULMICORT) nebulizer suspension 500 mcg, 0.5 mg, Nebulization, BID  Arformoterol Tartrate (BROVANA) nebulizer solution 15 mcg, 15 mcg, Nebulization, BID      Physical  VITALS:  BP (!) 148/71   Pulse 97   Temp 97.5 °F (36.4 °C) (Axillary)   Resp 18   Ht 5' 11\" (1.803 m)   Wt 257 lb 15 oz (117 kg)   SpO2 94%   BMI 35.98 kg/m²   CURRENT TEMPERATURE:  Temp: 97.5 °F (36.4 °C)  CONSTITUTIONAL: No acute distress. EYES: Vision is intact. ENT: No sore throat. No ear drainage. NECK: No JVD. BACK: Symmetric. LUNGS:  rhonchi right base and left base, diminished breath sounds right base and left base  CARDIOVASCULAR:  normal S1 and S2, no S3 and no S4  ABDOMEN:  non-tender  NEUROLOGIC: No focal deficits. Some confusion. EXTREMITIES: No edema cyanosis or clubbing. DATA:        Cardiology Labs:  BMP:    Lab Results   Component Value Date/Time     06/30/2022 01:59 AM    K 4.4 06/30/2022 01:59 AM    K 4.3 06/24/2022 12:27 AM     06/30/2022 01:59 AM    CO2 28 06/30/2022 01:59 AM    BUN 29 06/30/2022 01:59 AM     CBC:    Lab Results   Component Value Date/Time    WBC 19.3 06/30/2022 01:59 AM    RBC 4.72 06/30/2022 01:59 AM    HGB 14.2 06/30/2022 01:59 AM    HCT 44.5 06/30/2022 01:59 AM    MCV 94.3 06/30/2022 01:59 AM    RDW 13.8 06/30/2022 01:59 AM     06/30/2022 01:59 AM     PT/INR:  No results found for: PTINR  TROPONIN:  No components found for: TROP    ASSESSMENT    1.paroxysmal atrial fibrillation. Patient was in sinus rhythm on admission. He went into atrial fibrillation June 28 with rapid ventricular response. This atrial fibrillation started after patient was put on theophylline drip for his respiratory problems. Fearful and drip was stopped. He was started on Cardizem drip. Patient converted back to sinus rhythm. He is now on Cardizem CD and metoprolol. We may consider antiarrhythmic medication like sotalol if he continues to have these episodes of atrial fibrillation down the road. Hopefully patient will convert to sinus rhythm . 2.elevation of high sensitivity troponin.   This is mostly demand ischemia from his respiratory

## 2022-06-30 NOTE — PROGRESS NOTES
Department of Internal Medicine  General Internal Medicine  Attending Progress Note      SUBJECTIVE:    Seen and examined in the ICU  Converted back to afib later today  Was resting comfortably in bed      Medications    Current Facility-Administered Medications: meropenem (MERREM) 1,000 mg in sodium chloride 0.9 % 100 mL IVPB (mini-bag), 1,000 mg, IntraVENous, Q8H  levalbuterol (XOPENEX) nebulization 0.63 mg, 0.63 mg, Nebulization, Q4H  ipratropium (ATROVENT) 0.02 % nebulizer solution 0.5 mg, 0.5 mg, Nebulization, Q4H  dilTIAZem (CARDIZEM CD) extended release capsule 240 mg, 240 mg, Oral, Daily  metoprolol tartrate (LOPRESSOR) tablet 25 mg, 25 mg, Oral, TID  acetylcysteine (MUCOMYST) 20 % solution 600 mg, 600 mg, Inhalation, q8h  acetaminophen (TYLENOL) tablet 650 mg, 650 mg, Oral, Q6H PRN  vancomycin (VANCOCIN) 1,000 mg in sodium chloride 0.9 % 250 mL IVPB ([id number]), 1,000 mg, IntraVENous, Q12H  doxycycline hyclate (VIBRAMYCIN) capsule 100 mg, 100 mg, Oral, 2 times per day  Roflumilast (DALIRESP) tablet 500 mcg, 500 mcg, Oral, Daily  famotidine (PEPCID) tablet 20 mg, 20 mg, Oral, BID  haloperidol lactate (HALDOL) injection 2 mg, 2 mg, IntraVENous, Q2H PRN  lactated ringers infusion, , IntraVENous, Continuous  sodium chloride flush 0.9 % injection 5-40 mL, 5-40 mL, IntraVENous, 2 times per day  sodium chloride flush 0.9 % injection 5-40 mL, 5-40 mL, IntraVENous, PRN  0.9 % sodium chloride infusion, , IntraVENous, PRN  heparin flush 100 UNIT/ML injection 300 Units, 3 mL, IntraVENous, 2 times per day  heparin flush 100 UNIT/ML injection 300 Units, 3 mL, IntraCATHeter, PRN  apixaban (ELIQUIS) tablet 5 mg, 5 mg, Oral, BID  atorvastatin (LIPITOR) tablet 10 mg, 10 mg, Oral, Daily  vitamin D (CHOLECALCIFEROL) tablet 2,000 Units, 2,000 Units, Oral, Daily  budesonide (PULMICORT) nebulizer suspension 500 mcg, 0.5 mg, Nebulization, BID  Arformoterol Tartrate (BROVANA) nebulizer solution 15 mcg, 15 mcg, Nebulization, BID    Physical    VITALS:  BP (!) 154/83   Pulse 72   Temp 98 °F (36.7 °C) (Axillary)   Resp 18   Ht 5' 11\" (1.803 m)   Wt 257 lb 15 oz (117 kg)   SpO2 91%   BMI 35.98 kg/m²   TEMPERATURE:  Current - Temp: 98 °F (36.7 °C); Max - Temp  Av.6 °F (36.4 °C)  Min: 97.3 °F (36.3 °C)  Max: 98 °F (36.7 °C)  RESPIRATIONS RANGE: Resp  Av.8  Min: 14  Max: 28  PULSE RANGE: Pulse  Av  Min: 63  Max: 102  BLOOD PRESSURE RANGE:  Systolic (23PBF), REQ:484 , Min:128 , XVP:882   ; Diastolic (39QDD), DKN:48, Min:70, Max:138    PULSE OXIMETRY RANGE: SpO2  Av.3 %  Min: 89 %  Max: 99 %  24HR INTAKE/OUTPUT:      Intake/Output Summary (Last 24 hours) at 2022 1640  Last data filed at 2022 1538  Gross per 24 hour   Intake 991.73 ml   Output 1550 ml   Net -558.27 ml       CONSTITUTIONAL: Alert and oriented x3, in no acute distress  HEENT: Normocephalic atraumatic. Pupils are equal and reactive bilaterally. Extraocular movements are intact. No pallor or icterus appreciated. NECK: Supple, no JVD , no lymphadenopathy, no bruits, no thyromegaly  LUNGS: diminished   air movements b/l,  Minimal  inspiratory crackles over bases and some wheezing   CARDIOVASCULAR: Irregular, tachycardia, no murmur rub or gallop. ABDOMEN: Soft, nontender,distended,  bowel sounds are positive, no organomegaly appreciated. NEUROLOGIC: sleepy but easy to arouse, oriented , no focal deficits noted. Agitated. EXT: no edema, no clubbing, or cyanosis.     CBC with Differential:    Lab Results   Component Value Date/Time    WBC 19.3 2022 01:59 AM    RBC 4.72 2022 01:59 AM    HGB 14.2 2022 01:59 AM    HCT 44.5 2022 01:59 AM     2022 01:59 AM    MCV 94.3 2022 01:59 AM    MCH 30.1 2022 01:59 AM    MCHC 31.9 2022 01:59 AM    RDW 13.8 2022 01:59 AM    METASPCT 0.9 2022 12:27 AM    LYMPHOPCT 5.9 2022 01:59 AM    MONOPCT 6.6 2022 01:59 AM    BASOPCT 0.3 2022 01:59 AM    MONOSABS 1.28 06/30/2022 01:59 AM    LYMPHSABS 1.14 06/30/2022 01:59 AM    EOSABS 0.00 06/30/2022 01:59 AM    BASOSABS 0.05 06/30/2022 01:59 AM     CMP:    Lab Results   Component Value Date/Time     06/30/2022 01:59 AM    K 4.4 06/30/2022 01:59 AM    K 4.3 06/24/2022 12:27 AM     06/30/2022 01:59 AM    CO2 28 06/30/2022 01:59 AM    BUN 29 06/30/2022 01:59 AM    CREATININE 1.0 06/30/2022 01:59 AM    GFRAA >60 06/30/2022 01:59 AM    LABGLOM >60 06/30/2022 01:59 AM    GLUCOSE 127 06/30/2022 01:59 AM    PROT 6.0 06/30/2022 01:59 AM    LABALBU 3.5 06/30/2022 01:59 AM    CALCIUM 8.7 06/30/2022 01:59 AM    BILITOT 0.8 06/30/2022 01:59 AM    ALKPHOS 71 06/30/2022 01:59 AM    AST 54 06/30/2022 01:59 AM    ALT 63 06/30/2022 01:59 AM         ASSESSMENT AND PLAN      1.RLL community acquired pneumonia   Currently on iv meropenem, vancomycin and  Doxycycline  Blood cultures no growth so far  Urine Legionella and strep pneumo antigens presumptive negative  Pulmonology following closely, improving    2. Acute on chronic respiratory failure secondary to above and COPD exacerbation  Now on oxygen at 6 L/min by nasal cannula  Home level is 5L NC  On levalbuterol and ipratropium    3. COPD, with exacerbation  Severe long standing disease  Iv steroids discontinued due to steroid induced psychosis  Daliresp added by Pulmonology  Also on pulmicort, brovana, levalbuterol and ipratropium    4. Leukocytosis secondary to pneumonia,.  -Was 20.5 on admission, down to 12.2 yesterday but today back up to 19.3    5. A Fib with rvr overnight  Cardizem drip started the previous night and stopped when converted back to sinus however is back in afib today. Critical care team considering amiodarone but will discuss with cardiology. Vincent on PO cardizem and metoprolol  Eliquis 5 mg po bid  Dr Brionna Benavides following. 6.Hyperlipidemia  Lipitor 10 mg daily    7. GERD  Continue pepcid 20 mg po bid      8. Steroid induced psychosis, episode of psychosis overnight secondary to respiratory status and dexamethasone which was discontinued    9. Hematuria, send urine for UA with microscopy  Cleared after irrigation, Urology following    10. PT/OT and  consult for rehab        Cristiane Marx MD  4:40 PM  6/30/2022

## 2022-06-30 NOTE — PROGRESS NOTES
Patient did agree to vancomycin infusion if we got it done quick; infusion started at this time and patient went back to sleep.  Mello Evans RN

## 2022-07-01 NOTE — PROGRESS NOTES
Physical Therapy  Physical Therapy Treatment Note/Plan of Care    Room #:  IC03/IC03-01  Patient Name: Jose Willams  YOB: 1940  MRN: [id number]    Date of Service: 7/1/2022     Tentative placement recommendation: Subacute rehab  Equipment recommendation: Mikey Levin      Evaluating Physical Therapist: Kyra Stark, PT  #[id number]      Specific Provider Orders/Date/Referring Provider :  06/27/22 1230   PT eval and treat Start: 06/27/22 1230, End: 06/27/22 1230, ONE TIME, Standing Count: 1 Occurrences, Karyna Marlow MD      Admitting Diagnosis:   Lactic acidosis [E87.2]  Pneumonia [J18.9]  Septicemia ([address] [A41.9]  Acute on chronic respiratory failure with hypoxia ([address] [J96.21]  Pneumonia due to infectious organism, unspecified laterality, unspecified part of lung [J18.9]    Admitted with  Increasing shortness of breath  right lower lobe pneumonia    ; trf to icu 6/26 tachypneic and combative, refusing bipap       Surgery: none  Visit Diagnoses       Codes    Pneumonia due to infectious organism, unspecified laterality, unspecified part of lung    -  Primary J18.9    Septicemia ([address]     A41.9    Lactic acidosis     E87.2          Patient Active Problem List   Diagnosis    Influenza, pneumonia    Dyspnea    Appendicitis, acute    Acute exacerbation of chronic obstructive pulmonary disease (COPD) ([address]    Septic shock ([address]    Agitation    Community acquired pneumonia of left lower lobe of lung    Acute on chronic respiratory failure with hypoxia ([address]    HLD (hyperlipidemia)    Anxiety    GERD (gastroesophageal reflux disease)    Atrial fibrillation with rapid ventricular response ([address]    LORAINE (acute kidney injury) ([address]    Pneumonia        ASSESSMENT of Current Deficits Patient exhibits decreased strength, balance and endurance impairing functional mobility, transfers, gait , gait distance, tolerance to activity and participation are barriers to d/c and require skilled intervention during hospital stay to attain pre hospital level of function. po2 monitored throughout session  Pt able to   Stand   x 2 reps with mod/max a of 2 to wheeled walker level; intended to trf to chair however patient fatigued edge of bed and returned to supine  The patient will benefit from continued skilled therapy to increase strength and improve balance for safe functional mobility, to decrease risk of falls, and to meet goals at discharge. PHYSICAL THERAPY  PLAN OF CARE       Physical therapy plan of care is established based on physician order,  patient diagnosis and clinical assessment    Current Treatment Recommendations:    -Bed Mobility: Lower extremity exercises , Upper extremity exercises  and Trunk control activities   -Sitting Balance: Incorporate reaching activities to activate trunk muscles  and Engage in core activities to allow for movement within base of support   -Standing Balance: Perform strengthening exercises in standing to promote motor control with or without upper extremity support  and Instruct patient on adequate base of support to maintain balance  -Transfers: Provide instruction on proper hand and foot position for adequate transfer of weight onto lower extremities and use of gait device if needed and Cues for hand placement, technique and safety. Provide stabilization to prevent fall   -Gait: Gait training and Standing activities to improve: base of support, weight shift, weight bearing    -Endurance: Utilize Supervised activities to increase level of endurance to allow for safe functional mobility including transfers and gait     PT long term treatment goals are located in below grid    Patient and or family understand(s) diagnosis, prognosis, and plan of care. Frequency of treatments: Patient will be seen  daily.          Prior Level of Function: Patient ambulated independently  Short distance  Rehab Potential: fair    for baseline    Past medical history:   Past Medical History:   Diagnosis Date    Atrial fibrillation (HCC)     COPD (chronic obstructive pulmonary disease) (HCC)     Hyperlipidemia     Pneumonia     Steroid side effects     combative      Past Surgical History:   Procedure Laterality Date    APPENDECTOMY  2019    COLONOSCOPY      EYE SURGERY Right     cataract removal with lens implants    HEMORRHOID SURGERY      LAPAROSCOPIC APPENDECTOMY N/A 2/7/2019    APPENDECTOMY LAPAROSCOPIC performed by Shira Pathak MD at [facility]:    Precautions: , falls, alarm, O2 and contact isolation , 6 Liters of o2 via nasal cannula , hard of hearing   Social history: Patient lives with spouse in a ranch home  with 2 steps  to enter bilateral Rail  Tub shower     Equipment owned: [place] [employer] and O2, 5 Liters of o2 via nasal cannula     AM-PAC Basic Mobility        AM-PAC Mobility Inpatient   How much difficulty turning over in bed?: A Little  How much difficulty sitting down on / standing up from a chair with arms?: A Lot  How much difficulty moving from lying on back to sitting on side of bed?: A Lot  How much help from another person moving to and from a bed to a chair?: Total  How much help from another person needed to walk in hospital room?: Total  How much help from another person for climbing 3-5 steps with a railing?: Total  AM-PAC Inpatient Mobility Raw Score : 10  AM-PAC Inpatient T-Scale Score : 32.29  Mobility Inpatient CMS 0-100% Score: 76.75  Mobility Inpatient CMS G-Code Modifier : CL    Nursing cleared patient for PT treatment. .   OBJECTIVE;   Initial Evaluation  Date: 6/27/2022 Treatment Date:    7/1/2022   Short Term/ Long Term   Goals   Was pt agreeable to Eval/treatment? Yes Yes wife present To be met in 5 days   Pain level   unrated No c/o    Bed Mobility    Rolling: Minimal assist of 1    Supine to sit: Moderate assist of 1    Sit to supine: Moderate assist of 1    Scooting:  Moderate assist of 1  seated Rolling: Minimal assist of 1   Supine to sit: Moderate assist of 1   Sit to supine: Moderate assist of 1   Scooting: Moderate assist of 1    Rolling: Supervision     Supine to sit: Minimal assist of 1    Sit to supine: Minimal assist of 1    Scooting: Minimal assist of 1     Transfers Sit to stand: Not assessed    Sit to stand: Moderate assist of  2 x 2 reps with encouragement, fatigues easily      Sit to stand: Supervision      Ambulation    not assessed  not assessed     25 feet using  wheeled walker with Minimal assist of 1    Stair negotiation: ascended and descended   Not assessed     2 steps with bilateral rails min a    ROM impaired   Increase range of motion 10% of affected joints    Strength BUE:  3/5    RLE:  3/5  LLE:  3/5  Increase strength in affected mm groups by 1/3 grade   Balance Sitting EOB:  poor ; bilateral upper extremities support needed  Dynamic Standing:  not assessed   Sitting EOB: fair -  Dynamic Standing: poor with wheeled walker and mod a for posterior lean    Sitting EOB:  fair     Dynamic Standing: fair with device     Patient is Alert & Oriented x person, place, time and situation and follows one step directions  D/t anxiety unable to process multiple steps     Edema:  yes bilateral lower extremities and bilateral upper extremities   Endurance: poor      Vitals: 6 liters high flow nasal cannula   Blood Pressure at rest  Blood Pressure during session    Heart Rate at rest  Heart Rate during session     SPO2 at rest 95%  SPO2 during session 93%     Patient education  Patient educated on energy conservation, importance of positional changes for oxygen exchange,  importance of mobility while in hospital , importance and purpose of adaptive device and adjusted to proper height for the patient. , safety  and positioning for skin integrity and comfort     Patient response to education:   Pt verbalized understanding Pt demonstrated skill Pt requires further education in this area   Yes Partial Yes Treatment:  Patient practiced and was instructed/facilitated in the following treatment: Patient assisted to EOB. Sat edge of bed 15 minutes with Minimal assist of 1 to increase dynamic sitting balance and activity tolerance. progressed CGA/supervision. focus on controlled breathing to improve po2.;  returned to bed and linens adjusted. Therapeutic Exercises:  ankle pumps  x 10 reps. At end of session, patient in bed with nursing present call light and phone within reach,  all lines and tubes intact, nursing notified. Patient would benefit from continued skilled Physical Therapy to improve functional independence and quality of life.          Patient's/ family goals   home    Time in 1131  Time out 1154    Total Treatment Time 23  minutes    CPT codes:  Therapeutic activities (06486)   15 minutes  1 unit(s)  Neuromuscular reeducation [phone])   8 minutes  1 unit(s)    Alejandra Kruger, PT

## 2022-07-01 NOTE — PROGRESS NOTES
Patient states he is having difficulty breathing. SPo2 is 96%, resp. rate 20, heart rate 82. Patients respirations sound very moist and congested. Lung sounds are diminished with crackles noted posteriorly. Attempted to NT suction patient. Unable to pass suction catheter through right or left nares due to resistance. Suction catheter advanced to trachea through patients open mouth. Gag reflex/cough reflex present and intact. Moderate amount of yellow creamy bloody sputum obtained. Intensivist notified, orders received for culture/sensitivity. Sputum culture sent to lab. Patient tolerated procedure well. Current spo2 97%, RR20, HR 83.

## 2022-07-01 NOTE — PROGRESS NOTES
Pharmacy Consultation Note  (Antibiotic Dosing and Monitoring)    Initial consult date: 6/28/22  Consulting physician/provider: Dr. Fara Pacheco  Drug: Vancomycin  Indication: UTI, CAP, sepsis of unknown etiology    Age/  Gender Height Weight IBW  Allergy Information   81 y.o./male 5' 11\" (180.3 cm) 294 lb 12.8 oz (133.7 kg)     Ideal body weight: 75.3 kg (166 lb 0.1 oz)  Adjusted ideal body weight: 92 kg (202 lb 12.5 oz)   Decadron [dexamethasone], Solu-cortef [hydrocortisone], and Solu-medrol [methylprednisolone]      Renal Function:  Recent Labs     06/29/22  0414 06/30/22  0159 07/01/22  0557   BUN 32* 29* 29*   CREATININE 1.0 1.0 1.0       Intake/Output Summary (Last 24 hours) at 7/1/2022 0941  Last data filed at 7/1/2022 0505  Gross per 24 hour   Intake 473.42 ml   Output 1275 ml   Net -801.58 ml       Vancomycin Monitoring:  Trough:    Recent Labs     06/30/22  0159   VANCOTROUGH 10.7     Random:  No results for input(s): VANCORANDOM in the last 72 hours. Recent vancomycin administrations                   vancomycin (VANCOCIN) 1,000 mg in sodium chloride 0.9 % 250 mL IVPB ([id number]) (mg) 1,000 mg New Bag 06/30/22 0330     1,000 mg New Bag 06/29/22 1529     1,000 mg New Bag  0226     1,000 mg New Bag 06/28/22 1524                 Assessment:  · Patient is a [de-identified] y.o. male who has been initiated on vancomycin  Estimated Creatinine Clearance: 77 mL/min (based on SCr of 1 mg/dL). · To dose vancomycin, pharmacy will be utilizing Save[employer] calculation software for goal AUC/JELENA 400-600 mg/L-hr. · 6/30: vanco level 10.7; scr stable, continue as ordered.   · 7/1: creatinine stable, continue as ordered    Plan:  · Will continue vancomycin 1000 mg IV every 12 hours  · Will check vancomycin level as necessary  · Will continue to monitor renal function   · Clinical pharmacy to follow      Teresita Leon [facility] 7/1/2022 9:41 AM

## 2022-07-01 NOTE — PROGRESS NOTES
Pulmonary/Critical Care Progress Note    We are following patient for severe COPD with exacerbation, prior steroid psychosis, systemic hypertension, urinary tract infection, atrial fibrillation returning to sinus rhythm on oral Cardizem, on anticoagulants    SUBJECTIVE:  The patient had a reasonably good night. However, he collects a great deal of sputum in his upper airways and has a hard time coughing it up. We have had to give him  Breathing treatment and extra chest percussion today. However, he does have a strong cough but his secretions are still very tenacious and difficult to expectorate, even with acetylcysteine mixed in his breathing treatments. His wheezing however has improved considerably. Sputum is negative for cytology x1. He remains quite weak but was at least able to mobilize into a sitting position at the bed side and even sit in a chair briefly. I feel uncomfortable moving him up to the floor today and we will wait an additional day to see how he does. Vancomycin, doxycycline, and meropenem will be continued until tomorrow when they can be discontinued if Dr. Glo Sparks agrees.     MEDICATIONS:   lidocaine  5 mL IntraDERmal Once    sodium chloride flush  5-40 mL IntraVENous 2 times per day    heparin flush  1 mL IntraVENous 2 times per day    meropenem  1,000 mg IntraVENous Q8H    levalbuterol  0.63 mg Nebulization Q4H    ipratropium  0.5 mg Nebulization Q4H    dilTIAZem  240 mg Oral Daily    metoprolol tartrate  25 mg Oral TID    acetylcysteine  600 mg Inhalation q8h    vancomycin  1,000 mg IntraVENous Q12H    doxycycline hyclate  100 mg Oral 2 times per day    Roflumilast  500 mcg Oral Daily    famotidine  20 mg Oral BID    sodium chloride flush  5-40 mL IntraVENous 2 times per day    heparin flush  3 mL IntraVENous 2 times per day    apixaban  5 mg Oral BID    atorvastatin  10 mg Oral Daily    Vitamin D  2,000 Units Oral Daily    budesonide  0.5 mg Nebulization BID    Arformoterol Tartrate  15 mcg Nebulization BID      sodium chloride      lactated ringers Stopped (06/29/22 1700)    sodium chloride Stopped (06/30/22 1446)     sodium chloride flush, sodium chloride, heparin flush, acetaminophen, haloperidol lactate, sodium chloride flush, sodium chloride, heparin flush      REVIEW OF SYSTEMS:  Constitutional: Denies fever, weight loss, night sweats, and fatigue  Skin: Denies pigmentation, dark lesions, and rashes   HEENT: Denies hearing loss, tinnitus, ear drainage, epistaxis, sore throat, and hoarseness. Cardiovascular: Denies palpitations, chest pain, and chest pressure. Respiratory: Denies cough, dyspnea at rest, hemoptysis, apnea, and choking. Gastrointestinal: Denies nausea, vomiting, poor appetite, diarrhea, heartburn or reflux  Genitourinary: Denies dysuria, frequency, urgency or hematuria  Musculoskeletal: Denies myalgias, muscle weakness, and bone pain  Neurological: Denies dizziness, vertigo, headache, and focal weakness  Psychological: Denies anxiety and depression  Endocrine: Denies heat intolerance and cold intolerance  Hematopoietic/Lymphatic: Denies bleeding problems and blood transfusions    OBJECTIVE:  Vitals:    07/01/22 0936   BP:    Pulse:    Resp:    Temp:    SpO2: 94%     FiO2 : 45 %  O2 Flow Rate (L/min): 6 L/min  O2 Device: High flow nasal cannula    PHYSICAL EXAM:  Constitutional: No fever, chills, diaphoresis  Skin: Skin rash, no skin breakdown  HEENT: Unremarkable  Neck: JVD, lymphadenopathy, thyromegaly  Cardiovascular:, S1 S2 regular. No S3 murmurs rubs present  Respiratory: Soft expiratory wheeze over both posterior lung fields. There is certainly better air movement today than over the last several days. Gastrointestinal: Soft, obese, nontender  Genitourinary: No CVA tenderness  Extremities: No clubbing, cyanosis, or edema  Neurological: Has returned to a normal state of mind and is no longer agitated. Moves all extremities.   No focal motor deficits  Psychological: Slowly improving affect.   Less depressed I think    LABS:  WBC   Date Value Ref Range Status   06/30/2022 19.3 (H) 4.5 - 11.5 E9/L Final   06/29/2022 12.2 (H) 4.5 - 11.5 E9/L Final   06/28/2022 17.2 (H) 4.5 - 11.5 E9/L Final     Hemoglobin   Date Value Ref Range Status   06/30/2022 14.2 12.5 - 16.5 g/dL Final   06/29/2022 13.0 12.5 - 16.5 g/dL Final   06/28/2022 13.8 12.5 - 16.5 g/dL Final     Hematocrit   Date Value Ref Range Status   06/30/2022 44.5 37.0 - 54.0 % Final   06/29/2022 40.5 37.0 - 54.0 % Final   06/28/2022 42.6 37.0 - 54.0 % Final     MCV   Date Value Ref Range Status   06/30/2022 94.3 80.0 - 99.9 fL Final   06/29/2022 95.5 80.0 - 99.9 fL Final   06/28/2022 95.1 80.0 - 99.9 fL Final     Platelets   Date Value Ref Range Status   06/30/2022 174 130 - 450 E9/L Final   06/29/2022 139 130 - 450 E9/L Final   06/28/2022 191 130 - 450 E9/L Final     Sodium   Date Value Ref Range Status   07/01/2022 144 132 - 146 mmol/L Final   06/30/2022 145 132 - 146 mmol/L Final   06/29/2022 144 132 - 146 mmol/L Final     Potassium   Date Value Ref Range Status   07/01/2022 3.9 3.5 - 5.0 mmol/L Final   06/30/2022 4.4 3.5 - 5.0 mmol/L Final   06/29/2022 4.1 3.5 - 5.0 mmol/L Final     Potassium reflex Magnesium   Date Value Ref Range Status   06/24/2022 4.3 3.5 - 5.0 mmol/L Final   12/18/2020 3.7 3.5 - 5.0 mmol/L Final   07/23/2019 4.6 3.5 - 5.0 mmol/L Final     Chloride   Date Value Ref Range Status   07/01/2022 107 98 - 107 mmol/L Final   06/30/2022 109 (H) 98 - 107 mmol/L Final   06/29/2022 109 (H) 98 - 107 mmol/L Final     CO2   Date Value Ref Range Status   07/01/2022 27 22 - 29 mmol/L Final   06/30/2022 28 22 - 29 mmol/L Final   06/29/2022 26 22 - 29 mmol/L Final     BUN   Date Value Ref Range Status   07/01/2022 29 (H) 6 - 23 mg/dL Final   06/30/2022 29 (H) 6 - 23 mg/dL Final   06/29/2022 32 (H) 6 - 23 mg/dL Final     CREATININE   Date Value Ref Range Status   07/01/2022 1.0 0.7 - 1.2 mg/dL Final   06/30/2022 1.0 0.7 - 1.2 mg/dL Final   06/29/2022 1.0 0.7 - 1.2 mg/dL Final     Glucose   Date Value Ref Range Status   07/01/2022 101 (H) 74 - 99 mg/dL Final   06/30/2022 127 (H) 74 - 99 mg/dL Final   06/29/2022 163 (H) 74 - 99 mg/dL Final     Calcium   Date Value Ref Range Status   07/01/2022 8.8 8.6 - 10.2 mg/dL Final   06/30/2022 8.7 8.6 - 10.2 mg/dL Final   06/29/2022 8.6 8.6 - 10.2 mg/dL Final     Total Protein   Date Value Ref Range Status   07/01/2022 5.8 (L) 6.4 - 8.3 g/dL Final   06/30/2022 6.0 (L) 6.4 - 8.3 g/dL Final   06/29/2022 5.4 (L) 6.4 - 8.3 g/dL Final     Albumin   Date Value Ref Range Status   07/01/2022 3.3 (L) 3.5 - 5.2 g/dL Final   06/30/2022 3.5 3.5 - 5.2 g/dL Final   06/29/2022 3.2 (L) 3.5 - 5.2 g/dL Final     Total Bilirubin   Date Value Ref Range Status   07/01/2022 0.9 0.0 - 1.2 mg/dL Final   06/30/2022 0.8 0.0 - 1.2 mg/dL Final   06/29/2022 0.6 0.0 - 1.2 mg/dL Final     Alkaline Phosphatase   Date Value Ref Range Status   07/01/2022 77 40 - 129 U/L Final   06/30/2022 71 40 - 129 U/L Final   06/29/2022 63 40 - 129 U/L Final     AST   Date Value Ref Range Status   07/01/2022 52 (H) 0 - 39 U/L Final   06/30/2022 54 (H) 0 - 39 U/L Final   06/29/2022 43 (H) 0 - 39 U/L Final     ALT   Date Value Ref Range Status   07/01/2022 70 (H) 0 - 40 U/L Final   06/30/2022 63 (H) 0 - 40 U/L Final   06/29/2022 45 (H) 0 - 40 U/L Final     GFR Non-   Date Value Ref Range Status   07/01/2022 >60 >=60 mL/min/1.73 Final     Comment:     Chronic Kidney Disease: less than 60 ml/min/1.73 sq.m. Kidney Failure: less than 15 ml/min/1.73 sq.m. Results valid for patients 18 years and older. 06/30/2022 >60 >=60 mL/min/1.73 Final     Comment:     Chronic Kidney Disease: less than 60 ml/min/1.73 sq.m. Kidney Failure: less than 15 ml/min/1.73 sq.m. Results valid for patients 18 years and older.      06/29/2022 >60 >=60 mL/min/1.73 Final     Comment:     Chronic Kidney Disease: less than 60 ml/min/1.73 sq.m. Kidney Failure: less than 15 ml/min/1.73 sq.m. Results valid for patients 18 years and older. GFR    Date Value Ref Range Status   07/01/2022 >60  Final   06/30/2022 >60  Final   06/29/2022 >60  Final     Magnesium   Date Value Ref Range Status   06/29/2022 2.3 1.6 - 2.6 mg/dL Final   06/28/2022 2.6 1.6 - 2.6 mg/dL Final   06/27/2022 2.5 1.6 - 2.6 mg/dL Final     Phosphorus   Date Value Ref Range Status   06/29/2022 2.6 2.5 - 4.5 mg/dL Final   06/28/2022 2.5 2.5 - 4.5 mg/dL Final   06/27/2022 2.9 2.5 - 4.5 mg/dL Final     Recent Labs     07/01/22  0427   PH 7.495*   PO2 67.7*   PCO2 33.6*   HCO3 25.3   BE 2.6   O2SAT 93.5       RADIOLOGY:  XR CHEST PORTABLE   Final Result   1. Minimal right lung base atelectasis. The chest x-ray is otherwise   unremarkable. XR CHEST PORTABLE   Final Result   Addendum 1 of 1   ADDENDUM:   I was asked by the attending physician to addend the report dictated by    Dr. Dorota Portillo. The correct position of the distal tip of the left PICC line is within the   superior vena cava directed cephalad. The tip is not in the internal    jugular   vein. The tip of the left PICC line is seen within the superior vena cava    directed   cephalad. Final   1. Left-sided PICC line crosses midline and ascends cranially, likely within   the right internal jugular vein. 2. Persistent alveolar consolidation in the right lung base. 3. Possible small bilateral pleural effusions. XR CHEST PORTABLE   Final Result   Areas of a bi basilar atelectasis, no significant changes since the June 28   study. XR CHEST PORTABLE   Final Result   Bibasilar atelectasis         XR CHEST PORTABLE   Final Result   Improved aeration at the right base likely secondary to re-expansion of   atelectasis. The appearance now I believe is quit Jet Roland to this patient's   baseline.          CT ABDOMEN PELVIS WO CONTRAST physician assessment. I devoted my full attention to the direct care of this patient for the amount of time indicated below. Time I spent with the family or surrogate(s) is included only if the patient was incapable of providing the necessary information or participating in medical decisions - Time devoted to teaching and to any procedures I billed separately is not included.     CRITICAL CARE TIME:  31 minutes    Electronically signed by Antony Magaña MD on 7/1/2022 at 10:03 AM

## 2022-07-01 NOTE — PROGRESS NOTES
Patient mildly confused throughout this shift but easily redirectable and more cooperative than two previous night shifts. Patient disconnected pink port from tubing of PICC line; no blood draining from line but it is now clamped. Perfectserve will be sent to PICC team in morning to exchange; patient agreeable and aware at this time.  Adrianna Cano RN

## 2022-07-01 NOTE — PROGRESS NOTES
Cardiology  Progress Note      SUBJECTIVE:  Very weak. Dyspnea at rest.  Chest sounds congested.     Current Inpatient Medications  Current Facility-Administered Medications: lidocaine 1 % injection 5 mL, 5 mL, IntraDERmal, Once  sodium chloride flush 0.9 % injection 5-40 mL, 5-40 mL, IntraVENous, 2 times per day  sodium chloride flush 0.9 % injection 5-40 mL, 5-40 mL, IntraVENous, PRN  0.9 % sodium chloride infusion, , IntraVENous, PRN  heparin flush 100 UNIT/ML injection 100 Units, 1 mL, IntraVENous, 2 times per day  heparin flush 100 UNIT/ML injection 100 Units, 1 mL, IntraCATHeter, PRN  meropenem (MERREM) 1,000 mg in sodium chloride 0.9 % 100 mL IVPB (mini-bag), 1,000 mg, IntraVENous, Q8H  levalbuterol (XOPENEX) nebulization 0.63 mg, 0.63 mg, Nebulization, Q4H  ipratropium (ATROVENT) 0.02 % nebulizer solution 0.5 mg, 0.5 mg, Nebulization, Q4H  dilTIAZem (CARDIZEM CD) extended release capsule 240 mg, 240 mg, Oral, Daily  metoprolol tartrate (LOPRESSOR) tablet 25 mg, 25 mg, Oral, TID  acetylcysteine (MUCOMYST) 20 % solution 600 mg, 600 mg, Inhalation, q8h  acetaminophen (TYLENOL) tablet 650 mg, 650 mg, Oral, Q6H PRN  vancomycin (VANCOCIN) 1,000 mg in sodium chloride 0.9 % 250 mL IVPB ([id number]), 1,000 mg, IntraVENous, Q12H  doxycycline hyclate (VIBRAMYCIN) capsule 100 mg, 100 mg, Oral, 2 times per day  Roflumilast (DALIRESP) tablet 500 mcg, 500 mcg, Oral, Daily  famotidine (PEPCID) tablet 20 mg, 20 mg, Oral, BID  haloperidol lactate (HALDOL) injection 2 mg, 2 mg, IntraVENous, Q2H PRN  lactated ringers infusion, , IntraVENous, Continuous  sodium chloride flush 0.9 % injection 5-40 mL, 5-40 mL, IntraVENous, 2 times per day  sodium chloride flush 0.9 % injection 5-40 mL, 5-40 mL, IntraVENous, PRN  0.9 % sodium chloride infusion, , IntraVENous, PRN  heparin flush 100 UNIT/ML injection 300 Units, 3 mL, IntraVENous, 2 times per day  heparin flush 100 UNIT/ML injection 300 Units, 3 mL, IntraCATHeter, PRN  apixaban (ELIQUIS) tablet 5 mg, 5 mg, Oral, BID  atorvastatin (LIPITOR) tablet 10 mg, 10 mg, Oral, Daily  vitamin D (CHOLECALCIFEROL) tablet 2,000 Units, 2,000 Units, Oral, Daily  budesonide (PULMICORT) nebulizer suspension 500 mcg, 0.5 mg, Nebulization, BID  Arformoterol Tartrate (BROVANA) nebulizer solution 15 mcg, 15 mcg, Nebulization, BID      Physical  VITALS:  /82   Pulse 86   Temp 98.4 °F (36.9 °C) (Oral)   Resp 24   Ht 5' 11\" (1.803 m)   Wt 257 lb 15 oz (117 kg)   SpO2 95%   BMI 35.98 kg/m²   CURRENT TEMPERATURE:  Temp: 98.4 °F (36.9 °C)  CONSTITUTIONAL: No acute distress. EYES: Vision is intact. ENT: No sore throat. No ear drainage. NECK: No JVD. BACK: Symmetric. LUNGS:  rhonchi right base and left base, diminished breath sounds right base and left base  CARDIOVASCULAR:  normal S1 and S2, no S3 and no S4  ABDOMEN:  non-tender  NEUROLOGIC: No focal deficits. EXTREMITIES: Trace edema in legs with chronic discoloration. DATA:        Cardiology Labs:  BMP:    Lab Results   Component Value Date/Time     07/01/2022 05:57 AM    K 3.9 07/01/2022 05:57 AM    K 4.3 06/24/2022 12:27 AM     07/01/2022 05:57 AM    CO2 27 07/01/2022 05:57 AM    BUN 29 07/01/2022 05:57 AM     CBC:    Lab Results   Component Value Date/Time    WBC 19.3 06/30/2022 01:59 AM    RBC 4.72 06/30/2022 01:59 AM    HGB 14.2 06/30/2022 01:59 AM    HCT 44.5 06/30/2022 01:59 AM    MCV 94.3 06/30/2022 01:59 AM    RDW 13.8 06/30/2022 01:59 AM     06/30/2022 01:59 AM     PT/INR:  No results found for: PTINR  TROPONIN:  No components found for: TROP    ASSESSMENT    1.paroxysmal atrial fibrillation. Patient was in sinus rhythm on admission. He went into atrial fibrillation June 28 with rapid ventricular response. This atrial fibrillation started after patient was put on theophylline drip for his respiratory problems. theophylline drip was stopped. He was started on Cardizem drip.   Patient converted back to sinus rhythm. He is now on Cardizem CD and metoprolol. We may consider antiarrhythmic medication like sotalol if he continues to have these episodes of atrial fibrillation down the road. Hopefully patient will convert to sinus rhythm . 2.elevation of high sensitivity troponin. This is mostly demand ischemia from his respiratory problems with type II non-STEMI. EKG is not showing acute ischemic changes. No complaints of chest pain. Pulmonary status is not good and I do not feel patient is suitable for any coronary workup. 3.committee acquired pneumonia/acute exacerbation of COPD. On antibiotics and aerosol treatment. Pulmonary status appears to be his main issue right now. Case is discussed yesterday with the intensivist.          PLAN  As per orders.

## 2022-07-01 NOTE — PLAN OF CARE
Problem: Safety - Adult  Goal: Free from fall injury  7/1/2022 0411 by Karene Aase, RN  Outcome: Progressing     Problem: ABCDS Injury Assessment  Goal: Absence of physical injury  7/1/2022 0411 by Karene Aase, RN  Outcome: Progressing     Problem: Pain  Goal: Verbalizes/displays adequate comfort level or baseline comfort level  7/1/2022 0411 by Karene Aase, RN  Outcome: Progressing     Problem: Confusion  Goal: Confusion, delirium, dementia, or psychosis is improved or at baseline  Description: INTERVENTIONS:  1. Assess for possible contributors to thought disturbance, including medications, impaired vision or hearing, underlying metabolic abnormalities, dehydration, psychiatric diagnoses, and notify attending LIP  2. [place] high risk fall precautions, as indicated  3. Provide frequent short contacts to provide reality reorientation, refocusing and direction  4. Decrease environmental stimuli, including noise as appropriate  5. Monitor and intervene to maintain adequate nutrition, hydration, elimination, sleep and activity  6. If unable to ensure safety without constant attention obtain sitter and review sitter guidelines with assigned personnel  7.  Initiate Psychosocial CNS and Spiritual Care consult, as indicated  7/1/2022 0411 by Karene Aase, RN  Outcome: Progressing     Problem: Skin/Tissue Integrity - Adult  Goal: Skin integrity remains intact  7/1/2022 0411 by Karene Aase, RN  Outcome: Progressing     Problem: Genitourinary - Adult  Goal: Urinary catheter remains patent  Outcome: Progressing

## 2022-07-01 NOTE — PROGRESS NOTES
SPEECH/LANGUAGE PATHOLOGY  CLINICAL ASSESSMENT OF SWALLOWING FUNCTION   and PLAN OF CARE    PATIENT NAME:  Illene Blizzard  (male)     MRN:  [id number]    :  1940  ([de-identified] y.o.)  STATUS:  Inpatient: Room IC03/IC03-01    TODAY'S DATE:  2022  REFERRING PROVIDER:    SLP eval and treat Start: 22 1500, End: 22 1500, ONE TIME, Standing Count: 1 Occurrences, R    Gerson Sol MD  REASON FOR REFERRAL: dysphagia   EVALUATING THERAPIST: SHREYAS Benjamin                 RESULTS:    DYSPHAGIA DIAGNOSIS:   Clinical indicators of functional swallow for age/premorbid functioning      DIET RECOMMENDATIONS:  Regular consistency solids (IDDSI level 7) with  thin liquids (IDDSI level 0)     FEEDING RECOMMENDATIONS:     Assistance level:  Set-up is required for all oral intake, Encourage self-feeding      Compensatory strategies recommended: Small bites/sips and Alternate solids and liquids      Discussed recommendations with nursing and/or faxed report to referring provider: Yes    SPEECH THERAPY  PLAN OF CARE   The dysphagia POC is established based on physician order, dysphagia diagnosis and results of clinical assessment     Skilled SLP intervention for dysphagia management up to 5x per week until goals met, pt plateaus in function and/or discharged from hospital    Conditions Requiring Skilled Therapeutic Intervention for dysphagia:    Underlying moist cough decreases ability to rely on presence or absence of clinical indicators of dysphagia    Specific dysphagia interventions to include:     Meal time assessment for 1-2 sessions to provide diet modification and compensatory strategy implementation due to Underlying moist cough decreases ability to rely on presence or absence of clinical indicators of dysphagia    Specific instructions for next treatment:  development and training of compensatory swallow strategies to improve airway protection and swallow function  Patient Treatment Goals:    Short Term Goals:  Pt will participate in meal time assessment for 1-2 sessions to provide diet modification and compensatory strategy implementation due to Underlying moist cough decreases ability to rely on presence or absence of clinical indicators of dysphagia    Long Term Goals:   Pt will maintain adequate nutrition/hydration via PO intake of the least restrictive oral diet with implementation of safe swallow/ compensatory strategies and decrease signs/symptoms of aspiration to less than 1 x/day. OTHER RECOMMENDATIONS:  A Video Swallow Study (MBSS) is recommended and requires a physician order IF silent aspiration is suspected based on medical presentation     Patient/family Goal:    To be able to eat regular foods and drink regular liquids    Plan of care discussed with Patient   The Patient did not demonstrate complete understanding of the diagnosis, prognosis and plan of care     Rehabilitation Potential/Prognosis: good                    ADMITTING DIAGNOSIS: Lactic acidosis [E87.2]  Pneumonia [J18.9]  Septicemia ([facility] [address] [A41.9]  Acute on chronic respiratory failure with hypoxia ([facility] [address] [J96.21]  Pneumonia due to infectious organism, unspecified laterality, unspecified part of lung [J18.9]    VISIT DIAGNOSIS:   Visit Diagnoses       Codes    Pneumonia due to infectious organism, unspecified laterality, unspecified part of lung    -  Primary J18.9    Septicemia ([facility] [address]     A41.9    Lactic acidosis     E87.2           PATIENT REPORT/COMPLAINT: denies difficulty swallowing  RN cleared patient for participation in assessment     yes     PRIOR LEVEL OF SWALLOW FUNCTION:    PAST HISTORY OF DYSPHAGIA?: none reported    Home diet: Regular consistency solids (IDDSI level 7) with  thin liquids (IDDSI level 0)  Current Diet Order:  ADULT DIET; Regular  ADULT ORAL NUTRITION SUPPLEMENT; Breakfast, Lunch, Dinner;  Low Calorie/High Protein Oral Supplement    PROCEDURE:  Consistencies Administered During the Evaluation   Liquids: thin liquid   Solids:  declined       Method of Intake:   straw  Self fed      Position:   Seated, upright    CLINICAL ASSESSMENT:  Oral Stage: The oral stage of swallowing was within functional limits for consistencies administered      Pharyngeal Stage:    No signs of aspiration were noted during this evaluation however, silent aspiration cannot be ruled out at bedside. If silent aspiration is suspected, a Videofluoroscopic Study of Swallowing (MBS) is recommended and requires a physician order. Congested cough throughout evaluation    Cognition:   Within functional limits for this exam    Oral Peripheral Examination   Generalized oral weakness    Current Respiratory Status    6 liters high flow nasal cannula     Parameters of Speech Production  Respiration:  Adequate for speech production  Quality:   Within functional limits  Intensity: Within functional limits    Volitional Swallow: present     Volitional Cough:   present     Pain: No pain reported. EDUCATION:   The Speech Language Pathologist (SLP) completed education regarding results of evaluation and that intervention is warranted at this time. Learner: Patient  Education: Reviewed results and recommendations of this evaluation  Evaluation of Education:  Needs further instruction    This plan may be re-evaluated and revised as warranted. Evaluation Time includes thorough review of current medical information, gathering information on past medical history/social history and prior level of function, completion of standardized testing/informal observation of tasks, assessment of data and education on plan of care and goals. [x]The admitting diagnosis and active problem list, have been reviewed prior to initiation of this evaluation.         ACTIVE PROBLEM LIST:   Patient Active Problem List   Diagnosis    Influenza, pneumonia    Dyspnea    Appendicitis, acute    Acute exacerbation of chronic obstructive pulmonary disease (COPD) ([facility] [address]    Septic shock ([facility] [address]    Agitation    Community acquired pneumonia of left lower lobe of lung    Acute on chronic respiratory failure with hypoxia (HCC)    HLD (hyperlipidemia)    Anxiety    GERD (gastroesophageal reflux disease)    Atrial fibrillation with rapid ventricular response ([facility] [address]    LORAINE (acute kidney injury) ([facility] [address]    Pneumonia         CPT code:  [address]  [facility]  bedside swallow bailee John MSCCC/SLP  Speech Language Pathologist  [id number]

## 2022-07-01 NOTE — PLAN OF CARE
Progressing  7/1/2022 0411 by Jojo Sam RN  Outcome: Progressing     Problem: Nutrition Deficit:  Goal: Optimize nutritional status  Outcome: Progressing     Problem: Skin/Tissue Integrity  Goal: Absence of new skin breakdown  Description: 1. Monitor for areas of redness and/or skin breakdown  2. Assess vascular access sites hourly  3. Every 4-6 hours minimum:  Change oxygen saturation probe site  4. Every 4-6 hours:  If on nasal continuous positive airway pressure, respiratory therapy assess nares and determine need for appliance change or resting period.   Outcome: Progressing     Problem: Chronic Conditions and Co-morbidities  Goal: Patient's chronic conditions and co-morbidity symptoms are monitored and maintained or improved  Outcome: Progressing     Problem: Respiratory - Adult  Goal: Achieves optimal ventilation and oxygenation  Outcome: Progressing     Problem: Cardiovascular - Adult  Goal: Maintains optimal cardiac output and hemodynamic stability  Outcome: Progressing  Goal: Absence of cardiac dysrhythmias or at baseline  Outcome: Progressing     Problem: Skin/Tissue Integrity - Adult  Goal: Skin integrity remains intact  7/1/2022 1123 by Kurt Gould RN  Outcome: Progressing  Flowsheets (Taken 7/1/2022 0800)  Skin Integrity Remains Intact: Monitor for areas of redness and/or skin breakdown  7/1/2022 0411 by Jojo Sam RN  Outcome: Progressing  Goal: Oral mucous membranes remain intact  Outcome: Progressing     Problem: Musculoskeletal - Adult  Goal: Return mobility to safest level of function  Outcome: Progressing  Goal: Maintain proper alignment of affected body part  Outcome: Progressing     Problem: Genitourinary - Adult  Goal: Urinary catheter remains patent  7/1/2022 1123 by Kurt Gould RN  Outcome: Progressing  Flowsheets (Taken 7/1/2022 0800)  Urinary catheter remains patent: Assess patency of urinary catheter  7/1/2022 0411 by Jojo Sam RN  Outcome: Progressing Problem: Infection - Adult  Goal: Absence of infection at discharge  Outcome: Progressing

## 2022-07-01 NOTE — PROGRESS NOTES
Pts wife, Meena Jemima, present at bedside for visitation. Emotional support provided. Wife is emotionally upset due to the fact that the patient will require rehab at time of discharge. Meena Onofre states that she would like to use \"[employer]\" for transportation at time of discharge.

## 2022-07-02 NOTE — PROGRESS NOTES
PULMONARY MEDICINE CONSULT      [de-identified]year old person with PMH of COPD with chronic hypoxemic respiratory failure (on 5L ), hyperlipidemia, as described below admitted to hospital for management of worsening dyspnea, right lower lobe pneumonia, acute on chronic respiratory failure. .    The patient is receiving azithromycin and cefepime, methylprednisolone and bronchodilators. He is chronically anticoagulated with apixaban. 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  · Feels better today, oriented to person and place  · On 6L NC  · He is being forced to get out of bed as he refuses movements       /77   Pulse 100   Temp 97.7 °F (36.5 °C) (Oral)   Resp 26   Ht 5' 11\" (1.803 m)   Wt 259 lb 0.7 oz (117.5 kg)   SpO2 96%   BMI 36.13 kg/m²     General: Awake, oriented to place and person  HEENT: No head lesions, PERRL, EOMI, mouth without lesions, no nasal lesions, no cervical adenopathy palpated  Respiratory: Lungs with diminished breath sounds bilaterally and with prolonged expiratory phase, no accessory muscle use  CV: Regular rate, no murmurs, JVD, no leg edema  Abdomen: Soft, non tender, + bowel sounds, no lesions  Skin: Hydrated, adequate turgor, no rash, capillary refill <2 seconds  Extremities: Muscular strength 3-44/5 in 4 limbs, moves 4 limbs spontaneously, distal pulses present  Neurology: Awake and alert, follows commands, moves 4 limbs on command and spontaneously, neck is supple, no meningitic signs present.       A/P:  Acute on chronic hypoxemic respiratory failure secondary to pneumonia -Community acquired (pneumococcus vs atypicals vs pseudomonas in a patient with COPD  --Cultures of blood and sputum ordered, pending  --Check pneumococcal and legionella urinary antigens  --Antimicrobial therapy: Doxycycline, meropenem and vancomycin for 3 more days  --Bronchodilators: Arformoterol/budesonide + albuterol/iparatropium q 4 hrs + albuterol PRN + Roflumilast  --Incentive spirometry and flutter valve 10 times/hour while awake + Mucomyst    Atrial fibrillation  --Continue cardizem and metoprolol    Hyperlipidemia  --Continue statin     Hypertension  --On antihypertensives    DVT prophylaxis - anticoagulated with apixaban   PT/OT consulted    Past Medical History:   Diagnosis Date    Atrial fibrillation ([address]     COPD (chronic obstructive pulmonary disease) (HCC)     Hyperlipidemia     Pneumonia     Steroid side effects     combative      No family history on file. Social History     Socioeconomic History    Marital status:      Spouse name: Not on file    Number of children: Not on file    Years of education: Not on file    Highest education level: Not on file   Occupational History    Not on file   Tobacco Use    Smoking status: Former Smoker     Quit date: 2009     Years since quittin.6    Smokeless tobacco: Former User   Vaping Use    Vaping Use: Never used   Substance and Sexual Activity    Alcohol use: Not Currently    Drug use: No    Sexual activity: Not Currently   Other Topics Concern    Not on file   Social History Narrative    Not on file     Social Determinants of Health     Financial Resource Strain:     Difficulty of Paying Living Expenses: Not on file   Food Insecurity:     Worried About [address] in the Last Year: Not on file    [address] in the Last Year: Not on file   Transportation Needs:     Lack of Transportation (Medical): Not on file    Lack of Transportation (Non-Medical):  Not on file   Physical Activity:     Days of Exercise per Week: Not on file    Minutes of Exercise per Session: Not on file   Stress:     Feeling of Stress : Not on file   Social Connections:     Frequency of Communication with Friends and Family: Not on file    Frequency of Social Gatherings with Friends and Family: Not on file    Attends [employer] Services: Not on file    Active Member of Clubs or Organizations: Not on file    Attends Club or Organization Meetings: Not on file    Marital Status: Not on file   Intimate Partner Violence:     Fear of Current or Ex-Partner: Not on file    Emotionally Abused: Not on file    Physically Abused: Not on file    Sexually Abused: Not on file   Housing Stability:     Unable to Pay for Housing in the Last Year: Not on file    Number of [place] in the Last Year: Not on file    Unstable Housing in the Last Year: Not on file     Current Facility-Administered Medications   Medication Dose Route Frequency Provider Last Rate Last Admin    lidocaine 1 % injection 5 mL  5 mL IntraDERmal Once Frances Segura MD        sodium chloride flush 0.9 % injection 5-40 mL  5-40 mL IntraVENous 2 times per day Frances Segura MD   10 mL at 07/02/22 0824    sodium chloride flush 0.9 % injection 5-40 mL  5-40 mL IntraVENous PRN Frances Segura MD        0.9 % sodium chloride infusion   IntraVENous PRN Frances Segura MD 12.5 mL/hr at 07/01/22 1833 25 mL at 07/01/22 1833    heparin flush 100 UNIT/ML injection 100 Units  1 mL IntraVENous 2 times per day Frances Segura MD   100 Units at 07/02/22 0824    heparin flush 100 UNIT/ML injection 100 Units  1 mL IntraCATHeter PRN Frances Segura MD        meropenem (MERREM) 1,000 mg in sodium chloride 0.9 % 100 mL IVPB (mini-bag)  1,000 mg IntraVENous Q8H Frances Segura MD   Stopped at 07/02/22 0935    levalbuterol (XOPENEX) nebulization 0.63 mg  0.63 mg Nebulization Q4H Frances Segura MD   0.63 mg at 07/02/22 0910    ipratropium (ATROVENT) 0.02 % nebulizer solution 0.5 mg  0.5 mg Nebulization Q4H Frances Segura MD   0.5 mg at 07/02/22 0910    dilTIAZem (CARDIZEM CD) extended release capsule 240 mg  240 mg Oral Daily Frances Segura MD   240 mg at 07/02/22 7248    metoprolol tartrate (LOPRESSOR) tablet 25 mg  25 mg Oral TID Frances Segura MD   25 mg at 07/02/22 0823    acetylcysteine (MUCOMYST) 20 % solution 600 mg  600 mg Inhalation q8h Frances Segura MD   600 mg at 07/02/22 0437    acetaminophen (TYLENOL) tablet 650 mg  650 mg Oral Q6H PRN Natalio Winters MD   650 mg at 07/01/22 2123    vancomycin (VANCOCIN) 1,000 mg in sodium chloride 0.9 % 250 mL IVPB ([id number])  1,000 mg IntraVENous Q12H Natalio Winters MD   Stopped at 07/02/22 0418    doxycycline hyclate (VIBRAMYCIN) capsule 100 mg  100 mg Oral 2 times per day Natalio Winters MD   100 mg at 07/02/22 8241    Roflumilast (DALIRESP) tablet 500 mcg  500 mcg Oral Daily Natalio Winters MD   500 mcg at 07/02/22 5954    famotidine (PEPCID) tablet 20 mg  20 mg Oral BID Natalio Winters MD   20 mg at 07/02/22 5425    haloperidol lactate (HALDOL) injection 2 mg  2 mg IntraVENous Q2H PRN Natalio Winters MD   2 mg at 06/29/22 1537    lactated ringers infusion   IntraVENous Continuous Natalio Winters MD   Stopped at 06/29/22 1700    sodium chloride flush 0.9 % injection 5-40 mL  5-40 mL IntraVENous 2 times per day Natalio Winters MD   10 mL at 07/02/22 0824    sodium chloride flush 0.9 % injection 5-40 mL  5-40 mL IntraVENous PRN Natalio Winters MD   10 mL at 07/01/22 0557    0.9 % sodium chloride infusion   IntraVENous PRN Natalio Winters MD   Stopped at 06/30/22 1446    heparin flush 100 UNIT/ML injection 300 Units  3 mL IntraVENous 2 times per day Natalio Winters MD   300 Units at 07/01/22 1957    heparin flush 100 UNIT/ML injection 300 Units  3 mL IntraCATHeter PRN Natalio Winters MD   300 Units at 07/01/22 0557    apixaban (ELIQUIS) tablet 5 mg  5 mg Oral BID Milo Lees MD   5 mg at 07/02/22 3795    atorvastatin (LIPITOR) tablet 10 mg  10 mg Oral Daily Milo Lees MD   10 mg at 07/01/22 1806    vitamin D (CHOLECALCIFEROL) tablet 2,000 Units  2,000 Units Oral Daily Milo Lees MD   2,000 Units at 07/02/22 0823    budesonide (PULMICORT) nebulizer suspension 500 mcg  0.5 mg Nebulization BID Milo Lees MD   500 mcg at 07/02/22 0437    Arformoterol Tartrate (BROVANA) nebulizer solution 15 mcg  15 mcg Nebulization BID Milo Lees MD   15 mcg at 07/02/22 0437     MEDICATIONS:   lidocaine  5 mL IntraDERmal Once    sodium chloride flush  5-40 mL IntraVENous 2 times per day    heparin flush  1 mL IntraVENous 2 times per day    meropenem  1,000 mg IntraVENous Q8H    levalbuterol  0.63 mg Nebulization Q4H    ipratropium  0.5 mg Nebulization Q4H    dilTIAZem  240 mg Oral Daily    metoprolol tartrate  25 mg Oral TID    acetylcysteine  600 mg Inhalation q8h    vancomycin  1,000 mg IntraVENous Q12H    doxycycline hyclate  100 mg Oral 2 times per day    Roflumilast  500 mcg Oral Daily    famotidine  20 mg Oral BID    sodium chloride flush  5-40 mL IntraVENous 2 times per day    heparin flush  3 mL IntraVENous 2 times per day    apixaban  5 mg Oral BID    atorvastatin  10 mg Oral Daily    Vitamin D  2,000 Units Oral Daily    budesonide  0.5 mg Nebulization BID    Arformoterol Tartrate  15 mcg Nebulization BID      sodium chloride 25 mL (07/01/22 1833)    lactated ringers Stopped (06/29/22 1700)    sodium chloride Stopped (06/30/22 1446)         OBJECTIVE:  Vitals:    07/02/22 0900   BP: 133/77   Pulse: 100   Resp: 26   Temp:    SpO2: 96%     FiO2 : 45 %  O2 Flow Rate (L/min): 6 L/min  O2 Device: Nasal cannula        LABS:  WBC   Date Value Ref Range Status   07/02/2022 16.5 (H) 4.5 - 11.5 E9/L Final   06/30/2022 19.3 (H) 4.5 - 11.5 E9/L Final   06/29/2022 12.2 (H) 4.5 - 11.5 E9/L Final     Hemoglobin   Date Value Ref Range Status   07/02/2022 14.9 12.5 - 16.5 g/dL Final   06/30/2022 14.2 12.5 - 16.5 g/dL Final   06/29/2022 13.0 12.5 - 16.5 g/dL Final     Hematocrit   Date Value Ref Range Status   07/02/2022 46.4 37.0 - 54.0 % Final   06/30/2022 44.5 37.0 - 54.0 % Final   06/29/2022 40.5 37.0 - 54.0 % Final     MCV   Date Value Ref Range Status   07/02/2022 93.9 80.0 - 99.9 fL Final   06/30/2022 94.3 80.0 - 99.9 fL Final   06/29/2022 95.5 80.0 - 99.9 fL Final     Platelets   Date Value Ref Range Status   07/02/2022 127 (L) 130 - 450 E9/L Final 06/30/2022 174 130 - 450 E9/L Final   06/29/2022 139 130 - 450 E9/L Final     Sodium   Date Value Ref Range Status   07/02/2022 142 132 - 146 mmol/L Final   07/01/2022 144 132 - 146 mmol/L Final   06/30/2022 145 132 - 146 mmol/L Final     Potassium   Date Value Ref Range Status   07/02/2022 3.5 3.5 - 5.0 mmol/L Final   07/01/2022 3.9 3.5 - 5.0 mmol/L Final   06/30/2022 4.4 3.5 - 5.0 mmol/L Final     Potassium reflex Magnesium   Date Value Ref Range Status   06/24/2022 4.3 3.5 - 5.0 mmol/L Final   12/18/2020 3.7 3.5 - 5.0 mmol/L Final   07/23/2019 4.6 3.5 - 5.0 mmol/L Final     Chloride   Date Value Ref Range Status   07/02/2022 105 98 - 107 mmol/L Final   07/01/2022 107 98 - 107 mmol/L Final   06/30/2022 109 (H) 98 - 107 mmol/L Final     CO2   Date Value Ref Range Status   07/02/2022 28 22 - 29 mmol/L Final   07/01/2022 27 22 - 29 mmol/L Final   06/30/2022 28 22 - 29 mmol/L Final     BUN   Date Value Ref Range Status   07/02/2022 25 (H) 6 - 23 mg/dL Final   07/01/2022 29 (H) 6 - 23 mg/dL Final   06/30/2022 29 (H) 6 - 23 mg/dL Final     CREATININE   Date Value Ref Range Status   07/02/2022 0.9 0.7 - 1.2 mg/dL Final   07/01/2022 1.0 0.7 - 1.2 mg/dL Final   06/30/2022 1.0 0.7 - 1.2 mg/dL Final     Glucose   Date Value Ref Range Status   07/02/2022 118 (H) 74 - 99 mg/dL Final   07/01/2022 101 (H) 74 - 99 mg/dL Final   06/30/2022 127 (H) 74 - 99 mg/dL Final     Calcium   Date Value Ref Range Status   07/02/2022 8.8 8.6 - 10.2 mg/dL Final   07/01/2022 8.8 8.6 - 10.2 mg/dL Final   06/30/2022 8.7 8.6 - 10.2 mg/dL Final     Total Protein   Date Value Ref Range Status   07/02/2022 5.6 (L) 6.4 - 8.3 g/dL Final   07/01/2022 5.8 (L) 6.4 - 8.3 g/dL Final   06/30/2022 6.0 (L) 6.4 - 8.3 g/dL Final     Albumin   Date Value Ref Range Status   07/02/2022 3.1 (L) 3.5 - 5.2 g/dL Final   07/01/2022 3.3 (L) 3.5 - 5.2 g/dL Final   06/30/2022 3.5 3.5 - 5.2 g/dL Final     Total Bilirubin   Date Value Ref Range Status   07/02/2022 0.9 0.0 - unremarkable. XR CHEST PORTABLE   Final Result   Addendum 1 of 1   ADDENDUM:   I was asked by the attending physician to addend the report dictated by    Dr. Cinthia Alaniz. The correct position of the distal tip of the left PICC line is within the   superior vena cava directed cephalad. The tip is not in the internal    jugular   vein. The tip of the left PICC line is seen within the superior vena cava    directed   cephalad. Final   1. Left-sided PICC line crosses midline and ascends cranially, likely within   the right internal jugular vein. 2. Persistent alveolar consolidation in the right lung base. 3. Possible small bilateral pleural effusions. XR CHEST PORTABLE   Final Result   Areas of a bi basilar atelectasis, no significant changes since the June 28   study. XR CHEST PORTABLE   Final Result   Bibasilar atelectasis         XR CHEST PORTABLE   Final Result   Improved aeration at the right base likely secondary to re-expansion of   atelectasis. The appearance now I believe is quit [place] to this patient's   baseline. CT ABDOMEN PELVIS WO CONTRAST Additional Contrast? None   Final Result   Limited study due to patient motion. No acute intra-abdominal or pelvic findings. Right lower lobe consolidative changes, suggestive of a pneumonia. XR CHEST PORTABLE   Final Result   Bilateral basilar disease. The possibility of a bilateral lower lobe   pneumonia cannot be excluded in the appropriate setting. PROBLEM LIST:  Principal Problem:    Pneumonia  Resolved Problems:    * No resolved hospital problems.  *        Aditya Ferrara MD  Pulmonary and Critical Care Medicine

## 2022-07-02 NOTE — PROGRESS NOTES
Department of Internal Medicine  General Internal Medicine  Attending Progress Note      SUBJECTIVE:    Seen and examined in the ICU this morning  Agitation throughout day, not wanting to get out of bed      Medications    Current Facility-Administered Medications: lidocaine 1 % injection 5 mL, 5 mL, IntraDERmal, Once  sodium chloride flush 0.9 % injection 5-40 mL, 5-40 mL, IntraVENous, 2 times per day  sodium chloride flush 0.9 % injection 5-40 mL, 5-40 mL, IntraVENous, PRN  0.9 % sodium chloride infusion, , IntraVENous, PRN  heparin flush 100 UNIT/ML injection 100 Units, 1 mL, IntraVENous, 2 times per day  heparin flush 100 UNIT/ML injection 100 Units, 1 mL, IntraCATHeter, PRN  meropenem (MERREM) 1,000 mg in sodium chloride 0.9 % 100 mL IVPB (mini-bag), 1,000 mg, IntraVENous, Q8H  levalbuterol (XOPENEX) nebulization 0.63 mg, 0.63 mg, Nebulization, Q4H  ipratropium (ATROVENT) 0.02 % nebulizer solution 0.5 mg, 0.5 mg, Nebulization, Q4H  dilTIAZem (CARDIZEM CD) extended release capsule 240 mg, 240 mg, Oral, Daily  metoprolol tartrate (LOPRESSOR) tablet 25 mg, 25 mg, Oral, TID  acetylcysteine (MUCOMYST) 20 % solution 600 mg, 600 mg, Inhalation, q8h  acetaminophen (TYLENOL) tablet 650 mg, 650 mg, Oral, Q6H PRN  vancomycin (VANCOCIN) 1,000 mg in sodium chloride 0.9 % 250 mL IVPB ([id number]), 1,000 mg, IntraVENous, Q12H  doxycycline hyclate (VIBRAMYCIN) capsule 100 mg, 100 mg, Oral, 2 times per day  Roflumilast (DALIRESP) tablet 500 mcg, 500 mcg, Oral, Daily  famotidine (PEPCID) tablet 20 mg, 20 mg, Oral, BID  haloperidol lactate (HALDOL) injection 2 mg, 2 mg, IntraVENous, Q2H PRN  sodium chloride flush 0.9 % injection 5-40 mL, 5-40 mL, IntraVENous, 2 times per day  sodium chloride flush 0.9 % injection 5-40 mL, 5-40 mL, IntraVENous, PRN  0.9 % sodium chloride infusion, , IntraVENous, PRN  heparin flush 100 UNIT/ML injection 300 Units, 3 mL, IntraVENous, 2 times per day  heparin flush 100 UNIT/ML injection 300 Units, 3 mL, IntraCATHeter, PRN  apixaban (ELIQUIS) tablet 5 mg, 5 mg, Oral, BID  atorvastatin (LIPITOR) tablet 10 mg, 10 mg, Oral, Daily  vitamin D (CHOLECALCIFEROL) tablet 2,000 Units, 2,000 Units, Oral, Daily  budesonide (PULMICORT) nebulizer suspension 500 mcg, 0.5 mg, Nebulization, BID  Arformoterol Tartrate (BROVANA) nebulizer solution 15 mcg, 15 mcg, Nebulization, BID    Physical    VITALS:  /89   Pulse 88   Temp 98 °F (36.7 °C)   Resp 23   Ht 5' 11\" (1.803 m)   Wt 259 lb 0.7 oz (117.5 kg)   SpO2 94%   BMI 36.13 kg/m²   TEMPERATURE:  Current - Temp: 98 °F (36.7 °C); Max - Temp  Av.7 °F (36.5 °C)  Min: 97.4 °F (36.3 °C)  Max: 98 °F (36.7 °C)  RESPIRATIONS RANGE: Resp  Av.5  Min: 14  Max: 30  PULSE RANGE: Pulse  Av.3  Min: 69  Max: 117  BLOOD PRESSURE RANGE:  Systolic (31QXY), AFO:954 , Min:121 , UPB:827   ; Diastolic (08JZY), GPS:31, Min:69, Max:97    PULSE OXIMETRY RANGE: SpO2  Av.7 %  Min: 88 %  Max: 99 %  24HR INTAKE/OUTPUT:      Intake/Output Summary (Last 24 hours) at 2022 1640  Last data filed at 2022 1400  Gross per 24 hour   Intake 540 ml   Output 1300 ml   Net -760 ml       CONSTITUTIONAL: Alert and oriented x3, in no acute distress  HEENT: Normocephalic atraumatic. Pupils are equal and reactive bilaterally. Extraocular movements are intact. No pallor or icterus appreciated. NECK: Supple, no JVD , no lymphadenopathy, no bruits, no thyromegaly  LUNGS: diminished air movements b/l - however slightly improved,  Minimal  inspiratory crackles over bases and some wheezing, upper airway congestion as well. CARDIOVASCULAR: Regular, tachycardia, no murmur rub or gallop. ABDOMEN: Soft, nontender, distended, bowel sounds are positive, no organomegaly appreciated. NEUROLOGIC: sleepy but easy to arouse, oriented , no focal deficits noted. Mildly agitated. EXT: no edema, no clubbing, or cyanosis.     CBC with Differential:    Lab Results   Component Value Date/Time    WBC 16.5 yesterday but today at 16.5    5. A Fib with rvr   Cardizem drip on and off the past few days as was in and out of afib RVR. Now off drip and on metoprolol tartrate and PO cardizem and in NSR  Eliquis 5 mg po bid  Dr Parth Seo following. 6.Hyperlipidemia  Lipitor 10 mg daily    7. GERD  Continue pepcid 20 mg po bid      8. Steroid induced psychosis, episode of psychosis overnight secondary to respiratory status and dexamethasone which was discontinued    9. Hematuria, send urine for UA with microscopy  Cleared after irrigation, Urology following    10. PT/OT and  consult for rehab.  Patient approved for [facility] MD  4:40 PM  7/2/2022

## 2022-07-02 NOTE — PLAN OF CARE
Problem: Discharge Planning  Goal: Discharge to home or other facility with appropriate resources  Outcome: Not Progressing     Problem: Nutrition Deficit:  Goal: Optimize nutritional status  Outcome: Not Progressing     Problem: Safety - Adult  Goal: Free from fall injury  7/2/2022 0912 by Daniela Mendoza RN  Outcome: Progressing     Problem: ABCDS Injury Assessment  Goal: Absence of physical injury  7/2/2022 0912 by Daniela Mendoza RN  Outcome: Progressing     Problem: Confusion  Goal: Confusion, delirium, dementia, or psychosis is improved or at baseline  Description: INTERVENTIONS:  1. Assess for possible contributors to thought disturbance, including medications, impaired vision or hearing, underlying metabolic abnormalities, dehydration, psychiatric diagnoses, and notify attending LIP  2. [place] high risk fall precautions, as indicated  3. Provide frequent short contacts to provide reality reorientation, refocusing and direction  4. Decrease environmental stimuli, including noise as appropriate  5. Monitor and intervene to maintain adequate nutrition, hydration, elimination, sleep and activity  6. If unable to ensure safety without constant attention obtain sitter and review sitter guidelines with assigned personnel  7. Initiate Psychosocial CNS and Spiritual Care consult, as indicated  7/2/2022 0912 by Daniela Mendoza RN  Outcome: Progressing     Problem: Skin/Tissue Integrity  Goal: Absence of new skin breakdown  Description: 1. Monitor for areas of redness and/or skin breakdown  2. Assess vascular access sites hourly  3. Every 4-6 hours minimum:  Change oxygen saturation probe site  4. Every 4-6 hours:  If on nasal continuous positive airway pressure, respiratory therapy assess nares and determine need for appliance change or resting period.   7/2/2022 0912 by Daniela Mendoza RN  Outcome: Progressing     Problem: Skin/Tissue Integrity - Adult  Goal: Skin integrity remains intact  Recent Flowsheet Documentation  Taken 7/2/2022 0800 by Nel Sidhu RN  Skin Integrity Remains Intact: Monitor for areas of redness and/or skin breakdown

## 2022-07-02 NOTE — PLAN OF CARE
Problem: Safety - Adult  Goal: Free from fall injury  Outcome: Progressing     Problem: ABCDS Injury Assessment  Goal: Absence of physical injury  Outcome: Progressing     Problem: Confusion  Goal: Confusion, delirium, dementia, or psychosis is improved or at baseline  Description: INTERVENTIONS:  1. Assess for possible contributors to thought disturbance, including medications, impaired vision or hearing, underlying metabolic abnormalities, dehydration, psychiatric diagnoses, and notify attending LIP  2. [place] high risk fall precautions, as indicated  3. Provide frequent short contacts to provide reality reorientation, refocusing and direction  4. Decrease environmental stimuli, including noise as appropriate  5. Monitor and intervene to maintain adequate nutrition, hydration, elimination, sleep and activity  6. If unable to ensure safety without constant attention obtain sitter and review sitter guidelines with assigned personnel  7. Initiate Psychosocial CNS and Spiritual Care consult, as indicated  Outcome: Progressing     Problem: Skin/Tissue Integrity  Goal: Absence of new skin breakdown  Description: 1. Monitor for areas of redness and/or skin breakdown  2. Assess vascular access sites hourly  3. Every 4-6 hours minimum:  Change oxygen saturation probe site  4. Every 4-6 hours:  If on nasal continuous positive airway pressure, respiratory therapy assess nares and determine need for appliance change or resting period.   Outcome: Progressing     Problem: Cardiovascular - Adult  Goal: Maintains optimal cardiac output and hemodynamic stability  Outcome: Progressing  Goal: Absence of cardiac dysrhythmias or at baseline  Outcome: Progressing     Problem: Genitourinary - Adult  Goal: Urinary catheter remains patent  Outcome: Progressing

## 2022-07-03 NOTE — PROGRESS NOTES
Cardiology  Progress Note      SUBJECTIVE: Patient was asking for water when I came to see him. He was somewhat confused. He looked very weak. He looks short of breath even at rest but no acute respiratory distress.     Current Inpatient Medications  Current Facility-Administered Medications: lidocaine 1 % injection 5 mL, 5 mL, IntraDERmal, Once  sodium chloride flush 0.9 % injection 5-40 mL, 5-40 mL, IntraVENous, 2 times per day  sodium chloride flush 0.9 % injection 5-40 mL, 5-40 mL, IntraVENous, PRN  0.9 % sodium chloride infusion, , IntraVENous, PRN  heparin flush 100 UNIT/ML injection 100 Units, 1 mL, IntraVENous, 2 times per day  heparin flush 100 UNIT/ML injection 100 Units, 1 mL, IntraCATHeter, PRN  meropenem (MERREM) 1,000 mg in sodium chloride 0.9 % 100 mL IVPB (mini-bag), 1,000 mg, IntraVENous, Q8H  levalbuterol (XOPENEX) nebulization 0.63 mg, 0.63 mg, Nebulization, Q4H  ipratropium (ATROVENT) 0.02 % nebulizer solution 0.5 mg, 0.5 mg, Nebulization, Q4H  dilTIAZem (CARDIZEM CD) extended release capsule 240 mg, 240 mg, Oral, Daily  metoprolol tartrate (LOPRESSOR) tablet 25 mg, 25 mg, Oral, TID  acetylcysteine (MUCOMYST) 20 % solution 600 mg, 600 mg, Inhalation, q8h  acetaminophen (TYLENOL) tablet 650 mg, 650 mg, Oral, Q6H PRN  vancomycin (VANCOCIN) 1,000 mg in sodium chloride 0.9 % 250 mL IVPB ([id number]), 1,000 mg, IntraVENous, Q12H  doxycycline hyclate (VIBRAMYCIN) capsule 100 mg, 100 mg, Oral, 2 times per day  Roflumilast (DALIRESP) tablet 500 mcg, 500 mcg, Oral, Daily  famotidine (PEPCID) tablet 20 mg, 20 mg, Oral, BID  haloperidol lactate (HALDOL) injection 2 mg, 2 mg, IntraVENous, Q2H PRN  sodium chloride flush 0.9 % injection 5-40 mL, 5-40 mL, IntraVENous, 2 times per day  sodium chloride flush 0.9 % injection 5-40 mL, 5-40 mL, IntraVENous, PRN  0.9 % sodium chloride infusion, , IntraVENous, PRN  heparin flush 100 UNIT/ML injection 300 Units, 3 mL, IntraVENous, 2 times per day  heparin flush 100 UNIT/ML injection 300 Units, 3 mL, IntraCATHeter, PRN  apixaban (ELIQUIS) tablet 5 mg, 5 mg, Oral, BID  atorvastatin (LIPITOR) tablet 10 mg, 10 mg, Oral, Daily  vitamin D (CHOLECALCIFEROL) tablet 2,000 Units, 2,000 Units, Oral, Daily  budesonide (PULMICORT) nebulizer suspension 500 mcg, 0.5 mg, Nebulization, BID  Arformoterol Tartrate (BROVANA) nebulizer solution 15 mcg, 15 mcg, Nebulization, BID      Physical  VITALS:  /81   Pulse 98   Temp 97.8 °F (36.6 °C)   Resp 24   Ht 5' 11\" (1.803 m)   Wt 259 lb 0.7 oz (117.5 kg)   SpO2 (!) 88%   BMI 36.13 kg/m²   CURRENT TEMPERATURE:  Temp: 97.8 °F (36.6 °C)  CONSTITUTIONAL: No acute distress. EYES: Vision is intact. ENT: No sore throat. No ear drainage. NECK: No JVD. BACK: Symmetric. LUNGS:  diminished breath sounds right base and left base  CARDIOVASCULAR:  normal S1 and S2, no S3 and no S4  ABDOMEN:  non-distended  NEUROLOGIC: No focal deficits. EXTREMITIES: Trace ankle edema. DATA:      Cardiology Labs:  BMP:    Lab Results   Component Value Date/Time     07/03/2022 06:00 AM    K 4.1 07/03/2022 06:00 AM    K 4.3 06/24/2022 12:27 AM     07/03/2022 06:00 AM    CO2 25 07/03/2022 06:00 AM    BUN 26 07/03/2022 06:00 AM     CBC:    Lab Results   Component Value Date/Time    WBC 16.5 07/02/2022 08:47 AM    RBC 4.94 07/02/2022 08:47 AM    HGB 14.9 07/02/2022 08:47 AM    HCT 46.4 07/02/2022 08:47 AM    MCV 93.9 07/02/2022 08:47 AM    RDW 14.0 07/02/2022 08:47 AM     07/02/2022 08:47 AM     PT/INR:  No results found for: PTINR  TROPONIN:  No components found for: TROP    ASSESSMENT    1.paroxysmal atrial fibrillation. Patient was in sinus rhythm on admission. He went into atrial fibrillation June 28 with rapid ventricular response. This atrial fibrillation started after patient was put on theophylline drip for his respiratory problems. theophylline drip was stopped. He was started on Cardizem drip.   Patient converted back to sinus rhythm. He is now on Cardizem CD and metoprolol in addition to Eliquis. We may consider antiarrhythmic medication like sotalol if he continues to have these episodes of atrial fibrillation down the road. Hopefully patient will convert to sinus rhythm . 2.elevation of high sensitivity troponin. This is mostly demand ischemia from his respiratory problems with type II non-STEMI. EKG is not showing acute ischemic changes. No complaints of chest pain. Pulmonary status is not good and I do not feel patient is suitable for any coronary workup. 3.committee acquired pneumonia/acute exacerbation of COPD. On antibiotics and aerosol treatment. Pulmonary status appears to be his main issue right now.

## 2022-07-03 NOTE — PROGRESS NOTES
Addendum:    Vancomycin discontinued, pharmacy will sign off. Please re-consult if needed. Loree Soto, PharmD, BCPS 7/3/2022 2:15 PM   [phone]    Pharmacy Consultation Note  (Antibiotic Dosing and Monitoring)    Initial consult date: 6/28/22  Consulting physician/provider: Dr. Yunior Nascimento  Drug: Vancomycin  Indication: UTI, CAP, sepsis of unknown etiology    Age/  Gender Height Weight IBW  Allergy Information   81 y.o./male 5' 11\" (180.3 cm) 294 lb 12.8 oz (133.7 kg)     Ideal body weight: 75.3 kg (166 lb 0.1 oz)  Adjusted ideal body weight: 92.2 kg (203 lb 3.5 oz)   Decadron [dexamethasone], Solu-cortef [hydrocortisone], and Solu-medrol [methylprednisolone]      Renal Function:  Recent Labs     07/01/22  0557 07/02/22  0437 07/03/22  0600   BUN 29* 25* 26*   CREATININE 1.0 0.9 1.0       Intake/Output Summary (Last 24 hours) at 7/3/2022 0802  Last data filed at 7/3/2022 0615  Gross per 24 hour   Intake 814.63 ml   Output 1100 ml   Net -285.37 ml       Vancomycin Monitoring:  Trough:    No results for input(s): VANCOTROUGH in the last 72 hours. Random:  No results for input(s): VANCORANDOM in the last 72 hours. Vancomycin Administration Times:  Recent vancomycin administrations                   vancomycin (VANCOCIN) 1,000 mg in sodium chloride 0.9 % 250 mL IVPB (Omum2Csk) (mg) 1,000 mg New Bag 07/03/22 0327     1,000 mg New Bag 07/02/22 1649     1,000 mg New Bag  0318     1,000 mg New Bag 07/01/22 1519     1,000 mg New Bag  0355     1,000 mg New Bag 06/30/22 1524                Assessment:  · Patient is a [de-identified] y.o. male who has been initiated on vancomycin  Estimated Creatinine Clearance: 77 mL/min (based on SCr of 1 mg/dL). · To dose vancomycin, pharmacy will be utilizing [employer] calculation software for goal AUC/JELENA 400-600 mg/L-hr. · 6/30: vanco level 10.7; scr stable, continue as ordered.   · 7/1: creatinine stable, continue as ordered    Plan:  · Continue vancomycin 1000 mg IV every 12 hours  · Will check vancomycin level as necessary  · Will continue to monitor renal function   · Clinical pharmacy to follow      DUARTE [facility] 7/3/2022 8:02 AM

## 2022-07-03 NOTE — PROGRESS NOTES
Dr Rosario Toney and Dr Champagne Sers aware of blood in sputum, pt and pts wife was educated on how to collect sputum and the importance of collection.

## 2022-07-03 NOTE — CONSULTS
6/24-6/28  Pt was transferred to ICU on 6/26  Pt has been on   meropenem , and vancomycin  6/28-present  Bactrim was started today   Followed by Urology for hematuria   Cardiology for afib RVR  ID was asked to see for positive resp cx on 7/1 with stenotrpophomonas  Pt is on bactrim   He is awake but grumpy  Has productive cough has a rash mostly on his head  He denies f/c/n/v/d/    REVIEW OF SYSTEMS     CONSTITUTIONAL:   No fever, chills, weight loss  ALLERGIES:    No urticaria, hay fever,    EYES:     No blurry vision, loss of vision, eye pain  ENT:      No hearing loss, sore throat  CARDIOVASCULAR:  No chest pain or palpitations  RESPIRATORY:     cough, sob  ENDOCRINE:    No increase thirst, urination   HEME-LYMPH:   No easy bruising or bleeding  GI:     No nausea, vomiting or diarrhea  :     No urinary complaints  NEURO:    No seizures, stroke, HA  MUSCULOSKELETAL:  No muscle aches or pain, no joint pain  SKIN:     No rash or itch  PSYCH:    No depression or anxiety    Medications Prior to Admission: predniSONE (DELTASONE) 10 MG tablet, 1 tablet  apixaban (ELIQUIS) 5 MG TABS tablet, Take 1 tablet by mouth 2 times daily  dilTIAZem (CARDIZEM CD) 240 MG extended release capsule, Take 1 capsule by mouth daily  Vitamin D (CHOLECALCIFEROL) 50 MCG (2000 UT) TABS tablet, Take 1 tablet by mouth daily  mometasone-formoterol (DULERA) 200-5 MCG/ACT inhaler, Inhale 2 puffs into the lungs every 12 hours (Patient not taking: Reported on 6/24/2022)  OXYGEN, Inhale 5 L into the lungs daily  theophylline (LUIS-24) 200 MG extended release capsule, Take 400 mg by mouth daily (Patient not taking: Reported on 6/24/2022)  tiotropium (SPIRIVA RESPIMAT) 2.5 MCG/ACT AERS inhaler, Inhale 2 puffs into the lungs daily (Patient not taking: Reported on 6/24/2022)  famotidine (PEPCID) 20 MG tablet, Take 1 tablet by mouth 2 times daily  albuterol (PROVENTIL) (2.5 MG/3ML) 0.083% nebulizer solution, Take 2.5 mg by nebulization every 6 hours as needed for Wheezing  aspirin 81 MG tablet, Take 81 mg by mouth daily (Patient not taking: Reported on 6/24/2022)  OXYGEN, Inhale 2 L into the lungs nightly   albuterol (PROVENTIL HFA) 108 (90 BASE) MCG/ACT inhaler, Inhale 2 puffs into the lungs every 6 hours as needed for Wheezing.   simvastatin (ZOCOR) 20 MG tablet, Take 20 mg by mouth daily   CURRENT MEDICATIONS     Current Facility-Administered Medications:     sulfamethoxazole-trimethoprim (BACTRIM DS;SEPTRA DS) 800-160 MG per tablet 1 tablet, 1 tablet, Oral, 2 times per day, Shantanu Helm MD    lidocaine 1 % injection 5 mL, 5 mL, IntraDERmal, Once, Barrie Baumann MD    sodium chloride flush 0.9 % injection 5-40 mL, 5-40 mL, IntraVENous, 2 times per day, Barrie Baumann MD, 10 mL at 07/02/22 2009    sodium chloride flush 0.9 % injection 5-40 mL, 5-40 mL, IntraVENous, PRN, Barrie Baumann MD    0.9 % sodium chloride infusion, , IntraVENous, PRN, Barrie Baumann MD, Stopped at 07/01/22 2227    heparin flush 100 UNIT/ML injection 100 Units, 1 mL, IntraVENous, 2 times per day, Barrie Baumann MD, 100 Units at 07/03/22 0839    heparin flush 100 UNIT/ML injection 100 Units, 1 mL, IntraCATHeter, PRN, Barrie Baumann MD    levalbuterol Jadene Mustard) nebulization 0.63 mg, 0.63 mg, Nebulization, Q4H, Barrie Baumann MD, 0.63 mg at 07/03/22 6340    ipratropium (ATROVENT) 0.02 % nebulizer solution 0.5 mg, 0.5 mg, Nebulization, Q4H, Barrie Baumann MD, 0.5 mg at 07/03/22 8774    dilTIAZem (CARDIZEM CD) extended release capsule 240 mg, 240 mg, Oral, Daily, Barrie Baumann MD, 240 mg at 07/03/22 8050    metoprolol tartrate (LOPRESSOR) tablet 25 mg, 25 mg, Oral, TID, Barrie Baumann MD, 25 mg at 07/03/22 0839    acetylcysteine (MUCOMYST) 20 % solution 600 mg, 600 mg, Inhalation, q8h, Barrie Baumann MD, 600 mg at 07/03/22 0610    acetaminophen (TYLENOL) tablet 650 mg, 650 mg, Oral, Q6H PRN, Barrie Baumann MD, 650 mg at 07/01/22 2123    vancomycin (VANCOCIN) 1,000 mg in sodium chloride 0.9 % 250 mL IVPB (Nhct0Gib), 1,000 mg, IntraVENous, Q12H, Gerson Sol MD, Stopped at 07/03/22 0427    Roflumilast (DALIRESP) tablet 500 mcg, 500 mcg, Oral, Daily, Gerson Sol MD, 500 mcg at 07/03/22 0839    famotidine (PEPCID) tablet 20 mg, 20 mg, Oral, BID, Gerson Sol MD, 20 mg at 07/03/22 9532    haloperidol lactate (HALDOL) injection 2 mg, 2 mg, IntraVENous, Q2H PRN, Gerson Sol MD, 2 mg at 06/29/22 7537    sodium chloride flush 0.9 % injection 5-40 mL, 5-40 mL, IntraVENous, 2 times per day, Gerson Sol MD, 10 mL at 07/02/22 2010    sodium chloride flush 0.9 % injection 5-40 mL, 5-40 mL, IntraVENous, PRN, Gerson Sol MD, 10 mL at 07/01/22 0557    0.9 % sodium chloride infusion, , IntraVENous, PRN, Gerson Sol MD, Stopped at 06/30/22 1446    heparin flush 100 UNIT/ML injection 300 Units, 3 mL, IntraVENous, 2 times per day, Gerson Sol MD, 300 Units at 07/03/22 0949    heparin flush 100 UNIT/ML injection 300 Units, 3 mL, IntraCATHeter, PRN, Gerson Sol MD, 300 Units at 07/01/22 0557    apixaban (ELIQUIS) tablet 5 mg, 5 mg, Oral, BID, Cyndi Jauregui MD, 5 mg at 07/03/22 0839    atorvastatin (LIPITOR) tablet 10 mg, 10 mg, Oral, Daily, Cyndi Jauregui MD, 10 mg at 07/02/22 1651    vitamin D (CHOLECALCIFEROL) tablet 2,000 Units, 2,000 Units, Oral, Daily, Cyndi Jauregui MD, 2,000 Units at 07/03/22 0839    budesonide (PULMICORT) nebulizer suspension 500 mcg, 0.5 mg, Nebulization, BID, Cyndi Jauregui MD, 500 mcg at 07/03/22 0610    Arformoterol Tartrate (BROVANA) nebulizer solution 15 mcg, 15 mcg, Nebulization, BID, Cyndi Jauregui MD, 15 mcg at 07/03/22 0610  ALLERGIES     Decadron [dexamethasone], Solu-cortef [hydrocortisone], and Solu-medrol [methylprednisolone]  Immunization History   Administered Date(s) Administered    COVID-19, PFIZER PURPLE top, DILUTE for use, (age 15 y+), 30mcg/0.3mL 01/28/2021, 02/18/2021, 10/18/2021      Internal Administration   First Dose COVID-19, PFIZER PURPLE top, DILUTE for use, (age 15 y+), 30mcg/0.3mL  2021   Second Dose COVID-19, PFIZER PURPLE top, DILUTE for use, (age 15 y+), 30mcg/0.3mL   2021       Last COVID Lab SARS-CoV-2, PCR (no units)   Date Value   2022 Not Detected     SARS-CoV-2, NAAT (no units)   Date Value   2022 Not Detected            PAST MEDICAL HISTORY     Past Medical History:   Diagnosis Date    Atrial fibrillation ([facility]     COPD (chronic obstructive pulmonary disease) ([facility]     Hyperlipidemia     Pneumonia     Steroid side effects     combative      SURGICAL HISTORY       Past Surgical History:   Procedure Laterality Date    APPENDECTOMY      COLONOSCOPY      EYE SURGERY Right     cataract removal with lens implants    HEMORRHOID SURGERY      LAPAROSCOPIC APPENDECTOMY N/A 2019    APPENDECTOMY LAPAROSCOPIC performed by Parris Shaikh MD at [facility]     No family history on file. SOCIAL HISTORY       Social History     Socioeconomic History    Marital status:      Spouse name: Not on file    Number of children: Not on file    Years of education: Not on file    Highest education level: Not on file   Occupational History    Not on file   Tobacco Use    Smoking status: Former Smoker     Quit date: 2009     Years since quittin.6    Smokeless tobacco: Former User   Vaping Use    Vaping Use: Never used   Substance and Sexual Activity    Alcohol use: Not Currently    Drug use: No    Sexual activity: Not Currently   Other Topics Concern    Not on file   Social History Narrative    Not on file     Social Determinants of Health     Financial Resource Strain:     Difficulty of Paying Living Expenses: Not on file   Food Insecurity:     Worried About [address] in the Last Year: Not on file    [address] in the Last Year: Not on file   Transportation Needs:     Lack of Transportation (Medical):  Not on file    Lack of Transportation (Non-Medical): Not on file   Physical Activity:     Days of Exercise per Week: Not on file    Minutes of Exercise per Session: Not on file   Stress:     Feeling of Stress : Not on file   Social Connections:     Frequency of Communication with Friends and Family: Not on file    Frequency of Social Gatherings with Friends and Family: Not on file    Attends Mandaeism Services: Not on file    Active Member of [employer] or Organizations: Not on file    Attends Club or Organization Meetings: Not on file    Marital Status: Not on file   Intimate Partner Violence:     Fear of Current or Ex-Partner: Not on file    Emotionally Abused: Not on file    Physically Abused: Not on file    Sexually Abused: Not on file   Housing Stability:     Unable to Pay for Housing in the Last Year: Not on file    Number of [place] in the Last Year: Not on file    Unstable Housing in the Last Year: Not on file     ·      [address]       ED Triage Vitals   BP Temp Temp Source Heart Rate Resp SpO2 Height Weight   06/24/22 0021 06/24/22 0259 06/24/22 0259 06/24/22 0021 06/24/22 0021 06/24/22 0021 06/24/22 0021 06/24/22 0021   132/78 97.6 °F (36.4 °C) Axillary (!) 141 (!) 36 98 % 5' 11\" (1.803 m) 294 lb 12.8 oz (133.7 kg)     Vitals:    Vitals:    07/03/22 0315 07/03/22 0830 07/03/22 0838 07/03/22 0845   BP: (!) 159/66 138/81     Pulse: 90 98     Resp: 22 24     Temp: 98 °F (36.7 °C) 97.8 °F (36.6 °C)     TempSrc: Infrared      SpO2: 99% (!) 88% 93% 99%   Weight:       Height:         Physical Exam   Constitutional/General: Awake, NAD  Head: NC/AT  Eyes: PERRL, EOMI  Mouth: Normal mucosa, no thrush   Neck: Supple, full ROM,    Pulmonary: Lungs coarse  to auscultation bilaterally. Not in respiratory distress  Cardiovascular:  Regular rate and rhythm, no murmurs, gallops, or rubs. Abdomen: Soft, + BS.   distension. Nontender. Extremities:edema  Bruises Moves all extremities x 4.    Warm and well perfused  Pulses:  Distal pulses dec   Skin: Warm and dry with rash  Neurologic:    No focal deficits  Psych: Normal Affect  7/1 extended piv  picc lue  6/29  Gonzalez        DIAGNOSTIC RESULTS   RADIOLOGY:   CT ABDOMEN PELVIS WO CONTRAST Additional Contrast? None    Result Date: 6/24/2022  EXAMINATION: CT OF THE ABDOMEN AND PELVIS WITHOUT CONTRAST 6/24/2022 1:22 am TECHNIQUE: CT of the abdomen and pelvis was performed without the administration of intravenous contrast. Multiplanar reformatted images are provided for review. Automated exposure control, iterative reconstruction, and/or weight based adjustment of the mA/kV was utilized to reduce the radiation dose to as low as reasonably achievable. COMPARISON: None. HISTORY: ORDERING SYSTEM PROVIDED HISTORY: distended abdomen TECHNOLOGIST PROVIDED HISTORY: Reason for exam:->distended abdomen Additional Contrast?->None Decision Support Exception - unselect if not a suspected or confirmed emergency medical condition->Emergency Medical Condition (MA) FINDINGS: Study limited due to patient motion. Lower Chest: Consolidative changes involving the right lower lobe. The lung bases are otherwise clear. There is no pleural or pericardial effusion. Organs: The liver, spleen, kidneys, adrenal glands and pancreas are within normal limits accounting for the limitations of the study. GI/Bowel: A small hiatal hernia is noted. No bowel obstruction or free air. Postsurgical changes within the right lower quadrant, likely representing appendectomy clips. Pelvis: The prostate gland is enlarged. The remainder of the pelvic organs are normal within the limitations of a nonenhanced study. No lymphadenopathy or free fluid. Peritoneum/Retroperitoneum: No mass, fluid collection or lymphadenopathy is seen. Bones/Soft Tissues: Degenerative changes are noted, without lytic or blastic osseous lesions. No soft tissue abnormalities seen.   Atherosclerotic calcifications of the abdominal aorta and its branches, without aneurysm. Limited study due to patient motion. No acute intra-abdominal or pelvic findings. Right lower lobe consolidative changes, suggestive of a pneumonia. XR CHEST PORTABLE    Result Date: 7/1/2022  EXAMINATION: ONE XRAY VIEW OF THE CHEST 7/1/2022 6:31 am COMPARISON: 06/29/2022 HISTORY: ORDERING SYSTEM PROVIDED HISTORY: SOB TECHNOLOGIST PROVIDED HISTORY: Reason for exam:->SOB FINDINGS: The cardiac silhouette is within normals. There is minimal atelectasis seen within the right lung base. The right upper lobe and left lung are clear. There is a left-sided PICC line. 1. Minimal right lung base atelectasis. The chest x-ray is otherwise unremarkable. XR CHEST PORTABLE    Addendum Date: 6/29/2022    ADDENDUM: I was asked by the attending physician to addend the report [facility]. The correct position of the distal tip of the left PICC line is within the superior vena cava directed cephalad. The tip is not in the internal jugular vein. The tip of the left PICC line is seen within the superior vena cava directed cephalad. Result Date: 6/29/2022  EXAMINATION: ONE XRAY VIEW OF THE CHEST 6/29/2022 11:04 am COMPARISON: Chest, single view 06/29/2022 at 0637 hours HISTORY: ORDERING SYSTEM PROVIDED HISTORY: confirm PICC placement TECHNOLOGIST PROVIDED HISTORY: Reason for exam:->confirm PICC placement FINDINGS: AP portable view of the chest was obtained on 2 images. A left-sided PICC line is in place which traverses across midline and ascends cranially to the right of the thoracic spine. This is likely within the right internal jugular vein. Heart, mediastinum, and pulmonary vasculature are within normal limits. There is biapical pleuroparenchymal scarring. There is stable consolidation within the right lung base. There is linear atelectasis and/or scarring within the left lung base. There is mild hazy blunting of the bilateral costophrenic angles.      1. baseline. XR CHEST PORTABLE    Result Date: 6/24/2022  EXAMINATION: ONE XRAY VIEW OF THE CHEST 6/24/2022 12:24 am COMPARISON: December 22, 2020. HISTORY: ORDERING SYSTEM PROVIDED HISTORY: shortness of breath TECHNOLOGIST PROVIDED HISTORY: Reason for exam:->shortness of breath FINDINGS: Emphysematous changes/COPD noted. Bibasilar parenchymal disease, which may suggest an acute consolidative process. No effusion or pneumothorax. No acute osseous abnormality. Cardiomediastinal contours are stable. Bilateral basilar disease. The possibility of a bilateral lower lobe pneumonia cannot be excluded in the appropriate setting. LABS  Recent Labs     07/02/22  0847 07/03/22  0845   WBC 16.5* 25.2*   HGB 14.9 14.6   HCT 46.4 45.6   MCV 93.9 94.8   * 131     Recent Labs     07/01/22  0557 07/01/22  0557 07/02/22  0437 07/02/22  0437 07/03/22  0600     --  142  --  145   K 3.9   < > 3.5   < > 4.1      < > 105   < > 108*   CO2 27   < > 28   < > 25   BUN 29*  --  25*  --  26*   CREATININE 1.0  --  0.9  --  1.0   GFRAA >60  --  >60  --  >60   LABGLOM >60  --  >60  --  >60   GLUCOSE 101*  --  118*  --  113*   PROT 5.8*  --  5.6*  --  5.6*   LABALBU 3.3*  --  3.1*  --  3.2*   CALCIUM 8.8  --  8.8  --  9.0   BILITOT 0.9  --  0.9  --  1.2   ALKPHOS 77  --  77  --  79   AST 52*  --  47*  --  41*   ALT 70*  --  64*  --  58*    < > = values in this interval not displayed. No results for input(s): PROCAL in the last 72 hours.   No results found for: CRP  No results found for: SEDRATE  No results found for: PPHNCKI1P6  Lab Results   Component Value Date/Time    ADVIRPCR Not Detected 06/24/2022 12:27 AM    BPARAPPCR Not Detected 06/24/2022 12:27 AM    BPPTXPPCR Not Detected 06/24/2022 12:27 AM    CHLPNEPCR Not Detected 06/24/2022 12:27 AM    [id number] Not Detected 06/24/2022 12:27 AM    QFVVLM5VIV Not Detected 06/24/2022 12:27 AM    DNEXS41LQQ Not Detected 06/24/2022 12:27 AM    CDHGF26VTO Not Detected 06/24/2022 12:27 AM    COVID19 Not Detected 06/24/2022 12:27 AM    COVID19 Not Detected 06/24/2022 12:26 AM    METAPNEPCR Not Detected 06/24/2022 12:27 AM    RHENTPCR Not Detected 06/24/2022 12:27 AM    FLUBPCR Not Detected 06/24/2022 12:27 AM    FLUBPCR Not Detected 06/24/2022 12:27 AM    MYCPNEPCR Not Detected 06/24/2022 12:27 AM    KGWFMNI7THU Not Detected 06/24/2022 12:27 AM    RYELLSA4KYS Not Detected 06/24/2022 12:27 AM    UTONCCR3VBC Not Detected 06/24/2022 12:27 AM    TJAFJIP7ZSV Not Detected 06/24/2022 12:27 AM    RSVPCR Not Detected 06/24/2022 12:27 AM          Lab Results   Component Value Date/Time    COVID19 Not Detected 06/24/2022 12:27 AM    COVID19 Not Detected 06/24/2022 12:26 AM     COVID-19/IVONNE-COV2 LABS  Recent Labs     07/01/22  0557 07/02/22  0437 07/03/22  0600   AST 52* 47* 41*   ALT 70* 64* 58*        MICROBIOLOGY:     Cultures :   No results for input(s): BC in the last 72 hours. Recent Labs     07/01/22  1028   ORG Stenotrophomonas maltophilia*        Recent Labs     07/01/22  1028   CULTRESP Oral Pharyngeal Darline absent*  Heavy growth         Lab Results   Component Value Date/Time    BC 5 Days no growth 06/24/2022 12:27 AM    BC 5 Days no growth 12/18/2020 10:16 AM    BC 5 Days no growth 06/16/2020 03:55 PM    ORG Stenotrophomonas maltophilia 07/01/2022 10:28 AM    ORG Candida albicans 02/14/2018 08:20 AM     Lab Results   Component Value Date/Time    BLOODCULT2 5 Days no growth 06/24/2022 12:27 AM    BLOODCULT2 5 Days no growth 12/18/2020 10:25 AM    BLOODCULT2 5 Days no growth 06/16/2020 04:02 PM    ORG Stenotrophomonas maltophilia 07/01/2022 10:28 AM    ORG Candida albicans 02/14/2018 08:20 AM       No results found for: WNDABS    Smear, Respiratory   Date Value Ref Range Status   07/01/2022 Refer to ordered Gram stain for results  Final         Component Value Date/Time    AFBCX No growth after 6 weeks of incubation.  02/14/2018 0820    FUNGSM No Fungal elements seen 02/14/2018 0820 LABFUNG Rare growth  No further workup   02/14/2018 0820     CULTURE, RESPIRATORY   Date Value Ref Range Status   07/01/2022 Oral Pharyngeal Darline absent (A)  Final   07/01/2022 Heavy growth  Final        Urine Culture, Routine   Date Value Ref Range Status   06/28/2022 Growth not present  Final   12/18/2020 Growth not present  Final   03/26/2018 <10,000 CFU/mL  Mixed gram positive organisms    Final     MRSA Culture Only   Date Value Ref Range Status   12/19/2020 Methicillin resistant Staph aureus not isolated  Final          FINAL IMPRESSION    Patient is a [de-identified] y.o. male who presented with   Chief Complaint   Patient presents with    Shortness of Breath     Pt c/o increasing SOB, states he wears O2 at home \"wide open\" and \"as much as I can get. \" Pt found 70s at home per EMS and was placed on CPAP. 125mg solumedrol, 2 proventils given en route. Pt denies CP/N/V. Placed on NIV upon arrival to ED. and admitted for Lactic acidosis [E87.2]  Pneumonia [J18.9]  Septicemia ([address] [A41.9]  Acute on chronic respiratory failure with hypoxia ([address] [J96.21]  Pneumonia due to infectious organism, unspecified laterality, unspecified part of lung [J18.9]     Leukocytosis  currently resp culture with Stenotrophomonas maltophilia right pneumonia   Rash ?    sulfamethoxazole-trimethoprim (BACTRIM DS;SEPTRA DS) 800-160 MG per tablet 1 tablet, 2 times per day watch CR and for hyperkalemia  Can stop  vancomycin (VANCOCIN) 1,000 mg in sodium chloride 0.9 % 250 mL IVPB ([id number]), Q12H    Check blood cx  procal in am   If diarrhea check for  cdiff  IS        Imaging and labs were reviewed per medical records and any ID pertinent labs were addressed with the patient. The patient/FAMILY  was educated about the diagnosis, prognosis, indications, risks and benefits of treatment. An opportunity to ask questions was given to the patient/FAMILY and questions were answered.       Thank you for involving me in the care of Elio Irvin Fermín. Please do not hesitate to call for any questions or concerns.          Electronically signed by Tamiko Ballesteros MD on 7/3/2022 at 1:04 PM

## 2022-07-03 NOTE — PROGRESS NOTES
Patient pulling at oxygen and iv sites. Patient also attempting to get legs out of bed. Taniaesitter placed on patient for safety at this time.

## 2022-07-03 NOTE — PROGRESS NOTES
Physical Therapy        0615/0615-02    Patient unavailable for physical therapy treatment due to patient increasingly agitated and refused to work with therapy or complete exercises in bed. Patient's wife present. Patient with moist cough, coughing up blood with phlegm, nursing aware. Will attempt session at later time/date.      uSsana Ontiveros, AMAURY  #[id number]

## 2022-07-03 NOTE — PLAN OF CARE
Problem: Discharge Planning  Goal: Discharge to home or other facility with appropriate resources  Outcome: Progressing     Problem: Safety - Adult  Goal: Free from fall injury  Outcome: Progressing     Problem: ABCDS Injury Assessment  Goal: Absence of physical injury  Outcome: Progressing     Problem: Pain  Goal: Verbalizes/displays adequate comfort level or baseline comfort level  Outcome: Progressing     Problem: Confusion  Goal: Confusion, delirium, dementia, or psychosis is improved or at baseline  Description: INTERVENTIONS:  1. Assess for possible contributors to thought disturbance, including medications, impaired vision or hearing, underlying metabolic abnormalities, dehydration, psychiatric diagnoses, and notify attending LIP  2. [place] high risk fall precautions, as indicated  3. Provide frequent short contacts to provide reality reorientation, refocusing and direction  4. Decrease environmental stimuli, including noise as appropriate  5. Monitor and intervene to maintain adequate nutrition, hydration, elimination, sleep and activity  6. If unable to ensure safety without constant attention obtain sitter and review sitter guidelines with assigned personnel  7. Initiate Psychosocial CNS and Spiritual Care consult, as indicated  Outcome: Progressing     Problem: Nutrition Deficit:  Goal: Optimize nutritional status  Outcome: Progressing     Problem: Skin/Tissue Integrity  Goal: Absence of new skin breakdown  Description: 1. Monitor for areas of redness and/or skin breakdown  2. Assess vascular access sites hourly  3. Every 4-6 hours minimum:  Change oxygen saturation probe site  4. Every 4-6 hours:  If on nasal continuous positive airway pressure, respiratory therapy assess nares and determine need for appliance change or resting period.   Outcome: Progressing     Problem: Chronic Conditions and Co-morbidities  Goal: Patient's chronic conditions and co-morbidity symptoms are monitored and maintained or improved  Outcome: Progressing     Problem: Respiratory - Adult  Goal: Achieves optimal ventilation and oxygenation  Outcome: Progressing     Problem: Cardiovascular - Adult  Goal: Maintains optimal cardiac output and hemodynamic stability  Outcome: Progressing  Goal: Absence of cardiac dysrhythmias or at baseline  Outcome: Progressing     Problem: Skin/Tissue Integrity - Adult  Goal: Skin integrity remains intact  Outcome: Progressing  Goal: Oral mucous membranes remain intact  Outcome: Progressing     Problem: Musculoskeletal - Adult  Goal: Return mobility to safest level of function  Outcome: Progressing  Goal: Maintain proper alignment of affected body part  Outcome: Progressing     Problem: Genitourinary - Adult  Goal: Urinary catheter remains patent  Outcome: Progressing     Problem: Infection - Adult  Goal: Absence of infection at discharge  Outcome: Progressing

## 2022-07-03 NOTE — PROGRESS NOTES
Attempted ongoing Speech-Language Pathology intervention for dysphagia mgmt. Pt unavailable at this time due to:  [] HOLD per RN/ medical staff d/t medical status   [] Off unit for testing/ procedure    [] With medical staff   [] Declined intervention  [x] Sleeping/ Lethargic   [] Other:     SLP to continue previously established POC and re-attempt as able.     Alistair Pinto M.S., [facility] Path[facility]  [phone]

## 2022-07-03 NOTE — PROGRESS NOTES
PULMONARY MEDICINE CONSULT      [de-identified]year old person with PMH of COPD with chronic hypoxemic respiratory failure (on 5L ), hyperlipidemia, as described below admitted to hospital for management of worsening dyspnea, right lower lobe pneumonia, acute on chronic respiratory failure. .    The patient is receiving azithromycin and cefepime, methylprednisolone and bronchodilators. He is chronically anticoagulated with apixaban. · On 6L NC  · Sputum culture with Stenotrophomonas maltophilia, to be started on IV Bactrim and ID consult  · Has mild hemoptysis -- To collect all secretions, if there were to be >150 ml in 1 hour then he will need intubation. /81   Pulse 98   Temp 97.8 °F (36.6 °C)   Resp 24   Ht 5' 11\" (1.803 m)   Wt 259 lb 0.7 oz (117.5 kg)   SpO2 99%   BMI 36.13 kg/m²     General:  Somnolent but easily arousable, oriented to person  HEENT: No head lesions, PERRL, EOMI, mouth without lesions, no nasal lesions, no cervical adenopathy palpated  Respiratory: Lungs with decreased breath sounds bilaterally, no adventitious sounds auscultated, no accessory muscle use  CV: Regular rate, no murmurs, no JVD, no leg edema  Abdomen: Soft, non tender, + bowel sounds, no lesions  Skin: Hydrated, adequate turgor, mulyiplr echymosis on arms, capillary refill <2 seconds  Extremities: Muscular strength 3-4/5 in 4 limbs, moves 4 limbs spontaneously, distal pulses present  Neurology: Somnolent, follows commands, moves 4 limbs on command and spontaneously, equal sensation, no dysmetria, neck is supple, no meningitic signs present.         A/P:  Acute on chronic hypoxemic respiratory failure secondary to Stenotrophomonas pneumonia in a patient with severe COPD  --Cultures reviewed  --Antimicrobial therapy:Bactrim + vancomycin  --Bronchodilators: Arformoterol/budesonide + albuterol/iparatropium q 4 hrs + albuterol PRN + Roflumilast  --Incentive spirometry and flutter valve 10 times/hour while awake + Mucomyst    Rest of supportive care as per primary team    Atrial fibrillation  --Continue cardizem and metoprolol    Hyperlipidemia  --Continue statin     Hypertension  --On antihypertensives    DVT prophylaxis - anticoagulated with apixaban   PT/OT following    Past Medical History:   Diagnosis Date    Atrial fibrillation (HCC)     COPD (chronic obstructive pulmonary disease) (HCC)     Hyperlipidemia     Pneumonia     Steroid side effects     combative      No family history on file. Social History     Socioeconomic History    Marital status:      Spouse name: Not on file    Number of children: Not on file    Years of education: Not on file    Highest education level: Not on file   Occupational History    Not on file   Tobacco Use    Smoking status: Former Smoker     Quit date: 2009     Years since quittin.6    Smokeless tobacco: Former User   Vaping Use    Vaping Use: Never used   Substance and Sexual Activity    Alcohol use: Not Currently    Drug use: No    Sexual activity: Not Currently   Other Topics Concern    Not on file   Social History Narrative    Not on file     Social Determinants of Health     Financial Resource Strain:     Difficulty of Paying Living Expenses: Not on file   Food Insecurity:     Worried About [address] in the Last Year: Not on file    [address] in the Last Year: Not on file   Transportation Needs:     Lack of Transportation (Medical): Not on file    Lack of Transportation (Non-Medical):  Not on file   Physical Activity:     Days of Exercise per Week: Not on file    Minutes of Exercise per Session: Not on file   Stress:     Feeling of Stress : Not on file   Social Connections:     Frequency of Communication with Friends and Family: Not on file    Frequency of Social Gatherings with Friends and Family: Not on file    Attends Muslim Services: Not on file    Active Member of Clubs or Organizations: Not on file    Attends Club or Organization Meetings: Not on file    Marital Status: Not on file   Intimate Partner Violence:     Fear of Current or Ex-Partner: Not on file    Emotionally Abused: Not on file    Physically Abused: Not on file    Sexually Abused: Not on file   Housing Stability:     Unable to Pay for Housing in the Last Year: Not on file    Number of [place] in the Last Year: Not on file    Unstable Housing in the Last Year: Not on file     Current Facility-Administered Medications   Medication Dose Route Frequency Provider Last Rate Last Admin    sulfamethoxazole-trimethoprim (BACTRIM DS;SEPTRA DS) 800-160 MG per tablet 1 tablet  1 tablet Oral 2 times per day Sharita Verdin MD        lidocaine 1 % injection 5 mL  5 mL IntraDERmal Once Alex Rangel MD        sodium chloride flush 0.9 % injection 5-40 mL  5-40 mL IntraVENous 2 times per day Alex Rangel MD   10 mL at 07/02/22 2009    sodium chloride flush 0.9 % injection 5-40 mL  5-40 mL IntraVENous PRN Alex Rangel MD        0.9 % sodium chloride infusion   IntraVENous PRN Alex Rangel MD   Stopped at 07/01/22 2227    heparin flush 100 UNIT/ML injection 100 Units  1 mL IntraVENous 2 times per day Alex Rangel MD   100 Units at 07/03/22 0839    heparin flush 100 UNIT/ML injection 100 Units  1 mL IntraCATHeter PRN Alex Rangel MD        levalbuterol Willye Poplin) nebulization 0.63 mg  0.63 mg Nebulization Q4H Alex Rangel MD   0.63 mg at 07/03/22 7108    ipratropium (ATROVENT) 0.02 % nebulizer solution 0.5 mg  0.5 mg Nebulization Q4H Alex Rangel MD   0.5 mg at 07/03/22 6426    dilTIAZem (CARDIZEM CD) extended release capsule 240 mg  240 mg Oral Daily Alex Rangel MD   240 mg at 07/03/22 0839    metoprolol tartrate (LOPRESSOR) tablet 25 mg  25 mg Oral TID Alex Rangel MD   25 mg at 07/03/22 0839    acetylcysteine (MUCOMYST) 20 % solution 600 mg  600 mg Inhalation q8h Alex Rangel MD   600 mg at 07/03/22 0610    acetaminophen (TYLENOL) tablet 650 mg  650 mg Oral Q6H PRN Yuki Dickey MD   650 mg at 07/01/22 2123    vancomycin (VANCOCIN) 1,000 mg in sodium chloride 0.9 % 250 mL IVPB ([id number])  1,000 mg IntraVENous Q12H Yuki Dickey MD   Stopped at 07/03/22 0427    Roflumilast (DALIRESP) tablet 500 mcg  500 mcg Oral Daily Yuki Dickey MD   500 mcg at 07/03/22 0839    famotidine (PEPCID) tablet 20 mg  20 mg Oral BID Yuki Dickey MD   20 mg at 07/03/22 5357    haloperidol lactate (HALDOL) injection 2 mg  2 mg IntraVENous Q2H PRN Yuki Dickey MD   2 mg at 06/29/22 2898    sodium chloride flush 0.9 % injection 5-40 mL  5-40 mL IntraVENous 2 times per day Yuki Dickey MD   10 mL at 07/02/22 2010    sodium chloride flush 0.9 % injection 5-40 mL  5-40 mL IntraVENous PRN Yuki Dickey MD   10 mL at 07/01/22 0557    0.9 % sodium chloride infusion   IntraVENous PRN Yuki Dickey MD   Stopped at 06/30/22 1446    heparin flush 100 UNIT/ML injection 300 Units  3 mL IntraVENous 2 times per day Yuki Dickey MD   300 Units at 07/03/22 0949    heparin flush 100 UNIT/ML injection 300 Units  3 mL IntraCATHeter PRN Yuki Dickey MD   300 Units at 07/01/22 0557    apixaban (ELIQUIS) tablet 5 mg  5 mg Oral BID Arlene Landaverde MD   5 mg at 07/03/22 0839    atorvastatin (LIPITOR) tablet 10 mg  10 mg Oral Daily Arlene Landaverde MD   10 mg at 07/02/22 1651    vitamin D (CHOLECALCIFEROL) tablet 2,000 Units  2,000 Units Oral Daily Arlene Landaverde MD   2,000 Units at 07/03/22 0839    budesonide (PULMICORT) nebulizer suspension 500 mcg  0.5 mg Nebulization BID Arlene Landaverde MD   500 mcg at 07/03/22 0610    Arformoterol Tartrate (BROVANA) nebulizer solution 15 mcg  15 mcg Nebulization BID Arlene Landaverde MD   15 mcg at 07/03/22 0610     MEDICATIONS:   sulfamethoxazole-trimethoprim  1 tablet Oral 2 times per day    lidocaine  5 mL IntraDERmal Once    sodium chloride flush  5-40 mL IntraVENous 2 times per day    heparin flush  1 mL IntraVENous 2 times per day    levalbuterol  0.63 mg Nebulization Q4H    ipratropium  0.5 mg Nebulization Q4H    dilTIAZem  240 mg Oral Daily    metoprolol tartrate  25 mg Oral TID    acetylcysteine  600 mg Inhalation q8h    vancomycin  1,000 mg IntraVENous Q12H    Roflumilast  500 mcg Oral Daily    famotidine  20 mg Oral BID    sodium chloride flush  5-40 mL IntraVENous 2 times per day    heparin flush  3 mL IntraVENous 2 times per day    apixaban  5 mg Oral BID    atorvastatin  10 mg Oral Daily    Vitamin D  2,000 Units Oral Daily    budesonide  0.5 mg Nebulization BID    Arformoterol Tartrate  15 mcg Nebulization BID      sodium chloride Stopped (07/01/22 2227)    sodium chloride Stopped (06/30/22 1446)         OBJECTIVE:  Vitals:    07/03/22 0845   BP:    Pulse:    Resp:    Temp:    SpO2: 99%     FiO2 : 60 %  O2 Flow Rate (L/min): 6 L/min  O2 Device: High flow nasal cannula        LABS:  WBC   Date Value Ref Range Status   07/03/2022 25.2 (H) 4.5 - 11.5 E9/L Final   07/02/2022 16.5 (H) 4.5 - 11.5 E9/L Final   06/30/2022 19.3 (H) 4.5 - 11.5 E9/L Final     Hemoglobin   Date Value Ref Range Status   07/03/2022 14.6 12.5 - 16.5 g/dL Final   07/02/2022 14.9 12.5 - 16.5 g/dL Final   06/30/2022 14.2 12.5 - 16.5 g/dL Final     Hematocrit   Date Value Ref Range Status   07/03/2022 45.6 37.0 - 54.0 % Final   07/02/2022 46.4 37.0 - 54.0 % Final   06/30/2022 44.5 37.0 - 54.0 % Final     MCV   Date Value Ref Range Status   07/03/2022 94.8 80.0 - 99.9 fL Final   07/02/2022 93.9 80.0 - 99.9 fL Final   06/30/2022 94.3 80.0 - 99.9 fL Final     Platelets   Date Value Ref Range Status   07/03/2022 131 130 - 450 E9/L Final   07/02/2022 127 (L) 130 - 450 E9/L Final   06/30/2022 174 130 - 450 E9/L Final     Sodium   Date Value Ref Range Status   07/03/2022 145 132 - 146 mmol/L Final   07/02/2022 142 132 - 146 mmol/L Final   07/01/2022 144 132 - 146 mmol/L Final     Potassium   Date Value Ref Range Status   07/03/2022 4.1 3.5 - 5.0 mmol/L Final   07/02/2022 3.5 3.5 - 5.0 mmol/L Final   07/01/2022 3.9 3.5 - 5.0 mmol/L Final     Potassium reflex Magnesium   Date Value Ref Range Status   06/24/2022 4.3 3.5 - 5.0 mmol/L Final   12/18/2020 3.7 3.5 - 5.0 mmol/L Final   07/23/2019 4.6 3.5 - 5.0 mmol/L Final     Chloride   Date Value Ref Range Status   07/03/2022 108 (H) 98 - 107 mmol/L Final   07/02/2022 105 98 - 107 mmol/L Final   07/01/2022 107 98 - 107 mmol/L Final     CO2   Date Value Ref Range Status   07/03/2022 25 22 - 29 mmol/L Final   07/02/2022 28 22 - 29 mmol/L Final   07/01/2022 27 22 - 29 mmol/L Final     BUN   Date Value Ref Range Status   07/03/2022 26 (H) 6 - 23 mg/dL Final   07/02/2022 25 (H) 6 - 23 mg/dL Final   07/01/2022 29 (H) 6 - 23 mg/dL Final     CREATININE   Date Value Ref Range Status   07/03/2022 1.0 0.7 - 1.2 mg/dL Final   07/02/2022 0.9 0.7 - 1.2 mg/dL Final   07/01/2022 1.0 0.7 - 1.2 mg/dL Final     Glucose   Date Value Ref Range Status   07/03/2022 113 (H) 74 - 99 mg/dL Final   07/02/2022 118 (H) 74 - 99 mg/dL Final   07/01/2022 101 (H) 74 - 99 mg/dL Final     Calcium   Date Value Ref Range Status   07/03/2022 9.0 8.6 - 10.2 mg/dL Final   07/02/2022 8.8 8.6 - 10.2 mg/dL Final   07/01/2022 8.8 8.6 - 10.2 mg/dL Final     Total Protein   Date Value Ref Range Status   07/03/2022 5.6 (L) 6.4 - 8.3 g/dL Final   07/02/2022 5.6 (L) 6.4 - 8.3 g/dL Final   07/01/2022 5.8 (L) 6.4 - 8.3 g/dL Final     Albumin   Date Value Ref Range Status   07/03/2022 3.2 (L) 3.5 - 5.2 g/dL Final   07/02/2022 3.1 (L) 3.5 - 5.2 g/dL Final   07/01/2022 3.3 (L) 3.5 - 5.2 g/dL Final     Total Bilirubin   Date Value Ref Range Status   07/03/2022 1.2 0.0 - 1.2 mg/dL Final   07/02/2022 0.9 0.0 - 1.2 mg/dL Final   07/01/2022 0.9 0.0 - 1.2 mg/dL Final     Alkaline Phosphatase   Date Value Ref Range Status   07/03/2022 79 40 - 129 U/L Final   07/02/2022 77 40 - 129 U/L Final   07/01/2022 77 40 - 129 U/L Final     AST   Date Value Ref Range Status 07/03/2022 41 (H) 0 - 39 U/L Final   07/02/2022 47 (H) 0 - 39 U/L Final   07/01/2022 52 (H) 0 - 39 U/L Final     ALT   Date Value Ref Range Status   07/03/2022 58 (H) 0 - 40 U/L Final   07/02/2022 64 (H) 0 - 40 U/L Final   07/01/2022 70 (H) 0 - 40 U/L Final     GFR Non-   Date Value Ref Range Status   07/03/2022 >60 >=60 mL/min/1.73 Final     Comment:     Chronic Kidney Disease: less than 60 ml/min/1.73 sq.m. Kidney Failure: less than 15 ml/min/1.73 sq.m. Results valid for patients 18 years and older. 07/02/2022 >60 >=60 mL/min/1.73 Final     Comment:     Chronic Kidney Disease: less than 60 ml/min/1.73 sq.m. Kidney Failure: less than 15 ml/min/1.73 sq.m. Results valid for patients 18 years and older. 07/01/2022 >60 >=60 mL/min/1.73 Final     Comment:     Chronic Kidney Disease: less than 60 ml/min/1.73 sq.m. Kidney Failure: less than 15 ml/min/1.73 sq.m. Results valid for patients 18 years and older. GFR    Date Value Ref Range Status   07/03/2022 >60  Final   07/02/2022 >60  Final   07/01/2022 >60  Final     Magnesium   Date Value Ref Range Status   06/29/2022 2.3 1.6 - 2.6 mg/dL Final   06/28/2022 2.6 1.6 - 2.6 mg/dL Final   06/27/2022 2.5 1.6 - 2.6 mg/dL Final     Phosphorus   Date Value Ref Range Status   06/29/2022 2.6 2.5 - 4.5 mg/dL Final   06/28/2022 2.5 2.5 - 4.5 mg/dL Final   06/27/2022 2.9 2.5 - 4.5 mg/dL Final     Recent Labs     07/01/22  0427   PH 7.495*   PO2 67.7*   PCO2 33.6*   HCO3 25.3   BE 2.6   O2SAT 93.5       RADIOLOGY:  XR CHEST PORTABLE   Final Result   1. Minimal right lung base atelectasis. The chest x-ray is otherwise   unremarkable. XR CHEST PORTABLE   Final Result   Addendum 1 of 1   ADDENDUM:   I was asked by the attending physician to addend the report dictated by    Dr. Salud Collado.       The correct position of the distal tip of the left PICC line is within the   superior vena cava directed cephalad. The tip is not in the internal    jugular   vein. The tip of the left PICC line is seen within the superior vena cava    directed   cephalad. Final   1. Left-sided PICC line crosses midline and ascends cranially, likely within   the right internal jugular vein. 2. Persistent alveolar consolidation in the right lung base. 3. Possible small bilateral pleural effusions. XR CHEST PORTABLE   Final Result   Areas of a bi basilar atelectasis, no significant changes since the June 28   study. XR CHEST PORTABLE   Final Result   Bibasilar atelectasis         XR CHEST PORTABLE   Final Result   Improved aeration at the right base likely secondary to re-expansion of   atelectasis. The appearance now I believe is quit Dontrell Powers to this patient's   baseline. CT ABDOMEN PELVIS WO CONTRAST Additional Contrast? None   Final Result   Limited study due to patient motion. No acute intra-abdominal or pelvic findings. Right lower lobe consolidative changes, suggestive of a pneumonia. XR CHEST PORTABLE   Final Result   Bilateral basilar disease. The possibility of a bilateral lower lobe   pneumonia cannot be excluded in the appropriate setting. PROBLEM LIST:  Principal Problem:    Pneumonia  Resolved Problems:    * No resolved hospital problems.  *        Taras Trevino MD  Pulmonary and Critical Care Medicine

## 2022-07-03 NOTE — PLAN OF CARE
Problem: Discharge Planning  Goal: Discharge to home or other facility with appropriate resources  7/3/2022 1009 by Pearl Quinones RN  Outcome: Progressing     Problem: Safety - Adult  Goal: Free from fall injury  7/3/2022 1009 by Pearl Quinones RN  Outcome: Progressing     Problem: ABCDS Injury Assessment  Goal: Absence of physical injury  7/3/2022 1009 by Pearl Quinones RN  Outcome: Progressing     Problem: Pain  Goal: Verbalizes/displays adequate comfort level or baseline comfort level  7/3/2022 1009 by Pearl Quinones RN  Outcome: Progressing     Problem: Confusion  Goal: Confusion, delirium, dementia, or psychosis is improved or at baseline  Description: INTERVENTIONS:  1. Assess for possible contributors to thought disturbance, including medications, impaired vision or hearing, underlying metabolic abnormalities, dehydration, psychiatric diagnoses, and notify attending LIP  2. Tucker high risk fall precautions, as indicated  3. Provide frequent short contacts to provide reality reorientation, refocusing and direction  4. Decrease environmental stimuli, including noise as appropriate  5. Monitor and intervene to maintain adequate nutrition, hydration, elimination, sleep and activity  6. If unable to ensure safety without constant attention obtain sitter and review sitter guidelines with assigned personnel  7. Initiate Psychosocial CNS and Spiritual Care consult, as indicated  7/3/2022 1009 by Pearl Quinones RN  Outcome: Progressing     Problem: Nutrition Deficit:  Goal: Optimize nutritional status  7/3/2022 1009 by Pearl Quinones RN  Outcome: Progressing     Problem: Skin/Tissue Integrity  Goal: Absence of new skin breakdown  Description: 1. Monitor for areas of redness and/or skin breakdown  2. Assess vascular access sites hourly  3. Every 4-6 hours minimum:  Change oxygen saturation probe site  4.   Every 4-6 hours:  If on nasal continuous positive airway pressure, respiratory therapy assess nares and determine need for appliance change or resting period.   7/3/2022 1009 by Efra Tierney RN  Outcome: Progressing     Problem: Chronic Conditions and Co-morbidities  Goal: Patient's chronic conditions and co-morbidity symptoms are monitored and maintained or improved  7/3/2022 1009 by Efra Tierney RN  Outcome: Progressing     Problem: Respiratory - Adult  Goal: Achieves optimal ventilation and oxygenation  7/3/2022 1009 by Efra Tierney RN  Outcome: Progressing     Problem: Cardiovascular - Adult  Goal: Maintains optimal cardiac output and hemodynamic stability  7/3/2022 1009 by Efra Tierney RN  Outcome: Progressing     Problem: Cardiovascular - Adult  Goal: Absence of cardiac dysrhythmias or at baseline  7/3/2022 1009 by Efra Tierney RN  Outcome: Progressing     Problem: Skin/Tissue Integrity - Adult  Goal: Skin integrity remains intact  7/3/2022 1009 by Efra Tierney RN  Outcome: Progressing     Problem: Skin/Tissue Integrity - Adult  Goal: Oral mucous membranes remain intact  7/3/2022 1009 by Efra Tierney RN  Outcome: Progressing     Problem: Musculoskeletal - Adult  Goal: Return mobility to safest level of function  7/3/2022 1009 by Efra Tierney RN  Outcome: Progressing     Problem: Musculoskeletal - Adult  Goal: Maintain proper alignment of affected body part  7/3/2022 1009 by Efra Tierney RN  Outcome: Progressing     Problem: Genitourinary - Adult  Goal: Urinary catheter remains patent  7/3/2022 1009 by Efra Tierney RN  Outcome: Progressing     Problem: Infection - Adult  Goal: Absence of infection at discharge  7/3/2022 1009 by Efra Tierney RN  Outcome: Progressing

## 2022-07-03 NOTE — PROGRESS NOTES
Department of Internal Medicine  General Internal Medicine  Attending Progress Note      SUBJECTIVE:    Patient transferred out of ICU to [facility] yesterday afternoon. Was seen in room this morning, on BiPAP, appears comfortable but stated he felt SOB but had no other complaints this morning. Was informed by nursing staff later in day that patient had blood sputum that was expectorated.       Medications    Current Facility-Administered Medications: lidocaine 1 % injection 5 mL, 5 mL, IntraDERmal, Once  sodium chloride flush 0.9 % injection 5-40 mL, 5-40 mL, IntraVENous, 2 times per day  sodium chloride flush 0.9 % injection 5-40 mL, 5-40 mL, IntraVENous, PRN  0.9 % sodium chloride infusion, , IntraVENous, PRN  heparin flush 100 UNIT/ML injection 100 Units, 1 mL, IntraVENous, 2 times per day  heparin flush 100 UNIT/ML injection 100 Units, 1 mL, IntraCATHeter, PRN  meropenem (MERREM) 1,000 mg in sodium chloride 0.9 % 100 mL IVPB (mini-bag), 1,000 mg, IntraVENous, Q8H  levalbuterol (XOPENEX) nebulization 0.63 mg, 0.63 mg, Nebulization, Q4H  ipratropium (ATROVENT) 0.02 % nebulizer solution 0.5 mg, 0.5 mg, Nebulization, Q4H  dilTIAZem (CARDIZEM CD) extended release capsule 240 mg, 240 mg, Oral, Daily  metoprolol tartrate (LOPRESSOR) tablet 25 mg, 25 mg, Oral, TID  acetylcysteine (MUCOMYST) 20 % solution 600 mg, 600 mg, Inhalation, q8h  acetaminophen (TYLENOL) tablet 650 mg, 650 mg, Oral, Q6H PRN  vancomycin (VANCOCIN) 1,000 mg in sodium chloride 0.9 % 250 mL IVPB ([id number]), 1,000 mg, IntraVENous, Q12H  doxycycline hyclate (VIBRAMYCIN) capsule 100 mg, 100 mg, Oral, 2 times per day  Roflumilast (DALIRESP) tablet 500 mcg, 500 mcg, Oral, Daily  famotidine (PEPCID) tablet 20 mg, 20 mg, Oral, BID  haloperidol lactate (HALDOL) injection 2 mg, 2 mg, IntraVENous, Q2H PRN  sodium chloride flush 0.9 % injection 5-40 mL, 5-40 mL, IntraVENous, 2 times per day  sodium chloride flush 0.9 % injection 5-40 mL, 5-40 mL, IntraVENous, PRN  0.9 % sodium chloride infusion, , IntraVENous, PRN  heparin flush 100 UNIT/ML injection 300 Units, 3 mL, IntraVENous, 2 times per day  heparin flush 100 UNIT/ML injection 300 Units, 3 mL, IntraCATHeter, PRN  apixaban (ELIQUIS) tablet 5 mg, 5 mg, Oral, BID  atorvastatin (LIPITOR) tablet 10 mg, 10 mg, Oral, Daily  vitamin D (CHOLECALCIFEROL) tablet 2,000 Units, 2,000 Units, Oral, Daily  budesonide (PULMICORT) nebulizer suspension 500 mcg, 0.5 mg, Nebulization, BID  Arformoterol Tartrate (BROVANA) nebulizer solution 15 mcg, 15 mcg, Nebulization, BID    Physical    VITALS:  /81   Pulse 98   Temp 97.8 °F (36.6 °C)   Resp 24   Ht 5' 11\" (1.803 m)   Wt 259 lb 0.7 oz (117.5 kg)   SpO2 99%   BMI 36.13 kg/m²   TEMPERATURE:  Current - Temp: 97.8 °F (36.6 °C); Max - Temp  Av.9 °F (36.6 °C)  Min: 97.8 °F (36.6 °C)  Max: 98 °F (36.7 °C)  RESPIRATIONS RANGE: Resp  Av.9  Min: 17  Max: 25  PULSE RANGE: Pulse  Av.9  Min: 87  Max: 98  BLOOD PRESSURE RANGE:  Systolic (70NSK), SGK:271 , Min:121 , IYH:949   ; Diastolic (27TQZ), ABD:11, Min:66, Max:89    PULSE OXIMETRY RANGE: SpO2  Av.6 %  Min: 88 %  Max: 99 %  24HR INTAKE/OUTPUT:      Intake/Output Summary (Last 24 hours) at 7/3/2022 1133  Last data filed at 7/3/2022 9794  Gross per 24 hour   Intake 889.63 ml   Output 1100 ml   Net -210.37 ml       CONSTITUTIONAL: Alert and oriented x3, in no acute distress  HEENT: Normocephalic atraumatic. Pupils are equal and reactive bilaterally. Extraocular movements are intact. No pallor or icterus appreciated. NECK: Supple, no JVD , no lymphadenopathy, no bruits, no thyromegaly  LUNGS: diminished air movements b/l - however slightly improved,  Minimal  inspiratory crackles over bases and some wheezing, upper airway congestion as well. On BiPAP in no respiratory distress  CARDIOVASCULAR: Regular, tachycardia, no murmur rub or gallop.   ABDOMEN: Soft, nontender, distended, bowel sounds are positive, no organomegaly appreciated. NEUROLOGIC: sleepy but easy to arouse, oriented , no focal deficits noted. Mildly agitated. EXT: no edema, no clubbing, or cyanosis. CBC with Differential:    Lab Results   Component Value Date/Time    WBC 25.2 07/03/2022 08:45 AM    RBC 4.81 07/03/2022 08:45 AM    HGB 14.6 07/03/2022 08:45 AM    HCT 45.6 07/03/2022 08:45 AM     07/03/2022 08:45 AM    MCV 94.8 07/03/2022 08:45 AM    MCH 30.4 07/03/2022 08:45 AM    MCHC 32.0 07/03/2022 08:45 AM    RDW 14.1 07/03/2022 08:45 AM    METASPCT 0.9 06/24/2022 12:27 AM    LYMPHOPCT 7.0 07/03/2022 08:45 AM    MONOPCT 1.8 07/03/2022 08:45 AM    BASOPCT 0.2 07/03/2022 08:45 AM    MONOSABS 0.50 07/03/2022 08:45 AM    LYMPHSABS 1.76 07/03/2022 08:45 AM    EOSABS 0.23 07/03/2022 08:45 AM    BASOSABS 0.00 07/03/2022 08:45 AM     CMP:    Lab Results   Component Value Date/Time     07/03/2022 06:00 AM    K 4.1 07/03/2022 06:00 AM    K 4.3 06/24/2022 12:27 AM     07/03/2022 06:00 AM    CO2 25 07/03/2022 06:00 AM    BUN 26 07/03/2022 06:00 AM    CREATININE 1.0 07/03/2022 06:00 AM    GFRAA >60 07/03/2022 06:00 AM    LABGLOM >60 07/03/2022 06:00 AM    GLUCOSE 113 07/03/2022 06:00 AM    PROT 5.6 07/03/2022 06:00 AM    LABALBU 3.2 07/03/2022 06:00 AM    CALCIUM 9.0 07/03/2022 06:00 AM    BILITOT 1.2 07/03/2022 06:00 AM    ALKPHOS 79 07/03/2022 06:00 AM    AST 41 07/03/2022 06:00 AM    ALT 58 07/03/2022 06:00 AM         ASSESSMENT AND PLAN      1.RLL community acquired pneumonia   Currently on iv meropenem, vancomycin and doxycycline  Blood cultures negative, sputum culture grew stenotrophomonas maltophilia, sensitive only to levaquin and bactrim. Discussed with clinical pharmacist, plan to switch patient's antibiotics to bactrim, but will also discuss with pulmonology first.   Patient also apparently having bloody sputum now, will track amount and have patient collect and appreciate pulmonology recommendations. Hgb and platelets stable.   Urine Legionella and strep pneumo antigens presumptive negative  Pulmonology following closely, improving    2. Acute on chronic respiratory failure secondary to above and COPD exacerbation  Now on oxygen at 6 L/min by nasal cannula and BiPAP when sleeping  Home level is 5L NC  On levalbuterol and ipratropium    3. COPD, with exacerbation  Severe long standing disease  Iv steroids discontinued due to steroid induced psychosis  Daliresp added by Pulmonology  Also on pulmicort, brovana, levalbuterol and ipratropium    4. Leukocytosis secondary to pneumonia,.  -Was 20.5 on admission, down to 12.2 yesterday but today up to 25. May be due to resistant bacteria in sputum    5. A Fib with rvr   Cardizem drip on and off the past few days as was in and out of afib RVR. Now off drip and on metoprolol tartrate and PO cardizem and in NSR  Eliquis 5 mg po bid  Dr Jass Rodriges following. 6.Hyperlipidemia  Lipitor 10 mg daily    7. GERD  Continue pepcid 20 mg po bid      8. Steroid induced psychosis, episode of psychosis overnight secondary to respiratory status and dexamethasone which was discontinued    9. Hematuria, send urine for UA with microscopy  Cleared after irrigation, Urology following    10. PT/OT and  consult for rehab.  Patient approved for [employer] of Mounika Pelaez MD  11:33 AM  7/3/2022

## 2022-07-04 NOTE — PROGRESS NOTES
Cardiology  Progress Note      SUBJECTIVE:  No chest pain. Still looks dyspneic even at rest.  Chest sounds congested. He is much more alert and awake this morning.     Current Inpatient Medications  Current Facility-Administered Medications: potassium chloride (KLOR-CON M) extended release tablet 40 mEq, 40 mEq, Oral, Once  sulfamethoxazole-trimethoprim (BACTRIM DS;SEPTRA DS) 800-160 MG per tablet 1 tablet, 1 tablet, Oral, 2 times per day  lidocaine 1 % injection 5 mL, 5 mL, IntraDERmal, Once  sodium chloride flush 0.9 % injection 5-40 mL, 5-40 mL, IntraVENous, 2 times per day  sodium chloride flush 0.9 % injection 5-40 mL, 5-40 mL, IntraVENous, PRN  0.9 % sodium chloride infusion, , IntraVENous, PRN  heparin flush 100 UNIT/ML injection 100 Units, 1 mL, IntraVENous, 2 times per day  heparin flush 100 UNIT/ML injection 100 Units, 1 mL, IntraCATHeter, PRN  levalbuterol (XOPENEX) nebulization 0.63 mg, 0.63 mg, Nebulization, Q4H  ipratropium (ATROVENT) 0.02 % nebulizer solution 0.5 mg, 0.5 mg, Nebulization, Q4H  dilTIAZem (CARDIZEM CD) extended release capsule 240 mg, 240 mg, Oral, Daily  metoprolol tartrate (LOPRESSOR) tablet 25 mg, 25 mg, Oral, TID  acetylcysteine (MUCOMYST) 20 % solution 600 mg, 600 mg, Inhalation, q8h  acetaminophen (TYLENOL) tablet 650 mg, 650 mg, Oral, Q6H PRN  Roflumilast (DALIRESP) tablet 500 mcg, 500 mcg, Oral, Daily  famotidine (PEPCID) tablet 20 mg, 20 mg, Oral, BID  haloperidol lactate (HALDOL) injection 2 mg, 2 mg, IntraVENous, Q2H PRN  sodium chloride flush 0.9 % injection 5-40 mL, 5-40 mL, IntraVENous, 2 times per day  sodium chloride flush 0.9 % injection 5-40 mL, 5-40 mL, IntraVENous, PRN  0.9 % sodium chloride infusion, , IntraVENous, PRN  heparin flush 100 UNIT/ML injection 300 Units, 3 mL, IntraVENous, 2 times per day  heparin flush 100 UNIT/ML injection 300 Units, 3 mL, IntraCATHeter, PRN  apixaban (ELIQUIS) tablet 5 mg, 5 mg, Oral, BID  atorvastatin (LIPITOR) tablet 10 mg, 10 mg, Oral, Daily  vitamin D (CHOLECALCIFEROL) tablet 2,000 Units, 2,000 Units, Oral, Daily  budesonide (PULMICORT) nebulizer suspension 500 mcg, 0.5 mg, Nebulization, BID  Arformoterol Tartrate (BROVANA) nebulizer solution 15 mcg, 15 mcg, Nebulization, BID      Physical  VITALS:  BP (!) 137/91   Pulse 95   Temp 97.7 °F (36.5 °C) (Infrared)   Resp 18   Ht 5' 11\" (1.803 m)   Wt 259 lb 0.7 oz (117.5 kg)   SpO2 96%   BMI 36.13 kg/m²   CURRENT TEMPERATURE:  Temp: 97.7 °F (36.5 °C)  CONSTITUTIONAL: No acute distress. EYES: Vision is intact. ENT: No sore throat. No ear drainage. NECK: No JVD. BACK: Symmetric. LUNGS:  rhonchi right base and left base, diminished breath sounds right base and left base  CARDIOVASCULAR:  normal S1 and S2, no S3 and no S4  ABDOMEN:  non-tender  NEUROLOGIC: No focal deficits. EXTREMITIES: No edema cyanosis or clubbing. DATA:      ECG:  I have reviewed EKG with the following interpretation:  normal sinus rhythm    Cardiology Labs:  BMP:    Lab Results   Component Value Date/Time     07/04/2022 06:10 AM    K 3.3 07/04/2022 06:10 AM    K 4.3 06/24/2022 12:27 AM     07/04/2022 06:10 AM    CO2 26 07/04/2022 06:10 AM    BUN 22 07/04/2022 06:10 AM     CBC:    Lab Results   Component Value Date/Time    WBC 19.2 07/04/2022 06:10 AM    RBC 4.61 07/04/2022 06:10 AM    HGB 13.9 07/04/2022 06:10 AM    HCT 44.0 07/04/2022 06:10 AM    MCV 95.4 07/04/2022 06:10 AM    RDW 14.2 07/04/2022 06:10 AM     07/04/2022 06:10 AM     PT/INR:  No results found for: PTINR  TROPONIN:  No components found for: TROP    ASSESSMENT    1.paroxysmal atrial fibrillation. Patient was in sinus rhythm on admission. He went into atrial fibrillation June 28 with rapid ventricular response. This atrial fibrillation started after patient was put on theophylline drip for his respiratory problems. theophylline drip was stopped. He was started on Cardizem drip.   Patient converted back to sinus rhythm. He is now on Cardizem CD and metoprolol in addition to Eliquis. 2.elevation of high sensitivity troponin. This is mostly demand ischemia from his respiratory problems with type II non-STEMI. EKG is not showing acute ischemic changes. No complaints of chest pain. Pulmonary status is not good and I do not feel patient is suitable for any coronary workup. 3.community acquired pneumonia/acute exacerbation of COPD. On antibiotics and aerosol treatment. Pulmonary status appears to be his main issue right now.

## 2022-07-04 NOTE — PLAN OF CARE
Problem: Discharge Planning  Goal: Discharge to home or other facility with appropriate resources  Outcome: Progressing     Problem: Safety - Adult  Goal: Free from fall injury  Outcome: Progressing     Problem: ABCDS Injury Assessment  Goal: Absence of physical injury  Outcome: Progressing     Problem: Pain  Goal: Verbalizes/displays adequate comfort level or baseline comfort level  Outcome: Progressing     Problem: Confusion  Goal: Confusion, delirium, dementia, or psychosis is improved or at baseline  Description: INTERVENTIONS:  1. Assess for possible contributors to thought disturbance, including medications, impaired vision or hearing, underlying metabolic abnormalities, dehydration, psychiatric diagnoses, and notify attending LIP  2. [place] high risk fall precautions, as indicated  3. Provide frequent short contacts to provide reality reorientation, refocusing and direction  4. Decrease environmental stimuli, including noise as appropriate  5. Monitor and intervene to maintain adequate nutrition, hydration, elimination, sleep and activity  6. If unable to ensure safety without constant attention obtain sitter and review sitter guidelines with assigned personnel  7. Initiate Psychosocial CNS and Spiritual Care consult, as indicated  Outcome: Progressing     Problem: Nutrition Deficit:  Goal: Optimize nutritional status  Outcome: Progressing     Problem: Skin/Tissue Integrity  Goal: Absence of new skin breakdown  Description: 1. Monitor for areas of redness and/or skin breakdown  2. Assess vascular access sites hourly  3. Every 4-6 hours minimum:  Change oxygen saturation probe site  4. Every 4-6 hours:  If on nasal continuous positive airway pressure, respiratory therapy assess nares and determine need for appliance change or resting period.   Outcome: Progressing     Problem: Chronic Conditions and Co-morbidities  Goal: Patient's chronic conditions and co-morbidity symptoms are monitored and maintained or improved  Outcome: Progressing     Problem: Respiratory - Adult  Goal: Achieves optimal ventilation and oxygenation  Outcome: Progressing     Problem: Cardiovascular - Adult  Goal: Maintains optimal cardiac output and hemodynamic stability  Outcome: Progressing  Goal: Absence of cardiac dysrhythmias or at baseline  Outcome: Progressing     Problem: Skin/Tissue Integrity - Adult  Goal: Skin integrity remains intact  Outcome: Progressing  Goal: Oral mucous membranes remain intact  Outcome: Progressing     Problem: Musculoskeletal - Adult  Goal: Return mobility to safest level of function  Outcome: Progressing  Goal: Maintain proper alignment of affected body part  Outcome: Progressing     Problem: Genitourinary - Adult  Goal: Urinary catheter remains patent  Outcome: Progressing     Problem: Infection - Adult  Goal: Absence of infection at discharge  Outcome: Progressing

## 2022-07-04 NOTE — PROGRESS NOTES
PULMONARY MEDICINE CONSULT      [de-identified]year old person with PMH of COPD with chronic hypoxemic respiratory failure (on 5L ), hyperlipidemia, as described below admitted to hospital for management of worsening dyspnea, right lower lobe pneumonia, acute on chronic respiratory failure. .    The patient is receiving azithromycin and cefepime, methylprednisolone and bronchodilators. He is chronically anticoagulated with apixaban. · On 6L NC  · Stenotrophomonas maltophilia pneumonia being managed with Bactrim   · No change of hemoglobin levels  · Feels unwell, has dyspnea, his family states he has been taking prednisone 10 mg for 8 years      BP (!) 137/91   Pulse 95   Temp 97.7 °F (36.5 °C) (Infrared)   Resp 18   Ht 5' 11\" (1.803 m)   Wt 259 lb 0.7 oz (117.5 kg)   SpO2 96%   BMI 36.13 kg/m²   General: Awake, oriented to place and person, uncomfortable  HEENT: No head lesions, PERRL, EOMI, mouth without lesions, no nasal lesions, no cervical adenopathy palpated  Respiratory: Lungs with decreased breath sounds bilaterally, prolonged expiratory pahse, no wheezing, no accessory muscle use  CV: Regular rate, no murmurs, no JVD, no leg edema  Abdomen: Soft, non tender, + bowel sounds, no lesions  Skin: Hydrated, adequate turgor, no rash, capillary refill <2 seconds  Extremities: Muscular strength 4/5 in 4 limbs, moves 4 limbs spontaneously, distal pulses present  Neurology: Awake and alert, follows commands, moves 4 limbs on command and spontaneously, neck is supple, no meningitic signs present.       A/P:  Acute on chronic hypoxemic respiratory failure secondary to Stenotrophomonas pneumonia in a patient with severe COPD  --Cultures reviewed, has sputum with Stenotrophomonas maltophilia  --Antimicrobial therapy: Bactrim + vancomycin as per ID  --Bronchodilators: Arformoterol/budesonide + albuterol/iparatropium q 4 hrs + albuterol PRN + Roflumilast  --Incentive spirometry and flutter valve 10 times/hour while awake + Mucomyst  --Prednisone 10 mg/day  --Management of dyspnea with low dose morphine PRN    Rest of supportive care as per primary team    Atrial fibrillation  --Continue cardizem and metoprolol    Hyperlipidemia  --Continue statin     Hypertension  --On antihypertensives    DVT prophylaxis - anticoagulated with apixaban   PT/OT following    Past Medical History:   Diagnosis Date    Atrial fibrillation (HCC)     COPD (chronic obstructive pulmonary disease) (HCC)     Hyperlipidemia     Pneumonia     Steroid side effects     combative      No family history on file. Social History     Socioeconomic History    Marital status:      Spouse name: Not on file    Number of children: Not on file    Years of education: Not on file    Highest education level: Not on file   Occupational History    Not on file   Tobacco Use    Smoking status: Former Smoker     Quit date: 2009     Years since quittin.6    Smokeless tobacco: Former User   Vaping Use    Vaping Use: Never used   Substance and Sexual Activity    Alcohol use: Not Currently    Drug use: No    Sexual activity: Not Currently   Other Topics Concern    Not on file   Social History Narrative    Not on file     Social Determinants of Health     Financial Resource Strain:     Difficulty of Paying Living Expenses: Not on file   Food Insecurity:     Worried About [address] in the Last Year: Not on file    [address] in the Last Year: Not on file   Transportation Needs:     Lack of Transportation (Medical): Not on file    Lack of Transportation (Non-Medical):  Not on file   Physical Activity:     Days of Exercise per Week: Not on file    Minutes of Exercise per Session: Not on file   Stress:     Feeling of Stress : Not on file   Social Connections:     Frequency of Communication with Friends and Family: Not on file    Frequency of Social Gatherings with Friends and Family: Not on file    Attends Oriental orthodox Services: Not on file   [address] [facility] [place] or Organizations: Not on file    Attends Club or Organization Meetings: Not on file    Marital Status: Not on file   Intimate Partner Violence:     Fear of Current or Ex-Partner: Not on file    Emotionally Abused: Not on file    Physically Abused: Not on file    Sexually Abused: Not on file   Housing Stability:     Unable to Pay for Housing in the Last Year: Not on file    Number of [place] in the Last Year: Not on file    Unstable Housing in the Last Year: Not on file     Current Facility-Administered Medications   Medication Dose Route Frequency Provider Last Rate Last Admin    sulfamethoxazole-trimethoprim (BACTRIM DS;SEPTRA DS) 800-160 MG per tablet 1 tablet  1 tablet Oral 2 times per day Edelmira Ball MD   1 tablet at 07/04/22 0840    lidocaine 1 % injection 5 mL  5 mL IntraDERmal Once Keshia Abdul MD        sodium chloride flush 0.9 % injection 5-40 mL  5-40 mL IntraVENous 2 times per day Keshia Abdul MD   10 mL at 07/04/22 0856    sodium chloride flush 0.9 % injection 5-40 mL  5-40 mL IntraVENous PRN Keshia Abdul MD        0.9 % sodium chloride infusion   IntraVENous PRN Keshia Abdul MD   Stopped at 07/01/22 2227    heparin flush 100 UNIT/ML injection 100 Units  1 mL IntraVENous 2 times per day Keshia Abdul MD   100 Units at 07/04/22 0856    heparin flush 100 UNIT/ML injection 100 Units  1 mL IntraCATHeter PRN Keshia Abdul MD        levalbuterol Mal Geronimo) nebulization 0.63 mg  0.63 mg Nebulization Q4H Keshia Abdul MD   0.63 mg at 07/04/22 0952    ipratropium (ATROVENT) 0.02 % nebulizer solution 0.5 mg  0.5 mg Nebulization Q4H Keshia Abdul MD   0.5 mg at 07/04/22 0129    dilTIAZem (CARDIZEM CD) extended release capsule 240 mg  240 mg Oral Daily Keshia Abdul MD   240 mg at 07/04/22 0840    metoprolol tartrate (LOPRESSOR) tablet 25 mg  25 mg Oral TID Keshia Abdul MD   25 mg at 07/04/22 0840    acetylcysteine (MUCOMYST) 20 % solution 600 mg 600 mg Inhalation q8h Phan Engel MD   600 mg at 07/04/22 0548    acetaminophen (TYLENOL) tablet 650 mg  650 mg Oral Q6H PRN Phan Engel MD   650 mg at 07/01/22 2123    Roflumilast (DALIRESP) tablet 500 mcg  500 mcg Oral Daily Phan Engel MD   500 mcg at 07/04/22 0840    famotidine (PEPCID) tablet 20 mg  20 mg Oral BID Phan Engel MD   20 mg at 07/04/22 0840    haloperidol lactate (HALDOL) injection 2 mg  2 mg IntraVENous Q2H PRN Phan Engel MD   2 mg at 07/03/22 1625    sodium chloride flush 0.9 % injection 5-40 mL  5-40 mL IntraVENous 2 times per day Phan Engel MD   10 mL at 07/04/22 0856    sodium chloride flush 0.9 % injection 5-40 mL  5-40 mL IntraVENous PRN Phan Engel MD   10 mL at 07/01/22 0557    0.9 % sodium chloride infusion   IntraVENous PRN Phan Engel MD   Stopped at 06/30/22 1446    heparin flush 100 UNIT/ML injection 300 Units  3 mL IntraVENous 2 times per day Phan Engel MD   300 Units at 07/04/22 0856    heparin flush 100 UNIT/ML injection 300 Units  3 mL IntraCATHeter PRN Phan Engel MD   300 Units at 07/01/22 0557    apixaban (ELIQUIS) tablet 5 mg  5 mg Oral BID Mitchel Naqvi MD   5 mg at 07/04/22 0840    atorvastatin (LIPITOR) tablet 10 mg  10 mg Oral Daily Mitchel Naqvi MD   10 mg at 07/02/22 1651    vitamin D (CHOLECALCIFEROL) tablet 2,000 Units  2,000 Units Oral Daily Mitchel Naqvi MD   2,000 Units at 07/04/22 0840    budesonide (PULMICORT) nebulizer suspension 500 mcg  0.5 mg Nebulization BID Mitchel Naqvi MD   500 mcg at 07/04/22 0546    Arformoterol Tartrate (BROVANA) nebulizer solution 15 mcg  15 mcg Nebulization BID Mitchel Naqvi MD   15 mcg at 07/04/22 0546     MEDICATIONS:   sulfamethoxazole-trimethoprim  1 tablet Oral 2 times per day    lidocaine  5 mL IntraDERmal Once    sodium chloride flush  5-40 mL IntraVENous 2 times per day    heparin flush  1 mL IntraVENous 2 times per day    levalbuterol  0.63 mg Nebulization Q4H    ipratropium  0.5 mg Nebulization Q4H    dilTIAZem  240 mg Oral Daily    metoprolol tartrate  25 mg Oral TID    acetylcysteine  600 mg Inhalation q8h    Roflumilast  500 mcg Oral Daily    famotidine  20 mg Oral BID    sodium chloride flush  5-40 mL IntraVENous 2 times per day    heparin flush  3 mL IntraVENous 2 times per day    apixaban  5 mg Oral BID    atorvastatin  10 mg Oral Daily    Vitamin D  2,000 Units Oral Daily    budesonide  0.5 mg Nebulization BID    Arformoterol Tartrate  15 mcg Nebulization BID      sodium chloride Stopped (07/01/22 2227)    sodium chloride Stopped (06/30/22 1446)         OBJECTIVE:  Vitals:    07/04/22 0730   BP: (!) 137/91   Pulse: 95   Resp: 18   Temp: 97.7 °F (36.5 °C)   SpO2: 96%     FiO2 : 60 %  O2 Flow Rate (L/min): 6 L/min  O2 Device: High flow nasal cannula        LABS:  WBC   Date Value Ref Range Status   07/04/2022 19.2 (H) 4.5 - 11.5 E9/L Final   07/03/2022 25.2 (H) 4.5 - 11.5 E9/L Final   07/02/2022 16.5 (H) 4.5 - 11.5 E9/L Final     Hemoglobin   Date Value Ref Range Status   07/04/2022 13.9 12.5 - 16.5 g/dL Final   07/03/2022 14.6 12.5 - 16.5 g/dL Final   07/02/2022 14.9 12.5 - 16.5 g/dL Final     Hematocrit   Date Value Ref Range Status   07/04/2022 44.0 37.0 - 54.0 % Final   07/03/2022 45.6 37.0 - 54.0 % Final   07/02/2022 46.4 37.0 - 54.0 % Final     MCV   Date Value Ref Range Status   07/04/2022 95.4 80.0 - 99.9 fL Final   07/03/2022 94.8 80.0 - 99.9 fL Final   07/02/2022 93.9 80.0 - 99.9 fL Final     Platelets   Date Value Ref Range Status   07/04/2022 152 130 - 450 E9/L Final   07/03/2022 131 130 - 450 E9/L Final   07/02/2022 127 (L) 130 - 450 E9/L Final     Sodium   Date Value Ref Range Status   07/04/2022 141 132 - 146 mmol/L Final   07/03/2022 145 132 - 146 mmol/L Final   07/02/2022 142 132 - 146 mmol/L Final     Potassium   Date Value Ref Range Status   07/04/2022 3.3 (L) 3.5 - 5.0 mmol/L Final   07/03/2022 4.1 3.5 - 5.0 mmol/L Final   07/02/2022 3.5 3.5 - 5.0 mmol/L Final     Potassium reflex Magnesium   Date Value Ref Range Status   06/24/2022 4.3 3.5 - 5.0 mmol/L Final   12/18/2020 3.7 3.5 - 5.0 mmol/L Final   07/23/2019 4.6 3.5 - 5.0 mmol/L Final     Chloride   Date Value Ref Range Status   07/04/2022 104 98 - 107 mmol/L Final   07/03/2022 108 (H) 98 - 107 mmol/L Final   07/02/2022 105 98 - 107 mmol/L Final     CO2   Date Value Ref Range Status   07/04/2022 26 22 - 29 mmol/L Final   07/03/2022 25 22 - 29 mmol/L Final   07/02/2022 28 22 - 29 mmol/L Final     BUN   Date Value Ref Range Status   07/04/2022 22 6 - 23 mg/dL Final   07/03/2022 26 (H) 6 - 23 mg/dL Final   07/02/2022 25 (H) 6 - 23 mg/dL Final     CREATININE   Date Value Ref Range Status   07/04/2022 0.8 0.7 - 1.2 mg/dL Final   07/03/2022 1.0 0.7 - 1.2 mg/dL Final   07/02/2022 0.9 0.7 - 1.2 mg/dL Final     Glucose   Date Value Ref Range Status   07/04/2022 88 74 - 99 mg/dL Final   07/03/2022 113 (H) 74 - 99 mg/dL Final   07/02/2022 118 (H) 74 - 99 mg/dL Final     Calcium   Date Value Ref Range Status   07/04/2022 8.7 8.6 - 10.2 mg/dL Final   07/03/2022 9.0 8.6 - 10.2 mg/dL Final   07/02/2022 8.8 8.6 - 10.2 mg/dL Final     Total Protein   Date Value Ref Range Status   07/04/2022 5.6 (L) 6.4 - 8.3 g/dL Final   07/03/2022 5.6 (L) 6.4 - 8.3 g/dL Final   07/02/2022 5.6 (L) 6.4 - 8.3 g/dL Final     Albumin   Date Value Ref Range Status   07/04/2022 3.0 (L) 3.5 - 5.2 g/dL Final   07/03/2022 3.2 (L) 3.5 - 5.2 g/dL Final   07/02/2022 3.1 (L) 3.5 - 5.2 g/dL Final     Total Bilirubin   Date Value Ref Range Status   07/04/2022 0.8 0.0 - 1.2 mg/dL Final   07/03/2022 1.2 0.0 - 1.2 mg/dL Final   07/02/2022 0.9 0.0 - 1.2 mg/dL Final     Alkaline Phosphatase   Date Value Ref Range Status   07/04/2022 80 40 - 129 U/L Final   07/03/2022 79 40 - 129 U/L Final   07/02/2022 77 40 - 129 U/L Final     AST   Date Value Ref Range Status   07/04/2022 37 0 - 39 U/L Final   07/03/2022 41 (H) 0 - 39 U/L Final   07/02/2022 47 (H) 0 - 39 U/L Final     ALT   Date Value Ref Range Status   07/04/2022 47 (H) 0 - 40 U/L Final   07/03/2022 58 (H) 0 - 40 U/L Final   07/02/2022 64 (H) 0 - 40 U/L Final     GFR Non-   Date Value Ref Range Status   07/04/2022 >60 >=60 mL/min/1.73 Final     Comment:     Chronic Kidney Disease: less than 60 ml/min/1.73 sq.m. Kidney Failure: less than 15 ml/min/1.73 sq.m. Results valid for patients 18 years and older. 07/03/2022 >60 >=60 mL/min/1.73 Final     Comment:     Chronic Kidney Disease: less than 60 ml/min/1.73 sq.m. Kidney Failure: less than 15 ml/min/1.73 sq.m. Results valid for patients 18 years and older. 07/02/2022 >60 >=60 mL/min/1.73 Final     Comment:     Chronic Kidney Disease: less than 60 ml/min/1.73 sq.m. Kidney Failure: less than 15 ml/min/1.73 sq.m. Results valid for patients 18 years and older. GFR    Date Value Ref Range Status   07/04/2022 >60  Final   07/03/2022 >60  Final   07/02/2022 >60  Final     Magnesium   Date Value Ref Range Status   07/04/2022 2.1 1.6 - 2.6 mg/dL Final   06/29/2022 2.3 1.6 - 2.6 mg/dL Final   06/28/2022 2.6 1.6 - 2.6 mg/dL Final     Phosphorus   Date Value Ref Range Status   07/04/2022 2.4 (L) 2.5 - 4.5 mg/dL Final   06/29/2022 2.6 2.5 - 4.5 mg/dL Final   06/28/2022 2.5 2.5 - 4.5 mg/dL Final     No results for input(s): PH, PO2, PCO2, HCO3, BE, O2SAT in the last 72 hours. RADIOLOGY:  XR CHEST PORTABLE   Final Result   1. Minimal right lung base atelectasis. The chest x-ray is otherwise   unremarkable. XR CHEST PORTABLE   Final Result   Addendum 1 of 1   ADDENDUM:   I was asked by the attending physician to addend the report dictated by       [facility]. The correct position of the distal tip of the left PICC line is within the   superior vena cava directed cephalad. The tip is not in the internal    jugular   vein.       The tip of the left PICC line is seen within the superior vena cava    directed   cephalad. Final   1. Left-sided PICC line crosses midline and ascends cranially, likely within   the right internal jugular vein. 2. Persistent alveolar consolidation in the right lung base. 3. Possible small bilateral pleural effusions. XR CHEST PORTABLE   Final Result   Areas of a bi basilar atelectasis, no significant changes since the June 28   study. XR CHEST PORTABLE   Final Result   Bibasilar atelectasis         XR CHEST PORTABLE   Final Result   Improved aeration at the right base likely secondary to re-expansion of   atelectasis. The appearance now I believe is quit [place] to this patient's   baseline. CT ABDOMEN PELVIS WO CONTRAST Additional Contrast? None   Final Result   Limited study due to patient motion. No acute intra-abdominal or pelvic findings. Right lower lobe consolidative changes, suggestive of a pneumonia. XR CHEST PORTABLE   Final Result   Bilateral basilar disease. The possibility of a bilateral lower lobe   pneumonia cannot be excluded in the appropriate setting. PROBLEM LIST:  Principal Problem:    Pneumonia  Resolved Problems:    * No resolved hospital problems.  *        Vikas Lal MD  Pulmonary and Critical Care Medicine

## 2022-07-04 NOTE — PROGRESS NOTES
OCCUPATIONAL THERAPY Treatment note  [facility]  [address], [address]    HOOW:[id number]                                                   Patient Name: Bess Munoz     MRN: [id number]     : 1940     Room: [address] [place] [address]     EVAL  Evaluating OT: Keila Beasley OTR/L #[id number]      Referring Provider and Specific Provider Orders/Date:      22 1230   OT eval and treat  Start:  22 1230,   End:  22 1230,   ONE TIME,   Standing Count:  1 OccurrencesSAMANTHA MD       Placement Recommendation: Subacute Rehab vs LTACH         Diagnosis:   1. Pneumonia due to infectious organism, unspecified laterality, unspecified part of lung    2. Septicemia ([facility] [address]    3. Acute on chronic respiratory failure with hypoxia (HCC)    4. Lactic acidosis         Surgery: None       Pertinent Medical History:       Past Medical History        Past Medical History:   Diagnosis Date    COPD (chronic obstructive pulmonary disease) ([address]      Hyperlipidemia              Past Surgical History         Past Surgical History:   Procedure Laterality Date    APPENDECTOMY       COLONOSCOPY        EYE SURGERY Right       cataract removal with lens implants    HEMORRHOID SURGERY        LAPAROSCOPIC APPENDECTOMY N/A 2019     APPENDECTOMY LAPAROSCOPIC performed by Deb Frances MD at [address]             Precautions:  Fall Risk, alarm, O2 9 L high flow, tremors, A x 2 with high fall risk       Assessment of current deficits    [x]? Functional mobility            [x]?ADLs           [x]? Strength                   []?Cognition    [x]? Functional transfers          [x]? IADLs         [x]? Safety Awareness   [x]? Endurance    [x]? Fine Coordination              [x]? Balance      []? Vision/perception    []? Sensation      []? Gross Motor Coordination  []? ROM           []? Delirium                   [x]?  Motor Control      OT PLAN OF CARE   OT POC based on physician orders, patient diagnosis and results of clinical assessment     Frequency/Duration 1-3 days/wk for 2 weeks PRN      Specific OT Treatment Interventions to include:   * Instruction/training on adapted ADL techniques and AE recommendations to increase functional independence within precautions       * Training on energy conservation strategies, correct breathing pattern and techniques to improve independence/tolerance for self-care routine  * Functional transfer/mobility training/DME recommendations for increased independence, safety, and fall prevention  * Patient/Family education to increase follow through with safety techniques and functional independence  * Recommendation of environmental modifications for increased safety with functional transfers/mobility and ADLs  * Therapeutic exercise to improve motor endurance, ROM, and functional strength for ADLs/functional transfers  * Therapeutic activities to facilitate/challenge dynamic balance, stand tolerance for increased safety and independence with ADLs  * Therapeutic activities to facilitate gross/fine motor skills for increased independence with ADLs  * Positioning to improve skin integrity, interaction with environment and functional independence     Recommended Adaptive Equipment: TBD       Home Living: Patient Liam Kimbrough a ranch home  with 2 steps  to enter bilateral Rail    Bathroom set-up: tub/shower , shower chair, standard height commode.      Equipment owned: Jolly Enter and O2,   5 Liters of o2 via nasal cannula      Prior Level of Function: Mod Indep with ADLs , daughter assists with IADLs; ambulated independently with wheeled walker.      Driving: Yes  Occupation: Retired   Enjoys: N/a       Pain Level: Pt denied pain; Nursing notified.                 Cognition: A&O: 4/4; Follows 2-3 step directions, telesitter              Memory: intact              Sequencing: fair               Problem solving: poor+ with anxiety and impulsivity              Judgement/safety: poor+      [facility]   AM-PAC Daily Activity Inpatient   How much help for putting on and taking off regular lower body clothing?: Total  How much help for Bathing?: Total  How much help for Toileting?: Total  How much help for putting on and taking off regular upper body clothing?: Total  How much help for taking care of personal grooming?: A Little  How much help for eating meals?: A Little  AM-PAC Inpatient Daily Activity Raw Score: 10  AM-PAC Inpatient ADL T-Scale Score : 27.31  ADL Inpatient CMS 0-100% Score: 74.7  ADL Inpatient CMS G-Code Modifier : CL                  Functional Assessment:     Initial Eval Status  Date: 6/28/22    Treatment Status  Date: 7/4/22: STGs = LTGs  Time frame: 10-14 days   Feeding Minimal Assist      Min A with poor intake. Family reports that he has not ate in 3 days and only drinking liquids. Moderate [place]    Grooming Moderate Assist     Min A with oral care, hair wash, hair comb and hand/face wash from sitting up in arm chair  Supervision    UB Dressing Moderate Assist    Dep with gown management. Pt very anxious and upset.   Supervision    LB Dressing Dependent     Dep with socks from supine level.   Minimal Assist    Bathing Maximal Assist     Max A x 2 with second assist for buttocks from standing.   Moderate [place]    Toileting Dependent      Dep with A x 2 for sitting up in chair with bed pan. Pt very anxious and impulsive. High fall risk. Recommending supine moving forward. Minimal Assist    Bed Mobility  Supine to sit: Moderate Assist   Sit to supine: Moderate Assist   Scooting: Moderate Assist to EOB, Dep for scooting toward [facility] with use of TAPs    Supine to sit: Moderate Assist   Sit to supine:  Moderate Assist   Scooting: Min Assist to EOB, Supine to sit: Supervision   Sit to supine: Supervision    Functional Transfers Sit to stand: Maximal Assist   Stand to sit: Maximal Assist      Transfer training from EOB to wheeled walker x 1 rep with verbal/tactile cues for hand/feet placement improve safety.     Max A x 2 from elevated surface of bed x 2 using walker; max A x 2 from arm chair x 2; dep assist x 2 from arm chair with fatigue, impulsivity and high fall risk. Returned pt to chair for safety and updated nursing that transfer out of the chair is not safe due to pts level of fatigue.   Minimal Assist    Functional Mobility NT due to pt overall debility, decreased activity tolerance, balance deficits/tremors, safety and fall risk.      Max A x 2 with walker form bed to arm chair. Not safe to complete any further with pt sitting impulsively and legs giving out under him.   Minimal Assist with use of wheeled walker    Balance Sitting:     Static: fair    Dynamic: fair minus  Standing: poor     Sitting:     Static: fair-    Dynamic: fair minus  Standing: poor   Sitting:     Static: good    Dynamic: good  Standing: fair plus    Activity Tolerance fair ; pt anxious while seated at EOB. Instructions for pursed lip breathing/deep breaths to improve endurance and decrease anxiety. Poor tolerance with high anxiety and requesting O2 to 8L even with 97% and HR ranging from 112-154  Increase standing tolerance >2-3 minutes for improved engagement with functional transfers and indep in ADLs      Visual/  Perceptual Glasses:  Yes     Reports changes in vision since admission: No       NA       Hand Dominance: Right        AROM (PROM) Strength Additional Info:  Goal:   RUE  WFL 3-/5 with fatigue good  and fair- minus FMC/dexterity noted during ADL tasks; UE tremors noted     Improve overall RUE strength  for participation in functional tasks   LUE WFL 3-/5 with fatigue good  and fair- minus FMC/dexterity noted during ADL tasks; UE tremors noted     Improve overall LUE strength  for participation in functional tasks      Hearing: Hard of hearing    Sensation: No c/o numbness or tingling  Tone: WFL   Edema: none Comments: Upon arrival patient supine in bed with wife and daughter. Nursing requesting pt up in arm chair and assisted therapist due to high fall risk and A x 2. Pt very anxious throughout. Pt required dep A for most UB ADLs and depA LB ADLs tasks. Limited with max A x 2 for standing during LB ADLs and functional transfers. The biggest barriers reflect that of functional transfers, functional mobility, UB/LB ADLs, cognition, activity tolerance, balance, safety and strengthening. At end of session, patient sitting in arm chair with call light and phone within reach, all lines and tubes intact. Overall patient demonstrated decreased independence and safety during completion of ADL/functional transfer/mobility tasks. Nursing updated on pt position and status following OT treatment. Pt would benefit from continued skilled OT to increase safety and independence with completion of ADL/IADL tasks for functional independence and quality of life. Treatment: OT treatment provided this date includes:   Instruction, education and training on safe facilitation and adapted techniques for completion of ADLs. These include neuromuscular reeducation to facilitate balance/righting reactions,safe functional transfer techniques, proper positioning/alignment to improve interaction with environment and overall function and on adapted techniques/work simplification for completion of ADLs. Education provided on hand/feet placement with arm chair, walker and body mechanics for fall prevention. Cues for energy conservation and safety for in the home at [place], including modifications and DME. Extended time to complete all tasks, including skilled monitoring of patient's response during treatment session and vital signs. Prior to and at the end of session, environmental modifications / line management completed for patients safety and efficiency of treatment session. See above for further details.     · Pt has made min progress towards set goals    · OT 1-3x/week for 5-7 days during hospitalization      Treatment Time also includes thorough review of current medical information, gathering information on past medical history/social history and prior level of function, informal observation of tasks, assessment of data and education on plan of care and goals.      Treatment Time In: 1107    Treatment Time Out: 1150     Treatment Charges: Mins Units   ADL/Home Mgt     60289 25 2   Thera Activities     40279 15 1   Ther Ex                 [id number]       Manual Therapy    24364       Neuro Re-ed         24761       Orthotic manage/training                               71821       Non Billable Time       Total Timed Treatment 43 3     Trish Mcgill OTR/L; [id number]

## 2022-07-04 NOTE — PROGRESS NOTES
[facility]  [facility]  Progress Note    NAME: Aleksander Finch  MR:  [id number]  :   1940  DATE OF SERVICE:22    This is a face to face encounter with Aleksander Finch [de-identified] y.o. male on 22    CHIEF COMPLAINT     ID following for   Chief Complaint   Patient presents with    Shortness of Breath     Pt c/o increasing SOB, states he wears O2 at home \"wide open\" and \"as much as I can get. \" Pt found 70s at home per EMS and was placed on CPAP. 125mg solumedrol, 2 proventils given en route. Pt denies CP/N/V. Placed on NIV upon arrival to ED. HISTORY OF PRESENT ILLNESS   Pt seen and examined  22  Wife present and updated reviewed labs and POC  Pt is in bed [place]  Coarse cough       Patient is tolerating medications. No reported adverse drug reactions. REVIEW OF SYSTEMS     As stated above in the chief complaint, otherwise negative.   CURRENT MEDICATIONS      sulfamethoxazole-trimethoprim  1 tablet Oral 2 times per day    lidocaine  5 mL IntraDERmal Once    sodium chloride flush  5-40 mL IntraVENous 2 times per day    heparin flush  1 mL IntraVENous 2 times per day    levalbuterol  0.63 mg Nebulization Q4H    ipratropium  0.5 mg Nebulization Q4H    dilTIAZem  240 mg Oral Daily    metoprolol tartrate  25 mg Oral TID    acetylcysteine  600 mg Inhalation q8h    Roflumilast  500 mcg Oral Daily    famotidine  20 mg Oral BID    sodium chloride flush  5-40 mL IntraVENous 2 times per day    heparin flush  3 mL IntraVENous 2 times per day    apixaban  5 mg Oral BID    atorvastatin  10 mg Oral Daily    Vitamin D  2,000 Units Oral Daily    budesonide  0.5 mg Nebulization BID    Arformoterol Tartrate  15 mcg Nebulization BID     Continuous Infusions:   sodium chloride Stopped (22 2227)    sodium chloride Stopped (22 1446)     PRN Meds:sodium chloride flush, sodium chloride, heparin flush, acetaminophen, haloperidol lactate, sodium chloride flush, sodium chloride, heparin flush    PHYSICAL EXAM     BP (!) 137/91   Pulse 95   Temp 97.7 °F (36.5 °C) (Infrared)   Resp 18   Ht 5' 11\" (1.803 m)   Wt 259 lb 0.7 oz (117.5 kg)   SpO2 96%   BMI 36.13 kg/m²   Temp  Av °F (36.7 °C)  Min: 97.5 °F (36.4 °C)  Max: 98.7 °F (37.1 °C)  Constitutional:  The patient is awake, alert, and oriented. Skin:    Warm and dry. No rashes were noted. HEENT:    rash AT/NC  Neck:    Supple to movements. Chest:   No use of accessory muscles to breathe. Symmetrical expansion. Cardiovascular:  S1 and S2 are rhythmic and regular. No murmurs appreciated. Abdomen:   Distended Positive bowel sounds to auscultation. Benign to palpation. Extremities:   No clubbing, no cyanosis,  edema. CNS    Awake   Lines: pic       DIAGNOSTIC RESULTS   Radiology:    Recent Labs     22  0847 22  0845 22  0610   WBC 16.5* 25.2* 19.2*   RBC 4.94 4.81 4.61   HGB 14.9 14.6 13.9   HCT 46.4 45.6 44.0   MCV 93.9 94.8 95.4   MCH 30.2 30.4 30.2   MCHC 32.1 32.0 31.6*   RDW 14.0 14.1 14.2   * 131 152   MPV 12.5* 12.6* 12.6*     Recent Labs     22  0437 22  0600 22  0610    145 141   K 3.5 4.1 3.3*    108* 104   CO2 28 25 26   BUN 25* 26* 22   CREATININE 0.9 1.0 0.8   GLUCOSE 118* 113* 88   PROT 5.6* 5.6* 5.6*   LABALBU 3.1* 3.2* 3.0*   CALCIUM 8.8 9.0 8.7   BILITOT 0.9 1.2 0.8   ALKPHOS 77 79 80   AST 47* 41* 37   ALT 64* 58* 47*     No results found for: CRP  No results found for: SEDRATE  Recent Labs     22/22  0600 22  0610   AST 47* 41* 37   ALT 64* 58* 47*     No results found for: CHOL, TRIG, HDL, LDLCALC, LABVLDL  Lab Results   Component Value Date/Time    VITD25 13 (L) 2020 02:50 PM       Microbiology:   No results for input(s): COVID19 in the last 72 hours.   Lab Results   Component Value Date/Time    BC 5 Days no growth 2022 12:27 AM    BC 5 Days no growth 2020 10:16 AM    BC 5 Days no growth tablet 1 tablet, 2 times per day     Follow labs      · Monitor labs    Imaging and labs were reviewed per medical records. The patient was educated about the diagnosis, prognosis, indications, risks and benefits of treatment. An opportunity to ask questions was given to the patient/FAMILY. Thank you for involving me in the care of Jose Luis Moreno I will continue to follow. Please do not hesitate to call for any questions or concerns.     Electronically signed by Francisco Pacheco MD on 7/4/2022 at 10:31 AM

## 2022-07-04 NOTE — PROGRESS NOTES
OT SESSION ATTEMPT     Date:2022  Patient Name: Jeffrey Lee  MRN: [id number]  : 1940  Room: [address] [place] [address]     Attempted OT session this date:    [] unavailable due to other medical staff currently with pt   [] unavailable per nursing staff recommendation    [] Unavailable per nursing staff secondary to lab / radiology results    [x] pt declined due to fatigue and nausea. Benefits of participation in therapy reviewed with pt and wife. Nursing also assisted in attempts to motivate pt, but he became agitated and using vulgar language saying that he would not participate with OT services. Educated them both on need for precert for DC, but they continued to decline therapy services at this time. [] off unit   [] Other:     Will reattempt OT evaluation and/or treatment at a later time.     Gamal Read OTR/L; [id number]

## 2022-07-04 NOTE — PROGRESS NOTES
Department of Internal Medicine  General Internal Medicine  Attending Progress Note      SUBJECTIVE:    Patient seen and examined this morning, complaining of upset stomach after getting bactrim this morning. He denies any vomiting or diarrhea and denies any fever/chills.       Medications    Current Facility-Administered Medications: sulfamethoxazole-trimethoprim (BACTRIM) 1,763.2 mg in dextrose 5 % 500 mL IVPB, 15 mg/kg, IntraVENous, Q8H  lidocaine 1 % injection 5 mL, 5 mL, IntraDERmal, Once  sodium chloride flush 0.9 % injection 5-40 mL, 5-40 mL, IntraVENous, 2 times per day  sodium chloride flush 0.9 % injection 5-40 mL, 5-40 mL, IntraVENous, PRN  0.9 % sodium chloride infusion, , IntraVENous, PRN  heparin flush 100 UNIT/ML injection 100 Units, 1 mL, IntraVENous, 2 times per day  heparin flush 100 UNIT/ML injection 100 Units, 1 mL, IntraCATHeter, PRN  levalbuterol (XOPENEX) nebulization 0.63 mg, 0.63 mg, Nebulization, Q4H  ipratropium (ATROVENT) 0.02 % nebulizer solution 0.5 mg, 0.5 mg, Nebulization, Q4H  dilTIAZem (CARDIZEM CD) extended release capsule 240 mg, 240 mg, Oral, Daily  metoprolol tartrate (LOPRESSOR) tablet 25 mg, 25 mg, Oral, TID  acetylcysteine (MUCOMYST) 20 % solution 600 mg, 600 mg, Inhalation, q8h  acetaminophen (TYLENOL) tablet 650 mg, 650 mg, Oral, Q6H PRN  Roflumilast (DALIRESP) tablet 500 mcg, 500 mcg, Oral, Daily  famotidine (PEPCID) tablet 20 mg, 20 mg, Oral, BID  haloperidol lactate (HALDOL) injection 2 mg, 2 mg, IntraVENous, Q2H PRN  sodium chloride flush 0.9 % injection 5-40 mL, 5-40 mL, IntraVENous, 2 times per day  sodium chloride flush 0.9 % injection 5-40 mL, 5-40 mL, IntraVENous, PRN  0.9 % sodium chloride infusion, , IntraVENous, PRN  heparin flush 100 UNIT/ML injection 300 Units, 3 mL, IntraVENous, 2 times per day  heparin flush 100 UNIT/ML injection 300 Units, 3 mL, IntraCATHeter, PRN  apixaban (ELIQUIS) tablet 5 mg, 5 mg, Oral, BID  atorvastatin (LIPITOR) tablet 10 mg, 10 mg, Oral, Daily  vitamin D (CHOLECALCIFEROL) tablet 2,000 Units, 2,000 Units, Oral, Daily  budesonide (PULMICORT) nebulizer suspension 500 mcg, 0.5 mg, Nebulization, BID  Arformoterol Tartrate (BROVANA) nebulizer solution 15 mcg, 15 mcg, Nebulization, BID    Physical    VITALS:  BP (!) 137/91   Pulse 95   Temp 97.7 °F (36.5 °C) (Infrared)   Resp 18   Ht 5' 11\" (1.803 m)   Wt 259 lb 0.7 oz (117.5 kg)   SpO2 96%   BMI 36.13 kg/m²   TEMPERATURE:  Current - Temp: 97.7 °F (36.5 °C); Max - Temp  Av °F (36.7 °C)  Min: 97.5 °F (36.4 °C)  Max: 98.7 °F (37.1 °C)  RESPIRATIONS RANGE: Resp  Av.3  Min: 18  Max: 22  PULSE RANGE: Pulse  Av.3  Min: 79  Max: 95  BLOOD PRESSURE RANGE:  Systolic (25TXX), WOX:167 , Min:135 , XAT:739   ; Diastolic (13BNC), OJH:89, Min:75, Max:91    PULSE OXIMETRY RANGE: SpO2  Av.3 %  Min: 91 %  Max: 96 %  24HR INTAKE/OUTPUT:      Intake/Output Summary (Last 24 hours) at 2022 1136  Last data filed at 2022 0925  Gross per 24 hour   Intake 155.61 ml   Output 950 ml   Net -794.39 ml       CONSTITUTIONAL: Alert and oriented x3, in no acute distress  HEENT: Normocephalic atraumatic. Pupils are equal and reactive bilaterally. Extraocular movements are intact. No pallor or icterus appreciated. NECK: Supple, no JVD , no lymphadenopathy, no bruits, no thyromegaly  LUNGS: diminished air movements b/l - however slightly improved,  Minimal  inspiratory crackles over bases and some wheezing, upper airway congestion as well. On NC in no respiratory distress  CARDIOVASCULAR: Regular, tachycardia, no murmur rub or gallop. ABDOMEN: Soft, mild tenderness in middle abdomen, distended, bowel sounds are positive, no organomegaly appreciated. NEUROLOGIC: sleepy but easy to arouse, oriented , no focal deficits noted. Mildly agitated. EXT: no edema, no clubbing, or cyanosis.     CBC with Differential:    Lab Results   Component Value Date/Time    WBC 19.2 2022 06:10 AM    RBC 4.61 07/04/2022 06:10 AM    HGB 13.9 07/04/2022 06:10 AM    HCT 44.0 07/04/2022 06:10 AM     07/04/2022 06:10 AM    MCV 95.4 07/04/2022 06:10 AM    MCH 30.2 07/04/2022 06:10 AM    MCHC 31.6 07/04/2022 06:10 AM    RDW 14.2 07/04/2022 06:10 AM    METASPCT 0.9 06/24/2022 12:27 AM    LYMPHOPCT 9.1 07/04/2022 06:10 AM    MONOPCT 7.1 07/04/2022 06:10 AM    BASOPCT 0.2 07/04/2022 06:10 AM    MONOSABS 1.36 07/04/2022 06:10 AM    LYMPHSABS 1.74 07/04/2022 06:10 AM    EOSABS 0.21 07/04/2022 06:10 AM    BASOSABS 0.04 07/04/2022 06:10 AM     CMP:    Lab Results   Component Value Date/Time     07/04/2022 06:10 AM    K 3.3 07/04/2022 06:10 AM    K 4.3 06/24/2022 12:27 AM     07/04/2022 06:10 AM    CO2 26 07/04/2022 06:10 AM    BUN 22 07/04/2022 06:10 AM    CREATININE 0.8 07/04/2022 06:10 AM    GFRAA >60 07/04/2022 06:10 AM    LABGLOM >60 07/04/2022 06:10 AM    GLUCOSE 88 07/04/2022 06:10 AM    PROT 5.6 07/04/2022 06:10 AM    LABALBU 3.0 07/04/2022 06:10 AM    CALCIUM 8.7 07/04/2022 06:10 AM    BILITOT 0.8 07/04/2022 06:10 AM    ALKPHOS 80 07/04/2022 06:10 AM    AST 37 07/04/2022 06:10 AM    ALT 47 07/04/2022 06:10 AM         ASSESSMENT AND PLAN      1.RLL PNA due to stenotrophomonas maltophilia  Antibiotics switched to bactrim yesterday after culture results and ID consulted   Patient also apparently having bloody sputum now, will track amount and have patient collect and appreciate pulmonology recommendations. Hgb and platelets stable. Urine Legionella and strep pneumo antigens presumptive negative  FU pulm and ID recommendations     2. Acute on chronic respiratory failure secondary to above and COPD exacerbation  Now on oxygen at 6 L/min by nasal cannula and BiPAP when sleeping  Home level is 5L NC  On levalbuterol and ipratropium    3. COPD, with exacerbation  Severe long standing disease  Iv steroids discontinued due to steroid induced psychosis  Daliresp added by Pulmonology  Also on Tyler Balnk, levalbuterol and ipratropium    4. Leukocytosis secondary to pneumonia,.  -Was 20.5 on admission, yesterday up to 25 and today 19.2 after switching antibiotics. May be due to resistant bacteria in sputum  -ID on board, testing for c diff, will FU results    5. A Fib with rvr   Cardizem drip on and off the past few days as was in and out of afib RVR. Now off drip and on metoprolol tartrate and PO cardizem and in NSR  Eliquis 5 mg po bid  Dr Claudia Smith following. 6.Hyperlipidemia  Lipitor 10 mg daily    7. GERD  Continue pepcid 20 mg po bid      8. Steroid induced psychosis, episode of psychosis overnight secondary to respiratory status and dexamethasone which was discontinued    9. Hematuria, send urine for UA with microscopy  Cleared after irrigation, Urology following    10. PT/OT and  consult for rehab.  Patient approved for [facility] of Krystyna Palacios MD  11:36 AM  7/4/2022

## 2022-07-05 NOTE — PLAN OF CARE
Problem: Safety - Adult  Goal: Free from fall injury  7/4/2022 2233 by Corinne Wade RN  Outcome: Progressing     Problem: ABCDS Injury Assessment  Goal: Absence of physical injury  7/4/2022 2233 by Corinne Wade RN  Outcome: Progressing     Problem: Pain  Goal: Verbalizes/displays adequate comfort level or baseline comfort level  7/4/2022 2233 by Corinne Wade RN  Outcome: Progressing     Problem: Skin/Tissue Integrity  Goal: Absence of new skin breakdown  Description: 1. Monitor for areas of redness and/or skin breakdown  2. Assess vascular access sites hourly  3. Every 4-6 hours minimum:  Change oxygen saturation probe site  4. Every 4-6 hours:  If on nasal continuous positive airway pressure, respiratory therapy assess nares and determine need for appliance change or resting period.   7/4/2022 2233 by Corinne Wade RN  Outcome: Progressing     Problem: Chronic Conditions and Co-morbidities  Goal: Patient's chronic conditions and co-morbidity symptoms are monitored and maintained or improved  7/4/2022 2233 by Corinne Wade RN  Outcome: Progressing     Problem: Respiratory - Adult  Goal: Achieves optimal ventilation and oxygenation  7/4/2022 2233 by Corinne Wade RN  Outcome: Progressing     Problem: Cardiovascular - Adult  Goal: Maintains optimal cardiac output and hemodynamic stability  7/4/2022 2233 by Corinne Wade RN  Outcome: Progressing     Problem: Cardiovascular - Adult  Goal: Absence of cardiac dysrhythmias or at baseline  7/4/2022 2233 by Corinne Wade RN  Outcome: Progressing     Problem: Skin/Tissue Integrity - Adult  Goal: Skin integrity remains intact  7/4/2022 2233 by Corinne Wade RN  Outcome: Progressing     Problem: Skin/Tissue Integrity - Adult  Goal: Oral mucous membranes remain intact  7/4/2022 2233 by Corinne Wade RN  Outcome: Progressing     Problem: Genitourinary - Adult  Goal: Urinary catheter remains patent  7/4/2022 2233 by Corinne Wade RN  Outcome: Progressing

## 2022-07-05 NOTE — PROGRESS NOTES
Department of Internal Medicine  General Internal Medicine  Attending Progress Note      SUBJECTIVE:    Patient seen and examined this morning,  Still with shortness of breath but no worse than previous  Occasional cough  Appetite is ok      Medications    Current Facility-Administered Medications: sulfamethoxazole-trimethoprim (BACTRIM) 600 mg in dextrose 5 % 500 mL IVPB, 600 mg, IntraVENous, Q8H  morphine (PF) injection 2 mg, 2 mg, IntraVENous, Q4H PRN  lidocaine 1 % injection 5 mL, 5 mL, IntraDERmal, Once  sodium chloride flush 0.9 % injection 5-40 mL, 5-40 mL, IntraVENous, 2 times per day  sodium chloride flush 0.9 % injection 5-40 mL, 5-40 mL, IntraVENous, PRN  0.9 % sodium chloride infusion, , IntraVENous, PRN  heparin flush 100 UNIT/ML injection 100 Units, 1 mL, IntraVENous, 2 times per day  heparin flush 100 UNIT/ML injection 100 Units, 1 mL, IntraCATHeter, PRN  levalbuterol (XOPENEX) nebulization 0.63 mg, 0.63 mg, Nebulization, Q4H  ipratropium (ATROVENT) 0.02 % nebulizer solution 0.5 mg, 0.5 mg, Nebulization, Q4H  dilTIAZem (CARDIZEM CD) extended release capsule 240 mg, 240 mg, Oral, Daily  metoprolol tartrate (LOPRESSOR) tablet 25 mg, 25 mg, Oral, TID  acetylcysteine (MUCOMYST) 20 % solution 600 mg, 600 mg, Inhalation, q8h  acetaminophen (TYLENOL) tablet 650 mg, 650 mg, Oral, Q6H PRN  Roflumilast (DALIRESP) tablet 500 mcg, 500 mcg, Oral, Daily  famotidine (PEPCID) tablet 20 mg, 20 mg, Oral, BID  haloperidol lactate (HALDOL) injection 2 mg, 2 mg, IntraVENous, Q2H PRN  sodium chloride flush 0.9 % injection 5-40 mL, 5-40 mL, IntraVENous, 2 times per day  sodium chloride flush 0.9 % injection 5-40 mL, 5-40 mL, IntraVENous, PRN  0.9 % sodium chloride infusion, , IntraVENous, PRN  heparin flush 100 UNIT/ML injection 300 Units, 3 mL, IntraVENous, 2 times per day  heparin flush 100 UNIT/ML injection 300 Units, 3 mL, IntraCATHeter, PRN  apixaban (ELIQUIS) tablet 5 mg, 5 mg, Oral, BID  atorvastatin (LIPITOR) tablet 10 mg, 10 mg, Oral, Daily  vitamin D (CHOLECALCIFEROL) tablet 2,000 Units, 2,000 Units, Oral, Daily  budesonide (PULMICORT) nebulizer suspension 500 mcg, 0.5 mg, Nebulization, BID  Arformoterol Tartrate (BROVANA) nebulizer solution 15 mcg, 15 mcg, Nebulization, BID    Physical    VITALS:  BP (!) 148/70   Pulse 78   Temp 98.1 °F (36.7 °C) (Axillary)   Resp 19   Ht 5' 11\" (1.803 m)   Wt 259 lb 0.7 oz (117.5 kg)   SpO2 97%   BMI 36.13 kg/m²   TEMPERATURE:  Current - Temp: 98.1 °F (36.7 °C); Max - Temp  Av.9 °F (36.6 °C)  Min: 97.7 °F (36.5 °C)  Max: 98.1 °F (36.7 °C)  RESPIRATIONS RANGE: Resp  Av.8  Min: 16  Max: 20  PULSE RANGE: Pulse  Av.2  Min: 75  Max: 88  BLOOD PRESSURE RANGE:  Systolic (45BHA), MGL:898 , Min:121 , CINTIA:879   ; Diastolic (32KXY), TE, Min:65, Max:87    PULSE OXIMETRY RANGE: SpO2  Av.7 %  Min: 84 %  Max: 97 %  24HR INTAKE/OUTPUT:      Intake/Output Summary (Last 24 hours) at 2022 1714  Last data filed at 2022 1342  Gross per 24 hour   Intake 1616.74 ml   Output 1250 ml   Net 366.74 ml       CONSTITUTIONAL: Alert and oriented x3, in no acute distress  HEENT: Normocephalic atraumatic. Pupils are equal and reactive bilaterally. Extraocular movements are intact. No pallor or icterus appreciated. NECK: Supple, no JVD , no lymphadenopathy, no bruits, no thyromegaly  LUNGS: diminished air movements b/l - Minimal  inspiratory crackles over bases and some wheezing, upper airway congestion as well. On NC in no respiratory distress  CARDIOVASCULAR: Regular, tachycardia, no murmur rub or gallop. ABDOMEN: Soft,  distended, bowel sounds are positive, no organomegaly appreciated. NEUROLOGIC: alert, oriented , no focal deficits noted. Mildly agitated. EXT: trace  edema, no clubbing, or cyanosis.     CBC with Differential:    Lab Results   Component Value Date/Time    WBC 18.6 2022 06:00 AM    RBC 4.30 2022 06:00 AM    HGB 13.0 2022 06:00 AM    HCT 41.3 07/05/2022 06:00 AM     07/05/2022 06:00 AM    MCV 96.0 07/05/2022 06:00 AM    MCH 30.2 07/05/2022 06:00 AM    MCHC 31.5 07/05/2022 06:00 AM    RDW 14.3 07/05/2022 06:00 AM    METASPCT 0.9 06/24/2022 12:27 AM    LYMPHOPCT 4.9 07/05/2022 06:00 AM    MONOPCT 7.7 07/05/2022 06:00 AM    BASOPCT 0.1 07/05/2022 06:00 AM    MONOSABS 1.43 07/05/2022 06:00 AM    LYMPHSABS 0.91 07/05/2022 06:00 AM    EOSABS 0.20 07/05/2022 06:00 AM    BASOSABS 0.02 07/05/2022 06:00 AM     CMP:    Lab Results   Component Value Date/Time     07/05/2022 06:00 AM    K 3.7 07/05/2022 06:00 AM    K 4.3 06/24/2022 12:27 AM     07/05/2022 06:00 AM    CO2 27 07/05/2022 06:00 AM    BUN 17 07/05/2022 06:00 AM    CREATININE 1.0 07/05/2022 06:00 AM    GFRAA >60 07/05/2022 06:00 AM    LABGLOM >60 07/05/2022 06:00 AM    GLUCOSE 89 07/05/2022 06:00 AM    PROT 5.3 07/05/2022 06:00 AM    LABALBU 2.8 07/05/2022 06:00 AM    CALCIUM 8.5 07/05/2022 06:00 AM    BILITOT 0.5 07/05/2022 06:00 AM    ALKPHOS 75 07/05/2022 06:00 AM    AST 34 07/05/2022 06:00 AM    ALT 38 07/05/2022 06:00 AM         ASSESSMENT AND PLAN      1.RLL PNA due to stenotrophomonas maltophilia  Antibiotics switched to bactrim  after culture results and ID consultation   Patient also apparently having bloody sputum , will track amount and have patient collect and appreciate pulmonology recommendations. Hgb and platelets stable. Urine Legionella and strep pneumo antigens presumptive negative  FU pulm and ID recommendations     2. Acute on chronic respiratory failure secondary to above and COPD exacerbation  Now on oxygen at 8 L/min by nasal cannula and BiPAP when sleeping  Home level is 5L NC  On levalbuterol and ipratropium    3. COPD, with exacerbation  Severe long standing disease  Iv steroids discontinued due to steroid induced psychosis  Daliresp added by Pulmonology  Also on pulmicort, brovana, levalbuterol and ipratropium    4. Leukocytosis secondary to pneumonia,.  -White cell count at 18,000 today, trending down slowly  -ID on board, C diff negative    5. A Fib with rvr   Cardizem drip on and off the past few days as was in and out of afib RVR. Now off drip and on metoprolol tartrate and PO cardizem and in NSR  Eliquis 5 mg po bid  Dr Brionna Benavides following. 6.Hyperlipidemia  Lipitor 10 mg daily    7. GERD  Continue pepcid 20 mg po bid      8. Steroid induced psychosis, episode of psychosis overnight secondary to respiratory status and dexamethasone which was discontinued    9. Hematuria, send urine for UA with microscopy  Cleared after irrigation,     10. PT/OT and  consult for rehab.  Plan is  for [address]   Discussed with Wife and daughter earlier today and explainned his very poor prognosis       Milo Lees MD  5:14 PM  7/5/2022

## 2022-07-05 NOTE — CONSULTS
Palliative Care Department  [phone]  Palliative Care Initial Consult  Provider ELIZABETH Mccann CNP      PATIENT: Norma Buckley  : 1940  MRN: [id number]  ADMISSION DATE: 2022 12:11 AM  Referring Provider: Rosa Maldonado MD    Palliative Medicine was consulted on hospital day 11 for assistance with Goals of care, Code Status Discussion, Family support, Symptom management     HPI:     Alma Monahan is a [de-identified] y.o. y/o male with a history of COPD with chronic hypoxic respiratory failure, on 5 L nasal cannula at baseline, hyperlipidemia, who presented to [facility]) on 2022 with worsening dyspnea right lower lobe pneumonia and acute on chronic respiratory failure. Chest x-ray on admission shows bilateral bibasilar disease. Possibility of bilateral lower lobe pneumonia cannot be excluded. Chest x-ray done on 2022 showed minimal right lung base atelectasis, otherwise unremarkable. Patient currently on nonrebreather maintaining oxygen around 91%, reporting that he is not feeling well today. Palliative medicine consulted to discuss goal of care, CODE STATUS discussion, family support, symptom management. ASSESSMENT/PLAN:     Pertinent Hospital Diagnoses      Pneumonia   COPD   Chronic respiratory failure   Dyspnea  o Morphine 2 mg every 4 hours as needed      Palliative Care Encounter / Counseling Regarding Goals of Care  Please see detailed goals of care discussion as below   At this time, Norma Buckley, Does Not have capacity for medical decision-making.   Capacity is time limited and situation/question specific   During encounter  was surrogate medical decision-maker   Outcome of goals of care meeting:  o Continue with current medical treatment  o Family having further discussions regarding pursuing LTAC versus nursing home hospice  o CODE STATUS changed to limited DNR CCA DNI   Code status Limited DNR CCA DNI    Advanced Directives: no POA or living will in [facility]   Surrogate/Legal NOK:  o Nirmal Elizabeth [phone] - [phone]  nury Olivier (Child) [phone]    Spiritual assessment: no spiritual distress identified  Bereavement and grief: to be determined  Referrals to: none today    Thank you for the opportunity to participate in the care of Daryl Brumfield. ELIZABETH Sanchez CNP  Palliative Medicine     SUBJECTIVE:     Details of Conversation:      Chart reviewed. Saw patient at the bedside. Patient was alert and oriented to self and place, however he was no fully understand understand of his condition. Introduced myself and the role of palliative medicine. We discussed CODE STATUS, report of no being sure about DNR, but was convinced of not wanting to be intubated short or long-term, he requested to have further discussion with his wife regarding this topic. Called and spoke with patient wife Sheri Jewell, introduced myself and the role of palliative medicine. Sheri Jewell also identified as patient's POA, POA and living will documents noted to be in soft chart. We have a lengthy discussions regarding goal of care, she does stated that her  had expressed to her of not wanting to be resuscitated, or be placed on vent support short or long-term. We discussed DNR CCA with no intubation, she believe that is a CODE STATUS that he has expressed wanting. CODE STATUS changed to limited DNR CCA DNI. She did report that her  has been having a long course of progressive health this condition due to his COPD with respiratory failure. We discussed pursuing treatment versus comfort care. Sheri Jewell is going to discuss with her  regarding going to [facility] and pursuing treatment, versus transition him to hospice and nursing home. Comfort support was provided. We will continue to follow.     Prognosis: Guarded    OBJECTIVE:     /65   Pulse 75   Temp 97.9 °F (36.6 °C) (Oral)   Resp 20   Ht 5' 11\" (1.803 m)   Wt 259 lb 0.7 oz (117.5 kg)   SpO2 91%   BMI 36.13 kg/m²     Physical Examination:  Gen: elderly, thin, NAD, awake, alert   HEENT: normocephalic, atraumatic, PERRL, EOMI,   Neck: trachea midline, no JVD  Lungs: respirations easy and not labored, moist  Heart: regular rate and rhythm, distant heart tones,   Abdomen: normoactive bowel sounds, soft, non-tender  Extremities:  edema, moving all extremities    Skin: warm, dry without rashes, lesions, bruising  Neuro: awake, alert, oriented x 2, follows commands, no gross neurologic deficit    Objective data reviewed: labs, images, records, medication use, vitals and chart    Time/Communication  Greater than 50% of time spent, total 50  minutes in counseling and coordination of care at the bedside regarding CODE STATUS discussion, family support, goals of care and symptom management. Thank you for allowing Palliative Medicine to participate in the care of Carmelina Anaya. Note: This report was completed using computerize voiced recognition software. Every effort has been made to ensure accuracy; however, inadvertent computerized transcription errors may be present.

## 2022-07-05 NOTE — PROGRESS NOTES
Comprehensive Nutrition Assessment    Type and Reason for Visit:  Reassess    Nutrition Recommendations/Plan:   1. Modify ONS to Ensure Enlive to better help estimated nutrient needs with poor PO.   2. Will continue to modify. Malnutrition Assessment:  Malnutrition Status: At risk for malnutrition (Comment) (06/27/22 0868)    Context:  Chronic Illness     Findings of the 6 clinical characteristics of malnutrition:  Energy Intake:  Mild decrease in energy intake (Comment)  Weight Loss:  Unable to assess     Body Fat Loss:  No significant body fat loss     Muscle Mass Loss:  No significant muscle mass loss    Fluid Accumulation:  No significant fluid accumulation     Strength:  Not Performed    Nutrition Assessment:    Pt remains at nutritional risk w/ decreased PO. Adm w/ resp failure 2/2 PNA & COPD exacerbation. Note steroid induced psychosis, Afib RVR. Modify ONS Ensure Enlive to better optimize nutrition. Nutrition Related Findings:    A7Ox2, +BS, taut/distended/round abd, cramping, I/Os WNL, +1 edema Wound Type: None       Current Nutrition Intake & Therapies:    Average Meal Intake: 1-25%  Average Supplements Intake: % (x 1 (6/25))  ADULT DIET; Regular  ADULT ORAL NUTRITION SUPPLEMENT; Breakfast, Lunch, Dinner; Low Calorie/High Protein Oral Supplement    Anthropometric Measures:  Height: 5' 11\" (180.3 cm)  Ideal Body Weight (IBW): 172 lbs (78 kg)    Admission Body Weight: 252 lb 8 oz (114.5 kg) (6/26 bed scale)  Current Body Weight: 259 lb 0.7 oz (117.5 kg) (7/2 actual, possibly elevated), 146.8 % IBW.     Current BMI (kg/m2): 36.1  Usual Body Weight:  (lack measured wt hx per EMR <1 year)  Weight Adjustment For: No Adjustment  BMI Categories: Obese Class 2 (BMI 35.0 -39.9)    Estimated Daily Nutrient Needs:  Energy Requirements Based On: Formula  Weight Used for Energy Requirements: Admission  Energy (kcal/day): 5003-4359  Weight Used for Protein Requirements: Ideal  Protein (g/day): 115-125  Method Used for Fluid Requirements: 1 ml/kcal  Fluid (ml/day): 7135-0150    Nutrition Diagnosis:   · Inadequate oral intake related to impaired respiratory function as evidenced by intake 0-25%    Nutrition Interventions:   Food and/or Nutrient Delivery: Continue Current Diet,Modify Oral Nutrition Supplement (Ensure Enlive)  Nutrition Education/Counseling: No recommendation at this time  Coordination of Nutrition Care: Continue to monitor while inpatient    Goals:  Previous Goal Met: No Progress toward Goal(s)  Goals: PO intake 50% or greater    Nutrition Monitoring and Evaluation:   Behavioral-Environmental Outcomes: None Identified  Food/Nutrient Intake Outcomes: Food and Nutrient Intake,Supplement Intake  Physical Signs/Symptoms Outcomes: Biochemical Data,Nutrition Focused Physical Findings,Skin,Weight,Chewing or Swallowing,GI Status,Fluid Status or Edema    Discharge Planning:     Too soon to determine     Tejas Kulkarni, MS, RD, LD  Contact: [phone]

## 2022-07-05 NOTE — PROGRESS NOTES
Oral care and bath refused at this time [No Acute Distress] : no acute distress [Normal Sclera/Conjunctiva] : normal sclera/conjunctiva [PERRL] : pupils equal round and reactive to light [EOMI] : extraocular movements intact [No Lymphadenopathy] : no lymphadenopathy [Supple] : supple [No Respiratory Distress] : no respiratory distress  [Clear to Auscultation] : lungs were clear to auscultation bilaterally [Normal Rate] : normal rate  [Regular Rhythm] : with a regular rhythm [Normal S1, S2] : normal S1 and S2 [No Murmur] : no murmur heard [de-identified] : +cough in exam room [de-identified] : mild pharyngeal erythema without tonsillar enlargement or exudates

## 2022-07-05 NOTE — PROGRESS NOTES
PULMONARY MEDICINE CONSULT      [de-identified]year old person with PMH of COPD with chronic hypoxemic respiratory failure (on 5L ), hyperlipidemia, as described below admitted to hospital for management of worsening dyspnea, right lower lobe pneumonia, acute on chronic respiratory failure. .    The patient is receiving azithromycin and cefepime, methylprednisolone and bronchodilators. He is chronically anticoagulated with apixaban. · On 9L NC  · States feels \"not well: today  · I will stop prednisone today  · Mr Katelin Leon has severe COPD with chronic respiratory failure and now with this pneumonia and delirium. I think he will benefit from evaluation by Palliative Care service      /65   Pulse 75   Temp 97.9 °F (36.6 °C) (Oral)   Resp 20   Ht 5' 11\" (1.803 m)   Wt 259 lb 0.7 oz (117.5 kg)   SpO2 91%   BMI 36.13 kg/m²   General: Awake, answers simple questions, oriented to place, time and person  HEENT: No head lesions, PERRL, EOMI, mouth without lesions, no nasal lesions, no cervical adenopathy palpated  Respiratory: Lungs with diminished breath sounds bilaterally, no adventitious sounds auscultated, no accessory muscle use  CV: Regular rate, no murmurs, no JVD, no leg edema  Abdomen: Soft, non tender, + bowel sounds, no lesions  Skin: Hydrated, adequate turgor, echymosis on arms, capillary refill <2 seconds  Extremities: Muscular strength 4/5 in 4 limbs, moves 4 limbs spontaneously, distal pulses present  Neurology: Awake and alert, follows commands, moves 4 limbs on command and spontaneously, neck is supple, no meningitic signs present.         A/P:  Acute on chronic hypoxemic respiratory failure secondary to Stenotrophomonas pneumonia in a patient with severe COPD  --Antimicrobial therapy: Bactrim + vancomycin as per ID  --Bronchodilators: Arformoterol/budesonide + albuterol/iparatropium q 4 hrs + albuterol PRN + Roflumilast  --Incentive spirometry and flutter valve 10 times/hour while awake + Mucomyst  --Prednisone stopped as there is no improvement. --Management of dyspnea with low dose morphine PRN    Rest of supportive care as per primary team    Atrial fibrillation  --Continue cardizem and metoprolol    Hyperlipidemia  --Continue statin     Hypertension  --On antihypertensives    DVT prophylaxis - anticoagulated with apixaban   PT/OT following    Past Medical History:   Diagnosis Date    Atrial fibrillation (HCC)     COPD (chronic obstructive pulmonary disease) (HCC)     Hyperlipidemia     Pneumonia     Steroid side effects     combative      No family history on file. Social History     Socioeconomic History    Marital status:      Spouse name: Not on file    Number of children: Not on file    Years of education: Not on file    Highest education level: Not on file   Occupational History    Not on file   Tobacco Use    Smoking status: Former Smoker     Quit date: 2009     Years since quittin.6    Smokeless tobacco: Former User   Vaping Use    Vaping Use: Never used   Substance and Sexual Activity    Alcohol use: Not Currently    Drug use: No    Sexual activity: Not Currently   Other Topics Concern    Not on file   Social History Narrative    Not on file     Social Determinants of Health     Financial Resource Strain:     Difficulty of Paying Living Expenses: Not on file   Food Insecurity:     Worried About [address] in the Last Year: Not on file    [address] in the Last Year: Not on file   Transportation Needs:     Lack of Transportation (Medical): Not on file    Lack of Transportation (Non-Medical):  Not on file   Physical Activity:     Days of Exercise per Week: Not on file    Minutes of Exercise per Session: Not on file   Stress:     Feeling of Stress : Not on file   Social Connections:     Frequency of Communication with Friends and Family: Not on file    Frequency of Social Gatherings with Friends and Family: Not on file    Attends Adventism Services: Not on file    Active Member of Clubs or Organizations: Not on file    Attends Club or Organization Meetings: Not on file    Marital Status: Not on file   Intimate Partner Violence:     Fear of Current or Ex-Partner: Not on file    Emotionally Abused: Not on file    Physically Abused: Not on file    Sexually Abused: Not on file   Housing Stability:     Unable to Pay for Housing in the Last Year: Not on file    Number of [place] in the Last Year: Not on file    Unstable Housing in the Last Year: Not on file     Current Facility-Administered Medications   Medication Dose Route Frequency Provider Last Rate Last Admin    sulfamethoxazole-trimethoprim (BACTRIM) 600 mg in dextrose 5 % 500 mL IVPB  600 mg IntraVENous Q8H Rebecca Chopra MD   Stopped at 07/05/22 1020    morphine (PF) injection 2 mg  2 mg IntraVENous Q4H PRN Tori Anthony MD   2 mg at 07/05/22 0816    lidocaine 1 % injection 5 mL  5 mL IntraDERmal Once Nir Dalton MD        sodium chloride flush 0.9 % injection 5-40 mL  5-40 mL IntraVENous 2 times per day Nir Dalton MD   10 mL at 07/05/22 6077    sodium chloride flush 0.9 % injection 5-40 mL  5-40 mL IntraVENous PRN Nir Dalton MD        0.9 % sodium chloride infusion   IntraVENous PRN Nir Dalton MD   Stopped at 07/01/22 2227    heparin flush 100 UNIT/ML injection 100 Units  1 mL IntraVENous 2 times per day Nir Dalton MD   100 Units at 07/05/22 0810    heparin flush 100 UNIT/ML injection 100 Units  1 mL IntraCATHeter PRN Nir Dalton MD        levalbuterol Kerry Mussel) nebulization 0.63 mg  0.63 mg Nebulization Q4H Nir Dalton MD   0.63 mg at 07/05/22 0934    ipratropium (ATROVENT) 0.02 % nebulizer solution 0.5 mg  0.5 mg Nebulization Q4H Nir Dalton MD   0.5 mg at 07/05/22 0933    dilTIAZem (CARDIZEM CD) extended release capsule 240 mg  240 mg Oral Daily Nir Dalton MD   240 mg at 07/05/22 0810    metoprolol tartrate (LOPRESSOR) tablet 25 mg  25 mg Oral TID Barrie Baumann MD   25 mg at 07/05/22 4602    acetylcysteine (MUCOMYST) 20 % solution 600 mg  600 mg Inhalation q8h Barrie Baumann MD   600 mg at 07/05/22 0553    acetaminophen (TYLENOL) tablet 650 mg  650 mg Oral Q6H PRN Barrie Baumann MD   650 mg at 07/01/22 2123    Roflumilast (DALIRESP) tablet 500 mcg  500 mcg Oral Daily Barrie Baumann MD   500 mcg at 07/05/22 0816    famotidine (PEPCID) tablet 20 mg  20 mg Oral BID Barrie Baumann MD   20 mg at 07/05/22 5164    haloperidol lactate (HALDOL) injection 2 mg  2 mg IntraVENous Q2H PRN Barrie Baumann MD   2 mg at 07/03/22 1625    sodium chloride flush 0.9 % injection 5-40 mL  5-40 mL IntraVENous 2 times per day Barrie Baumann MD   10 mL at 07/05/22 9270    sodium chloride flush 0.9 % injection 5-40 mL  5-40 mL IntraVENous PRN Barrie Baumann MD   10 mL at 07/04/22 2039    0.9 % sodium chloride infusion   IntraVENous PRN Barrie Baumann MD   Stopped at 06/30/22 1446    heparin flush 100 UNIT/ML injection 300 Units  3 mL IntraVENous 2 times per day Barrie Baumann MD   300 Units at 07/05/22 0823    heparin flush 100 UNIT/ML injection 300 Units  3 mL IntraCATHeter PRN Barrie Baumann MD   300 Units at 07/01/22 0557    apixaban (ELIQUIS) tablet 5 mg  5 mg Oral BID Quinn Rodrigues MD   5 mg at 07/05/22 0816    atorvastatin (LIPITOR) tablet 10 mg  10 mg Oral Daily Quinn Rodrigues MD   10 mg at 07/05/22 0834    vitamin D (CHOLECALCIFEROL) tablet 2,000 Units  2,000 Units Oral Daily Quinn Rodrigues MD   2,000 Units at 07/05/22 0810    budesonide (PULMICORT) nebulizer suspension 500 mcg  0.5 mg Nebulization BID Quinn Rodrigues MD   500 mcg at 07/05/22 0553    Arformoterol Tartrate (BROVANA) nebulizer solution 15 mcg  15 mcg Nebulization BID Quinn Rodrigues MD   15 mcg at 07/05/22 0510     MEDICATIONS:   sulfamethoxazole-trimethoprim (BACTRIM) IVPB  600 mg IntraVENous Q8H    lidocaine  5 mL IntraDERmal Once    sodium chloride flush  5-40 mL IntraVENous 2 times per day    heparin flush  1 mL IntraVENous 2 times per day    levalbuterol  0.63 mg Nebulization Q4H    ipratropium  0.5 mg Nebulization Q4H    dilTIAZem  240 mg Oral Daily    metoprolol tartrate  25 mg Oral TID    acetylcysteine  600 mg Inhalation q8h    Roflumilast  500 mcg Oral Daily    famotidine  20 mg Oral BID    sodium chloride flush  5-40 mL IntraVENous 2 times per day    heparin flush  3 mL IntraVENous 2 times per day    apixaban  5 mg Oral BID    atorvastatin  10 mg Oral Daily    Vitamin D  2,000 Units Oral Daily    budesonide  0.5 mg Nebulization BID    Arformoterol Tartrate  15 mcg Nebulization BID      sodium chloride Stopped (07/01/22 2227)    sodium chloride Stopped (06/30/22 1446)         OBJECTIVE:  Vitals:    07/05/22 1230   BP:    Pulse:    Resp:    Temp:    SpO2: 91%     FiO2 : 60 %  O2 Flow Rate (L/min): 10 L/min  O2 Device: Non-rebreather mask        LABS:  WBC   Date Value Ref Range Status   07/05/2022 18.6 (H) 4.5 - 11.5 E9/L Final   07/04/2022 19.2 (H) 4.5 - 11.5 E9/L Final   07/03/2022 25.2 (H) 4.5 - 11.5 E9/L Final     Hemoglobin   Date Value Ref Range Status   07/05/2022 13.0 12.5 - 16.5 g/dL Final   07/04/2022 13.9 12.5 - 16.5 g/dL Final   07/03/2022 14.6 12.5 - 16.5 g/dL Final     Hematocrit   Date Value Ref Range Status   07/05/2022 41.3 37.0 - 54.0 % Final   07/04/2022 44.0 37.0 - 54.0 % Final   07/03/2022 45.6 37.0 - 54.0 % Final     MCV   Date Value Ref Range Status   07/05/2022 96.0 80.0 - 99.9 fL Final   07/04/2022 95.4 80.0 - 99.9 fL Final   07/03/2022 94.8 80.0 - 99.9 fL Final     Platelets   Date Value Ref Range Status   07/05/2022 169 130 - 450 E9/L Final   07/04/2022 152 130 - 450 E9/L Final   07/03/2022 131 130 - 450 E9/L Final     Sodium   Date Value Ref Range Status   07/05/2022 143 132 - 146 mmol/L Final   07/04/2022 141 132 - 146 mmol/L Final   07/03/2022 145 132 - 146 mmol/L Final     Potassium   Date Value Ref Range Status 07/05/2022 3.7 3.5 - 5.0 mmol/L Final   07/04/2022 3.3 (L) 3.5 - 5.0 mmol/L Final   07/03/2022 4.1 3.5 - 5.0 mmol/L Final     Potassium reflex Magnesium   Date Value Ref Range Status   06/24/2022 4.3 3.5 - 5.0 mmol/L Final   12/18/2020 3.7 3.5 - 5.0 mmol/L Final   07/23/2019 4.6 3.5 - 5.0 mmol/L Final     Chloride   Date Value Ref Range Status   07/05/2022 107 98 - 107 mmol/L Final   07/04/2022 104 98 - 107 mmol/L Final   07/03/2022 108 (H) 98 - 107 mmol/L Final     CO2   Date Value Ref Range Status   07/05/2022 27 22 - 29 mmol/L Final   07/04/2022 26 22 - 29 mmol/L Final   07/03/2022 25 22 - 29 mmol/L Final     BUN   Date Value Ref Range Status   07/05/2022 17 6 - 23 mg/dL Final   07/04/2022 22 6 - 23 mg/dL Final   07/03/2022 26 (H) 6 - 23 mg/dL Final     CREATININE   Date Value Ref Range Status   07/05/2022 1.0 0.7 - 1.2 mg/dL Final   07/04/2022 0.8 0.7 - 1.2 mg/dL Final   07/03/2022 1.0 0.7 - 1.2 mg/dL Final     Glucose   Date Value Ref Range Status   07/05/2022 89 74 - 99 mg/dL Final   07/04/2022 88 74 - 99 mg/dL Final   07/03/2022 113 (H) 74 - 99 mg/dL Final     Calcium   Date Value Ref Range Status   07/05/2022 8.5 (L) 8.6 - 10.2 mg/dL Final   07/04/2022 8.7 8.6 - 10.2 mg/dL Final   07/03/2022 9.0 8.6 - 10.2 mg/dL Final     Total Protein   Date Value Ref Range Status   07/05/2022 5.3 (L) 6.4 - 8.3 g/dL Final   07/04/2022 5.6 (L) 6.4 - 8.3 g/dL Final   07/03/2022 5.6 (L) 6.4 - 8.3 g/dL Final     Albumin   Date Value Ref Range Status   07/05/2022 2.8 (L) 3.5 - 5.2 g/dL Final   07/04/2022 3.0 (L) 3.5 - 5.2 g/dL Final   07/03/2022 3.2 (L) 3.5 - 5.2 g/dL Final     Total Bilirubin   Date Value Ref Range Status   07/05/2022 0.5 0.0 - 1.2 mg/dL Final   07/04/2022 0.8 0.0 - 1.2 mg/dL Final   07/03/2022 1.2 0.0 - 1.2 mg/dL Final     Alkaline Phosphatase   Date Value Ref Range Status   07/05/2022 75 40 - 129 U/L Final   07/04/2022 80 40 - 129 U/L Final   07/03/2022 79 40 - 129 U/L Final     AST   Date Value Ref Range Status   07/05/2022 34 0 - 39 U/L Final   07/04/2022 37 0 - 39 U/L Final   07/03/2022 41 (H) 0 - 39 U/L Final     ALT   Date Value Ref Range Status   07/05/2022 38 0 - 40 U/L Final   07/04/2022 47 (H) 0 - 40 U/L Final   07/03/2022 58 (H) 0 - 40 U/L Final     GFR Non-   Date Value Ref Range Status   07/05/2022 >60 >=60 mL/min/1.73 Final     Comment:     Chronic Kidney Disease: less than 60 ml/min/1.73 sq.m. Kidney Failure: less than 15 ml/min/1.73 sq.m. Results valid for patients 18 years and older. 07/04/2022 >60 >=60 mL/min/1.73 Final     Comment:     Chronic Kidney Disease: less than 60 ml/min/1.73 sq.m. Kidney Failure: less than 15 ml/min/1.73 sq.m. Results valid for patients 18 years and older. 07/03/2022 >60 >=60 mL/min/1.73 Final     Comment:     Chronic Kidney Disease: less than 60 ml/min/1.73 sq.m. Kidney Failure: less than 15 ml/min/1.73 sq.m. Results valid for patients 18 years and older. GFR    Date Value Ref Range Status   07/05/2022 >60  Final   07/04/2022 >60  Final   07/03/2022 >60  Final     Magnesium   Date Value Ref Range Status   07/04/2022 2.1 1.6 - 2.6 mg/dL Final   06/29/2022 2.3 1.6 - 2.6 mg/dL Final   06/28/2022 2.6 1.6 - 2.6 mg/dL Final     Phosphorus   Date Value Ref Range Status   07/04/2022 2.4 (L) 2.5 - 4.5 mg/dL Final   06/29/2022 2.6 2.5 - 4.5 mg/dL Final   06/28/2022 2.5 2.5 - 4.5 mg/dL Final     No results for input(s): PH, PO2, PCO2, HCO3, BE, O2SAT in the last 72 hours. RADIOLOGY:  XR CHEST PORTABLE   Final Result   1. Minimal right lung base atelectasis. The chest x-ray is otherwise   unremarkable. XR CHEST PORTABLE   Final Result   Addendum 1 of 1   ADDENDUM:   I was asked by the attending physician to addend the report dictated by    Dr. Asim Menjivar. The correct position of the distal tip of the left PICC line is within the   superior vena cava directed cephalad.   The tip is not in the internal    jugular   vein. The tip of the left PICC line is seen within the superior vena cava    directed   cephalad. Final   1. Left-sided PICC line crosses midline and ascends cranially, likely within   the right internal jugular vein. 2. Persistent alveolar consolidation in the right lung base. 3. Possible small bilateral pleural effusions. XR CHEST PORTABLE   Final Result   Areas of a bi basilar atelectasis, no significant changes since the June 28   study. XR CHEST PORTABLE   Final Result   Bibasilar atelectasis         XR CHEST PORTABLE   Final Result   Improved aeration at the right base likely secondary to re-expansion of   atelectasis. The appearance now I believe is quit Sahil Goldsmith to this patient's   baseline. CT ABDOMEN PELVIS WO CONTRAST Additional Contrast? None   Final Result   Limited study due to patient motion. No acute intra-abdominal or pelvic findings. Right lower lobe consolidative changes, suggestive of a pneumonia. XR CHEST PORTABLE   Final Result   Bilateral basilar disease. The possibility of a bilateral lower lobe   pneumonia cannot be excluded in the appropriate setting. PROBLEM LIST:  Principal Problem:    Pneumonia  Resolved Problems:    * No resolved hospital problems.  *        Johanna Adams MD  Pulmonary and Critical Care Medicine

## 2022-07-05 NOTE — CARE COORDINATION
Plan is to [facility] on [address]. Andrew Solangener is needed since he qualifies for Click six criteria ( as long as Ampac Score stays between 8-15). PT WILL NEED A RAPID NEGATIVE COVID TEST TO GO TO [facility]. Pt is on 8 liters O2 here now. He has a PICC. He has a urinary catheter. He is on IV Bactrim and per Nikko Cleveland at [place], their Pharmacy will need a day to get it in if he will be on at [place]. JUDITH needs signed, current PT/OT. HENS started, needs completed with Discharge order. Ambulance form completed and placed in soft blue chart.

## 2022-07-05 NOTE — PROGRESS NOTES
[facility]  [facility]  Progress Note    NAME: Daryl Brumfield  MR:  [id number]  :   1940  DATE OF SERVICE:22    This is a face to face encounter with Daryl Brumfield [de-identified] y.o. male on 22    CHIEF COMPLAINT     ID following for   Chief Complaint   Patient presents with    Shortness of Breath     Pt c/o increasing SOB, states he wears O2 at home \"wide open\" and \"as much as I can get. \" Pt found 70s at home per EMS and was placed on CPAP. 125mg solumedrol, 2 proventils given en route. Pt denies CP/N/V. Placed on NIV upon arrival to ED. HISTORY OF PRESENT ILLNESS   Pt seen and examined  22   Afebrile 10L  Wife and daughter present and updated reviewed labs and POC  Pt is in bed  [facility]  nurse present     Patient is tolerating medications. No reported adverse drug reactions. REVIEW OF SYSTEMS     As stated above in the chief complaint, otherwise negative.   CURRENT MEDICATIONS      sulfamethoxazole-trimethoprim (BACTRIM) IVPB  600 mg IntraVENous Q8H    predniSONE  10 mg Oral Daily    lidocaine  5 mL IntraDERmal Once    sodium chloride flush  5-40 mL IntraVENous 2 times per day    heparin flush  1 mL IntraVENous 2 times per day    levalbuterol  0.63 mg Nebulization Q4H    ipratropium  0.5 mg Nebulization Q4H    dilTIAZem  240 mg Oral Daily    metoprolol tartrate  25 mg Oral TID    acetylcysteine  600 mg Inhalation q8h    Roflumilast  500 mcg Oral Daily    famotidine  20 mg Oral BID    sodium chloride flush  5-40 mL IntraVENous 2 times per day    heparin flush  3 mL IntraVENous 2 times per day    apixaban  5 mg Oral BID    atorvastatin  10 mg Oral Daily    Vitamin D  2,000 Units Oral Daily    budesonide  0.5 mg Nebulization BID    Arformoterol Tartrate  15 mcg Nebulization BID     Continuous Infusions:   sodium chloride Stopped (227)    sodium chloride Stopped (22 1446)     PRN Meds:morphine, sodium chloride flush, sodium chloride, heparin flush, acetaminophen, haloperidol lactate, sodium chloride flush, sodium chloride, heparin flush    PHYSICAL EXAM     /65   Pulse 75   Temp 97.9 °F (36.6 °C) (Oral)   Resp 20   Ht 5' 11\" (1.803 m)   Wt 259 lb 0.7 oz (117.5 kg)   SpO2 90%   BMI 36.13 kg/m²   Temp  Av.6 °F (36.4 °C)  Min: 97.3 °F (36.3 °C)  Max: 97.9 °F (36.6 °C)  Constitutional:  The patient is awake,   Skin:    Warm and dry. bruises  HEENT:    rash face better AT/NC  Chest:   No use of accessory muscles to breathe. Symmetrical expansion. Cardiovascular:  S1 and S2 are rhythmic and regular. No murmurs appreciated. Abdomen:   Distended Positive bowel sounds to auscultation. Benign to palpation. nt   Extremities:     edema. CNS    Awake   Lines: pic       DIAGNOSTIC RESULTS   Radiology:    Recent Labs     22  0845 22  0610 22  0600   WBC 25.2* 19.2* 18.6*   RBC 4.81 4.61 4.30   HGB 14.6 13.9 13.0   HCT 45.6 44.0 41.3   MCV 94.8 95.4 96.0   MCH 30.4 30.2 30.2   MCHC 32.0 31.6* 31.5*   RDW 14.1 14.2 14.3    152 169   MPV 12.6* 12.6* 13.0*     Recent Labs     22  0600 22  0610 22  0600    141 143   K 4.1 3.3* 3.7   * 104 107   CO2 25 26 27   BUN 26* 22 17   CREATININE 1.0 0.8 1.0   GLUCOSE 113* 88 89   PROT 5.6* 5.6* 5.3*   LABALBU 3.2* 3.0* 2.8*   CALCIUM 9.0 8.7 8.5*   BILITOT 1.2 0.8 0.5   ALKPHOS 79 80 75   AST 41* 37 34   ALT 58* 47* 38        Recent Labs     22  0600 22  0610 22  0600   AST 41* 37 34   ALT 58* 47* 38     No results found for: CHOL, TRIG, HDL, LDLCALC, LABVLDL  Lab Results   Component Value Date/Time    VITD25 13 (L) 2020 02:50 PM       Microbiology:   No results for input(s): COVID19 in the last 72 hours.   Lab Results   Component Value Date/Time    BC 5 Days no growth 2022 12:27 AM    BC 5 Days no growth 2020 10:16 AM    BC 5 Days no growth 2020 03:55 PM    BLOODCULT2 5 Days no growth 2022 12:27 AM BLOODCULT2 5 Days no growth 12/18/2020 10:25 AM    BLOODCULT2 5 Days no growth 06/16/2020 04:02 PM    ORG Stenotrophomonas maltophilia 07/01/2022 10:28 AM    ORG Candida albicans 02/14/2018 08:20 AM     No results found for: WNDABS  Smear, Respiratory   Date Value Ref Range Status   07/01/2022 Refer to ordered Gram stain for results  Final         Component Value Date/Time    AFBCX No growth after 6 weeks of incubation. 02/14/2018 0820    FUNGSM No Fungal elements seen 02/14/2018 0820    LABFUNG Rare growth  No further workup   02/14/2018 0820       MRSA Culture Only   Date Value Ref Range Status   07/02/2022 Methicillin resistant Staph aureus not isolated  Final     CULTURE, RESPIRATORY   Date Value Ref Range Status   07/01/2022 Oral Pharyngeal Darline absent (A)  Final   07/01/2022 Heavy growth  Final   02/14/2018 Oral Pharyngeal Darline present  Final           FINAL IMPRESSION    Pt had   Chief Complaint   Patient presents with    Shortness of Breath     Pt c/o increasing SOB, states he wears O2 at home \"wide open\" and \"as much as I can get. \" Pt found 70s at home per EMS and was placed on CPAP. 125mg solumedrol, 2 proventils given en route. Pt denies CP/N/V. Placed on NIV upon arrival to ED. Admitted for Lactic acidosis [E87.2]  Pneumonia [J18.9]  Septicemia ([facility] [address] [A41.9]  Acute on chronic respiratory failure with hypoxia ([facility] [address] [J96.21]  Pneumonia due to infectious organism, unspecified laterality, unspecified part of lung [J18.9]  On treatment for   Leukocytosis improved  currently resp culture with Stenotrophomonas maltophilia right pneumonia   Rash face ?     sulfamethoxazole-trimethoprim (BACTRIM) 600 mg in dextrose 5 % 500 mL IVPB, Q8H         Check blood cx  procal in am    cdiff neg   Discussed LTAC with family      sulfamethoxazole-trimethoprim (BACTRIM DS;SEPTRA DS) 800-160 MG per tablet 1 tablet, 2 times per day     Follow labs      · Monitor labs    Imaging and labs were reviewed per medical records. The patient was educated about the diagnosis, prognosis, indications, risks and benefits of treatment. An opportunity to ask questions was given to the patient/FAMILY. Thank you for involving me in the care of Conchis Glasgow I will continue to follow. Please do not hesitate to call for any questions or concerns.     Electronically signed by Tonia Tucker MD on 7/5/2022 at 11:23 AM

## 2022-07-05 NOTE — PROGRESS NOTES
Cardiology  Progress Note      SUBJECTIVE: Patient was lying semiupright in bed when I came to see him this morning. His chest sounded very congested. He looked very weak.     Current Inpatient Medications  Current Facility-Administered Medications: sulfamethoxazole-trimethoprim (BACTRIM) 600 mg in dextrose 5 % 500 mL IVPB, 600 mg, IntraVENous, Q8H  predniSONE (DELTASONE) tablet 10 mg, 10 mg, Oral, Daily  morphine (PF) injection 2 mg, 2 mg, IntraVENous, Q4H PRN  lidocaine 1 % injection 5 mL, 5 mL, IntraDERmal, Once  sodium chloride flush 0.9 % injection 5-40 mL, 5-40 mL, IntraVENous, 2 times per day  sodium chloride flush 0.9 % injection 5-40 mL, 5-40 mL, IntraVENous, PRN  0.9 % sodium chloride infusion, , IntraVENous, PRN  heparin flush 100 UNIT/ML injection 100 Units, 1 mL, IntraVENous, 2 times per day  heparin flush 100 UNIT/ML injection 100 Units, 1 mL, IntraCATHeter, PRN  levalbuterol (XOPENEX) nebulization 0.63 mg, 0.63 mg, Nebulization, Q4H  ipratropium (ATROVENT) 0.02 % nebulizer solution 0.5 mg, 0.5 mg, Nebulization, Q4H  dilTIAZem (CARDIZEM CD) extended release capsule 240 mg, 240 mg, Oral, Daily  metoprolol tartrate (LOPRESSOR) tablet 25 mg, 25 mg, Oral, TID  acetylcysteine (MUCOMYST) 20 % solution 600 mg, 600 mg, Inhalation, q8h  acetaminophen (TYLENOL) tablet 650 mg, 650 mg, Oral, Q6H PRN  Roflumilast (DALIRESP) tablet 500 mcg, 500 mcg, Oral, Daily  famotidine (PEPCID) tablet 20 mg, 20 mg, Oral, BID  haloperidol lactate (HALDOL) injection 2 mg, 2 mg, IntraVENous, Q2H PRN  sodium chloride flush 0.9 % injection 5-40 mL, 5-40 mL, IntraVENous, 2 times per day  sodium chloride flush 0.9 % injection 5-40 mL, 5-40 mL, IntraVENous, PRN  0.9 % sodium chloride infusion, , IntraVENous, PRN  heparin flush 100 UNIT/ML injection 300 Units, 3 mL, IntraVENous, 2 times per day  heparin flush 100 UNIT/ML injection 300 Units, 3 mL, IntraCATHeter, PRN  apixaban (ELIQUIS) tablet 5 mg, 5 mg, Oral, BID  atorvastatin Eliquis. 2.elevation of high sensitivity troponin. This is mostly demand ischemia from his respiratory problems with type II non-STEMI. EKG is not showing acute ischemic changes. No complaints of chest pain. Pulmonary status is not good and I do not feel patient is suitable for any coronary workup. 3.community acquired pneumonia/acute exacerbation of COPD. On antibiotics and aerosol treatment. Prednisone PO was added over the weekend in view of his poor pulmonary status. Pulmonary status appears to be his main issue right now.

## 2022-07-05 NOTE — DISCHARGE INSTR - COC
C Diff Contact          Patient Infection Status       Infection Onset Added Last Indicated Last Indicated By Review Planned Expiration Resolved Resolved By    None active    Resolved    C-diff Rule Out 07/04/22 07/04/22 07/04/22 CLOSTRIDIUM DIFFICILE EIA (Ordered)   07/05/22 Rule-Out Test Resulted    COVID-19 (Rule Out) 06/24/22 06/24/22 06/24/22 Respiratory Panel, Molecular, with COVID-19 (Restricted: peds pts or suitable admitted adults) (Ordered)   06/24/22 Rule-Out Test Resulted    COVID-19 (Rule Out) 06/24/22 06/24/22 06/24/22 COVID-19, Rapid (Ordered)   06/24/22 Rule-Out Test Resulted    COVID-19 (Rule Out) 12/18/20 12/18/20 12/18/20 COVID-19 (Ordered)   12/18/20 Rule-Out Test Resulted            Nurse Assessment:  Last Vital Signs: /65   Pulse 75   Temp 97.9 °F (36.6 °C) (Oral)   Resp 20   Ht 5' 11\" (1.803 m)   Wt 259 lb 0.7 oz (117.5 kg)   SpO2 90%   BMI 36.13 kg/m²     Last documented pain score (0-10 scale): Pain Level: 9  Last Weight:   Wt Readings from Last 1 Encounters:   07/02/22 259 lb 0.7 oz (117.5 kg)     Mental Status:  {IP PT MENTAL STATUS:20030}    IV Access:  { JUDITH IV ACCESS:657486447}    Nursing Mobility/ADLs:  Walking   {P DME SEOM:900596527}  Transfer  {P DME KUUT:647847539}  Bathing  {CHP DME RFWK:052596658}  Dressing  {CHP DME YVVX:803526017}  Toileting  {P DME DSHB:485992254}  Feeding  {P DME MNHB:216733562}  Med Admin  {P DME TION:350109654}  Med Delivery   { JUDITH MED Delivery:305354703}    Wound Care Documentation and Therapy:        Elimination:  Continence: Bowel: {YES / CV:89823}  Bladder: {YES / ZV:63978}  Urinary Catheter: {Urinary Catheter:980151404}   Colostomy/Ileostomy/Ileal Conduit: {YES / MW:38325}       Date of Last BM: ***    Intake/Output Summary (Last 24 hours) at 7/5/2022 1019  Last data filed at 7/5/2022 0030  Gross per 24 hour   Intake 0 ml   Output 850 ml   Net -850 ml     I/O last 3 completed shifts:   In: 155.6 [Other:75; IV [facility] for less than 30 days.      Update Admission H&P: {CHP DME Changes in CITUY:041193248}    PHYSICIAN SIGNATURE:  {Esignature:962608485}

## 2022-07-06 NOTE — PROGRESS NOTES
BID  atorvastatin (LIPITOR) tablet 10 mg, 10 mg, Oral, Daily  vitamin D (CHOLECALCIFEROL) tablet 2,000 Units, 2,000 Units, Oral, Daily  budesonide (PULMICORT) nebulizer suspension 500 mcg, 0.5 mg, Nebulization, BID  Arformoterol Tartrate (BROVANA) nebulizer solution 15 mcg, 15 mcg, Nebulization, BID      Physical  VITALS:  BP (!) 144/89   Pulse 91   Temp 98.2 °F (36.8 °C) (Infrared)   Resp 20   Ht 5' 11\" (1.803 m)   Wt 259 lb 0.7 oz (117.5 kg)   SpO2 91%   BMI 36.13 kg/m²   CURRENT TEMPERATURE:  Temp: 98.2 °F (36.8 °C)  CONSTITUTIONAL: No acute distress. EYES: Vision is intact. ENT: No sore throat. No ear drainage. NECK: No JVD. BACK: Symmetric. LUNGS:  diminished breath sounds right base and left base  CARDIOVASCULAR:  normal S1 and S2, no S3 and murmurs include systolic murmur I/VI located at left sternal border without radiation  ABDOMEN:  distended  NEUROLOGIC: Lethargic. EXTREMITIES: + Edema in legs. DATA:        Cardiology Labs:  BMP:    Lab Results   Component Value Date/Time     07/06/2022 05:00 AM    K 3.9 07/06/2022 05:00 AM    K 4.3 06/24/2022 12:27 AM     07/06/2022 05:00 AM    CO2 25 07/06/2022 05:00 AM    BUN 14 07/06/2022 05:00 AM     CBC:    Lab Results   Component Value Date/Time    WBC 17.1 07/06/2022 05:00 AM    RBC 4.48 07/06/2022 05:00 AM    HGB 13.6 07/06/2022 05:00 AM    HCT 42.4 07/06/2022 05:00 AM    MCV 94.6 07/06/2022 05:00 AM    RDW 14.1 07/06/2022 05:00 AM     07/06/2022 05:00 AM     PT/INR:  No results found for: PTINR  TROPONIN:  No components found for: TROP    ASSESSMENT    1.paroxysmal atrial fibrillation. Patient was in sinus rhythm on admission. He went into atrial fibrillation June 28 with rapid ventricular response. This atrial fibrillation started after patient was put on theophylline drip for his respiratory problems. theophylline drip was stopped. He was started on Cardizem drip. Patient converted back to sinus rhythm.   He is now on Cardizem CD and metoprolol in addition to Eliquis. 2.elevation of high sensitivity troponin. This is mostly demand ischemia from his respiratory problems with type II non-STEMI. EKG is not showing acute ischemic changes. No complaints of chest pain. Pulmonary status is not good and I do not feel patient is suitable for any coronary workup. 3.community acquired pneumonia/acute exacerbation of COPD. Pulmonary status is still very marginal.  Pulmonary status appears to be his main issue right now. PLAN  As per orders.

## 2022-07-06 NOTE — PROGRESS NOTES
[facility]  [facility]  Progress Note    NAME: Didi Mendiola  MR:  [id number]  :   1940  DATE OF SERVICE:22    This is a face to face encounter with Didi Mendiola [de-identified] y.o. male on 22    CHIEF COMPLAINT     ID following for   Chief Complaint   Patient presents with    Shortness of Breath     Pt c/o increasing SOB, states he wears O2 at home \"wide open\" and \"as much as I can get. \" Pt found 70s at home per EMS and was placed on CPAP. 125mg solumedrol, 2 proventils given en route. Pt denies CP/N/V. Placed on NIV upon arrival to ED. HISTORY OF PRESENT ILLNESS   Pt seen and examined  22   On NRB  Has sitter  Somnolent    REVIEW OF SYSTEMS     As stated above in the chief complaint, otherwise negative.   CURRENT MEDICATIONS      sulfamethoxazole-trimethoprim (BACTRIM) IVPB  600 mg IntraVENous Q8H    lidocaine  5 mL IntraDERmal Once    sodium chloride flush  5-40 mL IntraVENous 2 times per day    heparin flush  1 mL IntraVENous 2 times per day    levalbuterol  0.63 mg Nebulization Q4H    ipratropium  0.5 mg Nebulization Q4H    dilTIAZem  240 mg Oral Daily    metoprolol tartrate  25 mg Oral TID    acetylcysteine  600 mg Inhalation q8h    Roflumilast  500 mcg Oral Daily    famotidine  20 mg Oral BID    sodium chloride flush  5-40 mL IntraVENous 2 times per day    heparin flush  3 mL IntraVENous 2 times per day    apixaban  5 mg Oral BID    atorvastatin  10 mg Oral Daily    Vitamin D  2,000 Units Oral Daily    budesonide  0.5 mg Nebulization BID    Arformoterol Tartrate  15 mcg Nebulization BID     Continuous Infusions:   sodium chloride Stopped (22 2227)    sodium chloride Stopped (22 1446)     PRN Meds:morphine, sodium chloride flush, sodium chloride, heparin flush, acetaminophen, haloperidol lactate, sodium chloride flush, sodium chloride, heparin flush    PHYSICAL EXAM     BP (!) 144/89   Pulse 91   Temp 98.2 °F (36.8 °C) (Infrared)   Resp 20   Ht 5' 11\" (1.803 m)   Wt 259 lb 0.7 oz (117.5 kg)   SpO2 91%   BMI 36.13 kg/m²   Temp  Av.1 °F (36.7 °C)  Min: 97.7 °F (36.5 °C)  Max: 98.2 °F (36.8 °C)  Constitutional:  The patient is somnolent   Skin:    Warm and dry. bruises  HEENT:    rash face better AT/NC  Chest:   No use of accessory muscles to breathe. Symmetrical expansion. NRB  Cardiovascular:  S1 and S2 are rhythmic and regular. No murmurs appreciated. Abdomen:   Distended Positive bowel sounds to auscultation. Benign to palpation. Extremities:     edema. CNS    somnolent   Lines: pic       DIAGNOSTIC RESULTS   Radiology:    Recent Labs     22  0610 07/05/22  0622  0500   WBC 19.2* 18.6* 17.1*   RBC 4.61 4.30 4.48   HGB 13.9 13.0 13.6   HCT 44.0 41.3 42.4   MCV 95.4 96.0 94.6   MCH 30.2 30.2 30.4   MCHC 31.6* 31.5* 32.1   RDW 14.2 14.3 14.1    169 221   MPV 12.6* 13.0* 12.1*     Recent Labs     22  0610 07/05/22  0600 22  0500    143 138   K 3.3* 3.7 3.9    107 103   CO2 26 27 25   BUN 22 17 14   CREATININE 0.8 1.0 1.2   GLUCOSE 88 89 91   PROT 5.6* 5.3* 5.8*   LABALBU 3.0* 2.8* 3.3*   CALCIUM 8.7 8.5* 8.5*   BILITOT 0.8 0.5 0.4   ALKPHOS 80 75 81   AST 37 34 38   ALT 47* 38 36        Recent Labs     22  0610 22  0600 22  0500   PROCAL  --   --  0.14*   AST 37 34 38   ALT 47* 38 36     No results found for: CHOL, TRIG, HDL, LDLCALC, LABVLDL  Lab Results   Component Value Date/Time    VITD25 13 (L) 2020 02:50 PM       Microbiology:   No results for input(s): COVID19 in the last 72 hours.   Lab Results   Component Value Date/Time    BC 5 Days no growth 2022 12:27 AM    BC 5 Days no growth 2020 10:16 AM    BC 5 Days no growth 2020 03:55 PM    BLOODCULT2 5 Days no growth 2022 12:27 AM    BLOODCULT2 5 Days no growth 2020 10:25 AM    BLOODCULT2 5 Days no growth 2020 04:02 PM    ORG Stenotrophomonas maltophilia 2022 10:28 AM    ORG Candida albicans 02/14/2018 08:20 AM     No results found for: WNDABS  Smear, Respiratory   Date Value Ref Range Status   07/01/2022 Refer to ordered Gram stain for results  Final         Component Value Date/Time    AFBCX No growth after 6 weeks of incubation. 02/14/2018 0820    FUNGSM No Fungal elements seen 02/14/2018 0820    LABFUNG Rare growth  No further workup   02/14/2018 0820       MRSA Culture Only   Date Value Ref Range Status   07/02/2022 Methicillin resistant Staph aureus not isolated  Final     CULTURE, RESPIRATORY   Date Value Ref Range Status   07/01/2022 Oral Pharyngeal Darline absent (A)  Final   07/01/2022 Heavy growth  Final   02/14/2018 Oral Pharyngeal Darline present  Final           FINAL IMPRESSION    Pt had   Chief Complaint   Patient presents with    Shortness of Breath     Pt c/o increasing SOB, states he wears O2 at home \"wide open\" and \"as much as I can get. \" Pt found 70s at home per EMS and was placed on CPAP. 125mg solumedrol, 2 proventils given en route. Pt denies CP/N/V. Placed on NIV upon arrival to ED. Admitted for Lactic acidosis [E87.2]  Pneumonia [J18.9]  Septicemia ([facility] [address] [A41.9]  Acute on chronic respiratory failure with hypoxia ([facility] [address] [J96.21]  Pneumonia due to infectious organism, unspecified laterality, unspecified part of lung [J18.9]  On treatment for   Leukocytosis improved  currently resp culture with Stenotrophomonas maltophilia right pneumonia   Rash face ? better     sulfamethoxazole-trimethoprim (BACTRIM) 600 mg in dextrose 5 % 500 mL IVPB, Q8H       Watch CR and K  Check blood cx  procal in 0.14   cdiff neg       Prognosis guarded    · Monitor labs    Imaging and labs were reviewed per medical records.        Electronically signed by Brooke García MD on 7/6/2022 at 11:30 AM

## 2022-07-06 NOTE — PROGRESS NOTES
Palliative Care Department  [phone]  Palliative Care Progress Note  Provider Milo Braswell, APRN - CNP      PATIENT: Etta Helm  : 1940  MRN: [id number]  ADMISSION DATE: 2022 12:11 AM  Referring Provider: Carin Hill MD    Palliative Medicine was consulted on hospital day 12 for assistance with Goals of care, Code Status Discussion, Family support, Symptom management     HPI:     Nirmal Palma is a [de-identified] y.o. y/o male with a history of COPD with chronic hypoxic respiratory failure, on 5 L nasal cannula at baseline, hyperlipidemia, who presented to [facility]) on 2022 with worsening dyspnea right lower lobe pneumonia and acute on chronic respiratory failure. Chest x-ray on admission shows bilateral bibasilar disease. Possibility of bilateral lower lobe pneumonia cannot be excluded. Chest x-ray done on 2022 showed minimal right lung base atelectasis, otherwise unremarkable. Patient currently on nonrebreather maintaining oxygen around 91%, reporting that he is not feeling well today. Palliative medicine consulted to discuss goal of care, CODE STATUS discussion, family support, symptom management. ASSESSMENT/PLAN:     Pertinent Hospital Diagnoses      Pneumonia   COPD   Chronic respiratory failure   Dyspnea  o Morphine 2 mg every 4 hours as needed      Palliative Care Encounter / Counseling Regarding Goals of Care  Please see detailed goals of care discussion as below   At this time, Etta Helm, Does Not have capacity for medical decision-making.   Capacity is time limited and situation/question specific   During encounter  was surrogate medical decision-maker   Outcome of goals of care meeting:  o Continue with current medical treatment  o Family continues to have further discussion regarding pursuing LTAC versus nursing home hospice  o Family requesting more time before making decisions  o CODE STATUS already established to limited DNR CCA DNI   Code status Limited DNR CCA DNI    Advanced Directives: no POA or living will in [facility]   Surrogate/Legal NOK:  nury Chase [phone] - [phone]  o Andres Bejarano (Child) [phone]    Spiritual assessment: no spiritual distress identified  Bereavement and grief: to be determined  Referrals to: none today    Thank you for the opportunity to participate in the care of Neil Kim. ELIZABETH Wheeler CNP  Palliative Medicine     SUBJECTIVE:     Details of Conversation:    Chart was reviewed. Saw patient at the bedside. Patient's wife and daughter present in the room. We have a lengthy conversation regarding goal of care. Rajendra Torres did report of having spoken to her  last night, but he was too confused to have a good conversation. We discussed end-stage COPD, and currently he is on 15 L nonrebreather, and his wishes of not wanting to be resuscitated or placed on vent support. I have answered all their questions. We discussed hospice in details, they expressed concerns regarding hospice at home and also nursing home cost.  Family are not able to take him back home under hospice care due to not having a good family support to provide the patient with 24-hour care needed, and also expressed their concerns regarding having to pay room and boarding at a nursing home if patient is to transfer there under hospice care. We also discussed the hospice house, but the patient has to meet certain criteria to qualify, family expressed concern about how far the hospice house is from their home. Rajendra Torres believe her  would not have wanted to be in the current state  he is right now, she feels comfort care approach will be appropriate for him, however her daughter is having a difficult time coming to terms of the severity of the patient's condition, and they both are requested more time for them to discuss further about hospice versus LTAC placement.   Comfort support was provided. Charge nurse notified. We will continue to follow. Prognosis: Guarded    OBJECTIVE:     BP (!) 116/97   Pulse 87   Temp 97.8 °F (36.6 °C) (Axillary)   Resp 21   Ht 5' 11\" (1.803 m)   Wt 259 lb 0.7 oz (117.5 kg)   SpO2 98%   BMI 36.13 kg/m²     Physical Examination:  Gen: elderly, thin, NAD, awake, fidgety  HEENT: normocephalic, atraumatic, PERRL, EOMI,   Neck: trachea midline, no JVD  Lungs: respirations easy and not labored, moist  Heart: regular rate and rhythm, distant heart tones,   Abdomen: normoactive bowel sounds, soft, non-tender  Extremities:  edema, moving all extremities    Skin: warm, dry without rashes, lesions, bruising  Neuro: awake, confused, not follows commands    Objective data reviewed: labs, images, records, medication use, vitals and chart    Time/Communication  Greater than 50% of time spent, total 45 minutes in counseling and coordination of care at the bedside regarding CODE STATUS discussion, family support, goals of care and symptom management. Thank you for allowing Palliative Medicine to participate in the care of Carmelina Anaya. Note: This report was completed using computerize voiced recognition software. Every effort has been made to ensure accuracy; however, inadvertent computerized transcription errors may be present.

## 2022-07-06 NOTE — PROGRESS NOTES
Department of Internal Medicine  General Internal Medicine  Attending Progress Note      SUBJECTIVE:    Patient seen and examined this morning,  Still with shortness of breath   On non rebreather mask  Confused overnight. Has a sitter.       Medications    Current Facility-Administered Medications: predniSONE (DELTASONE) tablet 20 mg, 20 mg, Oral, Daily  sulfamethoxazole-trimethoprim (BACTRIM) 600 mg in dextrose 5 % 500 mL IVPB, 600 mg, IntraVENous, Q8H  morphine (PF) injection 2 mg, 2 mg, IntraVENous, Q4H PRN  lidocaine 1 % injection 5 mL, 5 mL, IntraDERmal, Once  sodium chloride flush 0.9 % injection 5-40 mL, 5-40 mL, IntraVENous, 2 times per day  sodium chloride flush 0.9 % injection 5-40 mL, 5-40 mL, IntraVENous, PRN  0.9 % sodium chloride infusion, , IntraVENous, PRN  heparin flush 100 UNIT/ML injection 100 Units, 1 mL, IntraVENous, 2 times per day  heparin flush 100 UNIT/ML injection 100 Units, 1 mL, IntraCATHeter, PRN  levalbuterol (XOPENEX) nebulization 0.63 mg, 0.63 mg, Nebulization, Q4H  ipratropium (ATROVENT) 0.02 % nebulizer solution 0.5 mg, 0.5 mg, Nebulization, Q4H  dilTIAZem (CARDIZEM CD) extended release capsule 240 mg, 240 mg, Oral, Daily  metoprolol tartrate (LOPRESSOR) tablet 25 mg, 25 mg, Oral, TID  acetylcysteine (MUCOMYST) 20 % solution 600 mg, 600 mg, Inhalation, q8h  acetaminophen (TYLENOL) tablet 650 mg, 650 mg, Oral, Q6H PRN  Roflumilast (DALIRESP) tablet 500 mcg, 500 mcg, Oral, Daily  famotidine (PEPCID) tablet 20 mg, 20 mg, Oral, BID  haloperidol lactate (HALDOL) injection 2 mg, 2 mg, IntraVENous, Q2H PRN  sodium chloride flush 0.9 % injection 5-40 mL, 5-40 mL, IntraVENous, 2 times per day  sodium chloride flush 0.9 % injection 5-40 mL, 5-40 mL, IntraVENous, PRN  0.9 % sodium chloride infusion, , IntraVENous, PRN  heparin flush 100 UNIT/ML injection 300 Units, 3 mL, IntraVENous, 2 times per day  heparin flush 100 UNIT/ML injection 300 Units, 3 mL, IntraCATHeter, PRN  apixaban (ELIQUIS) tablet 5 mg, 5 mg, Oral, BID  atorvastatin (LIPITOR) tablet 10 mg, 10 mg, Oral, Daily  vitamin D (CHOLECALCIFEROL) tablet 2,000 Units, 2,000 Units, Oral, Daily  budesonide (PULMICORT) nebulizer suspension 500 mcg, 0.5 mg, Nebulization, BID  Arformoterol Tartrate (BROVANA) nebulizer solution 15 mcg, 15 mcg, Nebulization, BID    Physical    VITALS:  BP (!) 116/97   Pulse 87   Temp 97.8 °F (36.6 °C) (Axillary)   Resp 21   Ht 5' 11\" (1.803 m)   Wt 259 lb 0.7 oz (117.5 kg)   SpO2 98%   BMI 36.13 kg/m²   TEMPERATURE:  Current - Temp: 97.8 °F (36.6 °C); Max - Temp  Av °F (36.7 °C)  Min: 97.7 °F (36.5 °C)  Max: 98.2 °F (36.8 °C)  RESPIRATIONS RANGE: Resp  Av.2  Min: 18  Max: 24  PULSE RANGE: Pulse  Av.3  Min: 84  Max: 103  BLOOD PRESSURE RANGE:  Systolic (43QWY), ZXM:517 , Min:116 , XPK:692   ; Diastolic (10AMD), UAE:58, Min:77, Max:97    PULSE OXIMETRY RANGE: SpO2  Av.1 %  Min: 90 %  Max: 98 %  24HR INTAKE/OUTPUT:      Intake/Output Summary (Last 24 hours) at 2022 1617  Last data filed at 2022 1143  Gross per 24 hour   Intake 1087.64 ml   Output 0 ml   Net 1087.64 ml       CONSTITUTIONAL: Alert , in no acute distress  HEENT: Normocephalic atraumatic. Pupils are equal and reactive bilaterally. Extraocular movements are intact. No pallor or icterus appreciated. NECK: Supple, no JVD , no lymphadenopathy, no bruits, no thyromegaly  LUNGS: diminished air movements b/l - Minimal  inspiratory crackles over bases , upper airway congestion as well. On NC in no respiratory distress  CARDIOVASCULAR: Regular, no murmur rub or gallop. ABDOMEN: Soft,  distended, bowel sounds are positive, no organomegaly appreciated. NEUROLOGIC: alert, oriented to place, time not inperson , no focal deficits noted. EXT: trace  edema, no clubbing, or cyanosis.     CBC with Differential:    Lab Results   Component Value Date/Time    WBC 17.1 2022 05:00 AM    RBC 4.48 2022 05:00 AM    HGB 13.6 2022 05:00 AM    HCT 42.4 07/06/2022 05:00 AM     07/06/2022 05:00 AM    MCV 94.6 07/06/2022 05:00 AM    MCH 30.4 07/06/2022 05:00 AM    MCHC 32.1 07/06/2022 05:00 AM    RDW 14.1 07/06/2022 05:00 AM    METASPCT 0.9 06/24/2022 12:27 AM    LYMPHOPCT 13.9 07/06/2022 05:00 AM    MONOPCT 10.4 07/06/2022 05:00 AM    BASOPCT 0.2 07/06/2022 05:00 AM    MONOSABS 1.71 07/06/2022 05:00 AM    LYMPHSABS 2.39 07/06/2022 05:00 AM    EOSABS 0.29 07/06/2022 05:00 AM    BASOSABS 0.00 07/06/2022 05:00 AM     CMP:    Lab Results   Component Value Date/Time     07/06/2022 05:00 AM    K 3.9 07/06/2022 05:00 AM    K 4.3 06/24/2022 12:27 AM     07/06/2022 05:00 AM    CO2 25 07/06/2022 05:00 AM    BUN 14 07/06/2022 05:00 AM    CREATININE 1.2 07/06/2022 05:00 AM    GFRAA >60 07/06/2022 05:00 AM    LABGLOM 58 07/06/2022 05:00 AM    GLUCOSE 91 07/06/2022 05:00 AM    PROT 5.8 07/06/2022 05:00 AM    LABALBU 3.3 07/06/2022 05:00 AM    CALCIUM 8.5 07/06/2022 05:00 AM    BILITOT 0.4 07/06/2022 05:00 AM    ALKPHOS 81 07/06/2022 05:00 AM    AST 38 07/06/2022 05:00 AM    ALT 36 07/06/2022 05:00 AM         ASSESSMENT AND PLAN      1.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 Acute on chronic respiratory failure secondary to above and COPD exacerbation  Now on non rebreather mask  and BiPAP when sleeping  Home level is 5L NC  On levalbuterol and ipratropium  Pulmonology following closely. 3.COPD, with exacerbation  Severe long standing disease  Iv steroids discontinued due to steroid induced psychosis  Daliresp added by Pulmonology  Also on pulmicort, brovana, levalbuterol and ipratropium    4. Leukocytosis secondary to pneumonia,.  -White cell count at 17,100 today, trending down slowly  -ID on board, C diff negative    5. A Fib with rvr   Cardizem drip on and off the past few days as was in and out of afib RVR. Now off drip and on metoprolol tartrate and PO cardizem and in NSR  Eliquis 5 mg po bid  Dr Karen Gudino following. 6.Hyperlipidemia  Lipitor 10 mg daily    7. GERD  Continue pepcid 20 mg po bid      8. Steroid induced psychosis, episode of psychosis overnight secondary to respiratory status and dexamethasone which was discontinued    9. Hematuria, Cleared after irrigation. Urology follows. 10.PT/OT and  consult for rehab.  Plan is  for [address] vs LTAC,  Discussed with Wife and daughter 7/5th  and explainned his very poor prognosis       Andrea Delong MD  4:17 PM  7/6/2022

## 2022-07-06 NOTE — CARE COORDINATION
Spoke with Juan Carlos at [facility], they will accept for their [place] [address] location and start a precert, although [facility] indicates that patients insurance is likely not going to approve LTAC they will still submit and try.

## 2022-07-06 NOTE — PROGRESS NOTES
Dr. Enma Silverman updated of KUB results- new order noted for gen surg consult. Dr. Mathew Settler notified via perfect- serve. Family present at bedside and aware of new orders.

## 2022-07-06 NOTE — PLAN OF CARE
Problem: Discharge Planning  Goal: Discharge to home or other facility with appropriate resources  Outcome: Progressing  Flowsheets (Taken 7/5/2022 2145)  Discharge to home or other facility with appropriate resources:   Identify barriers to discharge with patient and caregiver   Arrange for needed discharge resources and transportation as appropriate   Identify discharge learning needs (meds, wound care, etc)   Refer to discharge planning if patient needs post-hospital services based on physician order or complex needs related to functional status, cognitive ability or social support system     Problem: Safety - Adult  Goal: Free from fall injury  Outcome: Progressing     Problem: ABCDS Injury Assessment  Goal: Absence of physical injury  Outcome: Progressing     Problem: Pain  Goal: Verbalizes/displays adequate comfort level or baseline comfort level  Outcome: Progressing     Problem: Confusion  Goal: Confusion, delirium, dementia, or psychosis is improved or at baseline  Description: INTERVENTIONS:  1. Assess for possible contributors to thought disturbance, including medications, impaired vision or hearing, underlying metabolic abnormalities, dehydration, psychiatric diagnoses, and notify attending LIP  2. [place] high risk fall precautions, as indicated  3. Provide frequent short contacts to provide reality reorientation, refocusing and direction  4. Decrease environmental stimuli, including noise as appropriate  5. Monitor and intervene to maintain adequate nutrition, hydration, elimination, sleep and activity  6. If unable to ensure safety without constant attention obtain sitter and review sitter guidelines with assigned personnel  7.  Initiate Psychosocial CNS and Spiritual Care consult, as indicated  Outcome: Progressing  Flowsheets (Taken 7/5/2022 2145)  Effect of thought disturbance (confusion, delirium, dementia, or psychosis) are managed with adequate functional status:   Assess for contributors to thought disturbance, including medications, impaired vision or hearing, underlying metabolic abnormalities, dehydration, psychiatric diagnoses, notify LIP   Decrease environmental stimuli, including noise as appropriate     Problem: Skin/Tissue Integrity  Goal: Absence of new skin breakdown  Description: 1. Monitor for areas of redness and/or skin breakdown  2. Assess vascular access sites hourly  3. Every 4-6 hours minimum:  Change oxygen saturation probe site  4. Every 4-6 hours:  If on nasal continuous positive airway pressure, respiratory therapy assess nares and determine need for appliance change or resting period.   Outcome: Progressing     Problem: Chronic Conditions and Co-morbidities  Goal: Patient's chronic conditions and co-morbidity symptoms are monitored and maintained or improved  Outcome: Progressing  Flowsheets (Taken 7/5/2022 2145)  Care Plan - Patient's Chronic Conditions and Co-Morbidity Symptoms are Monitored and Maintained or Improved:   Monitor and assess patient's chronic conditions and comorbid symptoms for stability, deterioration, or improvement   Collaborate with multidisciplinary team to address chronic and comorbid conditions and prevent exacerbation or deterioration     Problem: Respiratory - Adult  Goal: Achieves optimal ventilation and oxygenation  Outcome: Progressing  Flowsheets (Taken 7/5/2022 2145)  Achieves optimal ventilation and oxygenation:   Assess for changes in respiratory status   Assess for changes in mentation and behavior     Problem: Cardiovascular - Adult  Goal: Maintains optimal cardiac output and hemodynamic stability  Outcome: Progressing  Flowsheets (Taken 7/5/2022 2145)  Maintains optimal cardiac output and hemodynamic stability:   Monitor blood pressure and heart rate   Monitor urine output and notify Licensed Independent Practitioner for values outside of normal range  Goal: Absence of cardiac dysrhythmias or at baseline  Outcome: Progressing  Flowsheets (Taken 7/5/2022 2145)  Absence of cardiac dysrhythmias or at baseline:   Monitor cardiac rate and rhythm   Assess for signs of decreased cardiac output     Problem: Skin/Tissue Integrity - Adult  Goal: Skin integrity remains intact  Outcome: Progressing  Flowsheets (Taken 7/5/2022 2145)  Skin Integrity Remains Intact: Monitor for areas of redness and/or skin breakdown  Goal: Oral mucous membranes remain intact  Outcome: Progressing  Flowsheets (Taken 7/5/2022 2145)  Oral Mucous Membranes Remain Intact:   Assess oral mucosa and hygiene practices   Implement preventative oral hygiene regimen   Implement oral medicated treatments as ordered     Problem: Musculoskeletal - Adult  Goal: Return mobility to safest level of function  Outcome: Progressing  Flowsheets (Taken 7/5/2022 2145)  Return Mobility to Safest Level of Function:   Assess patient stability and activity tolerance for standing, transferring and ambulating with or without assistive devices   Assist with transfers and ambulation using safe patient handling equipment as needed   Ensure adequate protection for wounds/incisions during mobilization  Goal: Maintain proper alignment of affected body part  Outcome: Progressing  Flowsheets (Taken 7/5/2022 2145)  Maintain proper alignment of affected body part: Support and protect limb and body alignment per provider's orders     Problem: Genitourinary - Adult  Goal: Urinary catheter remains patent  Outcome: Completed     Problem: Infection - Adult  Goal: Absence of infection at discharge  Outcome: Progressing  Flowsheets (Taken 7/5/2022 2145)  Absence of infection at discharge:   Assess and monitor for signs and symptoms of infection   Monitor lab/diagnostic results

## 2022-07-06 NOTE — PLAN OF CARE
Problem: Discharge Planning  Goal: Discharge to home or other facility with appropriate resources  7/6/2022 1709 by Lucy Nettles RN  Outcome: Progressing  7/6/2022 0412 by Vitaly Bacon RN  Outcome: Progressing  Flowsheets (Taken 7/5/2022 2145)  Discharge to home or other facility with appropriate resources:   Identify barriers to discharge with patient and caregiver   Arrange for needed discharge resources and transportation as appropriate   Identify discharge learning needs (meds, wound care, etc)   Refer to discharge planning if patient needs post-hospital services based on physician order or complex needs related to functional status, cognitive ability or social support system     Problem: Safety - Adult  Goal: Free from fall injury  7/6/2022 1709 by Lucy Nettles RN  Outcome: Progressing  Flowsheets (Taken 7/6/2022 1002)  Free From Fall Injury: Instruct family/caregiver on patient safety  7/6/2022 0412 by Vitaly Bacon RN  Outcome: Progressing     Problem: ABCDS Injury Assessment  Goal: Absence of physical injury  7/6/2022 1709 by Lucy Nettles RN  Outcome: Progressing  Flowsheets (Taken 7/6/2022 1002)  Absence of Physical Injury: Implement safety measures based on patient assessment  7/6/2022 0412 by Vitaly Bacon RN  Outcome: Progressing     Problem: Pain  Goal: Verbalizes/displays adequate comfort level or baseline comfort level  7/6/2022 1709 by Lucy Nettles RN  Outcome: Progressing  Flowsheets (Taken 7/6/2022 1330)  Verbalizes/displays adequate comfort level or baseline comfort level:   Encourage patient to monitor pain and request assistance   Assess pain using appropriate pain scale   Administer analgesics based on type and severity of pain and evaluate response   Implement non-pharmacological measures as appropriate and evaluate response   Consider cultural and social influences on pain and pain management   Notify Licensed Independent Practitioner if interventions unsuccessful or patient reports new pain  7/6/2022 0412 by Nikolay Bravo RN  Outcome: Progressing     Problem: Confusion  Goal: Confusion, delirium, dementia, or psychosis is improved or at baseline  Description: INTERVENTIONS:  1. Assess for possible contributors to thought disturbance, including medications, impaired vision or hearing, underlying metabolic abnormalities, dehydration, psychiatric diagnoses, and notify attending LIP  2. [place] high risk fall precautions, as indicated  3. Provide frequent short contacts to provide reality reorientation, refocusing and direction  4. Decrease environmental stimuli, including noise as appropriate  5. Monitor and intervene to maintain adequate nutrition, hydration, elimination, sleep and activity  6. If unable to ensure safety without constant attention obtain sitter and review sitter guidelines with assigned personnel  7. Initiate Psychosocial CNS and Spiritual Care consult, as indicated  7/6/2022 1709 by Tez Maria RN  Outcome: Progressing  7/6/2022 0412 by Nikolay Bravo RN  Outcome: Progressing  Flowsheets (Taken 7/5/2022 2145)  Effect of thought disturbance (confusion, delirium, dementia, or psychosis) are managed with adequate functional status:   Assess for contributors to thought disturbance, including medications, impaired vision or hearing, underlying metabolic abnormalities, dehydration, psychiatric diagnoses, notify LIP   Decrease environmental stimuli, including noise as appropriate     Problem: Nutrition Deficit:  Goal: Optimize nutritional status  7/6/2022 1709 by Tez Maria RN  Outcome: Progressing  7/6/2022 0412 by Nikolay Bravo RN  Outcome: Progressing     Problem: Skin/Tissue Integrity  Goal: Absence of new skin breakdown  Description: 1. Monitor for areas of redness and/or skin breakdown  2. Assess vascular access sites hourly  3. Every 4-6 hours minimum:  Change oxygen saturation probe site  4.   Every 4-6 hours:  If on nasal continuous positive airway pressure, respiratory therapy assess nares and determine need for appliance change or resting period.   7/6/2022 1709 by Sam Morocho RN  Outcome: Progressing  7/6/2022 0412 by Karma Keller RN  Outcome: Progressing     Problem: Chronic Conditions and Co-morbidities  Goal: Patient's chronic conditions and co-morbidity symptoms are monitored and maintained or improved  7/6/2022 1709 by Sam Morocho RN  Outcome: Progressing  7/6/2022 0412 by Karma Keller RN  Outcome: Progressing  Flowsheets (Taken 7/5/2022 2145)  Care Plan - Patient's Chronic Conditions and Co-Morbidity Symptoms are Monitored and Maintained or Improved:   Monitor and assess patient's chronic conditions and comorbid symptoms for stability, deterioration, or improvement   Collaborate with multidisciplinary team to address chronic and comorbid conditions and prevent exacerbation or deterioration     Problem: Respiratory - Adult  Goal: Achieves optimal ventilation and oxygenation  7/6/2022 1709 by Sam Morocho RN  Outcome: Progressing  7/6/2022 0412 by Karma Keller RN  Outcome: Progressing  Flowsheets (Taken 7/5/2022 2145)  Achieves optimal ventilation and oxygenation:   Assess for changes in respiratory status   Assess for changes in mentation and behavior     Problem: Cardiovascular - Adult  Goal: Maintains optimal cardiac output and hemodynamic stability  7/6/2022 1709 by Sam Morocho RN  Outcome: Progressing  7/6/2022 0412 by Karma Keller RN  Outcome: Progressing  [address] (Taken 7/5/2022 2145)  Maintains optimal cardiac output and hemodynamic stability:   Monitor blood pressure and heart rate   Monitor urine output and notify Licensed Independent Practitioner for values outside of normal range  Goal: Absence of cardiac dysrhythmias or at baseline  7/6/2022 1709 by Sam Morocho RN  Outcome: Progressing  7/6/2022 0412 by Karma Keller RN  Outcome: Progressing  Flowsheets (Taken 7/5/2022 2145)  Absence of cardiac dysrhythmias or at baseline:   Monitor cardiac rate and rhythm   Assess for signs of decreased cardiac output     Problem: Skin/Tissue Integrity - Adult  Goal: Skin integrity remains intact  7/6/2022 1709 by Edmund Ortez RN  Outcome: Progressing  Flowsheets (Taken 7/6/2022 1003)  Skin Integrity Remains Intact: Monitor for areas of redness and/or skin breakdown  7/6/2022 0412 by Nishi Hayden RN  Outcome: Progressing  Flowsheets (Taken 7/5/2022 2145)  Skin Integrity Remains Intact: Monitor for areas of redness and/or skin breakdown  Goal: Oral mucous membranes remain intact  7/6/2022 1709 by Edmund Ortez RN  Outcome: Progressing  7/6/2022 0412 by Nishi Hayden RN  Outcome: Progressing  Flowsheets (Taken 7/5/2022 2145)  Oral Mucous Membranes Remain Intact:   Assess oral mucosa and hygiene practices   Implement preventative oral hygiene regimen   Implement oral medicated treatments as ordered     Problem: Musculoskeletal - Adult  Goal: Return mobility to safest level of function  7/6/2022 1709 by Edmund Ortez RN  Outcome: Progressing  7/6/2022 0412 by Nishi Hayden RN  Outcome: Progressing  Flowsheets (Taken 7/5/2022 2145)  Return Mobility to Safest Level of Function:   Assess patient stability and activity tolerance for standing, transferring and ambulating with or without assistive devices   Assist with transfers and ambulation using safe patient handling equipment as needed   Ensure adequate protection for wounds/incisions during mobilization  Goal: Maintain proper alignment of affected body part  7/6/2022 1709 by Edmund Ortez RN  Outcome: Progressing  7/6/2022 0412 by Nishi Hayden RN  Outcome: Progressing  Flowsheets (Taken 7/5/2022 2145)  Maintain proper alignment of affected body part: Support and protect limb and body alignment per provider's orders     Problem: Genitourinary - Adult  Goal: Urinary catheter remains patent  7/6/2022 1207 by Marilynn Pack RN  Outcome: Completed     Problem: Infection - Adult  Goal: Absence of infection at discharge  7/6/2022 1709 by Zoya Vaughn RN  Outcome: Progressing  7/6/2022 0412 by Marilynn Pack RN  Outcome: Progressing  Flowsheets (Taken 7/5/2022 2145)  Absence of infection at discharge:   Assess and monitor for signs and symptoms of infection   Monitor lab/diagnostic results

## 2022-07-06 NOTE — PROGRESS NOTES
PULMONARY MEDICINE CONSULT      [de-identified]year old person with PMH of COPD with chronic hypoxemic respiratory failure (on 5L ), hyperlipidemia, as described below admitted to hospital for management of worsening dyspnea, right lower lobe pneumonia, acute on chronic respiratory failure. .    The patient is receiving azithromycin and cefepime, methylprednisolone and bronchodilators. He is chronically anticoagulated with apixaban. · On nonrebreather mask. · Patient is awake and interactive however he is currently on 100% nonrebreather. · Resume steroid  · Follow-up palliative care recommendations. BP (!) 144/89   Pulse (!) 103   Temp 98.2 °F (36.8 °C) (Infrared)   Resp 22   Ht 5' 11\" (1.803 m)   Wt 259 lb 0.7 oz (117.5 kg)   SpO2 94%   BMI 36.13 kg/m²   General: Awake, answers simple questions, oriented to place, time and person  HEENT: No head lesions, PERRL, EOMI, mouth without lesions, no nasal lesions, no cervical adenopathy palpated  Respiratory: Lungs with diminished breath sounds bilaterally, no adventitious sounds auscultated, no accessory muscle use  CV: Regular rate, no murmurs, no JVD, no leg edema  Abdomen: Soft, non tender, + bowel sounds, no lesions  Skin: Hydrated, adequate turgor, echymosis on arms, capillary refill <2 seconds  Extremities: Muscular strength 4/5 in 4 limbs, moves 4 limbs spontaneously, distal pulses present  Neurology: Awake and alert, follows commands, moves 4 limbs on command and spontaneously, neck is supple, no meningitic signs present. A/P:  Acute on chronic hypoxemic respiratory failure  Acute exacerbation of COPD  --Antimicrobial therapy: Bactrim + vancomycin as per ID  --Bronchodilators: Arformoterol/budesonide + albuterol/iparatropium q 4 hrs + albuterol PRN + Roflumilast  --Incentive spirometry and flutter valve 10 times/hour while awake + Mucomyst  - HHFO keep sat 88 to 94%.       Rest of supportive care as per primary team    Atrial fibrillation  --Continue cardizem and metoprolol    Hyperlipidemia  --Continue statin     Hypertension  --On antihypertensives    DVT prophylaxis - anticoagulated with apixaban   PT/OT following    Past Medical History:   Diagnosis Date    Atrial fibrillation (HCC)     COPD (chronic obstructive pulmonary disease) (HCC)     Hyperlipidemia     Pneumonia     Steroid side effects     combative      No family history on file. Social History     Socioeconomic History    Marital status:      Spouse name: Not on file    Number of children: Not on file    Years of education: Not on file    Highest education level: Not on file   Occupational History    Not on file   Tobacco Use    Smoking status: Former Smoker     Quit date: 2009     Years since quittin.6    Smokeless tobacco: Former User   Vaping Use    Vaping Use: Never used   Substance and Sexual Activity    Alcohol use: Not Currently    Drug use: No    Sexual activity: Not Currently   Other Topics Concern    Not on file   Social History Narrative    Not on file     Social Determinants of Health     Financial Resource Strain:     Difficulty of Paying Living Expenses: Not on file   Food Insecurity:     Worried About [address] in the Last Year: Not on file    [address] in the Last Year: Not on file   Transportation Needs:     Lack of Transportation (Medical): Not on file    Lack of Transportation (Non-Medical):  Not on file   Physical Activity:     Days of Exercise per Week: Not on file    Minutes of Exercise per Session: Not on file   Stress:     Feeling of Stress : Not on file   Social Connections:     Frequency of Communication with Friends and Family: Not on file    Frequency of Social Gatherings with Friends and Family: Not on file    Attends Anabaptism Services: Not on file    Active Member of Clubs or Organizations: Not on file    Attends Club or Organization Meetings: Not on file    Marital Status: Not on file   Intimate Partner Violence:     Fear of Current or Ex-Partner: Not on file    Emotionally Abused: Not on file    Physically Abused: Not on file    Sexually Abused: Not on file   Housing Stability:     Unable to Pay for Housing in the Last Year: Not on file    Number of [place] in the Last Year: Not on file    Unstable Housing in the Last Year: Not on file     Current Facility-Administered Medications   Medication Dose Route Frequency Provider Last Rate Last Admin    sulfamethoxazole-trimethoprim (BACTRIM) 600 mg in dextrose 5 % 500 mL IVPB  600 mg IntraVENous Q8H Meche Helton MD   Stopped at 07/06/22 1036    morphine (PF) injection 2 mg  2 mg IntraVENous Q4H PRN Timur Flores MD   2 mg at 07/06/22 0934    lidocaine 1 % injection 5 mL  5 mL IntraDERmal Once Alfredo Ellison MD        sodium chloride flush 0.9 % injection 5-40 mL  5-40 mL IntraVENous 2 times per day Alfredo Ellison MD   10 mL at 07/06/22 1036    sodium chloride flush 0.9 % injection 5-40 mL  5-40 mL IntraVENous PRN Alfredo Ellison MD        0.9 % sodium chloride infusion   IntraVENous PRN Alfredo Ellison MD   Stopped at 07/01/22 2227    heparin flush 100 UNIT/ML injection 100 Units  1 mL IntraVENous 2 times per day Alfredo Ellison MD   100 Units at 07/06/22 0813    heparin flush 100 UNIT/ML injection 100 Units  1 mL IntraCATHeter PRN Alfredo Ellison MD        levalbuterol Vivica Ped) nebulization 0.63 mg  0.63 mg Nebulization Q4H Alfredo Ellison MD   0.63 mg at 07/06/22 1421    ipratropium (ATROVENT) 0.02 % nebulizer solution 0.5 mg  0.5 mg Nebulization Q4H Alfredo Ellison MD   0.5 mg at 07/06/22 1420    dilTIAZem (CARDIZEM CD) extended release capsule 240 mg  240 mg Oral Daily Alfredo Ellison MD   240 mg at 07/06/22 0813    metoprolol tartrate (LOPRESSOR) tablet 25 mg  25 mg Oral TID Alfredo Ellison MD   25 mg at 07/06/22 0813    acetylcysteine (MUCOMYST) 20 % solution 600 mg  600 mg Inhalation q8h Alfredo Ellison MD   600 mg at 07/06/22 1421    acetaminophen (TYLENOL) tablet 650 mg  650 mg Oral Q6H PRN Oanh Hudson MD   650 mg at 07/01/22 2123    Roflumilast (DALIRESP) tablet 500 mcg  500 mcg Oral Daily Oanh Hudson MD   500 mcg at 07/06/22 0813    famotidine (PEPCID) tablet 20 mg  20 mg Oral BID Oanh Hudson MD   20 mg at 07/06/22 0813    haloperidol lactate (HALDOL) injection 2 mg  2 mg IntraVENous Q2H PRN Oanh Hudson MD   2 mg at 07/06/22 0447    sodium chloride flush 0.9 % injection 5-40 mL  5-40 mL IntraVENous 2 times per day Oanh Hudson MD   10 mL at 07/06/22 1036    sodium chloride flush 0.9 % injection 5-40 mL  5-40 mL IntraVENous PRN Oanh Hudson MD   10 mL at 07/04/22 2039    0.9 % sodium chloride infusion   IntraVENous PRN Oanh Hudson MD   Stopped at 06/30/22 1446    heparin flush 100 UNIT/ML injection 300 Units  3 mL IntraVENous 2 times per day Oanh Hudson MD   300 Units at 07/06/22 0945    heparin flush 100 UNIT/ML injection 300 Units  3 mL IntraCATHeter PRN Oanh Hudson MD   300 Units at 07/01/22 0557    apixaban (ELIQUIS) tablet 5 mg  5 mg Oral BID Nay Cartagena MD   5 mg at 07/06/22 0813    atorvastatin (LIPITOR) tablet 10 mg  10 mg Oral Daily Nay Cartagena MD   10 mg at 07/06/22 0813    vitamin D (CHOLECALCIFEROL) tablet 2,000 Units  2,000 Units Oral Daily Nay Cartagena MD   2,000 Units at 07/06/22 0813    budesonide (PULMICORT) nebulizer suspension 500 mcg  0.5 mg Nebulization BID Nay Cartagena MD   500 mcg at 07/06/22 0603    Arformoterol Tartrate (BROVANA) nebulizer solution 15 mcg  15 mcg Nebulization BID Nay Cartagena MD   15 mcg at 07/06/22 0603     MEDICATIONS:   sulfamethoxazole-trimethoprim (BACTRIM) IVPB  600 mg IntraVENous Q8H    lidocaine  5 mL IntraDERmal Once    sodium chloride flush  5-40 mL IntraVENous 2 times per day    heparin flush  1 mL IntraVENous 2 times per day    levalbuterol  0.63 mg Nebulization Q4H    ipratropium  0.5 mg Nebulization Q4H  dilTIAZem  240 mg Oral Daily    metoprolol tartrate  25 mg Oral TID    acetylcysteine  600 mg Inhalation q8h    Roflumilast  500 mcg Oral Daily    famotidine  20 mg Oral BID    sodium chloride flush  5-40 mL IntraVENous 2 times per day    heparin flush  3 mL IntraVENous 2 times per day    apixaban  5 mg Oral BID    atorvastatin  10 mg Oral Daily    Vitamin D  2,000 Units Oral Daily    budesonide  0.5 mg Nebulization BID    Arformoterol Tartrate  15 mcg Nebulization BID      sodium chloride Stopped (07/01/22 2227)    sodium chloride Stopped (06/30/22 1446)         OBJECTIVE:  Vitals:    07/06/22 1422   BP:    Pulse: (!) 103   Resp: 22   Temp:    SpO2: 94%     FiO2 : 60 % (BIPap)  O2 Flow Rate (L/min): 15 L/min  O2 Device: Non-rebreather mask        LABS:  WBC   Date Value Ref Range Status   07/06/2022 17.1 (H) 4.5 - 11.5 E9/L Final   07/05/2022 18.6 (H) 4.5 - 11.5 E9/L Final   07/04/2022 19.2 (H) 4.5 - 11.5 E9/L Final     Hemoglobin   Date Value Ref Range Status   07/06/2022 13.6 12.5 - 16.5 g/dL Final   07/05/2022 13.0 12.5 - 16.5 g/dL Final   07/04/2022 13.9 12.5 - 16.5 g/dL Final     Hematocrit   Date Value Ref Range Status   07/06/2022 42.4 37.0 - 54.0 % Final   07/05/2022 41.3 37.0 - 54.0 % Final   07/04/2022 44.0 37.0 - 54.0 % Final     MCV   Date Value Ref Range Status   07/06/2022 94.6 80.0 - 99.9 fL Final   07/05/2022 96.0 80.0 - 99.9 fL Final   07/04/2022 95.4 80.0 - 99.9 fL Final     Platelets   Date Value Ref Range Status   07/06/2022 221 130 - 450 E9/L Final   07/05/2022 169 130 - 450 E9/L Final   07/04/2022 152 130 - 450 E9/L Final     Sodium   Date Value Ref Range Status   07/06/2022 138 132 - 146 mmol/L Final   07/05/2022 143 132 - 146 mmol/L Final   07/04/2022 141 132 - 146 mmol/L Final     Potassium   Date Value Ref Range Status   07/06/2022 3.9 3.5 - 5.0 mmol/L Final   07/05/2022 3.7 3.5 - 5.0 mmol/L Final   07/04/2022 3.3 (L) 3.5 - 5.0 mmol/L Final     Potassium reflex Magnesium Date Value Ref Range Status   06/24/2022 4.3 3.5 - 5.0 mmol/L Final   12/18/2020 3.7 3.5 - 5.0 mmol/L Final   07/23/2019 4.6 3.5 - 5.0 mmol/L Final     Chloride   Date Value Ref Range Status   07/06/2022 103 98 - 107 mmol/L Final   07/05/2022 107 98 - 107 mmol/L Final   07/04/2022 104 98 - 107 mmol/L Final     CO2   Date Value Ref Range Status   07/06/2022 25 22 - 29 mmol/L Final   07/05/2022 27 22 - 29 mmol/L Final   07/04/2022 26 22 - 29 mmol/L Final     BUN   Date Value Ref Range Status   07/06/2022 14 6 - 23 mg/dL Final   07/05/2022 17 6 - 23 mg/dL Final   07/04/2022 22 6 - 23 mg/dL Final     CREATININE   Date Value Ref Range Status   07/06/2022 1.2 0.7 - 1.2 mg/dL Final   07/05/2022 1.0 0.7 - 1.2 mg/dL Final   07/04/2022 0.8 0.7 - 1.2 mg/dL Final     Glucose   Date Value Ref Range Status   07/06/2022 91 74 - 99 mg/dL Final   07/05/2022 89 74 - 99 mg/dL Final   07/04/2022 88 74 - 99 mg/dL Final     Calcium   Date Value Ref Range Status   07/06/2022 8.5 (L) 8.6 - 10.2 mg/dL Final   07/05/2022 8.5 (L) 8.6 - 10.2 mg/dL Final   07/04/2022 8.7 8.6 - 10.2 mg/dL Final     Total Protein   Date Value Ref Range Status   07/06/2022 5.8 (L) 6.4 - 8.3 g/dL Final   07/05/2022 5.3 (L) 6.4 - 8.3 g/dL Final   07/04/2022 5.6 (L) 6.4 - 8.3 g/dL Final     Albumin   Date Value Ref Range Status   07/06/2022 3.3 (L) 3.5 - 5.2 g/dL Final   07/05/2022 2.8 (L) 3.5 - 5.2 g/dL Final   07/04/2022 3.0 (L) 3.5 - 5.2 g/dL Final     Total Bilirubin   Date Value Ref Range Status   07/06/2022 0.4 0.0 - 1.2 mg/dL Final   07/05/2022 0.5 0.0 - 1.2 mg/dL Final   07/04/2022 0.8 0.0 - 1.2 mg/dL Final     Alkaline Phosphatase   Date Value Ref Range Status   07/06/2022 81 40 - 129 U/L Final   07/05/2022 75 40 - 129 U/L Final   07/04/2022 80 40 - 129 U/L Final     AST   Date Value Ref Range Status   07/06/2022 38 0 - 39 U/L Final   07/05/2022 34 0 - 39 U/L Final   07/04/2022 37 0 - 39 U/L Final     ALT   Date Value Ref Range Status   07/06/2022 36 0 - 40 U/L Final   07/05/2022 38 0 - 40 U/L Final   07/04/2022 47 (H) 0 - 40 U/L Final     GFR Non-   Date Value Ref Range Status   07/06/2022 58 >=60 mL/min/1.73 Final     Comment:     Chronic Kidney Disease: less than 60 ml/min/1.73 sq.m. Kidney Failure: less than 15 ml/min/1.73 sq.m. Results valid for patients 18 years and older. 07/05/2022 >60 >=60 mL/min/1.73 Final     Comment:     Chronic Kidney Disease: less than 60 ml/min/1.73 sq.m. Kidney Failure: less than 15 ml/min/1.73 sq.m. Results valid for patients 18 years and older. 07/04/2022 >60 >=60 mL/min/1.73 Final     Comment:     Chronic Kidney Disease: less than 60 ml/min/1.73 sq.m. Kidney Failure: less than 15 ml/min/1.73 sq.m. Results valid for patients 18 years and older. GFR    Date Value Ref Range Status   07/06/2022 >60  Final   07/05/2022 >60  Final   07/04/2022 >60  Final     Magnesium   Date Value Ref Range Status   07/04/2022 2.1 1.6 - 2.6 mg/dL Final   06/29/2022 2.3 1.6 - 2.6 mg/dL Final   06/28/2022 2.6 1.6 - 2.6 mg/dL Final     Phosphorus   Date Value Ref Range Status   07/04/2022 2.4 (L) 2.5 - 4.5 mg/dL Final   06/29/2022 2.6 2.5 - 4.5 mg/dL Final   06/28/2022 2.5 2.5 - 4.5 mg/dL Final     No results for input(s): PH, PO2, PCO2, HCO3, BE, O2SAT in the last 72 hours. RADIOLOGY:  XR CHEST PORTABLE   Final Result   1. Minimal right lung base atelectasis. The chest x-ray is otherwise   unremarkable. XR CHEST PORTABLE   Final Result   Addendum 1 of 1   ADDENDUM:   I was asked by the attending physician to addend the report [facility]    [facility]. The correct position of the distal tip of the left PICC line is within the   superior vena cava directed cephalad. The tip is not in the internal    jugular   vein. The tip of the left PICC line is seen within the superior vena cava    directed   cephalad. Final   1.  Left-sided PICC line crosses midline and ascends cranially, likely within   the right internal jugular vein. 2. Persistent alveolar consolidation in the right lung base. 3. Possible small bilateral pleural effusions. XR CHEST PORTABLE   Final Result   Areas of a bi basilar atelectasis, no significant changes since the June 28   study. XR CHEST PORTABLE   Final Result   Bibasilar atelectasis         XR CHEST PORTABLE   Final Result   Improved aeration at the right base likely secondary to re-expansion of   atelectasis. The appearance now I believe is quit [place] to this patient's   baseline. CT ABDOMEN PELVIS WO CONTRAST Additional Contrast? None   Final Result   Limited study due to patient motion. No acute intra-abdominal or pelvic findings. Right lower lobe consolidative changes, suggestive of a pneumonia. XR CHEST PORTABLE   Final Result   Bilateral basilar disease. The possibility of a bilateral lower lobe   pneumonia cannot be excluded in the appropriate setting. PROBLEM LIST:  Principal Problem:    Pneumonia  Resolved Problems:    * No resolved hospital problems.  *

## 2022-07-06 NOTE — PROGRESS NOTES
Physical Therapy  Room #:   0615/0615-02    Date: 2022       Patient Name: Jeffrey Lee  : 1940      MRN: [id number]     Patient unavailable for physical therapy treatment due to unavailable/not appropriate per nursing due to declining O2 saturation     Melba Roberson, PT

## 2022-07-06 NOTE — CARE COORDINATION
SW met with patients wife and his daughter outside room per their request.  They are very overwhelmed by decision making and information at this time. They had many questions regarding Hospice, [facility] and LTAC. SW offered support and answered all questions, after much discussion they are requesting SW make referral to [facility] in [place] and if they can accept they want a precert started with insurance. SW called and made initial referral to Allyssa Brennan at [facility], await acceptance.

## 2022-07-07 NOTE — CARE COORDINATION
Hospice Consult noted. Patients wife and daughter at bedside, choices for Hospice provided, they would like [facility]. Referral to [facility] at intake at [address] [place] [address] Drive. They will have a Liaison reach out to family.

## 2022-07-07 NOTE — PROGRESS NOTES
Speech Language Pathology  Chart review completed, pt transferring to [facility], [facility] care and therefore further SLP dysphagia management is discontinued at this time.     Rachel Singh, [address] [address]

## 2022-07-07 NOTE — PROGRESS NOTES
Dr. George Ka aware of family decision for hospice. He is okay with morphine being administered/started in hospital.  spoke with family via phone. Palliative aware.

## 2022-07-07 NOTE — PLAN OF CARE
Problem: Discharge Planning  Goal: Discharge to home or other facility with appropriate resources  7/7/2022 0650 by Hayley Wang RN  Outcome: Not Progressing  Flowsheets (Taken 7/6/2022 2000)  Discharge to home or other facility with appropriate resources:   Identify barriers to discharge with patient and caregiver   Arrange for needed discharge resources and transportation as appropriate   Identify discharge learning needs (meds, wound care, etc)   Refer to discharge planning if patient needs post-hospital services based on physician order or complex needs related to functional status, cognitive ability or social support system    Problem: Safety - Adult  Goal: Free from fall injury  7/7/2022 0650 by Hayley Wang RN  Outcome: Progressing  7/6/2022 1709 by Robbie Lopez RN  Outcome: Progressing  Flowsheets (Taken 7/6/2022 1002)  Free From Fall Injury: Instruct family/caregiver on patient safety     Problem: ABCDS Injury Assessment  Goal: Absence of physical injury  7/7/2022 0650 by Hayley Wang RN    Problem: ABCDS Injury Assessment  Goal: Absence of physical injury  7/7/2022 0650 by Hayley Wang RN  Outcome: Progressing  7/6/2022 1709 by Robbie Lopez RN  Outcome: Progressing  Flowsheets (Taken 7/6/2022 1002)  Absence of Physical Injury: Implement safety measures based on patient assessment     Problem: Pain  Goal: Verbalizes/displays adequate comfort level or baseline comfort level  7/7/2022 0650 by Hayley Wang RN  Outcome: Not Progressing  Flowsheets (Taken 7/6/2022 2211)  Verbalizes/displays adequate comfort level or baseline comfort level:   Encourage patient to monitor pain and request assistance   Assess pain using appropriate pain scale   Administer analgesics based on type and severity of pain and evaluate response  7/6/2022 1709 by Robbie Lopez RN  Outcome: Progressing  Flowsheets (Taken 7/6/2022 1330)  Verbalizes/displays adequate comfort level or baseline comfort level:   Encourage patient to monitor pain and request assistance   Assess pain using appropriate pain scale   Administer analgesics based on type and severity of pain and evaluate response   Implement non-pharmacological measures as appropriate and evaluate response   Consider cultural and social influences on pain and pain management   Notify Licensed Independent Practitioner if interventions unsuccessful or patient reports new pain     Problem: Confusion  Goal: Confusion, delirium, dementia, or psychosis is improved or at baseline  Description: INTERVENTIONS:  1. Assess for possible contributors to thought disturbance, including medications, impaired vision or hearing, underlying metabolic abnormalities, dehydration, psychiatric diagnoses, and notify attending LIP  2. [place] high risk fall precautions, as indicated  3. Provide frequent short contacts to provide reality reorientation, refocusing and direction  4. Decrease environmental stimuli, including noise as appropriate  5. Monitor and intervene to maintain adequate nutrition, hydration, elimination, sleep and activity  6. If unable to ensure safety without constant attention obtain sitter and review sitter guidelines with assigned personnel  7.  Initiate Psychosocial CNS and Spiritual Care consult, as indicated  7/7/2022 0650 by Ina Cameron RN  Outcome: Progressing  Flowsheets (Taken 7/6/2022 2000)  Effect of thought disturbance (confusion, delirium, dementia, or psychosis) are managed with adequate functional status: Provide frequent short contacts to provide reality reorientation, refocusing and direction    Problem: Chronic Conditions and Co-morbidities  Goal: Patient's chronic conditions and co-morbidity symptoms are monitored and maintained or improved  7/7/2022 0650 by Ina Cameron RN  Outcome: Not Progressing  Flowsheets (Taken 7/6/2022 2000)  Care Plan - Patient's Chronic Conditions and Co-Morbidity Symptoms are Monitored and Maintained or Improved:   Monitor and assess patient's chronic conditions and comorbid symptoms for stability, deterioration, or improvement   Collaborate with multidisciplinary team to address chronic and comorbid conditions and prevent exacerbation or deterioration    Problem: Respiratory - Adult  Goal: Achieves optimal ventilation and oxygenation  7/7/2022 0650 by Eric Rodarte RN  Outcome: Progressing  Flowsheets (Taken 7/6/2022 2000)  Achieves optimal ventilation and oxygenation:   Assess for changes in respiratory status   Assess for changes in mentation and behavior   Position to facilitate oxygenation and minimize respiratory effort   Respiratory therapy support as indicated    Problem: Cardiovascular - Adult  Goal: Maintains optimal cardiac output and hemodynamic stability  7/7/2022 0650 by Eric Rodatre RN  Outcome: Progressing    Problem: Musculoskeletal - Adult  Goal: Return mobility to safest level of function  7/7/2022 0650 by Eric Rodarte RN  Outcome: Not Progressing  Flowsheets (Taken 7/6/2022 2000)  Return Mobility to Safest Level of Function:   Assist with transfers and ambulation using safe patient handling equipment as needed   Ensure adequate protection for wounds/incisions during mobilization   Instruct patient/family in ordered activity level  7/6/2022 1709 by Sandra Gill RN  Outcome: Progressing  Goal: Maintain proper alignment of affected body part  7/7/2022 0650 by Eirc Rodarte RN  Outcome: Not Progressing  Flowsheets (Taken 7/6/2022 2000)  Maintain proper alignment of affected body part: Support and protect limb and body alignment per provider's orders       Problem: Genitourinary - Adult  Goal: Urinary catheter remains patent  Recent Flowsheet Documentation  Taken 7/6/2022 2000 by Eric Rodarte RN  Urinary catheter remains patent: Assess patency of urinary catheter   Goal: Absence of cardiac dysrhythmias or at baseline  7/7/2022 0650 by Eric Rodarte RN  Outcome: Progressing          Problem: Infection - Adult  Goal: Absence of infection at discharge  7/7/2022 0650 by Nate Edmond RN  Outcome: Progressing  Flowsheets (Taken 7/6/2022 2000)  Absence of infection at discharge:   Assess and monitor for signs and symptoms of infection   Monitor lab/diagnostic results   Monitor all insertion sites i.e., indwelling lines, tubes and drains   Administer medications as ordered

## 2022-07-07 NOTE — PROGRESS NOTES
Consult received, chart reviewed. Met with patients wife, daughter and grandson. Discussed the plan that pt is to remain inpatient for comfort care at this time. Discussion occurred on EOL symptoms and education provided on medication management for EOL symptoms. All family in agreement, wife states that \" he has suffered too much already\". Pt in bed on the BiPap RDOS of 12. Bedside RN initiating MS gtt. Charge to notify rell at wife's request.  Shani Coleman will continue to follow for support.

## 2022-07-07 NOTE — PROGRESS NOTES
PULMONARY MEDICINE CONSULT      [de-identified]year old person with PMH of COPD with chronic hypoxemic respiratory failure (on 5L ), hyperlipidemia, as described below admitted to hospital for management of worsening dyspnea, right lower lobe pneumonia, acute on chronic respiratory failure. .    The patient is receiving azithromycin and cefepime, methylprednisolone and bronchodilators. He is chronically anticoagulated with apixaban. · On nonrebreather mask/BiPAP  · Patient is awake and interactive however he is currently on 100% nonrebreather. · Continues with steroids  · Follow-up palliative care recommendations. · Patient's CODE STATUS changed to DNR CC. Patient is transition to hospice care. BP (!) 151/76   Pulse 92   Temp 97.5 °F (36.4 °C) (Infrared)   Resp 24   Ht 5' 11\" (1.803 m)   Wt 259 lb 0.7 oz (117.5 kg)   SpO2 92%   BMI 36.13 kg/m²   General: Awake, answers simple questions, oriented to place, time and person  HEENT: No head lesions, PERRL, EOMI, mouth without lesions, no nasal lesions, no cervical adenopathy palpated  Respiratory: Lungs with diminished breath sounds bilaterally, no adventitious sounds auscultated, no accessory muscle use  CV: Regular rate, no murmurs, no JVD, no leg edema  Abdomen: Soft, non tender, + bowel sounds, no lesions  Skin: Hydrated, adequate turgor, echymosis on arms, capillary refill <2 seconds  Extremities: Muscular strength 4/5 in 4 limbs, moves 4 limbs spontaneously, distal pulses present  Neurology: Awake and alert, follows commands, moves 4 limbs on command and spontaneously, neck is supple, no meningitic signs present.         A/P:  Acute on chronic hypoxemic respiratory failure  Acute exacerbation of COPD  --Antimicrobial therapy: Bactrim + vancomycin as per ID  --Bronchodilators: Arformoterol/budesonide + albuterol/iparatropium q 4 hrs + albuterol PRN + Roflumilast  --Incentive spirometry and flutter valve 10 times/hour while awake + Mucomyst  - HHFO/BiPAP [FreeTextEntry1] : Documented by Randal Renteria acting as a scribe for Dr. Landon Gifford on 03/02/2021. All medical record entries made by the Scribe were at my, Dr. Landon Gifford, direction and personally dictated by me on 03/02/2021 . I have reviewed the chart and agree that the record accurately reflects my personal performance of the history, physical exam, assessment and plan. I have also personally directed, reviewed, and agreed with the chart.  keep sat 88 to 94%. Rest of supportive care as per primary team    Atrial fibrillation  --Continue cardizem and metoprolol    Hyperlipidemia  --Continue statin     Hypertension  --On antihypertensives    DVT prophylaxis - anticoagulated with apixaban   PT/OT following    Past Medical History:   Diagnosis Date    Atrial fibrillation (HCC)     COPD (chronic obstructive pulmonary disease) (HCC)     Hyperlipidemia     Pneumonia     Steroid side effects     combative      No family history on file. Social History     Socioeconomic History    Marital status:      Spouse name: Not on file    Number of children: Not on file    Years of education: Not on file    Highest education level: Not on file   Occupational History    Not on file   Tobacco Use    Smoking status: Former Smoker     Quit date: 2009     Years since quittin.6    Smokeless tobacco: Former User   Vaping Use    Vaping Use: Never used   Substance and Sexual Activity    Alcohol use: Not Currently    Drug use: No    Sexual activity: Not Currently   Other Topics Concern    Not on file   Social History Narrative    Not on file     Social Determinants of Health     Financial Resource Strain:     Difficulty of Paying Living Expenses: Not on file   Food Insecurity:     Worried About [address] in the Last Year: Not on file    [address] in the Last Year: Not on file   Transportation Needs:     Lack of Transportation (Medical): Not on file    Lack of Transportation (Non-Medical):  Not on file   Physical Activity:     Days of Exercise per Week: Not on file    Minutes of Exercise per Session: Not on file   Stress:     Feeling of Stress : Not on file   Social Connections:     Frequency of Communication with Friends and Family: Not on file    Frequency of Social Gatherings with Friends and Family: Not on file    Attends Buddhist Services: Not on file    Active Member of Clubs or Organizations: Not on file  Attends Club or Organization Meetings: Not on file    Marital Status: Not on file   Intimate Partner Violence:     Fear of Current or Ex-Partner: Not on file    Emotionally Abused: Not on file    Physically Abused: Not on file    Sexually Abused: Not on file   Housing Stability:     Unable to Pay for Housing in the Last Year: Not on file    Number of [place] in the Last Year: Not on file    Unstable Housing in the Last Year: Not on file     Current Facility-Administered Medications   Medication Dose Route Frequency Provider Last Rate Last Admin    LORazepam (ATIVAN) 2 MG/ML injection             LORazepam (ATIVAN) injection 0.5 mg  0.5 mg IntraVENous Q4H PRN Beba A Costlow, APRN - CNP   0.5 mg at 07/07/22 1159    glycopyrrolate (ROBINUL) injection 0.4 mg  0.4 mg IntraVENous 4x Daily PRN Beba A Costlow, APRN - CNP        morphine (PF) 100 mg in sodium chloride 0.9 % 100 mL infusion  1 mg/hr IntraVENous Continuous Mitchel Naqvi MD        predniSONE (DELTASONE) tablet 20 mg  20 mg Oral Daily Aundria Aida Velazquez MD   20 mg at 07/07/22 1610    sulfamethoxazole-trimethoprim (BACTRIM) 600 mg in dextrose 5 % 500 mL IVPB  600 mg IntraVENous Q8H Jose Daniel Hair MD   Stopped at 07/07/22 1011    morphine (PF) injection 2 mg  2 mg IntraVENous Q4H PRN Carin Hill MD   2 mg at 07/07/22 5610    lidocaine 1 % injection 5 mL  5 mL IntraDERmal Once Phan Engel MD        sodium chloride flush 0.9 % injection 5-40 mL  5-40 mL IntraVENous 2 times per day Phan Engel MD   10 mL at 07/07/22 0821    sodium chloride flush 0.9 % injection 5-40 mL  5-40 mL IntraVENous PRN Phan Engel MD        0.9 % sodium chloride infusion   IntraVENous PRN Phan Engel MD   Stopped at 07/01/22 2227    heparin flush 100 UNIT/ML injection 100 Units  1 mL IntraVENous 2 times per day Phan Engel MD   100 Units at 07/07/22 0821    heparin flush 100 UNIT/ML injection 100 Units  1 mL IntraCATHeter PRN Leandro Segovia MD        levalbuterol Radames Cruz) nebulization 0.63 mg  0.63 mg Nebulization Q4H Leandro Segovia MD   0.63 mg at 07/07/22 1014    ipratropium (ATROVENT) 0.02 % nebulizer solution 0.5 mg  0.5 mg Nebulization Q4H Leandro Segovai MD   0.5 mg at 07/07/22 1014    dilTIAZem (CARDIZEM CD) extended release capsule 240 mg  240 mg Oral Daily Leandro Segovia MD   240 mg at 07/07/22 1875    metoprolol tartrate (LOPRESSOR) tablet 25 mg  25 mg Oral TID Leandro Segovia MD   25 mg at 07/07/22 9607    acetylcysteine (MUCOMYST) 20 % solution 600 mg  600 mg Inhalation q8h Leandro Segovia MD   600 mg at 07/07/22 0636    acetaminophen (TYLENOL) tablet 650 mg  650 mg Oral Q6H PRN Leandro Segovia MD   650 mg at 07/01/22 2123    Roflumilast (DALIRESP) tablet 500 mcg  500 mcg Oral Daily Leandro Segovia MD   500 mcg at 07/07/22 3546    famotidine (PEPCID) tablet 20 mg  20 mg Oral BID Leandro Segovia MD   20 mg at 07/07/22 1950    haloperidol lactate (HALDOL) injection 2 mg  2 mg IntraVENous Q2H PRN Leandro Segovia MD   2 mg at 07/07/22 0207    sodium chloride flush 0.9 % injection 5-40 mL  5-40 mL IntraVENous 2 times per day Leandro Segovia MD   10 mL at 07/06/22 2227    sodium chloride flush 0.9 % injection 5-40 mL  5-40 mL IntraVENous PRN Leandro Segovia MD   10 mL at 07/04/22 2039    0.9 % sodium chloride infusion   IntraVENous PRN Leandro Segovia MD   Stopped at 06/30/22 1446    heparin flush 100 UNIT/ML injection 300 Units  3 mL IntraVENous 2 times per day Leandro Segovia MD   300 Units at 07/07/22 0933    heparin flush 100 UNIT/ML injection 300 Units  3 mL IntraCATHeter PRN Leandro Segovia MD   300 Units at 07/01/22 0557    apixaban (ELIQUIS) tablet 5 mg  5 mg Oral BID Ravindra Fernández MD   5 mg at 07/07/22 9344    atorvastatin (LIPITOR) tablet 10 mg  10 mg Oral Daily Ravindra Fernández MD   10 mg at 07/07/22 0801    vitamin D (CHOLECALCIFEROL) tablet 2,000 Units  2,000 Units Oral Daily Ravindra Fernández MD   2,000 Units at 07/07/22 0822    budesonide (PULMICORT) nebulizer suspension 500 mcg  0.5 mg Nebulization BID Harmony Villatoro MD   500 mcg at 07/07/22 0636    Arformoterol Tartrate (BROVANA) nebulizer solution 15 mcg  15 mcg Nebulization BID Harmony Villatoro MD   15 mcg at 07/07/22 0636     MEDICATIONS:   LORazepam        predniSONE  20 mg Oral Daily    sulfamethoxazole-trimethoprim (BACTRIM) IVPB  600 mg IntraVENous Q8H    lidocaine  5 mL IntraDERmal Once    sodium chloride flush  5-40 mL IntraVENous 2 times per day    heparin flush  1 mL IntraVENous 2 times per day    levalbuterol  0.63 mg Nebulization Q4H    ipratropium  0.5 mg Nebulization Q4H    dilTIAZem  240 mg Oral Daily    metoprolol tartrate  25 mg Oral TID    acetylcysteine  600 mg Inhalation q8h    Roflumilast  500 mcg Oral Daily    famotidine  20 mg Oral BID    sodium chloride flush  5-40 mL IntraVENous 2 times per day    heparin flush  3 mL IntraVENous 2 times per day    apixaban  5 mg Oral BID    atorvastatin  10 mg Oral Daily    Vitamin D  2,000 Units Oral Daily    budesonide  0.5 mg Nebulization BID    Arformoterol Tartrate  15 mcg Nebulization BID      morphine infusion 1 mg/mL      sodium chloride Stopped (07/01/22 2227)    sodium chloride Stopped (06/30/22 1446)         OBJECTIVE:  Vitals:    07/07/22 1017   BP:    Pulse:    Resp: 24   Temp:    SpO2:      FiO2 : 60 %  O2 Flow Rate (L/min): 60 L/min  O2 Device: PAP (positive airway pressure)        LABS:  WBC   Date Value Ref Range Status   07/07/2022 12.8 (H) 4.5 - 11.5 E9/L Final   07/06/2022 17.1 (H) 4.5 - 11.5 E9/L Final   07/05/2022 18.6 (H) 4.5 - 11.5 E9/L Final     Hemoglobin   Date Value Ref Range Status   07/07/2022 12.9 12.5 - 16.5 g/dL Final   07/06/2022 13.6 12.5 - 16.5 g/dL Final   07/05/2022 13.0 12.5 - 16.5 g/dL Final     Hematocrit   Date Value Ref Range Status   07/07/2022 40.3 37.0 - 54.0 % Final   07/06/2022 42.4 37.0 - 54.0 % Final   07/05/2022 41.3 37.0 - 54.0 % Final     MCV   Date Value Ref Range Status   07/07/2022 93.9 80.0 - 99.9 fL Final   07/06/2022 94.6 80.0 - 99.9 fL Final   07/05/2022 96.0 80.0 - 99.9 fL Final     Platelets   Date Value Ref Range Status   07/07/2022 206 130 - 450 E9/L Final   07/06/2022 221 130 - 450 E9/L Final   07/05/2022 169 130 - 450 E9/L Final     Sodium   Date Value Ref Range Status   07/07/2022 139 132 - 146 mmol/L Final   07/06/2022 138 132 - 146 mmol/L Final   07/05/2022 143 132 - 146 mmol/L Final     Potassium   Date Value Ref Range Status   07/07/2022 4.8 3.5 - 5.0 mmol/L Final   07/06/2022 3.9 3.5 - 5.0 mmol/L Final   07/05/2022 3.7 3.5 - 5.0 mmol/L Final     Potassium reflex Magnesium   Date Value Ref Range Status   06/24/2022 4.3 3.5 - 5.0 mmol/L Final   12/18/2020 3.7 3.5 - 5.0 mmol/L Final   07/23/2019 4.6 3.5 - 5.0 mmol/L Final     Chloride   Date Value Ref Range Status   07/07/2022 101 98 - 107 mmol/L Final   07/06/2022 103 98 - 107 mmol/L Final   07/05/2022 107 98 - 107 mmol/L Final     CO2   Date Value Ref Range Status   07/07/2022 27 22 - 29 mmol/L Final   07/06/2022 25 22 - 29 mmol/L Final   07/05/2022 27 22 - 29 mmol/L Final     BUN   Date Value Ref Range Status   07/07/2022 19 6 - 23 mg/dL Final   07/06/2022 14 6 - 23 mg/dL Final   07/05/2022 17 6 - 23 mg/dL Final     CREATININE   Date Value Ref Range Status   07/07/2022 1.5 (H) 0.7 - 1.2 mg/dL Final   07/06/2022 1.2 0.7 - 1.2 mg/dL Final   07/05/2022 1.0 0.7 - 1.2 mg/dL Final     Glucose   Date Value Ref Range Status   07/07/2022 108 (H) 74 - 99 mg/dL Final   07/06/2022 91 74 - 99 mg/dL Final   07/05/2022 89 74 - 99 mg/dL Final     Calcium   Date Value Ref Range Status   07/07/2022 8.5 (L) 8.6 - 10.2 mg/dL Final   07/06/2022 8.5 (L) 8.6 - 10.2 mg/dL Final   07/05/2022 8.5 (L) 8.6 - 10.2 mg/dL Final     Total Protein   Date Value Ref Range Status   07/07/2022 5.8 (L) 6.4 - 8.3 g/dL Final   07/06/2022 5.8 (L) 6.4 - 8.3 g/dL Final   07/05/2022 5.3 (L) 6.4 - 8.3 g/dL Final     Albumin   Date Value Ref Range Status   07/07/2022 3.3 (L) 3.5 - 5.2 g/dL Final   07/06/2022 3.3 (L) 3.5 - 5.2 g/dL Final   07/05/2022 2.8 (L) 3.5 - 5.2 g/dL Final     Total Bilirubin   Date Value Ref Range Status   07/07/2022 0.4 0.0 - 1.2 mg/dL Final   07/06/2022 0.4 0.0 - 1.2 mg/dL Final   07/05/2022 0.5 0.0 - 1.2 mg/dL Final     Alkaline Phosphatase   Date Value Ref Range Status   07/07/2022 83 40 - 129 U/L Final   07/06/2022 81 40 - 129 U/L Final   07/05/2022 75 40 - 129 U/L Final     AST   Date Value Ref Range Status   07/07/2022 45 (H) 0 - 39 U/L Final   07/06/2022 38 0 - 39 U/L Final   07/05/2022 34 0 - 39 U/L Final     ALT   Date Value Ref Range Status   07/07/2022 36 0 - 40 U/L Final   07/06/2022 36 0 - 40 U/L Final   07/05/2022 38 0 - 40 U/L Final     GFR Non-   Date Value Ref Range Status   07/07/2022 45 >=60 mL/min/1.73 Final     Comment:     Chronic Kidney Disease: less than 60 ml/min/1.73 sq.m. Kidney Failure: less than 15 ml/min/1.73 sq.m. Results valid for patients 18 years and older. 07/06/2022 58 >=60 mL/min/1.73 Final     Comment:     Chronic Kidney Disease: less than 60 ml/min/1.73 sq.m. Kidney Failure: less than 15 ml/min/1.73 sq.m. Results valid for patients 18 years and older. 07/05/2022 >60 >=60 mL/min/1.73 Final     Comment:     Chronic Kidney Disease: less than 60 ml/min/1.73 sq.m. Kidney Failure: less than 15 ml/min/1.73 sq.m. Results valid for patients 18 years and older.        GFR    Date Value Ref Range Status   07/07/2022 54  Final   07/06/2022 >60  Final   07/05/2022 >60  Final     Magnesium   Date Value Ref Range Status   07/04/2022 2.1 1.6 - 2.6 mg/dL Final   06/29/2022 2.3 1.6 - 2.6 mg/dL Final   06/28/2022 2.6 1.6 - 2.6 mg/dL Final     Phosphorus   Date Value Ref Range Status   07/04/2022 2.4 (L) 2.5 - 4.5 mg/dL Final   06/29/2022 2.6 2.5 - 4.5 mg/dL Final   06/28/2022 2.5 2.5 - 4.5 mg/dL Final     No results for input(s): PH, PO2, PCO2, HCO3, BE, O2SAT in the last 72 hours. RADIOLOGY:  XR CHEST PORTABLE   Final Result   No acute cardiopulmonary abnormality. XR ABDOMEN (KUB) (SINGLE AP VIEW)   Final Result   Newly dilated cecum, ascending and transverse colon which I am most inclined   to attribute to an adynamic state. XR CHEST PORTABLE   Final Result   1. Minimal right lung base atelectasis. The chest x-ray is otherwise   unremarkable. XR CHEST PORTABLE   Final Result   Addendum 1 of 1   ADDENDUM:   I was asked by the attending physician to addend the report dictated by    Dr. Mansoor Elise. The correct position of the distal tip of the left PICC line is within the   superior vena cava directed cephalad. The tip is not in the internal    jugular   vein. The tip of the left PICC line is seen within the superior vena cava    directed   cephalad. Final   1. Left-sided PICC line crosses midline and ascends cranially, likely within   the right internal jugular vein. 2. Persistent alveolar consolidation in the right lung base. 3. Possible small bilateral pleural effusions. XR CHEST PORTABLE   Final Result   Areas of a bi basilar atelectasis, no significant changes since the June 28   study. XR CHEST PORTABLE   Final Result   Bibasilar atelectasis         XR CHEST PORTABLE   Final Result   Improved aeration at the right base likely secondary to re-expansion of   atelectasis. The appearance now I believe is quit [place] to this patient's   baseline. CT ABDOMEN PELVIS WO CONTRAST Additional Contrast? None   Final Result   Limited study due to patient motion. No acute intra-abdominal or pelvic findings. Right lower lobe consolidative changes, suggestive of a pneumonia. XR CHEST PORTABLE   Final Result   Bilateral basilar disease. The possibility of a bilateral lower lobe   pneumonia cannot be excluded in the appropriate setting. PROBLEM LIST:  Principal Problem:    Pneumonia  Resolved Problems:    * No resolved hospital problems.  *

## 2022-07-07 NOTE — CONSULTS
General Surgery Consult  Delfina Kayser, MD, MS    Patient's Name/Date of Birth: Carla Vo / [birth year]    Date: July 7, 2022     Consulting Surgeon: Apple Ortiz MD    PCP: Quinn Rodrigues MD     Chief Complaint: abd distention, abnormal KUB    HPI:   Crala Vo is a [de-identified] y.o. male who presents for evaluation of abd distention. Timing is constant, radiation to midline, alleviated by none and started unk and severity is 7/10. Patient with prolonged hospital stay with multiple medical problems including severe resp failure and PNA. Now with abd distention and worsening confusion with altered mental status. Family in room and states they are transitioning to Hospice. He is unresponsive on BiPAP.        Patient Active Problem List   Diagnosis    Influenza, pneumonia    Dyspnea    Appendicitis, acute    Acute exacerbation of chronic obstructive pulmonary disease (COPD) ([address]    Septic shock ([address]    Agitation    Community acquired pneumonia of left lower lobe of lung    Acute on chronic respiratory failure with hypoxia (HCC)    HLD (hyperlipidemia)    Anxiety    GERD (gastroesophageal reflux disease)    Atrial fibrillation with rapid ventricular response ([address]    LORAINE (acute kidney injury) ([address]    Pneumonia       Allergies   Allergen Reactions    Decadron [Dexamethasone] Other (See Comments)     combative    Solu-Cortef [Hydrocortisone] Other (See Comments)     combative    Solu-Medrol [Methylprednisolone] Other (See Comments)     combative       Past Medical History:   Diagnosis Date    Atrial fibrillation ([address]     COPD (chronic obstructive pulmonary disease) ([address]     Hyperlipidemia     Pneumonia     Steroid side effects     combative        Past Surgical History:   Procedure Laterality Date    APPENDECTOMY  2019    COLONOSCOPY      EYE SURGERY Right     cataract removal with lens implants    HEMORRHOID SURGERY      LAPAROSCOPIC APPENDECTOMY N/A 2019    APPENDECTOMY LAPAROSCOPIC performed by Parris Shaikh MD at [address] [facility] [address] History     Socioeconomic History    Marital status:      Spouse name: Not on file    Number of children: Not on file    Years of education: Not on file    Highest education level: Not on file   Occupational History    Not on file   Tobacco Use    Smoking status: Former Smoker     Quit date: 2009     Years since quittin.6    Smokeless tobacco: Former User   Vaping Use    Vaping Use: Never used   Substance and Sexual Activity    Alcohol use: Not Currently    Drug use: No    Sexual activity: Not Currently   Other Topics Concern    Not on file   Social History Narrative    Not on file     Social Determinants of Health     Financial Resource Strain:     Difficulty of Paying Living Expenses: Not on file   Food Insecurity:     Worried About [address] in the Last Year: Not on file    [address] in the Last Year: Not on file   Transportation Needs:     Lack of Transportation (Medical): Not on file    Lack of Transportation (Non-Medical):  Not on file   Physical Activity:     Days of Exercise per Week: Not on file    Minutes of Exercise per Session: Not on file   Stress:     Feeling of Stress : Not on file   Social Connections:     Frequency of Communication with Friends and Family: Not on file    Frequency of Social Gatherings with Friends and Family: Not on file    Attends Jehovah's witness Services: Not on file    Active Member of Clubs or Organizations: Not on file    Attends Club or Organization Meetings: Not on file    Marital Status: Not on file   Intimate Partner Violence:     Fear of Current or Ex-Partner: Not on file    Emotionally Abused: Not on file    Physically Abused: Not on file    Sexually Abused: Not on file   Housing Stability:     Unable to Pay for Housing in the Last Year: Not on file    Number of Places Lived in the Last Year: Not on file    Unstable Housing in the Last Year: Not on file       The patient has a family history that is negative for severe cardiovascular or respiratory issues, negative for reaction to anesthesia. Recent Labs     07/05/22  0600 07/06/22  0500 07/07/22  0605   WBC 18.6* 17.1* 12.8*   HGB 13.0 13.6 12.9   HCT 41.3 42.4 40.3   MCV 96.0 94.6 93.9    221 206       Recent Labs     07/05/22  0600 07/06/22  0500 07/07/22  0605    138 139   K 3.7 3.9 4.8   CO2 27 25 27   BUN 17 14 19   CREATININE 1.0 1.2 1.5*       Recent Labs     07/05/22  0600 07/06/22  0500 07/07/22  0605   PROT 5.3* 5.8* 5.8*         Intake/Output Summary (Last 24 hours) at 7/7/2022 1404  Last data filed at 7/7/2022 1207  Gross per 24 hour   Intake 567.24 ml   Output 1750 ml   Net -1182.76 ml       I have reviewed relevant labs from this admission and interpretation is included in my assessment and plan      Review of Systems  A complete 10 system review was performed and are otherwise negative unless mentioned in the above HPI. Specific negatives are listed below but may not include all those reviewed.     General ROS: negative obtundation, AMS  ENT ROS: negative rhinorrhea, epistaxis  Allergy and Immunology ROS: negative itchy/watery eyes or nasal congestion  Hematological and Lymphatic ROS: negative spontaneous bleeding or bruising  Endocrine ROS: negative  lethargy, mood swings, palpitations or polydipsia/polyuria  Respiratory ROS: negative sputum changes, stridor, tachypnea or wheezing  Cardiovascular ROS: negative for - loss of consciousness, murmur or orthopnea  Gastrointestinal ROS: negative for - hematochezia or hematemesis  Genito-Urinary ROS: negative for -  genital discharge or hematuria  Musculoskeletal ROS: negative for - focal weakness, gangrene  Psych/Neuro ROS: negative for - visual or auditory hallucinations, suicidal ideation      Physical exam:   BP (!) 151/76   Pulse 92   Temp 97.5 °F (36.4 °C) (Infrared)   Resp 24   Ht 5' 11\" (1.803 m)   Wt 259 lb 0.7 oz (117.5 kg)   SpO2 92%   BMI 36.13 kg/m²   General appearance: mild resp distress, nonverbal, somnolent, illa ppearing  Head: NCAT, PERRLA, EOMI, red conjunctiva  Neck: supple, no masses, trachea midline  Lungs: Equal chest rise bilateral, no retractions, no wheezing  Heart: Reg rate  Abdomen: soft, Distended, nontender, obese  Skin; warm and dry, no cyanosis  Gu: no cva tenderness  Extremities: atraumatic,LE venous stasis  Psych: No tremor, visual hallucinations        Radiology: I reviewed relevant abdominal imaging from this admission and that available in the EMR including KUB. My assessment is ileus, no free air    Assessment:  Daryl Brumfield is a [de-identified] y.o. male with abd distention, ileus, resp failure, transition to hospice    Patient Active Problem List   Diagnosis    Influenza, pneumonia    Dyspnea    Appendicitis, acute    Acute exacerbation of chronic obstructive pulmonary disease (COPD) ([address]    Septic shock ([address]    Agitation    Community acquired pneumonia of left lower lobe of lung    Acute on chronic respiratory failure with hypoxia (HCC)    HLD (hyperlipidemia)    Anxiety    GERD (gastroesophageal reflux disease)    Atrial fibrillation with rapid ventricular response (HCC)    LORAINE (acute kidney injury) ([address]    Pneumonia       Plan:  No surgical intervention  Comfort measures per family wishes  Time spent reviewing past medical, surgical, social and family history, vitals, nursing assessment and images. Time spent face to face with patient and family counciling and discussing care exceeded 50% of the time of the consult. Additional time spent reviewing images and labs, discussing case with nursing, support staff and other physicians; as well as coordinating care. NOTE: This report, in part or full, may have been transcribed using voice recognition software.  Every effort was made to ensure accuracy; however, inadvertent computerized transcription errors may be present. Please excuse any transcriptional grammatical or spelling errors that may have escaped my editorial review.       Physician Signature: Electronically signed by Dr. Maye Hernandes  [phone] (p)

## 2022-07-07 NOTE — CONSULTS
GENERAL SURGERY  CONSULT NOTE  7/7/2022    Physician Consulted: Dr. Melody Mensah  Reason for Consult: Dilated colon on KUB  Referring Physician: Dr. Michael Jackson    HPI  Didi Mendiola is a [de-identified] y.o. male who presents for evaluation of dilated colon on KUB. Has hx of severe COPD and is currently admitted for respiratory issues and pneumonia, on BiPAP. Denies any abdominal pain, nausea, or vomiting. Was tolerating a diet. KUB showing dilated bowel, likely ileus. Had CT scan 2 weeks prior at admission which was unremarkable  Last BM charted was 3 days ago      Past Medical History:   Diagnosis Date    Atrial fibrillation (HCC)     COPD (chronic obstructive pulmonary disease) ([facility]     Hyperlipidemia     Pneumonia     Steroid side effects     combative        Past Surgical History:   Procedure Laterality Date    APPENDECTOMY  2019    COLONOSCOPY      EYE SURGERY Right     cataract removal with lens implants    HEMORRHOID SURGERY      LAPAROSCOPIC APPENDECTOMY N/A 2/7/2019    APPENDECTOMY LAPAROSCOPIC performed by Luz Rowe MD at [address]       Medications Prior to Admission    Prior to Admission medications    Medication Sig Start Date End Date Taking?  Authorizing Provider   predniSONE (DELTASONE) 10 MG tablet 1 tablet    Historical Provider, MD   apixaban (ELIQUIS) 5 MG TABS tablet Take 1 tablet by mouth 2 times daily 12/24/20   Chris Ledbetter, DO   dilTIAZem (CARDIZEM CD) 240 MG extended release capsule Take 1 capsule by mouth daily 12/25/20   Anthony Cyr, DO   Vitamin D (CHOLECALCIFEROL) 50 MCG (2000 UT) TABS tablet Take 1 tablet by mouth daily 12/25/20   Yoel Ledbetter, DO   mometasone-formoterol (DULERA) 200-5 MCG/ACT inhaler Inhale 2 puffs into the lungs every 12 hours  Patient not taking: Reported on 6/24/2022    Historical Provider, MD   OXYGEN Inhale 5 L into the lungs daily    Historical Provider, MD   theophylline (LUIS-24) 200 MG extended release capsule Take 400 mg by mouth daily  Patient not taking: Reported on 2022    Historical Provider, MD   tiotropium (SPIRIVA RESPIMAT) 2.5 MCG/ACT AERS inhaler Inhale 2 puffs into the lungs daily  Patient not taking: Reported on 2022    Historical Provider, MD   famotidine (PEPCID) 20 MG tablet Take 1 tablet by mouth 2 times daily 3/29/18   Zev Mantilla, MD   albuterol (PROVENTIL) (2.5 MG/3ML) 0.083% nebulizer solution Take 2.5 mg by nebulization every 6 hours as needed for Wheezing    Historical Provider, MD   aspirin 81 MG tablet Take 81 mg by mouth daily  Patient not taking: Reported on 2022    Historical Provider, MD   OXYGEN Inhale 2 L into the lungs nightly     Historical Provider, MD   albuterol (PROVENTIL HFA) 108 (90 BASE) MCG/ACT inhaler Inhale 2 puffs into the lungs every 6 hours as needed for Wheezing. 14   Zev Mantilla, MD   simvastatin (ZOCOR) 20 MG tablet Take 20 mg by mouth daily     Historical Provider, MD       Allergies   Allergen Reactions    Decadron [Dexamethasone] Other (See Comments)     combative    Solu-Cortef [Hydrocortisone] Other (See Comments)     combative    Solu-Medrol [Methylprednisolone] Other (See Comments)     combative       No family history on file. Social History     Tobacco Use    Smoking status: Former Smoker     Quit date: 2009     Years since quittin.6    Smokeless tobacco: Former User   Vaping Use    Vaping Use: Never used   Substance Use Topics    Alcohol use: Not Currently    Drug use: No         Review of Systems: pertinent ROS listed in HPI, all others negative       PHYSICAL EXAM:    Vitals:    22 1017   BP:    Pulse:    Resp: 24   Temp:    SpO2:        GENERAL:  Resting in bed, on BiPAP. Answers some questions  HEAD:  Normocephalic. Atraumatic. EYES:   No scleral icterus. PERRL. LUNGS:  +Increased respiratory effort, on bipap  CARDIOVASCULAR: RR  ABDOMEN:  Softly distended, non-tender. No guarding, rigidity, rebound. EXTREMITIES:   MAEx4. Atraumatic.  No LE edema.  SKIN:  Warm and dry        ASSESSMENT/PLAN:  [de-identified] y.o. male with hx COPD admitted with worsening respiratory status and PNA, with ileus    Very poor respiratory status - main issue right now  Recommend bowel regimen  Appears to have had code status change and now [facility] with hospice plans  No plans for surgical intervention    Plan will be discussed with Dr. Sharita Loomis. Tyler Solorio DO  Surgery Resident PGY-5  7/7/2022  12:39 PM    Surgery Progress Note            Chief complaint:   Chief Complaint   Patient presents with    Shortness of Breath     Pt c/o increasing SOB, states he wears O2 at home \"wide open\" and \"as much as I can get. \" Pt found 70s at home per EMS and was placed on CPAP. 125mg solumedrol, 2 proventils given en route. Pt denies CP/N/V. Placed on NIV upon arrival to ED.       Patient Active Problem List   Diagnosis    Influenza, pneumonia    Dyspnea    Appendicitis, acute    Acute exacerbation of chronic obstructive pulmonary disease (COPD) ([facility]    Septic shock (HCC)    Agitation    Community acquired pneumonia of left lower lobe of lung    Acute on chronic respiratory failure with hypoxia (HCC)    HLD (hyperlipidemia)    Anxiety    GERD (gastroesophageal reflux disease)    Atrial fibrillation with rapid ventricular response (HCC)    LORAINE (acute kidney injury) ([facility]    Pneumonia       S: as above    O:   Vitals:    07/08/22 0053   BP:    Pulse: 100   Resp: 12   Temp:    SpO2: 93%       Intake/Output Summary (Last 24 hours) at 7/8/2022 9894  Last data filed at 7/7/2022 1207  Gross per 24 hour   Intake 0 ml   Output 1200 ml   Net -1200 ml         Review of Systems  Review of Systems -  General ROS: negative for - chills, fatigue or malaise  ENT ROS: negative for - hearing change, nasal congestion or nasal discharge  Allergy and Immunology ROS: negative for - hives, itchy/watery eyes or nasal congestion  Hematological and Lymphatic ROS: negative for - blood clots, blood transfusions, bruising or fatigue  Endocrine ROS: negative for - malaise/lethargy, mood swings, palpitations or polydipsia/polyuria  Breast ROS: negative for - new or changing breast lumps or nipple changes  Respiratory ROS: negative for - sputum changes, stridor, tachypnea or wheezing  Cardiovascular ROS: negative for - irregular heartbeat, loss of consciousness, murmur or orthopnea  Gastrointestinal ROS: negative for - constipation, diarrhea, gas/bloating, heartburn or hematemesis  Genito-Urinary ROS: negative for -  genital discharge, genital ulcers or hematuria  Musculoskeletal ROS: negative for - gait disturbance, muscle pain or muscular weakness    Labs:  Lab Results   Component Value Date/Time    WBC 12.8 07/07/2022 06:05 AM    WBC 17.1 07/06/2022 05:00 AM    WBC 18.6 07/05/2022 06:00 AM    HGB 12.9 07/07/2022 06:05 AM    HGB 13.6 07/06/2022 05:00 AM    HGB 13.0 07/05/2022 06:00 AM    HCT 40.3 07/07/2022 06:05 AM    HCT 42.4 07/06/2022 05:00 AM    HCT 41.3 07/05/2022 06:00 AM     Lab Results   Component Value Date    CREATININE 1.5 (H) 07/07/2022    BUN 19 07/07/2022     07/07/2022    K 4.8 07/07/2022     07/07/2022    CO2 27 07/07/2022     Lab Results   Component Value Date/Time    LIPASE 26 03/26/2018 09:00 AM         Physical exam:   BP (!) 151/76   Pulse 100   Temp 97.5 °F (36.4 °C) (Infrared)   Resp 12   Ht 5' 11\" (1.803 m)   Wt 259 lb 0.7 oz (117.5 kg)   SpO2 93%   BMI 36.13 kg/m²   General appearance: NAD  Head: NCAT  Neck: supple, no masses  Lungs: equal chest rise bilateral  Heart: S1S2 present  Abdomen: soft, nontender, nondistended  Skin; no lesions  Gu: no cva tenderness  Extremities: extremities normal, atraumatic, no cyanosis or edema    A: ileus    P: cont bowel regimen and diet as tolerated, no further testing or intervention planned at this time.     Koko Jiang MD, MD  7/8/2022

## 2022-07-07 NOTE — PROGRESS NOTES
Cardiology  Progress Note      SUBJECTIVE: Patient was lethargic this morning when I came to see him. Sitter was at bedside. Chest sounded very congested.       Current Inpatient Medications  Current Facility-Administered Medications: LORazepam (ATIVAN) 2 MG/ML injection, , ,   glycopyrrolate (ROBINUL) injection 0.4 mg, 0.4 mg, IntraVENous, 4x Daily PRN  morphine (PF) 100 mg in sodium chloride 0.9 % 100 mL infusion, 1-10 mg/hr, IntraVENous, Continuous  LORazepam (ATIVAN) injection 1 mg, 1 mg, IntraVENous, Q4H PRN  predniSONE (DELTASONE) tablet 20 mg, 20 mg, Oral, Daily  sulfamethoxazole-trimethoprim (BACTRIM) 600 mg in dextrose 5 % 500 mL IVPB, 600 mg, IntraVENous, Q8H  morphine (PF) injection 2 mg, 2 mg, IntraVENous, Q4H PRN  lidocaine 1 % injection 5 mL, 5 mL, IntraDERmal, Once  sodium chloride flush 0.9 % injection 5-40 mL, 5-40 mL, IntraVENous, 2 times per day  sodium chloride flush 0.9 % injection 5-40 mL, 5-40 mL, IntraVENous, PRN  0.9 % sodium chloride infusion, , IntraVENous, PRN  heparin flush 100 UNIT/ML injection 100 Units, 1 mL, IntraVENous, 2 times per day  heparin flush 100 UNIT/ML injection 100 Units, 1 mL, IntraCATHeter, PRN  levalbuterol (XOPENEX) nebulization 0.63 mg, 0.63 mg, Nebulization, Q4H  ipratropium (ATROVENT) 0.02 % nebulizer solution 0.5 mg, 0.5 mg, Nebulization, Q4H  dilTIAZem (CARDIZEM CD) extended release capsule 240 mg, 240 mg, Oral, Daily  metoprolol tartrate (LOPRESSOR) tablet 25 mg, 25 mg, Oral, TID  acetylcysteine (MUCOMYST) 20 % solution 600 mg, 600 mg, Inhalation, q8h  acetaminophen (TYLENOL) tablet 650 mg, 650 mg, Oral, Q6H PRN  Roflumilast (DALIRESP) tablet 500 mcg, 500 mcg, Oral, Daily  famotidine (PEPCID) tablet 20 mg, 20 mg, Oral, BID  haloperidol lactate (HALDOL) injection 2 mg, 2 mg, IntraVENous, Q2H PRN  sodium chloride flush 0.9 % injection 5-40 mL, 5-40 mL, IntraVENous, 2 times per day  sodium chloride flush 0.9 % injection 5-40 mL, 5-40 mL, IntraVENous, PRN  0.9 %

## 2022-07-07 NOTE — PROGRESS NOTES
RDOS score 7-8  Morphine drip 4.5 mg/hour currently  Family at bedside     Patients distress has significantly improved. When RR consistently stays within normal range, bipap will be removed.

## 2022-07-07 NOTE — PROGRESS NOTES
OT SESSION HOLD     Date:2022  Patient Name: Lakesha Moffett  MRN: [id number]  : 1940  Room: [facility] [place] [address]     Hold per nursing due to decline in medical status, pt refusal, and pt transitioning into hospice care. Requesting new orders if pt is appropriate and able to participate with therapy evaluation and treatment.     Jan Arauz OTR/L; [id number]

## 2022-07-07 NOTE — PROGRESS NOTES
Department of Internal Medicine  General Internal Medicine  Attending Progress Note      SUBJECTIVE:    Patient seen and examined this morning,  Agitated and confused  Has a sitter.       Medications    Current Facility-Administered Medications: LORazepam (ATIVAN) 2 MG/ML injection, , ,   glycopyrrolate (ROBINUL) injection 0.4 mg, 0.4 mg, IntraVENous, 4x Daily PRN  morphine (PF) 100 mg in sodium chloride 0.9 % 100 mL infusion, 1-10 mg/hr, IntraVENous, Continuous  LORazepam (ATIVAN) injection 1 mg, 1 mg, IntraVENous, Q4H PRN  predniSONE (DELTASONE) tablet 20 mg, 20 mg, Oral, Daily  sulfamethoxazole-trimethoprim (BACTRIM) 600 mg in dextrose 5 % 500 mL IVPB, 600 mg, IntraVENous, Q8H  morphine (PF) injection 2 mg, 2 mg, IntraVENous, Q4H PRN  lidocaine 1 % injection 5 mL, 5 mL, IntraDERmal, Once  sodium chloride flush 0.9 % injection 5-40 mL, 5-40 mL, IntraVENous, 2 times per day  sodium chloride flush 0.9 % injection 5-40 mL, 5-40 mL, IntraVENous, PRN  0.9 % sodium chloride infusion, , IntraVENous, PRN  heparin flush 100 UNIT/ML injection 100 Units, 1 mL, IntraVENous, 2 times per day  heparin flush 100 UNIT/ML injection 100 Units, 1 mL, IntraCATHeter, PRN  levalbuterol (XOPENEX) nebulization 0.63 mg, 0.63 mg, Nebulization, Q4H  ipratropium (ATROVENT) 0.02 % nebulizer solution 0.5 mg, 0.5 mg, Nebulization, Q4H  dilTIAZem (CARDIZEM CD) extended release capsule 240 mg, 240 mg, Oral, Daily  metoprolol tartrate (LOPRESSOR) tablet 25 mg, 25 mg, Oral, TID  acetylcysteine (MUCOMYST) 20 % solution 600 mg, 600 mg, Inhalation, q8h  acetaminophen (TYLENOL) tablet 650 mg, 650 mg, Oral, Q6H PRN  Roflumilast (DALIRESP) tablet 500 mcg, 500 mcg, Oral, Daily  famotidine (PEPCID) tablet 20 mg, 20 mg, Oral, BID  haloperidol lactate (HALDOL) injection 2 mg, 2 mg, IntraVENous, Q2H PRN  sodium chloride flush 0.9 % injection 5-40 mL, 5-40 mL, IntraVENous, 2 times per day  sodium chloride flush 0.9 % injection 5-40 mL, 5-40 mL, IntraVENous, PRN  0.9 % sodium chloride infusion, , IntraVENous, PRN  heparin flush 100 UNIT/ML injection 300 Units, 3 mL, IntraVENous, 2 times per day  heparin flush 100 UNIT/ML injection 300 Units, 3 mL, IntraCATHeter, PRN  apixaban (ELIQUIS) tablet 5 mg, 5 mg, Oral, BID  atorvastatin (LIPITOR) tablet 10 mg, 10 mg, Oral, Daily  vitamin D (CHOLECALCIFEROL) tablet 2,000 Units, 2,000 Units, Oral, Daily  budesonide (PULMICORT) nebulizer suspension 500 mcg, 0.5 mg, Nebulization, BID  Arformoterol Tartrate (BROVANA) nebulizer solution 15 mcg, 15 mcg, Nebulization, BID    Physical    VITALS:  BP (!) 151/76   Pulse 92   Temp 97.5 °F (36.4 °C) (Infrared)   Resp 23   Ht 5' 11\" (1.803 m)   Wt 259 lb 0.7 oz (117.5 kg)   SpO2 (!) 84%   BMI 36.13 kg/m²   TEMPERATURE:  Current - Temp: 97.5 °F (36.4 °C); Max - Temp  Av.9 °F (36.6 °C)  Min: 97.5 °F (36.4 °C)  Max: 98.2 °F (36.8 °C)  RESPIRATIONS RANGE: Resp  Av.7  Min: 20  Max: 28  PULSE RANGE: Pulse  Av  Min: 80  Max: 98  BLOOD PRESSURE RANGE:  Systolic (17XVA), DTM:318 , Min:139 , SED:092   ; Diastolic (26NWC), OCC:30, Min:65, Max:86    PULSE OXIMETRY RANGE: SpO2  Av %  Min: 84 %  Max: 95 %  24HR INTAKE/OUTPUT:      Intake/Output Summary (Last 24 hours) at 2022 1847  Last data filed at 2022 1207  Gross per 24 hour   Intake 567.24 ml   Output 1200 ml   Net -632.76 ml       CONSTITUTIONAL: Confused, agitated  , in moderate  distress  HEENT: Normocephalic atraumatic. NECK: Supple, no JVD , no lymphadenopathy, no bruits, no thyromegaly  LUNGS: diminished air movements b/l -  inspiratory crackles over bases , upper airway congestion as well. Airvo with 60 liters of oxygen at 90 %  CARDIOVASCULAR: Regular, no murmur rub or gallop. ABDOMEN: Soft,  distended, bowel sounds are positive, no organomegaly appreciated. NEUROLOGIC: confused agitated  , no focal deficits noted. Tremulous all over  EXT: trace  edema, no clubbing, or cyanosis.     CBC with Differential: Lab Results   Component Value Date/Time    WBC 12.8 07/07/2022 06:05 AM    RBC 4.29 07/07/2022 06:05 AM    HGB 12.9 07/07/2022 06:05 AM    HCT 40.3 07/07/2022 06:05 AM     07/07/2022 06:05 AM    MCV 93.9 07/07/2022 06:05 AM    MCH 30.1 07/07/2022 06:05 AM    MCHC 32.0 07/07/2022 06:05 AM    RDW 14.0 07/07/2022 06:05 AM    METASPCT 0.9 06/24/2022 12:27 AM    LYMPHOPCT 5.3 07/07/2022 06:05 AM    MONOPCT 5.3 07/07/2022 06:05 AM    BASOPCT 0.2 07/07/2022 06:05 AM    MONOSABS 0.68 07/07/2022 06:05 AM    LYMPHSABS 0.68 07/07/2022 06:05 AM    EOSABS 0.01 07/07/2022 06:05 AM    BASOSABS 0.03 07/07/2022 06:05 AM     CMP:    Lab Results   Component Value Date/Time     07/07/2022 06:05 AM    K 4.8 07/07/2022 06:05 AM    K 4.3 06/24/2022 12:27 AM     07/07/2022 06:05 AM    CO2 27 07/07/2022 06:05 AM    BUN 19 07/07/2022 06:05 AM    CREATININE 1.5 07/07/2022 06:05 AM    GFRAA 54 07/07/2022 06:05 AM    LABGLOM 45 07/07/2022 06:05 AM    GLUCOSE 108 07/07/2022 06:05 AM    PROT 5.8 07/07/2022 06:05 AM    LABALBU 3.3 07/07/2022 06:05 AM    CALCIUM 8.5 07/07/2022 06:05 AM    BILITOT 0.4 07/07/2022 06:05 AM    ALKPHOS 83 07/07/2022 06:05 AM    AST 45 07/07/2022 06:05 AM    ALT 36 07/07/2022 06:05 AM         ASSESSMENT AND PLAN      1.RLL PNA due to stenotrophomonas maltophilia  Antibiotics switched to bactrim  after culture results and ID consultation  Requiring more respiratory support overnight   Urine Legionella and strep pneumo antigens presumptive negative      2. Acute on chronic respiratory failure secondary to above and COPD exacerbation  Increasing Oxygen requirements  Home level is 5L NC  On levalbuterol and ipratropium  Pulmonology following closely. 3.COPD, with exacerbation  Severe long standing disease  Iv steroids discontinued due to steroid induced psychosis  Daliresp added by Pulmonology  Also on pulmicort, brovana, levalbuterol and ipratropium    4. Leukocytosis secondary to pneumonia,.  -White cell count at 17,100 today, trending down slowly  -ID on board, C diff negative    5. A Fib with rvr   Cardizem drip on and off the past few days as was in and out of afib RVR. Now off drip and on metoprolol tartrate and PO cardizem and in NSR  Eliquis 5 mg po bid  Dr Ivelisse Morales following. 6.Hyperlipidemia  Lipitor 10 mg daily    7. GERD  Continue pepcid 20 mg po bid      8. Steroid induced psychosis, episode of psychosis overnight secondary to respiratory status and dexamethasone which was discontinued    9. Hematuria, Cleared after irrigation. Urology follows. Discussed with Wife and daughter over the phone and they understand the gravity of his condition, decision was to keep him comfortable and Hospice consult due to worsening respiratory status and very poor prognosis. Advised his nurse to place on face mask and to start Morphine drip at 1 mg/hr and to titrate to comfort. Hospice consulted.        Sandra Smith MD  6:47 PM  7/7/2022

## 2022-07-07 NOTE — PLAN OF CARE
Problem: Discharge Planning  Goal: Discharge to home or other facility with appropriate resources  7/7/2022 1215 by Jessica Bajwa RN  Outcome: Not Progressing  7/7/2022 0650 by Tal Carter RN  Outcome: Not Progressing  Flowsheets (Taken 7/6/2022 2000)  Discharge to home or other facility with appropriate resources:   Identify barriers to discharge with patient and caregiver   Arrange for needed discharge resources and transportation as appropriate   Identify discharge learning needs (meds, wound care, etc)   Refer to discharge planning if patient needs post-hospital services based on physician order or complex needs related to functional status, cognitive ability or social support system     Problem: Safety - Adult  Goal: Free from fall injury  7/7/2022 1215 by Jessica Bajwa RN  Outcome: Not Progressing  7/7/2022 0650 by Tal Carter RN  Outcome: Progressing     Problem: ABCDS Injury Assessment  Goal: Absence of physical injury  7/7/2022 1215 by Jessica Bajwa RN  Outcome: Not Progressing  7/7/2022 0650 by Tal Carter RN  Outcome: Progressing     Problem: Pain  Goal: Verbalizes/displays adequate comfort level or baseline comfort level  7/7/2022 1215 by Jessica Bajwa RN  Outcome: Not Progressing  7/7/2022 0650 by Tal Carter RN  Outcome: Not Progressing  Flowsheets (Taken 7/6/2022 2211)  Verbalizes/displays adequate comfort level or baseline comfort level:   Encourage patient to monitor pain and request assistance   Assess pain using appropriate pain scale   Administer analgesics based on type and severity of pain and evaluate response     Problem: Confusion  Goal: Confusion, delirium, dementia, or psychosis is improved or at baseline  Description: INTERVENTIONS:  1. Assess for possible contributors to thought disturbance, including medications, impaired vision or hearing, underlying metabolic abnormalities, dehydration, psychiatric diagnoses, and notify attending LIP  2. Phillips high risk fall precautions, as indicated  3. Provide frequent short contacts to provide reality reorientation, refocusing and direction  4. Decrease environmental stimuli, including noise as appropriate  5. Monitor and intervene to maintain adequate nutrition, hydration, elimination, sleep and activity  6. If unable to ensure safety without constant attention obtain sitter and review sitter guidelines with assigned personnel  7. Initiate Psychosocial CNS and Spiritual Care consult, as indicated  7/7/2022 1215 by Lacey Eason RN  Outcome: Not Progressing  7/7/2022 0650 by Eduardo Barton RN  Outcome: Progressing  Flowsheets (Taken 7/6/2022 2000)  Effect of thought disturbance (confusion, delirium, dementia, or psychosis) are managed with adequate functional status: Provide frequent short contacts to provide reality reorientation, refocusing and direction     Problem: Nutrition Deficit:  Goal: Optimize nutritional status  7/7/2022 1215 by Lacey Eason RN  Outcome: Not Progressing  7/7/2022 0650 by Eduardo Barton RN  Outcome: Progressing     Problem: Skin/Tissue Integrity  Goal: Absence of new skin breakdown  Description: 1. Monitor for areas of redness and/or skin breakdown  2. Assess vascular access sites hourly  3. Every 4-6 hours minimum:  Change oxygen saturation probe site  4. Every 4-6 hours:  If on nasal continuous positive airway pressure, respiratory therapy assess nares and determine need for appliance change or resting period.   7/7/2022 1215 by Lacey Eason RN  Outcome: Not Progressing  7/7/2022 0650 by Eduardo Barton RN  Outcome: Progressing     Problem: Chronic Conditions and Co-morbidities  Goal: Patient's chronic conditions and co-morbidity symptoms are monitored and maintained or improved  7/7/2022 1215 by Lacey Eason RN  Outcome: Not Progressing  7/7/2022 0650 by Eduardo Barton RN  Outcome: Not Progressing  Flowsheets (Taken 7/6/2022 2000)  Care Plan - Patient's Chronic Conditions and Co-Morbidity Symptoms are Monitored and Maintained or Improved:   Monitor and assess patient's chronic conditions and comorbid symptoms for stability, deterioration, or improvement   Collaborate with multidisciplinary team to address chronic and comorbid conditions and prevent exacerbation or deterioration     Problem: Respiratory - Adult  Goal: Achieves optimal ventilation and oxygenation  7/7/2022 1215 by Corrie Shea RN  Outcome: Not Progressing  7/7/2022 0650 by Bruce Bahena RN  Outcome: Progressing  Flowsheets (Taken 7/6/2022 2000)  Achieves optimal ventilation and oxygenation:   Assess for changes in respiratory status   Assess for changes in mentation and behavior   Position to facilitate oxygenation and minimize respiratory effort   Respiratory therapy support as indicated     Problem: Cardiovascular - Adult  Goal: Maintains optimal cardiac output and hemodynamic stability  7/7/2022 1215 by Corrie Shea RN  Outcome: Not Progressing  7/7/2022 0650 by Bruce Bahena RN  Outcome: Progressing  Goal: Absence of cardiac dysrhythmias or at baseline  7/7/2022 1215 by Corrie Shea RN  Outcome: Not Progressing  7/7/2022 0650 by Bruce Bahena RN  Outcome: Progressing     Problem: Skin/Tissue Integrity - Adult  Goal: Skin integrity remains intact  7/7/2022 1215 by Corrie Shea RN  Outcome: Not Progressing  7/7/2022 0650 by Bruce Bahena RN  Outcome: Progressing  Flowsheets (Taken 7/6/2022 2000)  Skin Integrity Remains Intact: Monitor for areas of redness and/or skin breakdown  Goal: Oral mucous membranes remain intact  7/7/2022 1215 by Corrie Shea RN  Outcome: Not Progressing  7/7/2022 0650 by Bruce Bahena RN  Outcome: Progressing  Flowsheets (Taken 7/6/2022 2000)  Oral Mucous Membranes Remain Intact: (Patient refuses oral care)   Assess oral mucosa and hygiene practices   Implement preventative oral hygiene regimen   Implement oral medicated treatments as ordered     Problem: Musculoskeletal - Adult  Goal: Return mobility to safest level of function  7/7/2022 1215 by Michael Ballesteros RN  Outcome: Not Progressing  7/7/2022 0650 by Sachin Montes RN  Outcome: Not Progressing  Flowsheets (Taken 7/6/2022 2000)  Return Mobility to Safest Level of Function:   Assist with transfers and ambulation using safe patient handling equipment as needed   Ensure adequate protection for wounds/incisions during mobilization   Instruct patient/family in ordered activity level  Goal: Maintain proper alignment of affected body part  7/7/2022 1215 by Michael Ballesteros RN  Outcome: Not Progressing  7/7/2022 0650 by Sachin Montes RN  Outcome: Not Progressing  Flowsheets (Taken 7/6/2022 2000)  Maintain proper alignment of affected body part: Support and protect limb and body alignment per provider's orders     Problem: Infection - Adult  Goal: Absence of infection at discharge  7/7/2022 1215 by Michael Ballesteros RN  Outcome: Not Progressing  7/7/2022 0650 by Sachin Montes RN  Outcome: Progressing  Flowsheets (Taken 7/6/2022 2000)  Absence of infection at discharge:   Assess and monitor for signs and symptoms of infection   Monitor lab/diagnostic results   Monitor all insertion sites i.e., indwelling lines, tubes and drains   Administer medications as ordered

## 2022-07-07 NOTE — PROGRESS NOTES
Palliative Care Department  [phone]  Palliative Care Progress Note  Provider Delicia Platt, APRN - CNP      PATIENT: Sonam Garcia  : 1940  MRN: [id number]  ADMISSION DATE: 2022 12:11 AM  Referring Provider: Earl Lopez MD    Palliative Medicine was consulted on hospital day 13 for assistance with Goals of care, Code Status Discussion, Family support, Symptom management     HPI:     Harvinder Hill is a [de-identified] y.o. y/o male with a history of COPD with chronic hypoxic respiratory failure, on 5 L nasal cannula at baseline, hyperlipidemia, who presented to [facility]) on 2022 with worsening dyspnea right lower lobe pneumonia and acute on chronic respiratory failure. Chest x-ray on admission shows bilateral bibasilar disease. Possibility of bilateral lower lobe pneumonia cannot be excluded. Chest x-ray done on 2022 showed minimal right lung base atelectasis, otherwise unremarkable. Patient currently on nonrebreather maintaining oxygen around 91%, reporting that he is not feeling well today. Palliative medicine consulted to discuss goal of care, CODE STATUS discussion, family support, symptom management. ASSESSMENT/PLAN:     Pertinent Hospital Diagnoses      Pneumonia   COPD   Chronic respiratory failure   Dyspnea  o Morphine 2 mg every 4 hours as needed      Palliative Care Encounter / Counseling Regarding Goals of Care  Please see detailed goals of care discussion as below   At this time, Sonam Garcia, Does Not have capacity for medical decision-making.   Capacity is time limited and situation/question specific   During encounter  was surrogate medical decision-maker   Outcome of goals of care meeting:  o Family decided to move forward with comfort care focus, attending place patient on morphine drip, hospice was consulted  o Ativan and Robinul added for comfort     Code status DNR-CC   Advanced Directives: no POA or living will in epic   Surrogate/Legal NOK:  Anshu Vaz [phone] - [phone]  nury Montoya (Child) [phone]    Spiritual assessment: no spiritual distress identified  Bereavement and grief: to be determined  Referrals to: none today    Thank you for the opportunity to participate in the care of Antonia Bourne. [facility]  Palliative Medicine     SUBJECTIVE:     Details of Conversation:    Chart was reviewed. Patient conditions worsening overnight, currently on BiPAP. spoke with with patient daughter and wife over the phone. They have expressed decided to move forward with comfort care for the patient, however they wishes for patient to be managed for comfort care in the hospital.  Spoke with bedside nurse, attending is going to manage patient as inpatient hospice, morphine drip was started. CODE STATUS changed to DNR CC. Added Ativan and Robinul as needed for comfort measures. Comfort support was provided to the family. We will continue to follow. OBJECTIVE:     BP (!) 151/76   Pulse 92   Temp 97.5 °F (36.4 °C) (Infrared)   Resp 24   Ht 5' 11\" (1.803 m)   Wt 259 lb 0.7 oz (117.5 kg)   SpO2 92%   BMI 36.13 kg/m²     Physical Examination:  Gen: elderly, thin, NAD, awake, fidgety  HEENT: normocephalic, atraumatic, PERRL, EOMI,   Neck: trachea midline, no JVD  Lungs: respirations easy and not labored, moist  Heart: regular rate and rhythm, distant heart tones,   Abdomen: normoactive bowel sounds, soft, non-tender  Extremities:  edema, moving all extremities    Skin: warm, dry without rashes, lesions, bruising  Neuro: awake, confused, not follows commands    Objective data reviewed: labs, images, records, medication use, vitals and chart    Time/Communication  Greater than 50% of time spent, total 45 minutes in counseling and coordination of care at the bedside regarding CODE STATUS discussion, family support, goals of care and symptom management.     Thank you for allowing Palliative Medicine to participate in the care of Jeffrey Lee. Note: This report was completed using computerLiveNinja voiced recognition software. Every effort has been made to ensure accuracy; however, inadvertent computerized transcription errors may be present.

## 2022-07-08 NOTE — PROGRESS NOTES
Physical Therapy  Room #:   0615/0615-02    Date: 2022       Patient Name: Deion Henning  : 1940      MRN: [id number]     Patient unavailable for physical therapy treatment due to family wanting comfort care.      Gabriella Goodell, PT

## 2022-07-08 NOTE — PROGRESS NOTES
Department of Internal Medicine  General Internal Medicine  Attending Progress Note      SUBJECTIVE:    Patient seen and examined this morning,  Unresponsive. Wife and Daughter at the bedside      Medications    Current Facility-Administered Medications: glycopyrrolate (ROBINUL) injection 0.4 mg, 0.4 mg, IntraVENous, 4x Daily PRN  morphine (PF) 100 mg in sodium chloride 0.9 % 100 mL infusion, 1-10 mg/hr, IntraVENous, Continuous  LORazepam (ATIVAN) injection 1 mg, 1 mg, IntraVENous, Q4H PRN  morphine (PF) injection 2 mg, 2 mg, IntraVENous, Q4H PRN  0.9 % sodium chloride infusion, , IntraVENous, PRN  heparin flush 100 UNIT/ML injection 100 Units, 1 mL, IntraCATHeter, PRN  haloperidol lactate (HALDOL) injection 2 mg, 2 mg, IntraVENous, Q2H PRN  sodium chloride flush 0.9 % injection 5-40 mL, 5-40 mL, IntraVENous, PRN  heparin flush 100 UNIT/ML injection 300 Units, 3 mL, IntraCATHeter, PRN    Physical    VITALS:  BP (!) 151/76   Pulse (!) 101   Temp 97.5 °F (36.4 °C) (Infrared)   Resp 12   Ht 5' 11\" (1.803 m)   Wt 259 lb 0.7 oz (117.5 kg)   SpO2 (!) 88%   BMI 36.13 kg/m²   TEMPERATURE:  Current - Temp: 97.5 °F (36.4 °C); Max - No data recorded  RESPIRATIONS RANGE: Resp  Av  Min: 12  Max: 24  PULSE RANGE: Pulse  Av.5  Min: 98  Max: 101  BLOOD PRESSURE RANGE:  No data recorded.  ; No data recorded. PULSE OXIMETRY RANGE: SpO2  Av %  Min: 84 %  Max: 93 %  24HR INTAKE/OUTPUT:      Intake/Output Summary (Last 24 hours) at 2022 0826  Last data filed at 2022 1207  Gross per 24 hour   Intake 0 ml   Output --   Net 0 ml       CONSTITUTIONAL: Unresponsive  HEENT: Normocephalic atraumatic. Eyes closed  NECK: Supple, no JVD , no lymphadenopathy, no bruits, no thyromegaly  LUNGS: diminished air movements b/l -  inspiratory crackles over bases , upper airway congestion as well. Face mask. CARDIOVASCULAR: Regular, no murmur rub or gallop.   ABDOMEN: Soft,  distended, no organomegaly appreciated. NEUROLOGIC: Unresponsive  EXT: trace  edema, no clubbing, or cyanosis. CBC with Differential:    Lab Results   Component Value Date/Time    WBC 12.8 07/07/2022 06:05 AM    RBC 4.29 07/07/2022 06:05 AM    HGB 12.9 07/07/2022 06:05 AM    HCT 40.3 07/07/2022 06:05 AM     07/07/2022 06:05 AM    MCV 93.9 07/07/2022 06:05 AM    MCH 30.1 07/07/2022 06:05 AM    MCHC 32.0 07/07/2022 06:05 AM    RDW 14.0 07/07/2022 06:05 AM    METASPCT 0.9 06/24/2022 12:27 AM    LYMPHOPCT 5.3 07/07/2022 06:05 AM    MONOPCT 5.3 07/07/2022 06:05 AM    BASOPCT 0.2 07/07/2022 06:05 AM    MONOSABS 0.68 07/07/2022 06:05 AM    LYMPHSABS 0.68 07/07/2022 06:05 AM    EOSABS 0.01 07/07/2022 06:05 AM    BASOSABS 0.03 07/07/2022 06:05 AM     CMP:    Lab Results   Component Value Date/Time     07/07/2022 06:05 AM    K 4.8 07/07/2022 06:05 AM    K 4.3 06/24/2022 12:27 AM     07/07/2022 06:05 AM    CO2 27 07/07/2022 06:05 AM    BUN 19 07/07/2022 06:05 AM    CREATININE 1.5 07/07/2022 06:05 AM    GFRAA 54 07/07/2022 06:05 AM    LABGLOM 45 07/07/2022 06:05 AM    GLUCOSE 108 07/07/2022 06:05 AM    PROT 5.8 07/07/2022 06:05 AM    LABALBU 3.3 07/07/2022 06:05 AM    CALCIUM 8.5 07/07/2022 06:05 AM    BILITOT 0.4 07/07/2022 06:05 AM    ALKPHOS 83 07/07/2022 06:05 AM    AST 45 07/07/2022 06:05 AM    ALT 36 07/07/2022 06:05 AM         ASSESSMENT AND PLAN      1.RLL PNA due to stenotrophomonas maltophilia  Off antibiotics  Comfort measures only now that he is Hospice      2. Acute on chronic respiratory failure secondary to above and COPD exacerbation  Face mask    3. COPD, with exacerbation  Severe long standing disease    4. Leukocytosis secondary to pneumonia,. 5.A Fib with rvr   Off medications    6. Hyperlipidemia    7. GERD    8. Steroid induced psychosis,      9. Hematuria    Discussed with Wife and daughter over the phone and they understand the gravity of his condition, decision was to keep him comfortable and Hospice consult due to worsening respiratory status and very poor prognosis. Hospice care ongoing at this time  On Morphine iv drip at 9 mg/hr, lorazepam and haldol as needed. Continue with Comfort measures.        Ravindra Fernández MD  8:26 AM  7/8/2022

## 2022-07-09 NOTE — PROGRESS NOTES
Department of Internal Medicine  General Internal Medicine  Attending Progress Note      SUBJECTIVE:    Patient seen and examined this morning,  Unresponsive. Wife at the bedside      Medications    Current Facility-Administered Medications: LORazepam (ATIVAN) injection 1 mg, 1 mg, IntraVENous, Q2H PRN  glycopyrrolate (ROBINUL) injection 0.4 mg, 0.4 mg, IntraVENous, 4x Daily PRN  morphine (PF) 100 mg in sodium chloride 0.9 % 100 mL infusion, 1-10 mg/hr, IntraVENous, Continuous  morphine (PF) injection 2 mg, 2 mg, IntraVENous, Q4H PRN  0.9 % sodium chloride infusion, , IntraVENous, PRN  heparin flush 100 UNIT/ML injection 100 Units, 1 mL, IntraCATHeter, PRN  haloperidol lactate (HALDOL) injection 2 mg, 2 mg, IntraVENous, Q2H PRN  sodium chloride flush 0.9 % injection 5-40 mL, 5-40 mL, IntraVENous, PRN  heparin flush 100 UNIT/ML injection 300 Units, 3 mL, IntraCATHeter, PRN    Physical    VITALS:  BP (!) 151/76   Pulse 98   Temp 97.5 °F (36.4 °C) (Infrared)   Resp 12   Ht 5' 11\" (1.803 m)   Wt 259 lb 0.7 oz (117.5 kg)   SpO2 (!) 75%   BMI 36.13 kg/m²   TEMPERATURE:  Current - Temp: 97.5 °F (36.4 °C); Max - No data recorded  RESPIRATIONS RANGE: Resp  Av  Min: 12  Max: 12  PULSE RANGE: Pulse  Av.2  Min: 98  Max: 105  BLOOD PRESSURE RANGE:  No data recorded.  ; No data recorded. PULSE OXIMETRY RANGE: SpO2  Av.5 %  Min: 69 %  Max: 80 %  24HR INTAKE/OUTPUT:      Intake/Output Summary (Last 24 hours) at 2022 1037  Last data filed at 2022 1012  Gross per 24 hour   Intake 0 ml   Output 250 ml   Net -250 ml       CONSTITUTIONAL: Unresponsive  HEENT: Normocephalic atraumatic. Eyes closed  NECK: Supple, no JVD , no lymphadenopathy, no bruits, no thyromegaly  LUNGS: diminished air movements b/l -  inspiratory crackles over bases , upper airway congestion as well. Face mask. CARDIOVASCULAR: Regular, no murmur rub or gallop. ABDOMEN: Soft,  distended, no organomegaly appreciated.   NEUROLOGIC: Unresponsive  EXT: trace  edema, no clubbing, or cyanosis. CBC with Differential:    Lab Results   Component Value Date/Time    WBC 12.8 07/07/2022 06:05 AM    RBC 4.29 07/07/2022 06:05 AM    HGB 12.9 07/07/2022 06:05 AM    HCT 40.3 07/07/2022 06:05 AM     07/07/2022 06:05 AM    MCV 93.9 07/07/2022 06:05 AM    MCH 30.1 07/07/2022 06:05 AM    MCHC 32.0 07/07/2022 06:05 AM    RDW 14.0 07/07/2022 06:05 AM    METASPCT 0.9 06/24/2022 12:27 AM    LYMPHOPCT 5.3 07/07/2022 06:05 AM    MONOPCT 5.3 07/07/2022 06:05 AM    BASOPCT 0.2 07/07/2022 06:05 AM    MONOSABS 0.68 07/07/2022 06:05 AM    LYMPHSABS 0.68 07/07/2022 06:05 AM    EOSABS 0.01 07/07/2022 06:05 AM    BASOSABS 0.03 07/07/2022 06:05 AM     CMP:    Lab Results   Component Value Date/Time     07/07/2022 06:05 AM    K 4.8 07/07/2022 06:05 AM    K 4.3 06/24/2022 12:27 AM     07/07/2022 06:05 AM    CO2 27 07/07/2022 06:05 AM    BUN 19 07/07/2022 06:05 AM    CREATININE 1.5 07/07/2022 06:05 AM    GFRAA 54 07/07/2022 06:05 AM    LABGLOM 45 07/07/2022 06:05 AM    GLUCOSE 108 07/07/2022 06:05 AM    PROT 5.8 07/07/2022 06:05 AM    LABALBU 3.3 07/07/2022 06:05 AM    CALCIUM 8.5 07/07/2022 06:05 AM    BILITOT 0.4 07/07/2022 06:05 AM    ALKPHOS 83 07/07/2022 06:05 AM    AST 45 07/07/2022 06:05 AM    ALT 36 07/07/2022 06:05 AM         ASSESSMENT AND PLAN      1.RLL PNA due to stenotrophomonas maltophilia  Off antibiotics  Comfort measures only now that he is Hospice      2. Acute on chronic respiratory failure secondary to above and COPD exacerbation  Face mask    3. COPD, with exacerbation  Severe long standing disease    4. Leukocytosis secondary to pneumonia,. 5.A Fib with rvr   Off medications    6. Hyperlipidemia    7. GERD    8. Steroid induced psychosis,      9. Hematuria    Hospice care ongoing at this time  On Morphine iv drip at 9 mg/hr, lorazepam and haldol as needed. Continue with Comfort measures.        Sandra Smith MD  10:37 AM  7/9/2022

## 2022-07-10 NOTE — PROGRESS NOTES
Pt assessed. Asystole on monitor. Absence of HR, B/P and respirations. Pupils non-reactive at 0201. Verified with second RN, Pepe Bryan RN. Family at bedside. Dr Eliseo Osullivan called and updated on pt status. Agreed with assessment findings. Family given time at bedside.

## 2022-07-14 NOTE — DISCHARGE SUMMARY
[address]                               DISCHARGE SUMMARY    PATIENT NAME: Trent Zhong                     :        1940  MED REC NO:   [id number]                            ROOM:       0615  ACCOUNT NO:   [de-identified]                           ADMIT DATE: 2022  PROVIDER:     Esther Jiménez MD                  DISCHARGE DATE:  07/10/2022    DATE OF EXPIRATION:  07/10/2022    DISCHARGE DIAGNOSES:  1. Right lower lobe pneumonia due to Stenotrophomonas maltophilia. 2.  Chronic respiratory failure. 3.  COPD exacerbation. 4.  Leukocytosis. 5.  AFib with RVR. 6.  Hyperlipidemia. 7.  GERD. 8.  Steroid-induced psychosis. HOSPITAL COURSE:  The patient was an 43-year-old gentleman with severe  end-stage COPD. He presented to the emergency room initially with  increasing shortness of breath, cough, and wheezing. The patient was  evaluated in the emergency room and he was found to have right lower  lobe community-acquired pneumonia. He was started on cefepime 2 gm IV  every 12 hours and doxycycline 100 mg IV every 12 hours. Blood cultures  were sent. Legionella and Strep pneumoniae antigen in the urine were  sent. Respiratory panel was also ordered. Pulmonology was consulted. The patient was initially placed on 6 L of oxygen by nasal cannula. He  also had COPD exacerbation and was placed on Solu-Medrol 40 mg IV every  8 hours. He was maintained on Brovana twice daily and budesonide twice  daily in addition to albuterol aerosol treatments as needed. The  patient had severe psychosis most likely secondary to the steroids and  was transferred to the unit because of the respiratory distress. His  oxygen requirements had increased to 8 liters per minute. IV steroids  were discontinued and Felicita Florentino was added by Pulmonology. His psychosis  did improve off of the IV steroids.   The patient had episodes of  agitation and combativeness overnight. He also went into atrial  fibrillation with RVR on 06/29/2022. He was placed on a Cardizem drip. Cardiology, Dr. Janna Will was consulted. He was already on Eliquis 5 mg  b.i.d. for history of paroxysmal atrial fibrillation. He had a Gonzalez  catheter in and his urine was blood-tinged. This was elevated and  Urology was consulted. Hematuria eventually resolved. His cultures of  the sputum eventually revealed Stenotrophomonas maltophilia. ID was  consulted. He was then switched to Bactrim. He did have some blood in  the sputum, but this did not progress. He was requiring BiPAP at night. He did have episodes of diarrhea and his C. diff came back negative. His Cardizem drip was eventually discontinued and he was placed on  metoprolol tartrate and p.o. Cardizem and was back in sinus rhythm. His  respiratory status; however, remained compromised. He did continue to  have episodes of confusion and combativeness overnight. On 07/05/2022,  he required a nonrebreather mask because of respiratory distress. His  oxygen requirements remained significantly higher than his baseline. The patient continued to be agitated and confused, and he had a sitter  for most of his stay in the ICU. He did continue to have increased  oxygen requirements. Case was discussed in detail with his wife and  daughter over the phone and they understood the gravity of his condition  and his very poor prognosis. Eventually, the decision was to  concentrate on comfort measures and hospice consultation was obtained. The patient was placed on a facemask and morphine drip was started at 1  mg/hour, which was increased gradually _____. He was also placed on  Ativan as needed. He did have some upper airway secretions, which were  suctioned as needed. The patient was eventually unresponsive and he  never regained consciousness.   The patient eventually went into asystole  on the monitor and was pronounced dead on 07/10/2022 around 2:01 a.m. May his soul rest in peace.         Denys Lockhart MD    D: 07/13/2022 18:17:02       T: 07/14/2022 2:02:51     [email or web address]  Job#: [id number]     Doc#: [id number]    CC:

## 2023-11-21 NOTE — PROGRESS NOTES
Cardiology  Progress Note      SUBJECTIVE:  Less confusion.     Current Inpatient Medications  Current Facility-Administered Medications: lidocaine 1 % injection 5 mL, 5 mL, IntraDERmal, Once  sodium chloride flush 0.9 % injection 5-40 mL, 5-40 mL, IntraVENous, 2 times per day  sodium chloride flush 0.9 % injection 5-40 mL, 5-40 mL, IntraVENous, PRN  0.9 % sodium chloride infusion, , IntraVENous, PRN  heparin flush 100 UNIT/ML injection 100 Units, 1 mL, IntraVENous, 2 times per day  heparin flush 100 UNIT/ML injection 100 Units, 1 mL, IntraCATHeter, PRN  meropenem (MERREM) 1,000 mg in sodium chloride 0.9 % 100 mL IVPB (mini-bag), 1,000 mg, IntraVENous, Q8H  levalbuterol (XOPENEX) nebulization 0.63 mg, 0.63 mg, Nebulization, Q4H  ipratropium (ATROVENT) 0.02 % nebulizer solution 0.5 mg, 0.5 mg, Nebulization, Q4H  dilTIAZem (CARDIZEM CD) extended release capsule 240 mg, 240 mg, Oral, Daily  metoprolol tartrate (LOPRESSOR) tablet 25 mg, 25 mg, Oral, TID  acetylcysteine (MUCOMYST) 20 % solution 600 mg, 600 mg, Inhalation, q8h  acetaminophen (TYLENOL) tablet 650 mg, 650 mg, Oral, Q6H PRN  vancomycin (VANCOCIN) 1,000 mg in sodium chloride 0.9 % 250 mL IVPB (Oxui3Yql), 1,000 mg, IntraVENous, Q12H  doxycycline hyclate (VIBRAMYCIN) capsule 100 mg, 100 mg, Oral, 2 times per day  Roflumilast (DALIRESP) tablet 500 mcg, 500 mcg, Oral, Daily  famotidine (PEPCID) tablet 20 mg, 20 mg, Oral, BID  haloperidol lactate (HALDOL) injection 2 mg, 2 mg, IntraVENous, Q2H PRN  lactated ringers infusion, , IntraVENous, Continuous  sodium chloride flush 0.9 % injection 5-40 mL, 5-40 mL, IntraVENous, 2 times per day  sodium chloride flush 0.9 % injection 5-40 mL, 5-40 mL, IntraVENous, PRN  0.9 % sodium chloride infusion, , IntraVENous, PRN  heparin flush 100 UNIT/ML injection 300 Units, 3 mL, IntraVENous, 2 times per day  heparin flush 100 UNIT/ML injection 300 Units, 3 mL, IntraCATHeter, PRN  apixaban (ELIQUIS) tablet 5 mg, 5 mg, Oral, BID  atorvastatin (LIPITOR) tablet 10 mg, 10 mg, Oral, Daily  vitamin D (CHOLECALCIFEROL) tablet 2,000 Units, 2,000 Units, Oral, Daily  budesonide (PULMICORT) nebulizer suspension 500 mcg, 0.5 mg, Nebulization, BID  Arformoterol Tartrate (BROVANA) nebulizer solution 15 mcg, 15 mcg, Nebulization, BID      Physical  VITALS:  BP (!) 154/78   Pulse 80   Temp 98 °F (36.7 °C) (Axillary)   Resp 17   Ht 5' 11\" (1.803 m)   Wt 257 lb 15 oz (117 kg)   SpO2 92%   BMI 35.98 kg/m²   CURRENT TEMPERATURE:  Temp: 98 °F (36.7 °C)  CONSTITUTIONAL: No acute distress. EYES: Vision is intact. ENT: No sore throat. No ear drainage. NECK: No JVD. BACK: Symmetric. LUNGS:  rhonchi right base and left base, diminished breath sounds right base and left base  CARDIOVASCULAR:  normal S1 and S2, no S3 and no S4  ABDOMEN:  non-tender  NEUROLOGIC: No focal deficits. EXTREMITIES: No edema cyanosis or clubbing. DATA:        Cardiology Labs:  BMP:    Lab Results   Component Value Date/Time     07/02/2022 04:37 AM    K 3.5 07/02/2022 04:37 AM    K 4.3 06/24/2022 12:27 AM     07/02/2022 04:37 AM    CO2 28 07/02/2022 04:37 AM    BUN 25 07/02/2022 04:37 AM     CBC:    Lab Results   Component Value Date/Time    WBC 19.3 06/30/2022 01:59 AM    RBC 4.72 06/30/2022 01:59 AM    HGB 14.2 06/30/2022 01:59 AM    HCT 44.5 06/30/2022 01:59 AM    MCV 94.3 06/30/2022 01:59 AM    RDW 13.8 06/30/2022 01:59 AM     06/30/2022 01:59 AM     PT/INR:  No results found for: PTINR  TROPONIN:  No components found for: TROP    ASSESSMENT    1.paroxysmal atrial fibrillation. Patient was in sinus rhythm on admission. He went into atrial fibrillation June 28 with rapid ventricular response. This atrial fibrillation started after patient was put on theophylline drip for his respiratory problems. theophylline drip was stopped. He was started on Cardizem drip. Patient converted back to sinus rhythm.   He is now on Cardizem CD and metoprolol. We may consider antiarrhythmic medication like sotalol if he continues to have these episodes of atrial fibrillation down the road. Hopefully patient will convert to sinus rhythm . 2.elevation of high sensitivity troponin. This is mostly demand ischemia from his respiratory problems with type II non-STEMI. EKG is not showing acute ischemic changes. No complaints of chest pain. Pulmonary status is not good and I do not feel patient is suitable for any coronary workup. 3.committee acquired pneumonia/acute exacerbation of COPD. On antibiotics and aerosol treatment. Pulmonary status appears to be his main issue right now. Methotrexate Pregnancy And Lactation Text: This medication is Pregnancy Category X and is known to cause fetal harm. This medication is excreted in breast milk.

## 2025-07-17 NOTE — CARE COORDINATION
12-22-Cm note: I called pt's wife Sergio Dowell about discharge needs for pt, PT/OT are on and OT recommended home care, pt declined, Sergio Dowell wants to make sure her  can ambulate before he is discharged , pt is on a cardizem gtt at this time . Pt has home 02 4l from [address], also has a nebulizer. Sergio Dowell will transport her  home at dc . CM/SS will follow for dc needs .  Electronically signed by Neil Awad RN on 12/22/2020 at 3:12 PM
12/21/2020 1008 CM note: NEGATIVE COVID 12/18/2020. Met with patient for transition of care needs. Pt is [place]. He resides with his wife in 1 story home,2 step entry. PCP is Dr Denisha Lam, uses [employer]. He owns walker and has nebulizer and home o2 at [address] through [facility]. OT recommendations for [facility] reviewed with patient. He declines HHC. Plan is home with wife.  René RUBIO
12/24/2020 1139 CM note: COVID (-) 12/18/2020. Met with patient at the bedside to discuss discharge planning. Discharge plan remains home when medically stable. Patient is agreeable to [facility] if ordered. LVM with [facility] and [facility]- awaiting return calls. WILL NEED [facility] ORDER FOR CPA, MED COMPLIANCE, AND PT/OT EVALS. Patient has home oxygen at 4L from [address]. Family will provide transportation home at discharge. Electronically signed by Lola Pinon RN on 12/24/2020 at 11:43 AM     Update: referral given to [facility], to review. Update: per Larayne Lesch, they can not accept d/t full into next week. Gave referral to [address] [place] [address] liaison- she is reviewing. Update: Per [facility], they can accept this patient and can see on Sunday.
Free 30 day trial card and $10 Co-Pay card for Eliquis explained and given to patient. Patient instructed to take this card with  prescription to retail pharmacy to obtain 30 day supply for free. Instructed patient to follow-up with PCP within 1-2 weeks in order to obtain subsequent prescription for this medication at which time PCP will complete prior authorization if required. Patient verbalized understanding and appriciation.  Electronically signed by Alberto Henderson on 12/23/2020 at 10:27 AM
SS NOTE: Per CM note pt's wife would like a report on pt's ambulation closer to [facility] since she would like to take him home without HHC. Pt has home 02 at [place] [address] from [facility], along with a nebulizer. Wife will transport pt home at [facility] . CM/SS will follow for dc needs . Kashif Dye. 12/23/2020.1:45 PM.
Selin García requested I email her the copy of the IMM letter. This is being done.  Electronically signed by Lucho Morales on 12/24/2020 at 11:08 AM
No

## (undated) DEVICE — PUMP SUC IRR TBNG L10FT W/ HNDPC ASSEMB STRYKEFLOW 2

## (undated) DEVICE — DOUBLE BASIN SET: Brand: MEDLINE INDUSTRIES, INC.

## (undated) DEVICE — MARKER,SKIN,WI/RULER AND LABELS: Brand: MEDLINE

## (undated) DEVICE — PACK SURG LAP CHOLE CUSTOM

## (undated) DEVICE — Z DISCONTINUED NO SUB IDED BAG SPEC RETRV M C240ML MOUTH 7.3MM L17CM SHFT 10MM NYL EZEE

## (undated) DEVICE — APPLIER CLP M/L SHFT DIA5MM 15 LIG LIGAMAX 5

## (undated) DEVICE — [HIGH FLOW INSUFFLATOR,  DO NOT USE IF PACKAGE IS DAMAGED,  KEEP DRY,  KEEP AWAY FROM SUNLIGHT,  PROTECT FROM HEAT AND RADIOACTIVE SOURCES.]: Brand: PNEUMOSURE

## (undated) DEVICE — STANDARD HYPODERMIC NEEDLE,POLYPROPYLENE HUB: Brand: MONOJECT

## (undated) DEVICE — PMI PTFE COATED LAPAROSCOPIC WIRE L-HOOK 33 CM: Brand: PMI

## (undated) DEVICE — TROCAR ENDOSCP L100MM DIA12MM BLDELSS OBT RADLUC STBL SL

## (undated) DEVICE — Z INACTIVE USE 2660664 SOLUTION IRRIG 3000ML 0.9% SOD CHL USP UROMATIC PLAS CONT

## (undated) DEVICE — CONTROL SYRINGE LUER-LOCK TIP: Brand: MONOJECT

## (undated) DEVICE — PATIENT RETURN ELECTRODE, SINGLE-USE, CONTACT QUALITY MONITORING, ADULT, WITH 9FT CORD, FOR PATIENTS WEIGING OVER 33LBS. (15KG): Brand: MEGADYNE

## (undated) DEVICE — PLUMEPORT LAPAROSCOPIC SMOKE FILTRATION DEVICE: Brand: PLUMEPORT ACTIV

## (undated) DEVICE — TROCAR ENDOSCP L100MM DIA5MM BLDELSS STBL SL OBT RADLUC

## (undated) DEVICE — GOWN,SIRUS,NONRNF,SETINSLV,XL,20/CS: Brand: MEDLINE

## (undated) DEVICE — RELOAD STPL SZ 0 L45MM DIA3.5MM 0DEG STD REG TISS BLU TI

## (undated) DEVICE — GLOVE ORANGE PI 7 1/2   MSG9075

## (undated) DEVICE — NDL CNTR 40CT FM MAG: Brand: MEDLINE INDUSTRIES, INC.

## (undated) DEVICE — BLADE ES ELASTOMERIC COAT INSUL DURABLE BEND UPTO 90DEG

## (undated) DEVICE — CUTTER ENDOSCP L340MM LIN ARTC SGL STROKE FIRING ENDOPATH

## (undated) DEVICE — PENCIL ES L3M BTTN SWCH HOLSTER W/ BLDE ELECTRD EDGE

## (undated) DEVICE — CAMERA STRYKER 1488 HD GEN

## (undated) DEVICE — SET ENDO INSTR LAPAROSCOPIC INCISIONAL

## (undated) DEVICE — CHLORAPREP 26ML ORANGE